# Patient Record
Sex: MALE | Race: WHITE | NOT HISPANIC OR LATINO | Employment: OTHER | URBAN - METROPOLITAN AREA
[De-identification: names, ages, dates, MRNs, and addresses within clinical notes are randomized per-mention and may not be internally consistent; named-entity substitution may affect disease eponyms.]

---

## 2017-10-16 ENCOUNTER — GENERIC CONVERSION - ENCOUNTER (OUTPATIENT)
Dept: OTHER | Facility: OTHER | Age: 62
End: 2017-10-16

## 2017-10-16 DIAGNOSIS — I10 ESSENTIAL (PRIMARY) HYPERTENSION: ICD-10-CM

## 2018-01-03 ENCOUNTER — GENERIC CONVERSION - ENCOUNTER (OUTPATIENT)
Dept: INTERNAL MEDICINE CLINIC | Age: 63
End: 2018-01-03

## 2018-01-22 VITALS
DIASTOLIC BLOOD PRESSURE: 86 MMHG | OXYGEN SATURATION: 98 % | WEIGHT: 176.8 LBS | TEMPERATURE: 99.2 F | SYSTOLIC BLOOD PRESSURE: 130 MMHG | BODY MASS INDEX: 27.75 KG/M2 | HEART RATE: 98 BPM | HEIGHT: 67 IN

## 2018-05-30 DIAGNOSIS — I10 HYPERTENSION, UNSPECIFIED TYPE: Primary | ICD-10-CM

## 2018-05-30 RX ORDER — DILTIAZEM HYDROCHLORIDE 180 MG/1
1 CAPSULE, EXTENDED RELEASE ORAL DAILY
COMMUNITY
End: 2018-05-30 | Stop reason: SDUPTHER

## 2018-05-31 RX ORDER — DILTIAZEM HYDROCHLORIDE 180 MG/1
180 CAPSULE, EXTENDED RELEASE ORAL DAILY
Qty: 180 CAPSULE | Refills: 0 | Status: SHIPPED | OUTPATIENT
Start: 2018-05-31 | End: 2018-10-30 | Stop reason: SDUPTHER

## 2018-06-04 LAB
ALBUMIN SERPL-MCNC: 4.6 G/DL (ref 3.6–4.8)
ALBUMIN/GLOB SERPL: 1.9 {RATIO} (ref 1.2–2.2)
ALP SERPL-CCNC: 86 IU/L (ref 39–117)
ALT SERPL-CCNC: 21 IU/L (ref 0–44)
AMBIG ABBREV DEFAULT: NORMAL
AMBIG ABBREV DEFAULT: NORMAL
AST SERPL-CCNC: 25 IU/L (ref 0–40)
BASOPHILS # BLD AUTO: 0.1 X10E3/UL (ref 0–0.2)
BASOPHILS NFR BLD AUTO: 1 %
BILIRUB SERPL-MCNC: 0.3 MG/DL (ref 0–1.2)
BUN SERPL-MCNC: 18 MG/DL (ref 8–27)
BUN/CREAT SERPL: 14 (ref 10–24)
CALCIUM SERPL-MCNC: 9.9 MG/DL (ref 8.6–10.2)
CHLORIDE SERPL-SCNC: 100 MMOL/L (ref 96–106)
CHOLEST SERPL-MCNC: 230 MG/DL (ref 100–199)
CO2 SERPL-SCNC: 26 MMOL/L (ref 18–29)
CREAT SERPL-MCNC: 1.28 MG/DL (ref 0.76–1.27)
EOSINOPHIL # BLD AUTO: 0.2 X10E3/UL (ref 0–0.4)
EOSINOPHIL NFR BLD AUTO: 3 %
ERYTHROCYTE [DISTWIDTH] IN BLOOD BY AUTOMATED COUNT: 14.1 % (ref 12.3–15.4)
GLOBULIN SER-MCNC: 2.4 G/DL (ref 1.5–4.5)
GLUCOSE SERPL-MCNC: 89 MG/DL (ref 65–99)
HCT VFR BLD AUTO: 50.3 % (ref 37.5–51)
HDLC SERPL-MCNC: 63 MG/DL
HGB BLD-MCNC: 16.8 G/DL (ref 13–17.7)
IMM GRANULOCYTES # BLD: 0 X10E3/UL (ref 0–0.1)
IMM GRANULOCYTES NFR BLD: 0 %
LDLC SERPL CALC-MCNC: 147 MG/DL (ref 0–99)
LYMPHOCYTES # BLD AUTO: 2.9 X10E3/UL (ref 0.7–3.1)
LYMPHOCYTES NFR BLD AUTO: 42 %
MCH RBC QN AUTO: 31.1 PG (ref 26.6–33)
MCHC RBC AUTO-ENTMCNC: 33.4 G/DL (ref 31.5–35.7)
MCV RBC AUTO: 93 FL (ref 79–97)
MONOCYTES # BLD AUTO: 0.7 X10E3/UL (ref 0.1–0.9)
MONOCYTES NFR BLD AUTO: 10 %
NEUTROPHILS # BLD AUTO: 3 X10E3/UL (ref 1.4–7)
NEUTROPHILS NFR BLD AUTO: 44 %
PLATELET # BLD AUTO: 315 X10E3/UL (ref 150–379)
POTASSIUM SERPL-SCNC: 4.9 MMOL/L (ref 3.5–5.2)
PROT SERPL-MCNC: 7 G/DL (ref 6–8.5)
RBC # BLD AUTO: 5.41 X10E6/UL (ref 4.14–5.8)
SL AMB EGFR AFRICAN AMERICAN: 69 ML/MIN/1.73
SL AMB EGFR NON AFRICAN AMERICAN: 60 ML/MIN/1.73
SL AMB VLDL CHOLESTEROL CALC: 20 MG/DL (ref 5–40)
SODIUM SERPL-SCNC: 142 MMOL/L (ref 134–144)
TRIGL SERPL-MCNC: 99 MG/DL (ref 0–149)
TSH SERPL DL<=0.005 MIU/L-ACNC: 5.7 UIU/ML (ref 0.45–4.5)
WBC # BLD AUTO: 6.9 X10E3/UL (ref 3.4–10.8)

## 2018-06-13 ENCOUNTER — OFFICE VISIT (OUTPATIENT)
Dept: INTERNAL MEDICINE CLINIC | Age: 63
End: 2018-06-13
Payer: COMMERCIAL

## 2018-06-13 VITALS
BODY MASS INDEX: 29 KG/M2 | SYSTOLIC BLOOD PRESSURE: 126 MMHG | TEMPERATURE: 97.7 F | OXYGEN SATURATION: 97 % | HEIGHT: 67 IN | HEART RATE: 77 BPM | DIASTOLIC BLOOD PRESSURE: 84 MMHG | WEIGHT: 184.8 LBS

## 2018-06-13 DIAGNOSIS — Z12.11 SCREENING FOR COLON CANCER: ICD-10-CM

## 2018-06-13 DIAGNOSIS — E03.8 SUBCLINICAL HYPOTHYROIDISM: ICD-10-CM

## 2018-06-13 DIAGNOSIS — E78.5 DYSLIPIDEMIA: Primary | ICD-10-CM

## 2018-06-13 PROBLEM — I10 BENIGN HYPERTENSION: Status: ACTIVE | Noted: 2017-10-16

## 2018-06-13 PROBLEM — E04.1 THYROID NODULE: Status: ACTIVE | Noted: 2017-11-06

## 2018-06-13 PROBLEM — L30.9 ECZEMA OF BOTH HANDS: Status: ACTIVE | Noted: 2017-10-16

## 2018-06-13 PROBLEM — L30.1 VESICULAR PALMOPLANTAR ECZEMA: Status: ACTIVE | Noted: 2017-10-16

## 2018-06-13 PROBLEM — L91.9 HYPERTROPHIC CONDITION OF SKIN: Status: ACTIVE | Noted: 2017-11-06

## 2018-06-13 PROBLEM — L85.1 ACQUIRED KERATODERMA: Status: ACTIVE | Noted: 2017-11-06

## 2018-06-13 PROBLEM — N40.0 BENIGN PROSTATIC HYPERPLASIA: Status: ACTIVE | Noted: 2017-11-06

## 2018-06-13 PROCEDURE — 3008F BODY MASS INDEX DOCD: CPT | Performed by: PHYSICIAN ASSISTANT

## 2018-06-13 PROCEDURE — 99214 OFFICE O/P EST MOD 30 MIN: CPT | Performed by: PHYSICIAN ASSISTANT

## 2018-06-13 RX ORDER — ASPIRIN 81 MG/1
1 TABLET ORAL DAILY
COMMUNITY

## 2018-06-13 RX ORDER — LEVOTHYROXINE SODIUM 0.05 MG/1
50 TABLET ORAL DAILY
Qty: 30 TABLET | Refills: 4 | Status: SHIPPED | OUTPATIENT
Start: 2018-06-13 | End: 2018-10-30 | Stop reason: SDUPTHER

## 2018-06-13 RX ORDER — BUDESONIDE AND FORMOTEROL FUMARATE DIHYDRATE 160; 4.5 UG/1; UG/1
2 AEROSOL RESPIRATORY (INHALATION) 2 TIMES DAILY
COMMUNITY
Start: 2018-04-09

## 2018-06-13 RX ORDER — MOMETASONE FUROATE 50 UG/1
2 SPRAY, METERED NASAL DAILY
COMMUNITY

## 2018-06-13 RX ORDER — TRIAMCINOLONE ACETONIDE 1 MG/G
1 CREAM TOPICAL 2 TIMES DAILY
COMMUNITY
Start: 2017-10-16 | End: 2019-05-07 | Stop reason: ALTCHOICE

## 2018-06-13 RX ORDER — ALBUTEROL SULFATE 90 UG/1
90 AEROSOL, METERED RESPIRATORY (INHALATION) 2 TIMES DAILY PRN
COMMUNITY

## 2018-06-13 RX ORDER — SIMVASTATIN 10 MG
1 TABLET ORAL DAILY
COMMUNITY
End: 2019-05-07 | Stop reason: SINTOL

## 2018-06-13 RX ORDER — CYCLOSPORINE 0.5 MG/ML
0.05 EMULSION OPHTHALMIC 2 TIMES DAILY
COMMUNITY

## 2018-06-13 RX ORDER — DILTIAZEM HYDROCHLORIDE 180 MG/1
1 CAPSULE, EXTENDED RELEASE ORAL DAILY
COMMUNITY
Start: 2017-10-16 | End: 2018-06-13 | Stop reason: ALTCHOICE

## 2018-06-13 NOTE — PROGRESS NOTES
Assessment/Plan:         Diagnoses and all orders for this visit:    Dyslipidemia  Comments:  resume simvastatin 10mg daily     Orders:  -     Lipid panel; Future  -     AST; Future  -     ALT; Future    Screening for colon cancer  -     Ambulatory referral to Gastroenterology; Future    Subclinical hypothyroidism  Comments:  start sythroid 50mcg daily (take on empty stomach)  discussed proper way to take this   Orders:  -     TSH, 3rd generation; Future  -     levothyroxine 50 mcg tablet; Take 1 tablet (50 mcg total) by mouth daily    Other orders  -     Discontinue: diltiazem (DILACOR XR) 180 MG 24 hr capsule; Take 1 capsule by mouth daily  -     esomeprazole (NEXIUM) 20 mg capsule; Take 1 capsule by mouth daily  -     aspirin (ASPIR-81) 81 mg EC tablet; Take 1 tablet by mouth daily  -     SYMBICORT 160-4 5 MCG/ACT inhaler; Take 2 puffs by mouth 2 (two) times a day  -     mometasone (NASONEX) 50 mcg/act nasal spray; 2 sprays into each nostril daily  -     albuterol (PROAIR HFA) 90 mcg/act inhaler; Inhale 90 mcg 2 (two) times a day as needed  -     cycloSPORINE (RESTASIS) 0 05 % ophthalmic emulsion; Apply 2 46 application to eye 2 (two) times a day  -     Multiple Vitamins-Minerals (CENTRUM SILVER 50+MEN) TABS; Take 1 tablet by mouth daily  -     Ginger, Zingiber officinalis, (GINGER ROOT PO); Take 550 mg by mouth daily  -     simvastatin (ZOCOR) 10 mg tablet; Take 1 tablet by mouth daily  -     triamcinolone (KENALOG) 0 1 % cream; Apply 1 application topically 2 (two) times a day          Subjective:      Patient ID: Archana Donahue is a 58 y o  male      Dyslipidemia - total 230  Pt has not been taking simvastatin  He is concerned with taking this and drinking beer   States he drinks 1 beer / day at max when working however when he is off drinks 3-6 beers  Pt denies problems with past taking simvastatin     tsh elevated - has been historically mildly elevated  Pt does have fatigue but is on shift work  Has had u/s thyroid in past with no nodules    Pt followed by dr Leatha Blake (urology) and dr Jose Mojica (gi)           The following portions of the patient's history were reviewed and updated as appropriate: allergies, current medications, past family history, past medical history, past social history, past surgical history and problem list     Review of Systems   Constitutional: Positive for fatigue  Negative for appetite change, chills and fever  HENT: Negative for congestion, hearing loss and postnasal drip  Eyes: Negative for itching and visual disturbance  Respiratory: Negative for cough, shortness of breath and wheezing  Cardiovascular: Negative for chest pain, palpitations and leg swelling  Gastrointestinal: Negative for abdominal pain, constipation, diarrhea and nausea  Endocrine: Negative for polyuria  Genitourinary: Negative for dysuria, hematuria and urgency  Musculoskeletal: Negative for arthralgias, back pain, gait problem and myalgias  Skin: Negative for rash  Allergic/Immunologic: Negative for environmental allergies  Neurological: Negative for dizziness, speech difficulty, light-headedness and headaches  Hematological: Does not bruise/bleed easily  Psychiatric/Behavioral: Negative for sleep disturbance  The patient is not nervous/anxious            Past Medical History:   Diagnosis Date    Asthma     Last Assessed:10/16/17    Fontanez's esophagus     Colonic polyp     Last Assessed:10/16/17    Eczema     GERD (gastroesophageal reflux disease)     Last Assessed:10/16/17    High cholesterol     Last Assessed:10/16/17    Hypertension     Last Assessed:10/16/17    Non-neoplastic nevus     Last Assessed:11/16/17         Current Outpatient Prescriptions:     albuterol (PROAIR HFA) 90 mcg/act inhaler, Inhale 90 mcg 2 (two) times a day as needed, Disp: , Rfl:     aspirin (ASPIR-81) 81 mg EC tablet, Take 1 tablet by mouth daily, Disp: , Rfl:     cycloSPORINE (RESTASIS) 0 05 % ophthalmic emulsion, Apply 2 04 application to eye 2 (two) times a day, Disp: , Rfl:     diltiazem (DILACOR XR) 180 MG 24 hr capsule, Take 1 capsule (180 mg total) by mouth daily, Disp: 180 capsule, Rfl: 0    esomeprazole (NEXIUM) 20 mg capsule, Take 1 capsule by mouth daily, Disp: , Rfl:     Marleny, Zingiber officinalis, (MARLENY ROOT PO), Take 550 mg by mouth daily, Disp: , Rfl:     mometasone (NASONEX) 50 mcg/act nasal spray, 2 sprays into each nostril daily, Disp: , Rfl:     Multiple Vitamins-Minerals (CENTRUM SILVER 50+MEN) TABS, Take 1 tablet by mouth daily, Disp: , Rfl:     SYMBICORT 160-4 5 MCG/ACT inhaler, Take 2 puffs by mouth 2 (two) times a day, Disp: , Rfl:     triamcinolone (KENALOG) 0 1 % cream, Apply 1 application topically 2 (two) times a day, Disp: , Rfl:     levothyroxine 50 mcg tablet, Take 1 tablet (50 mcg total) by mouth daily, Disp: 30 tablet, Rfl: 4    simvastatin (ZOCOR) 10 mg tablet, Take 1 tablet by mouth daily, Disp: , Rfl:     Allergies   Allergen Reactions    Amoxicillin      Other reaction(s): Diarrhea    Metoprolol      Annotation - [id number]: Aggravtes asthma    Penicillins      Annotation - [id number]: Diarrhea       Social History   Past Surgical History:   Procedure Laterality Date    COLONOSCOPY      10/2013- egd/colon    LYMPHADENECTOMY      Axillary Lymph node removed-benign    TONSILLECTOMY      Last Assessed:10/16/17    VALVE REPLACEMENT      Heart Valve Replacement; Last Assessed:10/16/17     Family History   Problem Relation Age of Onset    Cancer Father     Diabetes Paternal Grandfather        Objective:  /84 (BP Location: Left arm, Patient Position: Sitting, Cuff Size: Standard)   Pulse 77   Temp 97 7 °F (36 5 °C) (Tympanic)   Ht 5' 7" (1 702 m)   Wt 83 8 kg (184 lb 12 8 oz)   SpO2 97%   BMI 28 94 kg/m²        Physical Exam   Constitutional: He is oriented to person, place, and time  He appears well-developed and well-nourished     HENT:   Head: Normocephalic and atraumatic  Mouth/Throat: Oropharynx is clear and moist  No oropharyngeal exudate  Eyes: Conjunctivae and EOM are normal  Pupils are equal, round, and reactive to light  No scleral icterus  Neck: Normal range of motion  Neck supple  Cardiovascular: Normal rate, regular rhythm and normal heart sounds  No murmur heard  Pulmonary/Chest: Effort normal and breath sounds normal  No respiratory distress  He has no wheezes  He exhibits no tenderness  Musculoskeletal: Normal range of motion  He exhibits no edema  Neurological: He is alert and oriented to person, place, and time  No cranial nerve deficit  Skin: Skin is warm and dry  No rash noted  He is not diaphoretic  Psychiatric: He has a normal mood and affect  His behavior is normal  Thought content normal    Vitals reviewed

## 2018-10-08 DIAGNOSIS — E03.8 SUBCLINICAL HYPOTHYROIDISM: ICD-10-CM

## 2018-10-10 RX ORDER — LEVOTHYROXINE SODIUM 0.05 MG/1
TABLET ORAL
Qty: 30 TABLET | Refills: 4 | OUTPATIENT
Start: 2018-10-10

## 2018-10-29 ENCOUNTER — TELEPHONE (OUTPATIENT)
Dept: INTERNAL MEDICINE CLINIC | Age: 63
End: 2018-10-29

## 2018-10-29 LAB
ALT SERPL-CCNC: 21 IU/L (ref 0–44)
AST SERPL-CCNC: 27 IU/L (ref 0–40)
CHOLEST SERPL-MCNC: 273 MG/DL (ref 100–199)
HDLC SERPL-MCNC: 63 MG/DL
LABCORP COMMENT: NORMAL
LDLC SERPL CALC-MCNC: 170 MG/DL (ref 0–99)
SL AMB VLDL CHOLESTEROL CALC: 40 MG/DL (ref 5–40)
TRIGL SERPL-MCNC: 198 MG/DL (ref 0–149)
TSH SERPL DL<=0.005 MIU/L-ACNC: 1.13 UIU/ML (ref 0.45–4.5)

## 2018-10-30 ENCOUNTER — OFFICE VISIT (OUTPATIENT)
Dept: INTERNAL MEDICINE CLINIC | Age: 63
End: 2018-10-30
Payer: COMMERCIAL

## 2018-10-30 VITALS
WEIGHT: 184 LBS | HEART RATE: 77 BPM | BODY MASS INDEX: 28.88 KG/M2 | TEMPERATURE: 97.2 F | DIASTOLIC BLOOD PRESSURE: 70 MMHG | SYSTOLIC BLOOD PRESSURE: 114 MMHG | OXYGEN SATURATION: 96 % | HEIGHT: 67 IN

## 2018-10-30 DIAGNOSIS — R73.03 PREDIABETES: ICD-10-CM

## 2018-10-30 DIAGNOSIS — I10 HYPERTENSION, UNSPECIFIED TYPE: ICD-10-CM

## 2018-10-30 DIAGNOSIS — E03.8 SUBCLINICAL HYPOTHYROIDISM: ICD-10-CM

## 2018-10-30 DIAGNOSIS — Z82.49 FAMILY HISTORY OF CARDIAC DISORDER IN FATHER: ICD-10-CM

## 2018-10-30 DIAGNOSIS — E78.5 DYSLIPIDEMIA: Primary | ICD-10-CM

## 2018-10-30 DIAGNOSIS — Z23 NEED FOR INFLUENZA VACCINATION: ICD-10-CM

## 2018-10-30 PROCEDURE — 3078F DIAST BP <80 MM HG: CPT | Performed by: INTERNAL MEDICINE

## 2018-10-30 PROCEDURE — 1036F TOBACCO NON-USER: CPT | Performed by: INTERNAL MEDICINE

## 2018-10-30 PROCEDURE — 3008F BODY MASS INDEX DOCD: CPT | Performed by: INTERNAL MEDICINE

## 2018-10-30 PROCEDURE — 3074F SYST BP LT 130 MM HG: CPT | Performed by: INTERNAL MEDICINE

## 2018-10-30 PROCEDURE — 90471 IMMUNIZATION ADMIN: CPT

## 2018-10-30 PROCEDURE — 99214 OFFICE O/P EST MOD 30 MIN: CPT | Performed by: INTERNAL MEDICINE

## 2018-10-30 PROCEDURE — 90682 RIV4 VACC RECOMBINANT DNA IM: CPT

## 2018-10-30 RX ORDER — LEVOTHYROXINE SODIUM 0.05 MG/1
50 TABLET ORAL DAILY
Qty: 90 TABLET | Refills: 1 | Status: SHIPPED | OUTPATIENT
Start: 2018-10-30 | End: 2020-05-21

## 2018-10-30 RX ORDER — FOLIC ACID 1 MG/1
1 TABLET ORAL DAILY
Qty: 90 TABLET | Refills: 1 | Status: SHIPPED | OUTPATIENT
Start: 2018-10-30 | End: 2020-06-05

## 2018-10-30 RX ORDER — DILTIAZEM HYDROCHLORIDE 180 MG/1
180 CAPSULE, EXTENDED RELEASE ORAL DAILY
Qty: 90 CAPSULE | Refills: 1 | Status: SHIPPED | OUTPATIENT
Start: 2018-10-30 | End: 2019-05-07 | Stop reason: SDUPTHER

## 2018-10-30 NOTE — PATIENT INSTRUCTIONS
1  Reduce beer intake to one daily  2  Start to take your simvastatin regularly  3  Schedule a stress test to evaluate if you are able to resume long distance running, would recommend 150 minutes of persistent exercise per week  4  Continue to take Dilacor XR for your blood pressure  5  Take folic acid and thiamine daily  6  In regards to your cholesterol, modify your diet with no white starches, no concentrated sweets, no animal fats or at least have these in moderation  7  Complete blood work including hepatic function, renal function, hemoglobin a1c, and fasting glucose    8  Follow-up in 3 months

## 2018-10-30 NOTE — PROGRESS NOTES
Assessment/Plan:    1  Dyslipidemia  Patient was instructed to resume his simvastatin when he was last seen and he has continued to take simvastatin 10 mg although he does not take it regularly  Recent cholesterol was elevated at 273 and LDL was 170  I recommended that he reduce his alcohol intake to 1 beer per day and to take his simvastatin daily  He was given instructions to modify his diet so that he is refraining from white starches, concentrated sweets, and fatty meats  2  Hypothyroidism  TSH on 6/1/18 was elevated at 5 7 and patient was started on levothyroxine 50 mcg daily  He is to continue to take this  3  Hypertension  Blood pressure today in the office was stable at 114/70  He is to continue to take his Dilacor  mg    4  Family history of heart condition  Patient wants to resume vigorous exercise with long distance running  He has previously followed with Dr Elihue Harada of cardiology for chest pain but there were no issues found at that time  I would like him to have a stress test completed given his family history of cardiac conditions and his dyslipidemia  Discussed the patients case today with Dr Celsa Hinson who will contact the patient for a stress test    5  Health maintenance  Encouraged him to get 150 minutes of persistent exercise per week  He received his influenza vaccination today  He is to follow up in 3 months    Greater than 30 minutes were spent today reviewing the patient's lab work and discussing exercise and potential cardiac status with his cardiologist       Diagnoses and all orders for this visit:    Dyslipidemia  -     folic acid (FOLVITE) 1 mg tablet; Take 1 tablet (1 mg total) by mouth daily  -     thiamine 50 MG tablet; Take 1 tablet (50 mg total) by mouth daily  -     Hepatic function panel; Future  -     Renal function panel; Future  -     Glucose, fasting;  Future    Need for influenza vaccination  -     influenza vaccine, [id number], quadrivalent, recombinant, PF, 0 5 mL, for patients 18 yr+ (FLUBLOK)    Subclinical hypothyroidism  Comments:  start sythroid 50mcg daily (take on empty stomach)  discussed proper way to take this   Orders:  -     levothyroxine 50 mcg tablet; Take 1 tablet (50 mcg total) by mouth daily    Hypertension, unspecified type  -     diltiazem (DILACOR XR) 180 MG 24 hr capsule; Take 1 capsule (180 mg total) by mouth daily    Family history of cardiac disorder in father    Prediabetes  -     HEMOGLOBIN A1C W/ EAG ESTIMATION; Future          Subjective:      Patient ID: Joel Powers is a 61 y o  male  Ruben Hinson is a 61year old male who presents today as a 4 month follow-up regarding his dyslipidemia and hypothyroidism  He just recently retired in August this year and has been enjoying himself  He will be looking into starting new activities such as long distance running and possibly volunteering  If he does return to long distance running, he will ease into this  He was to resume his simvastatin but does not take it regularly because he drinks 3 beers per day and has concern for overloading his liver  He has family history of heart issues starting at age 54 and open heart surgery at age 76  He has previously seen Dr Elihue Harada at [facility] for chest pain a few years ago but there were no cardiac issues found  Recent lipid panel showed elevated cholesterol of 273 and elevated LDL of 170  He has annual eye examinations with no new eye problems  He states that he has had many events over the last several years and has been more emotional but denies a want to harm himself  His sleep has improved since he retired  Patient received his influenza vaccination today with no complication             The following portions of the patient's history were reviewed and updated as appropriate: allergies, current medications, past family history, past medical history, past social history, past surgical history and problem list     Review of Systems Constitutional: Negative for activity change, appetite change, chills, diaphoresis, fatigue, fever and unexpected weight change  Still  Drinking 3 drinks a day   HENT: Negative for congestion, dental problem, hearing loss, mouth sores, nosebleeds, postnasal drip, rhinorrhea, sinus pain and sinus pressure  Eyes: Negative  Respiratory: Negative for cough, choking, chest tightness, shortness of breath, wheezing and stridor  Cardiovascular: Negative for chest pain, palpitations and leg swelling  Gastrointestinal: Negative for abdominal distention, abdominal pain, anal bleeding, blood in stool, constipation, diarrhea, nausea, rectal pain and vomiting  Genitourinary: Negative for difficulty urinating, dysuria, frequency, hematuria and urgency  Skin: Negative for color change, pallor, rash and wound  Neurological: Negative for dizziness, speech difficulty, light-headedness, numbness and headaches  Psychiatric/Behavioral: Negative  Negative for agitation, confusion, dysphoric mood, hallucinations, self-injury, sleep disturbance and suicidal ideas  The patient is not nervous/anxious and is not hyperactive  Objective:      /70 (BP Location: Left arm, Patient Position: Sitting, Cuff Size: Standard)   Pulse 77   Temp (!) 97 2 °F (36 2 °C) (Tympanic)   Ht 5' 7" (1 702 m)   Wt 83 5 kg (184 lb)   SpO2 96%   BMI 28 82 kg/m²          Physical Exam   Constitutional: He is oriented to person, place, and time  He appears well-developed and well-nourished  No distress  HENT:   Head: Normocephalic and atraumatic  Eyes: Pupils are equal, round, and reactive to light  Conjunctivae and EOM are normal  Right eye exhibits no discharge  Left eye exhibits no discharge  No scleral icterus  Neck: Normal range of motion  Neck supple  No JVD present  No tracheal deviation present  No thyromegaly present     Cardiovascular: Normal rate, regular rhythm, normal heart sounds and intact distal pulses  No murmur heard  Pulmonary/Chest: Effort normal and breath sounds normal  No stridor  No respiratory distress  He has no wheezes  He has no rales  He exhibits no tenderness  Musculoskeletal: He exhibits no edema, tenderness or deformity  Lymphadenopathy:     He has no cervical adenopathy  Neurological: He is oriented to person, place, and time  He has normal reflexes  He displays normal reflexes  No cranial nerve deficit  He exhibits normal muscle tone  Coordination normal    Skin: Skin is warm and dry  No rash noted  He is not diaphoretic  No erythema  No pallor  Psychiatric: He has a normal mood and affect  His behavior is normal  Judgment and thought content normal          Attestation:   By signing my name below, Bobo BAKER, attest that this documentation has been prepared under the direction and in the presence of Giselle Hernandez MD  Electronically Signed: Kaity Read  10/30/18     Giselle BAKER, personally performed the services described in this documentation  All medical record entries made by the ernstibe were at my direction and in my presence  I have reviewed the chart and discharge instructions and agree that the record reflects my personal performance and is accurate and complete    Giselle Hernandez MD  10/30/18

## 2018-11-28 ENCOUNTER — TELEPHONE (OUTPATIENT)
Dept: INTERNAL MEDICINE CLINIC | Age: 63
End: 2018-11-28

## 2018-11-28 NOTE — TELEPHONE ENCOUNTER
Dr Mercedes Horne from [facility] called about Evonne Hoff JASPER 55  Pt went in for a stress test that Dr Rodgers Cranker told him he should get if he wants to start running again, pt stated that Dr Rodgers Cranker picked up the phone and spoke to Dr Demarco Deleon from [facility] about it and they schedule an appointment for him   Now [facility] needs a script for that stress test      Mario Ojeda Can be reached at [phone]  Fax [phone]

## 2018-11-29 DIAGNOSIS — Z82.49 FAMILY HISTORY OF HEART DISEASE: Primary | ICD-10-CM

## 2019-01-23 LAB
ALBUMIN SERPL-MCNC: 4.1 G/DL (ref 3.6–4.8)
ALP SERPL-CCNC: 90 IU/L (ref 39–117)
ALT SERPL-CCNC: 17 IU/L (ref 0–44)
AST SERPL-CCNC: 25 IU/L (ref 0–40)
BILIRUB DIRECT SERPL-MCNC: 0.12 MG/DL (ref 0–0.4)
BILIRUB SERPL-MCNC: 0.3 MG/DL (ref 0–1.2)
BUN SERPL-MCNC: 18 MG/DL (ref 8–27)
BUN/CREAT SERPL: 15 (ref 10–24)
CALCIUM SERPL-MCNC: 9.3 MG/DL (ref 8.6–10.2)
CHLORIDE SERPL-SCNC: 99 MMOL/L (ref 96–106)
CO2 SERPL-SCNC: 26 MMOL/L (ref 20–29)
CREAT SERPL-MCNC: 1.23 MG/DL (ref 0.76–1.27)
EST. AVERAGE GLUCOSE BLD GHB EST-MCNC: 114 MG/DL
GLUCOSE SERPL-MCNC: 88 MG/DL (ref 65–99)
HBA1C MFR BLD: 5.6 % (ref 4.8–5.6)
LABCORP COMMENT: NORMAL
PHOSPHATE SERPL-MCNC: 3.1 MG/DL (ref 2.5–4.5)
POTASSIUM SERPL-SCNC: 4.9 MMOL/L (ref 3.5–5.2)
PROT SERPL-MCNC: 6.6 G/DL (ref 6–8.5)
SL AMB EGFR AFRICAN AMERICAN: 72 ML/MIN/1.73
SL AMB EGFR NON AFRICAN AMERICAN: 62 ML/MIN/1.73
SODIUM SERPL-SCNC: 139 MMOL/L (ref 134–144)

## 2019-01-30 ENCOUNTER — OFFICE VISIT (OUTPATIENT)
Dept: INTERNAL MEDICINE CLINIC | Age: 64
End: 2019-01-30
Payer: COMMERCIAL

## 2019-01-30 VITALS
OXYGEN SATURATION: 97 % | WEIGHT: 171.4 LBS | BODY MASS INDEX: 26.85 KG/M2 | HEART RATE: 81 BPM | TEMPERATURE: 97.4 F | SYSTOLIC BLOOD PRESSURE: 112 MMHG | DIASTOLIC BLOOD PRESSURE: 70 MMHG

## 2019-01-30 DIAGNOSIS — J45.20 MILD INTERMITTENT ASTHMA WITHOUT COMPLICATION: ICD-10-CM

## 2019-01-30 DIAGNOSIS — E03.9 HYPOTHYROIDISM, UNSPECIFIED TYPE: ICD-10-CM

## 2019-01-30 DIAGNOSIS — I10 ESSENTIAL HYPERTENSION: ICD-10-CM

## 2019-01-30 DIAGNOSIS — Z11.59 ENCOUNTER FOR HEPATITIS C SCREENING TEST FOR LOW RISK PATIENT: ICD-10-CM

## 2019-01-30 DIAGNOSIS — E78.5 DYSLIPIDEMIA: Primary | ICD-10-CM

## 2019-01-30 PROCEDURE — 1036F TOBACCO NON-USER: CPT | Performed by: INTERNAL MEDICINE

## 2019-01-30 PROCEDURE — 3078F DIAST BP <80 MM HG: CPT | Performed by: INTERNAL MEDICINE

## 2019-01-30 PROCEDURE — 99214 OFFICE O/P EST MOD 30 MIN: CPT | Performed by: INTERNAL MEDICINE

## 2019-01-30 PROCEDURE — 3074F SYST BP LT 130 MM HG: CPT | Performed by: INTERNAL MEDICINE

## 2019-01-30 NOTE — PATIENT INSTRUCTIONS
1  He will have a lipid panel drawn prior to his next appointment  2  He will have a TSH and T4 level drawn prior to his next appointment  3  He will have a CMP drawn prior to his next appointment  4  He was advised to use OTC Nexium if he gets any exacerbation of his GERD  5  He will have a Hepatitis C screening completed as he is in the appropriate age range for this  6  He will follow up with us in 3-4 months or as needed

## 2019-01-30 NOTE — PROGRESS NOTES
Assessment/Plan:    1  Dyslipidemia  He states he discontinued taking his simvastatin since he has started exercising  He will have a lipid panel drawn prior to his next appointment  2  Hypothyroidism  He states he discontinued his Levothyroxine since he has started exercising  He will have a TSH and T4 and thyroid antibodies panel drawn prior to his next appointment  3  Hypertension  His BP is stable at 112/70 today at the office  He is doing very well with his lifestyle changes and exercise  He should continue with this lifestyle  4  Prediabetes  His HgbA1C was normal at 5 6 today at the office  He will have a CMP drawn prior to his next appointment  5  GERD  He states his acid reflux has improved very much after decreasing his alcohol intake  He states he uses apple cider vinegar daily to improve this  He was advised to use OTC Nexium if he gets any exacerbation of his GERD  6  Encounter for Hepatitis C screening  He will have a Hepatitis C screening completed as he is in the appropriate age range for this  7  Healthcare Maintenance  He will follow up with us in 3-4 months or as needed  Diagnoses and all orders for this visit:    Dyslipidemia  -     Lipid panel; Future    Encounter for hepatitis C screening test for low risk patient  -     Hepatitis C antibody; Future    Hypothyroidism, unspecified type  -     TSH, 3rd generation with Free T4 reflex; Future  -     T4, free; Future  -     Thyroid Antibodies Panel; Future    Essential hypertension  -     Comprehensive metabolic panel; Future    Mild intermittent asthma without complication          Subjective:      Patient ID: Afshin Starr is a 61 y o  male  Sussy George is a 61year old male who presents today for a 3 month follow up regarding his dyslipidemia, hypothyroidism, and hypertension  He has no acute significant complaints and is doing very well overall  He lost 20 lbs and has made a huge lifestyle change   He is exercising regularly and changed his diet  He decreased his alcohol intake to 1 beer/week  He states when he started exercising, he was retaining a lot of water so he discontinued taking levothyroxine and simvastatin  He reports he had a cold recently with some wheezing but that has improved considerably since that has resolved  He continues to have asthma and gets exacerbated to some chemicals or environmental allergens  He reports he has dry hands  He followed this with his dermatologist who told him that this might be due to some of the soaps and alcohol sanitizers  He has been doing well since using lotion on his hands regularly  He denies any fever, fatigue, congestion, sinus problems, CV problems, GI and aodminal problems, ocular problems, urinary problems, musculoskeletal problems, neurological problems, and psychiatrist problems  The following portions of the patient's history were reviewed and updated as appropriate: allergies, current medications, past family history, past medical history, past social history, past surgical history and problem list     Review of Systems   Constitutional: Negative for appetite change, chills, diaphoresis, fatigue and fever  Unexpected weight change: lost 20 lbs  on diet  HENT: Negative for congestion, ear discharge, ear pain, facial swelling, hearing loss, mouth sores, rhinorrhea, sinus pain, sinus pressure, sneezing and sore throat  Eyes: Negative  Respiratory: Positive for wheezing (better)  Negative for cough, chest tightness and shortness of breath  Cardiovascular: Positive for leg swelling  Negative for chest pain and palpitations  Gastrointestinal: Negative for abdominal pain, blood in stool, constipation, diarrhea, nausea and vomiting  Genitourinary: Negative for hematuria  Musculoskeletal: Negative for arthralgias, back pain, gait problem, joint swelling, myalgias, neck pain and neck stiffness  Skin: Negative           Hands are dry   had mole  removed Allergic/Immunologic: Positive for environmental allergies  Neurological: Negative for dizziness, tremors, seizures, syncope, speech difficulty, weakness, light-headedness, numbness and headaches  Psychiatric/Behavioral: Negative  Objective:      /70 (BP Location: Left arm, Patient Position: Sitting, Cuff Size: Standard)   Pulse 81   Temp (!) 97 4 °F (36 3 °C) (Tympanic)   Wt 77 7 kg (171 lb 6 4 oz) Comment: shoes on  SpO2 97%   BMI 26 85 kg/m²          Physical Exam   Constitutional: He is oriented to person, place, and time  He appears well-developed and well-nourished  No distress  HENT:   Head: Normocephalic and atraumatic  Mouth/Throat: No oropharyngeal exudate  Eyes: Conjunctivae and EOM are normal  Right eye exhibits no discharge  Left eye exhibits no discharge  No scleral icterus  Neck: Normal range of motion  Neck supple  No thyromegaly present  Cardiovascular: Normal rate and regular rhythm  Exam reveals no gallop and no friction rub  No murmur heard  Pulmonary/Chest: Effort normal and breath sounds normal  No respiratory distress  He has no wheezes  He has no rales  Abdominal: Soft  Bowel sounds are normal  He exhibits no distension  There is no tenderness  There is no rebound and no guarding  Musculoskeletal: He exhibits no edema, tenderness or deformity  Neurological: He is alert and oriented to person, place, and time  He has normal reflexes  He displays normal reflexes  No cranial nerve deficit  Coordination normal    Skin: Skin is warm and dry  No rash noted  He is not diaphoretic  No erythema  No pallor  Psychiatric: He has a normal mood and affect  His behavior is normal  Judgment and thought content normal          Scribe Signature and Attestation:  By signing my name below, Blayne Kramer attest that this documentation has been prepared under the direction and in the presence of Dr Ladan Horan MD  Electronically signed: Kaity Roberson  1/30/2019    Provider Attestation:  I, Dr Mayr Webb, personally performed the services described in this documentation  All medical record entries made by the scribe were at my direction and in my presence  I have reviewed the chart and discharge instructions (if applicable) and agree that the record reflects my personal performance and is accurate and complete  Dr Mary Webb MD  1/30/2019

## 2019-05-06 LAB
ALBUMIN SERPL-MCNC: 4.6 G/DL (ref 3.6–4.8)
ALBUMIN/GLOB SERPL: 2.1 {RATIO} (ref 1.2–2.2)
ALP SERPL-CCNC: 77 IU/L (ref 39–117)
ALT SERPL-CCNC: 17 IU/L (ref 0–44)
AST SERPL-CCNC: 24 IU/L (ref 0–40)
BILIRUB SERPL-MCNC: 0.3 MG/DL (ref 0–1.2)
BUN SERPL-MCNC: 17 MG/DL (ref 8–27)
BUN/CREAT SERPL: 15 (ref 10–24)
CALCIUM SERPL-MCNC: 9.2 MG/DL (ref 8.6–10.2)
CHLORIDE SERPL-SCNC: 100 MMOL/L (ref 96–106)
CHOLEST SERPL-MCNC: 208 MG/DL (ref 100–199)
CO2 SERPL-SCNC: 24 MMOL/L (ref 20–29)
CREAT SERPL-MCNC: 1.17 MG/DL (ref 0.76–1.27)
GLOBULIN SER-MCNC: 2.2 G/DL (ref 1.5–4.5)
GLUCOSE SERPL-MCNC: 93 MG/DL (ref 65–99)
HCV AB S/CO SERPL IA: 0.3 S/CO RATIO (ref 0–0.9)
HDLC SERPL-MCNC: 67 MG/DL
LABCORP COMMENT: NORMAL
LDLC SERPL CALC-MCNC: 124 MG/DL (ref 0–99)
POTASSIUM SERPL-SCNC: 4.3 MMOL/L (ref 3.5–5.2)
PROT SERPL-MCNC: 6.8 G/DL (ref 6–8.5)
SL AMB EGFR AFRICAN AMERICAN: 76 ML/MIN/1.73
SL AMB EGFR NON AFRICAN AMERICAN: 66 ML/MIN/1.73
SL AMB VLDL CHOLESTEROL CALC: 17 MG/DL (ref 5–40)
SODIUM SERPL-SCNC: 139 MMOL/L (ref 134–144)
T4 FREE SERPL-MCNC: 0.9 NG/DL (ref 0.82–1.77)
THYROGLOB AB SERPL-ACNC: <1 IU/ML (ref 0–0.9)
THYROPEROXIDASE AB SERPL-ACNC: 10 IU/ML (ref 0–34)
TRIGL SERPL-MCNC: 84 MG/DL (ref 0–149)
TSH SERPL DL<=0.005 MIU/L-ACNC: 2.57 UIU/ML (ref 0.45–4.5)

## 2019-05-07 ENCOUNTER — OFFICE VISIT (OUTPATIENT)
Dept: INTERNAL MEDICINE CLINIC | Age: 64
End: 2019-05-07
Payer: COMMERCIAL

## 2019-05-07 VITALS
BODY MASS INDEX: 26.06 KG/M2 | WEIGHT: 166.4 LBS | TEMPERATURE: 97 F | SYSTOLIC BLOOD PRESSURE: 112 MMHG | DIASTOLIC BLOOD PRESSURE: 76 MMHG | OXYGEN SATURATION: 97 % | HEART RATE: 68 BPM

## 2019-05-07 DIAGNOSIS — E78.5 DYSLIPIDEMIA: Primary | ICD-10-CM

## 2019-05-07 DIAGNOSIS — Z12.11 SCREENING FOR COLON CANCER: ICD-10-CM

## 2019-05-07 DIAGNOSIS — I10 HYPERTENSION, UNSPECIFIED TYPE: ICD-10-CM

## 2019-05-07 DIAGNOSIS — E03.9 HYPOTHYROIDISM, UNSPECIFIED TYPE: ICD-10-CM

## 2019-05-07 PROCEDURE — 99214 OFFICE O/P EST MOD 30 MIN: CPT | Performed by: INTERNAL MEDICINE

## 2019-05-07 PROCEDURE — 3078F DIAST BP <80 MM HG: CPT | Performed by: INTERNAL MEDICINE

## 2019-05-07 PROCEDURE — 1036F TOBACCO NON-USER: CPT | Performed by: INTERNAL MEDICINE

## 2019-05-07 PROCEDURE — 3074F SYST BP LT 130 MM HG: CPT | Performed by: INTERNAL MEDICINE

## 2019-05-07 RX ORDER — DILTIAZEM HYDROCHLORIDE 120 MG/1
120 CAPSULE, EXTENDED RELEASE ORAL DAILY
Qty: 90 CAPSULE | Refills: 1 | Status: SHIPPED | OUTPATIENT
Start: 2019-05-07 | End: 2019-10-08 | Stop reason: SDUPTHER

## 2019-08-29 LAB
ALBUMIN SERPL-MCNC: 4.2 G/DL (ref 3.6–4.8)
ALBUMIN/GLOB SERPL: 1.8 {RATIO} (ref 1.2–2.2)
ALP SERPL-CCNC: 73 IU/L (ref 39–117)
ALT SERPL-CCNC: 15 IU/L (ref 0–44)
AST SERPL-CCNC: 23 IU/L (ref 0–40)
BILIRUB SERPL-MCNC: 0.3 MG/DL (ref 0–1.2)
BUN SERPL-MCNC: 21 MG/DL (ref 8–27)
BUN/CREAT SERPL: 18 (ref 10–24)
CALCIUM SERPL-MCNC: 9.5 MG/DL (ref 8.6–10.2)
CHLORIDE SERPL-SCNC: 101 MMOL/L (ref 96–106)
CHOLEST SERPL-MCNC: 210 MG/DL (ref 100–199)
CO2 SERPL-SCNC: 24 MMOL/L (ref 20–29)
CREAT SERPL-MCNC: 1.19 MG/DL (ref 0.76–1.27)
GLOBULIN SER-MCNC: 2.3 G/DL (ref 1.5–4.5)
GLUCOSE SERPL-MCNC: 86 MG/DL (ref 65–99)
HDLC SERPL-MCNC: 63 MG/DL
LDLC SERPL CALC-MCNC: 131 MG/DL (ref 0–99)
POTASSIUM SERPL-SCNC: 5 MMOL/L (ref 3.5–5.2)
PROT SERPL-MCNC: 6.5 G/DL (ref 6–8.5)
SL AMB EGFR AFRICAN AMERICAN: 74 ML/MIN/1.73
SL AMB EGFR NON AFRICAN AMERICAN: 64 ML/MIN/1.73
SL AMB VLDL CHOLESTEROL CALC: 16 MG/DL (ref 5–40)
SODIUM SERPL-SCNC: 139 MMOL/L (ref 134–144)
TRIGL SERPL-MCNC: 82 MG/DL (ref 0–149)
TSH SERPL DL<=0.005 MIU/L-ACNC: 2.19 UIU/ML (ref 0.45–4.5)

## 2019-09-05 ENCOUNTER — OFFICE VISIT (OUTPATIENT)
Dept: INTERNAL MEDICINE CLINIC | Age: 64
End: 2019-09-05
Payer: COMMERCIAL

## 2019-09-05 VITALS
DIASTOLIC BLOOD PRESSURE: 80 MMHG | TEMPERATURE: 97.1 F | SYSTOLIC BLOOD PRESSURE: 118 MMHG | BODY MASS INDEX: 26.06 KG/M2 | HEART RATE: 72 BPM | WEIGHT: 166.4 LBS | OXYGEN SATURATION: 97 %

## 2019-09-05 DIAGNOSIS — Z12.11 SCREENING FOR COLON CANCER: ICD-10-CM

## 2019-09-05 DIAGNOSIS — E78.5 DYSLIPIDEMIA: ICD-10-CM

## 2019-09-05 DIAGNOSIS — Z86.010 HX OF COLONIC POLYP: Primary | ICD-10-CM

## 2019-09-05 DIAGNOSIS — E78.2 MIXED HYPERLIPIDEMIA: ICD-10-CM

## 2019-09-05 DIAGNOSIS — I10 BENIGN HYPERTENSION: ICD-10-CM

## 2019-09-05 DIAGNOSIS — E03.8 SUBCLINICAL HYPOTHYROIDISM: ICD-10-CM

## 2019-09-05 DIAGNOSIS — L85.1 ACQUIRED KERATODERMA: ICD-10-CM

## 2019-09-05 DIAGNOSIS — E04.1 THYROID NODULE: ICD-10-CM

## 2019-09-05 DIAGNOSIS — N40.0 BENIGN PROSTATIC HYPERPLASIA WITHOUT LOWER URINARY TRACT SYMPTOMS: ICD-10-CM

## 2019-09-05 PROCEDURE — 3079F DIAST BP 80-89 MM HG: CPT | Performed by: INTERNAL MEDICINE

## 2019-09-05 PROCEDURE — 3074F SYST BP LT 130 MM HG: CPT | Performed by: INTERNAL MEDICINE

## 2019-09-05 PROCEDURE — 99213 OFFICE O/P EST LOW 20 MIN: CPT | Performed by: INTERNAL MEDICINE

## 2019-09-05 NOTE — PROGRESS NOTES
Assessment/Plan:    1  Subclinical hypothyroidism  TSH has been stable at approximaetley 2 5  His T4 has remained normal  In addition, he has a thyroid nodule which needs to be evaluated  Thyroid ultrasound ordered  We will recheck his TSH next visit  He will continue to stay off his medication for now  2  Dyslipidemia  His total cholesterol was slightly elevated at 210, triglycerides were normal at 82, HDL was normal at 63, and LDL was slightly elevated at 131 on 8/28/19  He was advised to minimize fatty foods and red meats and increase natural foods in his diet  Instead, he should eat lean meats, fatty fish, and flax seed in his diet  He was encouraged to minimize his white starches, carbohydrates, and concentrated sugars from his diet  We will recheck lipids on his diet  3  Colonoscopy  Patient will schedule colonoscopy and upper GI with his gastroenterologist    4  Hypertension  His BP is well controlled at 118/80 today at the office  Pt should continue on his medications  5  Prostate  He is being followed by his doctor  He does not seem to be having any issues  He was advised to drink electrolyte drinks to make sure he is hydrated  6  BMI  His BMI was 26 06 today in the office  7  Healthcare Maintenance  His Hepatitis C antibody was negative  He will follow up with us in 4 months or as needed  Diagnoses and all orders for this visit:    Hx of colonic polyp  -     Ambulatory referral to Gastroenterology; Future    Screening for colon cancer    Subclinical hypothyroidism    Thyroid nodule  -     US thyroid; Future    Benign hypertension    Acquired keratoderma    Dyslipidemia    Mixed hyperlipidemia          Subjective:      Patient ID: Tiffanie Antonio is a 59 y o  male  Tiffanie Antonio is a 59 y o  Male presented in the office for a 4 m f/u  Patient has subclinical hypothyroidism and hyperlipidemia   He is asymptomatic from these problems, Pt states he limits his alcohol to two beers a day from a couple packs  Patient has Barrets esophagitis therefore will need follow up endoscopy in the near future  Patient had colonic polyps and needs a colonoscopy within three years of his last exam  Patient has known thyroid nodules which he states on his last ultrasound, he was told "it was fine "     Patients lab results were reviewed and discussed with the patient  The following portions of the patient's history were reviewed and updated as appropriate: allergies, current medications, past family history, past medical history, past social history, past surgical history and problem list     Review of Systems   Constitutional: Negative  Negative for activity change, appetite change, chills, diaphoresis, fatigue, fever and unexpected weight change  HENT: Positive for postnasal drip and rhinorrhea  Negative for congestion, drooling, ear discharge, ear pain, mouth sores, nosebleeds, sinus pressure, sinus pain, sneezing and sore throat  Eyes: Negative for pain, discharge, redness and itching  Respiratory: Negative for apnea, cough, choking, chest tightness, shortness of breath and stridor  Cardiovascular: Negative for palpitations and leg swelling  Gastrointestinal: Negative for abdominal distention, abdominal pain, blood in stool, constipation, nausea, rectal pain and vomiting  Genitourinary: Negative for difficulty urinating, dysuria, frequency, testicular pain and urgency  Musculoskeletal: Negative for arthralgias, back pain, gait problem, joint swelling, myalgias, neck pain and neck stiffness  Skin: Negative  Allergic/Immunologic: Positive for environmental allergies  Neurological: Negative for dizziness, seizures, syncope, speech difficulty, weakness, light-headedness, numbness and headaches  Hematological: Negative for adenopathy  Does not bruise/bleed easily     Psychiatric/Behavioral: Negative for agitation, behavioral problems, decreased concentration, hallucinations, self-injury and suicidal ideas  All other systems reviewed and are negative  Objective:      /80 (BP Location: Left arm, Patient Position: Sitting, Cuff Size: Standard)   Pulse 72   Temp (!) 97 1 °F (36 2 °C) (Tympanic)   Wt 75 5 kg (166 lb 6 4 oz)   SpO2 97%   BMI 26 06 kg/m²          Physical Exam   Constitutional: He is oriented to person, place, and time  He appears well-developed and well-nourished  No distress  HENT:   Head: Normocephalic and atraumatic  Nose: Nose normal    Mouth/Throat: Oropharynx is clear and moist  No oropharyngeal exudate  Eyes: EOM are normal  Right eye exhibits no discharge  Left eye exhibits no discharge  No scleral icterus  Neck: Normal range of motion  Neck supple  No JVD present  No thyromegaly present  Cardiovascular: Normal rate, regular rhythm and normal heart sounds  Exam reveals no friction rub  No murmur heard  Pulmonary/Chest: Effort normal and breath sounds normal  He has no wheezes  He exhibits no tenderness  Hx  Of asthma   Abdominal: Soft  Bowel sounds are normal  He exhibits no distension and no mass  There is no tenderness  There is no guarding  Musculoskeletal: Normal range of motion  He exhibits no edema, tenderness or deformity  Neurological: He is alert and oriented to person, place, and time  He displays normal reflexes  He exhibits normal muscle tone  Skin: Skin is warm and dry  No rash noted  He is not diaphoretic  No erythema  Psychiatric: He has a normal mood and affect  His behavior is normal  Judgment and thought content normal          Attestation:   By signing my name below, Jenniferluis fernando Haynes, attest that this documentation has been prepared under the direction and in the presence of Keshia Desai MD  Electronically Signed: Edith Freitas  8/27/19      I, Keshia Desai, personally performed the services described in this documentation  All medical record entries made by the edith were at my direction and in my presence   I have reviewed the chart and discharge instructions and agree that the record reflects my personal performance and is accurate and complete   Price Alvarado MD  8/27/19

## 2019-09-05 NOTE — PATIENT INSTRUCTIONS
1  Schedule colonscopy and upper GI endoscopy  2  Schedule appointment with GI, Dr Paul Mendoza  3  Complete all blood work by next visit  4  Stay on same medications  5  Continue to minimize fatty foods and red meats and increase natural foods in his diet  6  Instead, he should eat lean meats, fatty fish, and flax seed in his diet  7  Minimize his white starches, carbohydrates, and concentrated sugars from his diet  8  Follow up in 4 months or as needed  Cholesterol and Your Health   GENERAL INFORMATION:   What is cholesterol? Cholesterol is a waxy, fat-like substance that is made by your body  Cholesterol also comes from the foods you eat  Your body needs this fat to work properly, but high levels of cholesterol can lead to health problems  What are the risks of unhealthy cholesterol levels? Cholesterol can build up in your arteries and form plaque  As plaque builds up, your arteries become narrow, and less blood flows through  When plaque decreases blood flow to your heart, you may have chest pain  If plaque completely blocks an artery that carries blood to your heart, you may have a heart attack  Plaque buildup can also increase your risk of a stroke  How are unhealthy levels of cholesterol managed? Your healthcare provider will use blood tests to measure your levels of triglycerides, LDL (bad cholesterol), and HDL (good cholesterol) He will ask if you or your family members have other health conditions, such as diabetes, high blood pressure, or thyroid disease  He will also ask if you smoke  He will recommend treatments to help to decrease your risk of heart disease, heart attack, and stroke  Treatment includes changes to the foods you eat and lifestyle changes  You may also need medicine to decrease your cholesterol levels  How does food affect my cholesterol levels? · Unhealthy fats increase LDL cholesterol levels in your blood   These fats are found in foods that are high in cholesterol, saturated fat, and trans fat  ? Cholesterol is found in eggs, dairy, and meat  ? Saturated fat is found in butter, cheese, ice cream, whole milk, and coconut oil  Saturated fat is also found in meat, such as sausage, hot dogs, and bologna  ? Trans fat is found in liquid vegetable oils and is used in fried and baked foods  Foods that contain trans fats include chips, crackers, muffins, sweet rolls, microwave popcorn, and cookies  · Healthy fats, also called unsaturated fats, help lower LDL cholesterol and triglyceride levels  ? Monounsaturated fats are found in foods such as olive oil, canola oil, avocado, nuts, and olives  ? Polyunsaturated fats, such as omega 3 fats, help to decrease triglyceride levels  Omega 3 fats are found in fish, such as salmon, trout, and tuna  They can also be found in plant foods such as flaxseed, walnuts, and soybeans  What changes can I make to the foods I eat? · Decrease the total amount of fat you eat  Choose lean meats, fat-free or 1% fat milk, and low-fat dairy products, such as yogurt and cheese  · Replace unhealthy fats with healthy fats  Cook foods in olive oil or canola oil  Choose soft margarines that are low in saturated fat and have little or no trans fat  · Include fish in your diet  Eat 2 servings of fish each week  One serving is about 4 ounces  Fish is a good source of healthy omega 3 fats  Hyder and canned light tuna are fish with low levels of mercury  Children and pregnant women should not eat fish that have high levels of mercury, such as shark, swordfish, and arthur mackerel  · Eat a variety of fruits and vegetables  Include dark-colored fruits and vegetables such as peaches, berries, spinach, and carrots  They are good sources of vitamins that are important for good health    · Eat more fiber  Choose whole grain, high-fiber foods to decrease LDL cholesterol   Good choices include legumes, oats, apples, and brussel sprouts  Oranges, carrots, and whole-wheat breads or cereals are also good choices  What lifestyle changes can I make to improve my cholesterol levels? · Maintain a healthy weight  Ask your healthcare provider how much you should weigh  Ask him to help you create a weight loss plan if you are overweight  Weight loss can decrease your total cholesterol and triglyceride levels  Decrease the amount of calories you eat by 500 calories a day to help you lose weight  You can decrease calories by eating smaller portions for each meal and eating fewer high-calorie foods  · Exercise regularly to lower your cholesterol levels and maintain a healthy weight  Get 30 minutes or more of moderate exercise 5 days each week  Include muscle strengthening activities 2 days each week, such as push-ups, sit-ups, and lifting weights  To lose weight, get at least 60 minutes of exercise on most days of the week  Work with your healthcare provider to plan the best exercise program for you  CARE AGREEMENT:   You have the right to help plan your care  Discuss treatment options with your caregivers to decide what care you want to receive  You always have the right to refuse treatment  The above information is an  only  It is not intended as medical advice for individual conditions or treatments  Talk to your doctor, nurse or pharmacist before following any medical regimen to see if it is safe and effective for you  © 2014 [address] is for End User's use only and may not be sold, redistributed or otherwise used for commercial purposes  All illustrations and images included in CareNotes® are the copyrighted property of [employer]  or [employer]

## 2019-09-19 ENCOUNTER — OFFICE VISIT (OUTPATIENT)
Dept: INTERNAL MEDICINE CLINIC | Age: 64
End: 2019-09-19
Payer: COMMERCIAL

## 2019-09-19 ENCOUNTER — HOSPITAL ENCOUNTER (OUTPATIENT)
Dept: ULTRASOUND IMAGING | Facility: HOSPITAL | Age: 64
Discharge: HOME | End: 2019-09-19
Payer: COMMERCIAL

## 2019-09-19 VITALS
DIASTOLIC BLOOD PRESSURE: 84 MMHG | SYSTOLIC BLOOD PRESSURE: 128 MMHG | WEIGHT: 168.4 LBS | BODY MASS INDEX: 26.38 KG/M2 | HEART RATE: 68 BPM | OXYGEN SATURATION: 98 % | TEMPERATURE: 96.6 F

## 2019-09-19 DIAGNOSIS — S30.811A ABRASION OF FLANK, INITIAL ENCOUNTER: Primary | ICD-10-CM

## 2019-09-19 DIAGNOSIS — N40.1 BENIGN PROSTATIC HYPERPLASIA (BPH) WITH STRAINING ON URINATION: ICD-10-CM

## 2019-09-19 DIAGNOSIS — R10.9 RIGHT FLANK PAIN: ICD-10-CM

## 2019-09-19 DIAGNOSIS — R39.9 UTI SYMPTOMS: ICD-10-CM

## 2019-09-19 DIAGNOSIS — R39.16 BENIGN PROSTATIC HYPERPLASIA (BPH) WITH STRAINING ON URINATION: ICD-10-CM

## 2019-09-19 DIAGNOSIS — E66.3 OVERWEIGHT (BMI 25.0-29.9): ICD-10-CM

## 2019-09-19 DIAGNOSIS — E04.1 THYROID NODULE: ICD-10-CM

## 2019-09-19 DIAGNOSIS — N39.43 DRIBBLING FOLLOWING URINATION: ICD-10-CM

## 2019-09-19 PROCEDURE — 99214 OFFICE O/P EST MOD 30 MIN: CPT | Performed by: INTERNAL MEDICINE

## 2019-09-19 PROCEDURE — 76536 US EXAM OF HEAD AND NECK: CPT

## 2019-09-19 NOTE — PROGRESS NOTES
Assessment/Plan:    Abrasion of right flank  - patient should keep the abrasion clean and dry  - he may use over-the-counter Tylenol for his pain    BPH  - patient's symptoms are concerning for BPH versus UTI versus kidney stone  -patient follows up with his urologist  - will check a PSA, free and total  - patient has been counseled to follow up with his urologist    UTI symptoms  - will check urinalysis, with microscopy and reflex culture and sensitivity  -will hold off on giving patient antibiotics till we confirm that there is an infection since his symptoms may also be secondary to BPH  - patient has been counseled to keep himself well hydrated  - will call patient with results    Overweight  - diet and exercise counseling given    BMI Counseling: Body mass index is 26 38 kg/m²  The BMI is above normal  Nutrition recommendations include consuming healthier snacks and moderation in carbohydrate intake  Exercise recommendations include moderate aerobic physical activity for 150 minutes/week  Diagnoses and all orders for this visit:    Abrasion of flank, initial encounter    Right flank pain  -     UA w Reflex to Microscopic w Reflex to Culture - Clinic Collect    UTI symptoms  -     UA w Reflex to Microscopic w Reflex to Culture - [place] following urination  -     PSA, total and free; Future    Benign prostatic hyperplasia (BPH) with straining on urination  -     PSA, total and free; Future    Overweight (BMI 25 0-29  9)          Subjective:      Patient ID: Jonh Hairston is a 59 y o  male  HPI  Patient presents with complaints of pain around his right flank for the past couple of days  He states that he might have scratched his back accidentally on 2 occasions in the past few days  He has not been using any medications for pain  He describes the pain as dull and grades it as 7/10 when pressure is applied  Pain is nonradiating    Pain is also worse when he lies down on his right side   He also admits to difficulty urinating, urinary frequency, small volume urine sometimes, and cloudy urine on 1 occasion recently but denies hematuria, dysuria, suprapubic pain, nausea, vomiting, abdominal pain, diarrhea, constipation  Of note, patient follows up with a urologist and states that he saw the urologist recently and his PSA was normal   He denies a family history of prostate hypertrophy and cancer and denies a personal and family history of kidney stones  Of note, patient states that sometimes he does not drink enough water and he spends some time in the sauna and he feels that he gets dehydrated sometimes  The following portions of the patient's history were reviewed and updated as appropriate:   He  has a past medical history of Asthma, Fontanez's esophagus, Colonic polyp, Eczema, GERD (gastroesophageal reflux disease), High cholesterol, Hypertension, and Non-neoplastic nevus  He   Patient Active Problem List    Diagnosis Date Noted    Right flank pain 09/19/2019    Abrasion of flank 09/19/2019    Overweight (BMI 25 0-29 9) 09/19/2019    Screening for colon cancer 06/13/2018    Acquired keratoderma 11/06/2017    Benign prostatic hyperplasia 11/06/2017    Dyslipidemia 11/06/2017    Hyperlipidemia 11/06/2017    Hypertrophic condition of skin 11/06/2017    Subclinical hypothyroidism 11/06/2017    Thyroid nodule 11/06/2017    Benign hypertension 10/16/2017    Vesicular palmoplantar eczema 10/16/2017    Eczema of both hands 10/16/2017     He  has a past surgical history that includes Lymphadenectomy; Colonoscopy; Valve replacement; and TONSILLECTOMY  His family history includes Cancer in his father; Diabetes in his paternal grandfather  He  reports that he quit smoking about 31 years ago  His smoking use included cigarettes  He has a 14 00 pack-year smoking history  He quit smokeless tobacco use about 34 years ago  His smokeless tobacco use included chew   He reports that he drinks alcohol  He reports that he does not use drugs  Current Outpatient Medications   Medication Sig Dispense Refill    albuterol (PROAIR HFA) 90 mcg/act inhaler Inhale 90 mcg 2 (two) times a day as needed      aspirin (ASPIR-81) 81 mg EC tablet Take 1 tablet by mouth daily      cycloSPORINE (RESTASIS) 0 05 % ophthalmic emulsion Apply 1 53 application to eye 2 (two) times a day      diltiazem (DILACOR XR) 120 MG 24 hr capsule Take 1 capsule (120 mg total) by mouth daily 90 capsule 1    folic acid (FOLVITE) 1 mg tablet Take 1 tablet (1 mg total) by mouth daily 90 tablet 1    mometasone (NASONEX) 50 mcg/act nasal spray 2 sprays into each nostril daily      Multiple Vitamins-Minerals (CENTRUM SILVER 50+MEN) TABS Take 1 tablet by mouth daily      SYMBICORT 160-4 5 MCG/ACT inhaler Take 2 puffs by mouth 2 (two) times a day      thiamine 50 MG tablet Take 1 tablet (50 mg total) by mouth daily 90 tablet 1    levothyroxine 50 mcg tablet Take 1 tablet (50 mcg total) by mouth daily (Patient not taking: Reported on 5/7/2019) 90 tablet 1     No current facility-administered medications for this visit        Current Outpatient Medications on File Prior to Visit   Medication Sig    albuterol (PROAIR HFA) 90 mcg/act inhaler Inhale 90 mcg 2 (two) times a day as needed    aspirin (ASPIR-81) 81 mg EC tablet Take 1 tablet by mouth daily    cycloSPORINE (RESTASIS) 0 05 % ophthalmic emulsion Apply 3 10 application to eye 2 (two) times a day    diltiazem (DILACOR XR) 120 MG 24 hr capsule Take 1 capsule (120 mg total) by mouth daily    folic acid (FOLVITE) 1 mg tablet Take 1 tablet (1 mg total) by mouth daily    mometasone (NASONEX) 50 mcg/act nasal spray 2 sprays into each nostril daily    Multiple Vitamins-Minerals (CENTRUM SILVER 50+MEN) TABS Take 1 tablet by mouth daily    SYMBICORT 160-4 5 MCG/ACT inhaler Take 2 puffs by mouth 2 (two) times a day    thiamine 50 MG tablet Take 1 tablet (50 mg total) by mouth daily  levothyroxine 50 mcg tablet Take 1 tablet (50 mcg total) by mouth daily (Patient not taking: Reported on 5/7/2019)     No current facility-administered medications on file prior to visit  He is allergic to amoxicillin; metoprolol; and penicillins       Review of Systems   Constitutional: Negative for activity change, chills, fatigue, fever and unexpected weight change  HENT: Negative for ear pain, postnasal drip, rhinorrhea, sinus pressure and sore throat  Eyes: Negative for pain  Respiratory: Negative for cough, choking, chest tightness, shortness of breath and wheezing  Cardiovascular: Negative for chest pain, palpitations and leg swelling  Gastrointestinal: Positive for constipation  Negative for abdominal pain, diarrhea, nausea and vomiting  Genitourinary: Positive for decreased urine volume, difficulty urinating, flank pain and frequency  Negative for dysuria and hematuria  Musculoskeletal: Positive for back pain  Negative for arthralgias, gait problem, joint swelling, myalgias and neck stiffness  Skin: Negative for pallor and rash  Neurological: Negative for dizziness, tremors, seizures, syncope, light-headedness and headaches  Hematological: Negative for adenopathy  Psychiatric/Behavioral: Negative for behavioral problems           Past Medical History:   Diagnosis Date    Asthma     Last Assessed:10/16/17    Fontanez's esophagus     Colonic polyp     Last Assessed:10/16/17    Eczema     GERD (gastroesophageal reflux disease)     Last Assessed:10/16/17    High cholesterol     Last Assessed:10/16/17    Hypertension     Last Assessed:10/16/17    Non-neoplastic nevus     Last Assessed:11/16/17         Current Outpatient Medications:     albuterol (PROAIR HFA) 90 mcg/act inhaler, Inhale 90 mcg 2 (two) times a day as needed, Disp: , Rfl:     aspirin (ASPIR-81) 81 mg EC tablet, Take 1 tablet by mouth daily, Disp: , Rfl:     cycloSPORINE (RESTASIS) 0 05 % ophthalmic emulsion, Apply 8 53 application to eye 2 (two) times a day, Disp: , Rfl:     diltiazem (DILACOR XR) 120 MG 24 hr capsule, Take 1 capsule (120 mg total) by mouth daily, Disp: 90 capsule, Rfl: 1    folic acid (FOLVITE) 1 mg tablet, Take 1 tablet (1 mg total) by mouth daily, Disp: 90 tablet, Rfl: 1    mometasone (NASONEX) 50 mcg/act nasal spray, 2 sprays into each nostril daily, Disp: , Rfl:     Multiple Vitamins-Minerals (CENTRUM SILVER 50+MEN) TABS, Take 1 tablet by mouth daily, Disp: , Rfl:     SYMBICORT 160-4 5 MCG/ACT inhaler, Take 2 puffs by mouth 2 (two) times a day, Disp: , Rfl:     thiamine 50 MG tablet, Take 1 tablet (50 mg total) by mouth daily, Disp: 90 tablet, Rfl: 1    levothyroxine 50 mcg tablet, Take 1 tablet (50 mcg total) by mouth daily (Patient not taking: Reported on 5/7/2019), Disp: 90 tablet, Rfl: 1    Allergies   Allergen Reactions    Amoxicillin      Other reaction(s): Diarrhea    Metoprolol      Annotation - [id number]: Aggravtes asthma    Penicillins      Annotation - [id number]: Diarrhea       Social History   Past Surgical History:   Procedure Laterality Date    COLONOSCOPY      10/2013- egd/colon    LYMPHADENECTOMY      Axillary Lymph node removed-benign    TONSILLECTOMY      Last Assessed:10/16/17    VALVE REPLACEMENT      Heart Valve Replacement; Last Assessed:10/16/17     Family History   Problem Relation Age of Onset    Cancer Father         groin, bone    Diabetes Paternal Grandfather        Objective:  /84 (BP Location: Left arm, Patient Position: Sitting, Cuff Size: Standard)   Pulse 68   Temp (!) 96 6 °F (35 9 °C) (Tympanic)   Wt 76 4 kg (168 lb 6 4 oz)   SpO2 98%   BMI 26 38 kg/m²        Physical Exam   Constitutional: He is oriented to person, place, and time  He appears well-developed and well-nourished  No distress  HENT:   Head: Normocephalic and atraumatic     Right Ear: External ear normal    Left Ear: External ear normal    Nose: Nose normal    Mouth/Throat: No oropharyngeal exudate  Very dry mucous membranes   Eyes: Pupils are equal, round, and reactive to light  Conjunctivae and EOM are normal  Right eye exhibits no discharge  Left eye exhibits no discharge  No scleral icterus  Neck: Normal range of motion  Neck supple  No JVD present  No tracheal deviation present  No thyromegaly present  Cardiovascular: Normal rate, regular rhythm, normal heart sounds and intact distal pulses  Exam reveals no gallop and no friction rub  No murmur heard  Pulmonary/Chest: Effort normal and breath sounds normal  No respiratory distress  He has no wheezes  He has no rales  He exhibits no tenderness  Abdominal: Soft  Bowel sounds are normal  He exhibits no distension and no mass  There is no tenderness  There is no rebound and no guarding  Musculoskeletal: Normal range of motion  He exhibits no edema, tenderness or deformity  Lymphadenopathy:     He has no cervical adenopathy  Neurological: He is alert and oriented to person, place, and time  He has normal reflexes  No cranial nerve deficit  He exhibits normal muscle tone  Coordination normal    Skin: Skin is warm and dry  No rash noted  He is not diaphoretic  No erythema  No pallor  There is an Abrasion on patient's right flank that measures about 3 5 cm, erythematous and tender to touch  Psychiatric: He has a normal mood and affect   His behavior is normal

## 2019-09-19 NOTE — PATIENT INSTRUCTIONS
Benign Prostatic Hypertrophy   WHAT YOU NEED TO KNOW:   What is benign prostatic hypertrophy? Benign prostatic hypertrophy (BPH) is a condition that causes your prostate gland to grow larger than normal  The prostate gland is the male sex gland that produces a fluid that is part of semen  It is about the size of a walnut and it is located under the bladder  As the prostate grows, it can squeeze the urethra  This can block urine flow and cause urinary problems  What causes BPH? It is not known what causes BPH  It may be just part of getting older  BPH is very common in men over 39years of age, but rarely causes problems before age 61  Some healthcare providers believe it is caused by a change in hormone levels as men get older  What are the signs and symptoms of BPH? · Feeling like you have not emptied your bladder    · Feeling the need to urinate right away     · Urine does not flow right away when you start to urinate or stops and starts again    · Frequent urination, especially at night    · Weak urine stream    · Blood in your urine  How is BPH diagnosed? · Blood tests:  Blood tests such as prostate specific antigen (PSA) measurement may be done  The amount of PSA in your blood may go up if you have BPH  · Cystoscopy: A cystoscopy allows caregivers to look for problems inside your bladder  A cystoscope is put into your bladder through your urethra  The urethra is the tube that urine flows through when you urinate  The cystoscope is a long tube with a lens and a light on the end  The scope may be hooked to a camera or monitor, and pictures may be taken  A tissue sample may also be taken during your cystoscopy  During this test, small tumors may be removed or bleeding may be stopped       · Digital rectal examination:  Your healthcare provider will use his finger to feel if your prostate is larger than normal      · Prostate biopsy:  A small piece of the prostate is removed and sent to a lab for tests      · Transrectal ultrasound:  Sound waves are used to show pictures of your prostate on a monitor  · Urine tests:     ¨ Urine sample: For this test you need to urinate into a small container  You will be given instructions on how to clean your genital area before you urinate  Do not touch the inside of the cup  Follow instructions on where to place the cup of urine when you are done  ¨ Residual urine measurement:  Healthcare providers may use a catheter to measure how much urine is left in your bladder after you urinate  ¨ Flow rate recording: This is a test that measures the speed of your urine stream   How is BPH treated? · Medicines:      ¨ Alpha blockers: This medicine relaxes the muscles in your prostate and bladder  It may help you urinate more easily  ¨ 5 alpha reductase inhibitors: These medicines block the production of a hormone that causes the prostate to get larger  It may help to slow the growth of the prostate or shrink the prostate  · Stent:  A stent is a short, tiny mesh tube that is put into the urethra to hold it open  · Surgery: You may need surgery to remove your prostate  · Other procedures: The prostate may be made smaller by a procedure such as a transurethral needle ablation (TUNA) or microwave heat treatment  You may also have a laser procedure called interstitial laser coagulation (ILC) to remove the prostate  What are the risks of BPH?   · Surgery to remove your prostate may cause you to bleed more than expected or get an infection  You may also have trouble controlling your bladder  This may cause you to leak urine  You may also have sudden strong urges to urinate that make it hard for you to hold your urine  · Without treatment, you may have other health problems  If urine stays in your bladder too long, you may develop a urinary infection or bladder stones   Small blood vessels in the bladder and prostate may start to bleed because you strain more when you urinate  The urethra may suddenly become blocked off, making it even harder to urinate  Over time, your kidneys can be damaged as urine backs up from the bladder and into the kidneys  How can I manage BPH?   · Do not let your bladder get too full before you empty it  Urinate when you feel the urge  · Limit alcohol  Do not drink large amounts of any liquid at one time  · Decrease the amount of salt you eat  Examples of salty foods are chips, cured meats, and canned soups  Do not use table salt  · Healthcare providers may tell you not to eat spicy foods such as chilli peppers  This may help you find out if spicy food makes your BPH symptoms worse  · You may have sex if you feel well  When should I contact my healthcare provider? · There is a large amount of blood in your urine  · Your signs and symptoms get worse  · You have a fever  · You have questions or concerns about your condition or care  When should I seek immediate care? · You are unable to urinate  · Your bladder feels very full and painful  CARE AGREEMENT:   You have the right to help plan your care  Learn about your health condition and how it may be treated  Discuss treatment options with your caregivers to decide what care you want to receive  You always have the right to refuse treatment  The above information is an  only  It is not intended as medical advice for individual conditions or treatments  Talk to your doctor, nurse or pharmacist before following any medical regimen to see if it is safe and effective for you  © 2017 [employer] Information is for End User's use only and may not be sold, redistributed or otherwise used for commercial purposes  All illustrations and images included in CareNotes® are the copyrighted property of [employer]  or [employer]

## 2019-09-20 ENCOUNTER — TELEPHONE (OUTPATIENT)
Dept: INTERNAL MEDICINE CLINIC | Age: 64
End: 2019-09-20

## 2019-09-20 LAB
APPEARANCE UR: CLEAR
BACTERIA URNS QL MICRO: NORMAL
BILIRUB UR QL STRIP: NEGATIVE
COLOR UR: YELLOW
EPI CELLS #/AREA URNS HPF: NORMAL /HPF (ref 0–10)
GLUCOSE UR QL: NEGATIVE
HGB UR QL STRIP: NEGATIVE
KETONES UR QL STRIP: NEGATIVE
LEUKOCYTE ESTERASE UR QL STRIP: NEGATIVE
MICRO URNS: NORMAL
MICRO URNS: NORMAL
NITRITE UR QL STRIP: NEGATIVE
PH UR STRIP: 6 [PH] (ref 5–7.5)
PROT UR QL STRIP: NEGATIVE
PSA FREE MFR SERPL: 32 %
PSA FREE SERPL-MCNC: 0.16 NG/ML
PSA SERPL-MCNC: 0.5 NG/ML (ref 0–4)
RBC #/AREA URNS HPF: NORMAL /HPF (ref 0–2)
SL AMB URINALYSIS REFLEX: NORMAL
SP GR UR: 1.01 (ref 1–1.03)
UROBILINOGEN UR STRIP-ACNC: 0.2 MG/DL (ref 0.2–1)
WBC #/AREA URNS HPF: NORMAL /HPF (ref 0–5)

## 2019-09-20 NOTE — TELEPHONE ENCOUNTER
I discussed pt's lab results with him  He states that his flank pain is much improved now but was bad in the morning  He will follow up  next week for a possible CT scan if his symptoms continue

## 2019-10-08 DIAGNOSIS — I10 HYPERTENSION, UNSPECIFIED TYPE: ICD-10-CM

## 2019-10-09 RX ORDER — DILTIAZEM HYDROCHLORIDE 120 MG/1
CAPSULE, EXTENDED RELEASE ORAL
Qty: 90 CAPSULE | Refills: 1 | Status: SHIPPED | OUTPATIENT
Start: 2019-10-09 | End: 2020-01-07 | Stop reason: ALTCHOICE

## 2019-12-31 ENCOUNTER — HOSPITAL ENCOUNTER (EMERGENCY)
Facility: HOSPITAL | Age: 64
Discharge: HOME | End: 2019-12-31
Attending: EMERGENCY MEDICINE | Admitting: EMERGENCY MEDICINE
Payer: COMMERCIAL

## 2019-12-31 VITALS
DIASTOLIC BLOOD PRESSURE: 92 MMHG | SYSTOLIC BLOOD PRESSURE: 144 MMHG | TEMPERATURE: 97.1 F | HEART RATE: 65 BPM | BODY MASS INDEX: 26.78 KG/M2 | WEIGHT: 171 LBS | OXYGEN SATURATION: 95 % | RESPIRATION RATE: 18 BRPM

## 2019-12-31 DIAGNOSIS — I10 HYPERTENSION, UNSPECIFIED TYPE: Primary | ICD-10-CM

## 2019-12-31 LAB
ALBUMIN SERPL BCP-MCNC: 3.6 G/DL (ref 3.5–5)
ALP SERPL-CCNC: 79 U/L (ref 46–116)
ALT SERPL W P-5'-P-CCNC: 26 U/L (ref 12–78)
ANION GAP SERPL CALCULATED.3IONS-SCNC: 8 MMOL/L (ref 4–13)
AST SERPL W P-5'-P-CCNC: 21 U/L (ref 5–45)
ATRIAL RATE: 66 BPM
BASOPHILS # BLD AUTO: 0.09 THOUSANDS/ΜL (ref 0–0.1)
BASOPHILS NFR BLD AUTO: 1 % (ref 0–1)
BILIRUB SERPL-MCNC: 0.3 MG/DL (ref 0.2–1)
BUN SERPL-MCNC: 20 MG/DL (ref 5–25)
CALCIUM SERPL-MCNC: 8.5 MG/DL (ref 8.3–10.1)
CHLORIDE SERPL-SCNC: 98 MMOL/L (ref 100–108)
CO2 SERPL-SCNC: 27 MMOL/L (ref 21–32)
CREAT SERPL-MCNC: 1 MG/DL (ref 0.6–1.3)
EOSINOPHIL # BLD AUTO: 0.13 THOUSAND/ΜL (ref 0–0.61)
EOSINOPHIL NFR BLD AUTO: 2 % (ref 0–6)
ERYTHROCYTE [DISTWIDTH] IN BLOOD BY AUTOMATED COUNT: 13.1 % (ref 11.6–15.1)
GFR SERPL CREATININE-BSD FRML MDRD: 79 ML/MIN/1.73SQ M
GLUCOSE SERPL-MCNC: 96 MG/DL (ref 65–140)
HCT VFR BLD AUTO: 44.9 % (ref 36.5–49.3)
HGB BLD-MCNC: 15 G/DL (ref 12–17)
IMM GRANULOCYTES # BLD AUTO: 0.02 THOUSAND/UL (ref 0–0.2)
IMM GRANULOCYTES NFR BLD AUTO: 0 % (ref 0–2)
LYMPHOCYTES # BLD AUTO: 2.16 THOUSANDS/ΜL (ref 0.6–4.47)
LYMPHOCYTES NFR BLD AUTO: 34 % (ref 14–44)
MCH RBC QN AUTO: 30.9 PG (ref 26.8–34.3)
MCHC RBC AUTO-ENTMCNC: 33.4 G/DL (ref 31.4–37.4)
MCV RBC AUTO: 92 FL (ref 82–98)
MONOCYTES # BLD AUTO: 0.69 THOUSAND/ΜL (ref 0.17–1.22)
MONOCYTES NFR BLD AUTO: 11 % (ref 4–12)
NEUTROPHILS # BLD AUTO: 3.28 THOUSANDS/ΜL (ref 1.85–7.62)
NEUTS SEG NFR BLD AUTO: 52 % (ref 43–75)
NRBC BLD AUTO-RTO: 0 /100 WBCS
P AXIS: 30 DEGREES
PLATELET # BLD AUTO: 286 THOUSANDS/UL (ref 149–390)
PMV BLD AUTO: 10.1 FL (ref 8.9–12.7)
POTASSIUM SERPL-SCNC: 3.8 MMOL/L (ref 3.5–5.3)
PR INTERVAL: 202 MS
PROT SERPL-MCNC: 6.9 G/DL (ref 6.4–8.2)
QRS AXIS: 13 DEGREES
QRSD INTERVAL: 88 MS
QT INTERVAL: 370 MS
QTC INTERVAL: 387 MS
RBC # BLD AUTO: 4.86 MILLION/UL (ref 3.88–5.62)
SODIUM SERPL-SCNC: 133 MMOL/L (ref 136–145)
T WAVE AXIS: 18 DEGREES
VENTRICULAR RATE: 66 BPM
WBC # BLD AUTO: 6.37 THOUSAND/UL (ref 4.31–10.16)

## 2019-12-31 PROCEDURE — 93005 ELECTROCARDIOGRAM TRACING: CPT

## 2019-12-31 PROCEDURE — 80053 COMPREHEN METABOLIC PANEL: CPT | Performed by: EMERGENCY MEDICINE

## 2019-12-31 PROCEDURE — 93010 ELECTROCARDIOGRAM REPORT: CPT | Performed by: INTERNAL MEDICINE

## 2019-12-31 PROCEDURE — 85025 COMPLETE CBC W/AUTO DIFF WBC: CPT | Performed by: EMERGENCY MEDICINE

## 2019-12-31 PROCEDURE — 99283 EMERGENCY DEPT VISIT LOW MDM: CPT

## 2019-12-31 PROCEDURE — 36415 COLL VENOUS BLD VENIPUNCTURE: CPT | Performed by: EMERGENCY MEDICINE

## 2019-12-31 NOTE — ED PROVIDER NOTES
History  Chief Complaint   Patient presents with    Hypertension     states blood pressure up today, under alot of stress, feeling off     71-year-old male presents to the ED states that his blood pressure was up today been under lot of stress recently in not feeling quite well  Patient states his wife was recently diagnosed with brain cancer had a tumor removal it [place] earlier this last month and has been in the process of getting chemotherapy started over the last week  Patient states since that time he has stopped going to the gym exercising to be with her and also has started drinking more alcohol at home  Status is blood pressure systolic was 529 today and scared him made him come to the ED  Now that issues 164/96 states he does take medication such as diltiazem for his blood pressure states he recently lowered it where he had been exercising heavily  Patient does not want to see crisis for any assistance with dealing with all of his issues currently but would like to have his blood pressure checked states he will follow up with Dr Robert Hough who is his PCP  Prior to Admission Medications   Prescriptions Last Dose Informant Patient Reported? Taking?    DILT- MG 24 hr capsule   No No   Sig: TAKE 1 CAPSULE DAILY   Multiple Vitamins-Minerals (CENTRUM SILVER 50+MEN) TABS  Self Yes No   Sig: Take 1 tablet by mouth daily   SYMBICORT 160-4 5 MCG/ACT inhaler  Self Yes No   Sig: Take 2 puffs by mouth 2 (two) times a day   albuterol (PROAIR HFA) 90 mcg/act inhaler  Self Yes No   Sig: Inhale 90 mcg 2 (two) times a day as needed   aspirin (ASPIR-81) 81 mg EC tablet  Self Yes No   Sig: Take 1 tablet by mouth daily   cycloSPORINE (RESTASIS) 0 05 % ophthalmic emulsion  Self Yes No   Sig: Apply 9 15 application to eye 2 (two) times a day   folic acid (FOLVITE) 1 mg tablet  Self No No   Sig: Take 1 tablet (1 mg total) by mouth daily   levothyroxine 50 mcg tablet  Self No No   Sig: Take 1 tablet (50 mcg total) by mouth daily   Patient not taking: Reported on 2019   mometasone (NASONEX) 50 mcg/act nasal spray  Self Yes No   Si sprays into each nostril daily   thiamine 50 MG tablet  Self No No   Sig: Take 1 tablet (50 mg total) by mouth daily      Facility-Administered Medications: None       Past Medical History:   Diagnosis Date    Asthma     Last Assessed:10/16/17    Fontanez's esophagus     Colonic polyp     Last Assessed:10/16/17    Eczema     GERD (gastroesophageal reflux disease)     Last Assessed:10/16/17    High cholesterol     Last Assessed:10/16/17    Hypertension     Last Assessed:10/16/17    Non-neoplastic nevus     Last Assessed:17       Past Surgical History:   Procedure Laterality Date    COLONOSCOPY      10/2013- egd/colon    LYMPHADENECTOMY      Axillary Lymph node removed-benign    TONSILLECTOMY      Last Assessed:10/16/17    VALVE REPLACEMENT      Heart Valve Replacement; Last Assessed:10/16/17       Family History   Problem Relation Age of Onset    Cancer Father         groin, bone    Diabetes Paternal Grandfather      I have reviewed and agree with the history as documented  Social History     Tobacco Use    Smoking status: Former Smoker     Packs/day: 1 00     Years: 14 00     Pack years: 14 00     Types: Cigarettes     Last attempt to quit:      Years since quittin 0    Smokeless tobacco: Former User     Types: [address] date:    Substance Use Topics    Alcohol use: Yes     Comment: occasionally     Drug use: No        Review of Systems   Constitutional: Negative for activity change, chills, diaphoresis and fever  HENT: Negative for congestion, ear pain, nosebleeds, sore throat, trouble swallowing and voice change  Eyes: Negative for pain, discharge and redness  Respiratory: Negative for apnea, cough, choking, shortness of breath, wheezing and stridor  Cardiovascular: Negative for chest pain and palpitations     Gastrointestinal: Negative for abdominal distention, abdominal pain, constipation, diarrhea, nausea and vomiting  Endocrine: Negative for polydipsia  Genitourinary: Negative for difficulty urinating, dysuria, flank pain, frequency, hematuria and urgency  Musculoskeletal: Negative for back pain, gait problem, joint swelling, myalgias, neck pain and neck stiffness  Skin: Negative for pallor and rash  Neurological: Negative for dizziness, tremors, syncope, speech difficulty, weakness, numbness and headaches  Hematological: Negative for adenopathy  Psychiatric/Behavioral: Negative for confusion, hallucinations, self-injury and suicidal ideas  The patient is not nervous/anxious  Physical Exam  Physical Exam   Constitutional: He is oriented to person, place, and time  Vital signs are normal  He appears well-developed and well-nourished  No distress  HENT:   Head: Normocephalic and atraumatic  Right Ear: External ear normal    Left Ear: External ear normal    Nose: Nose normal    Mouth/Throat: Oropharynx is clear and moist    Eyes: Pupils are equal, round, and reactive to light  Conjunctivae are normal    Neck: Normal range of motion  Neck supple  Cardiovascular: Normal rate, regular rhythm, normal heart sounds and intact distal pulses  Pulmonary/Chest: Effort normal and breath sounds normal    Abdominal: Soft  Bowel sounds are normal    Musculoskeletal: Normal range of motion  Neurological: He is alert and oriented to person, place, and time  He has normal reflexes  Skin: Skin is warm and dry  He is not diaphoretic  Psychiatric: He has a normal mood and affect  Nursing note and vitals reviewed        Vital Signs  ED Triage Vitals [12/31/19 1520]   Temperature Pulse Respirations Blood Pressure SpO2   (!) 97 1 °F (36 2 °C) 75 20 164/96 97 %      Temp Source Heart Rate Source Patient Position - Orthostatic VS BP Location FiO2 (%)   Tympanic Monitor Sitting Right arm --      Pain Score       No Pain Vitals:    12/31/19 1520 12/31/19 1631   BP: 164/96 144/92   Pulse: 75 65   Patient Position - Orthostatic VS: Sitting Lying         Visual Acuity      ED Medications  Medications - No data to display    Diagnostic Studies  Results Reviewed     Procedure Component Value Units Date/Time    Comprehensive metabolic panel [[id number]]  (Abnormal) Collected:  12/31/19 1609    Lab Status:  Final result Specimen:  Blood from Arm, Left Updated:  12/31/19 1630     Sodium 133 mmol/L      Potassium 3 8 mmol/L      Chloride 98 mmol/L      CO2 27 mmol/L      ANION GAP 8 mmol/L      BUN 20 mg/dL      Creatinine 1 00 mg/dL      Glucose 96 mg/dL      Calcium 8 5 mg/dL      AST 21 U/L      ALT 26 U/L      Alkaline Phosphatase 79 U/L      Total Protein 6 9 g/dL      Albumin 3 6 g/dL      Total Bilirubin 0 30 mg/dL      eGFR 79 ml/min/1 73sq m     Narrative:       [place] guidelines for Chronic Kidney Disease (CKD):     Stage 1 with normal or high GFR (GFR > 90 mL/min/1 73 square meters)    Stage 2 Mild CKD (GFR = 60-89 mL/min/1 73 square meters)    Stage 3A Moderate CKD (GFR = 45-59 mL/min/1 73 square meters)    Stage 3B Moderate CKD (GFR = 30-44 mL/min/1 73 square meters)    Stage 4 Severe CKD (GFR = 15-29 mL/min/1 73 square meters)    Stage 5 End Stage CKD (GFR <15 mL/min/1 73 square meters)  Note: GFR calculation is accurate only with a steady state creatinine    CBC and differential [[id number]] Collected:  12/31/19 1609    Lab Status:  Final result Specimen:  Blood from Arm, Left Updated:  12/31/19 1614     WBC 6 37 Thousand/uL      RBC 4 86 Million/uL      Hemoglobin 15 0 g/dL      Hematocrit 44 9 %      MCV 92 fL      MCH 30 9 pg      MCHC 33 4 g/dL      RDW 13 1 %      MPV 10 1 fL      Platelets 760 Thousands/uL      nRBC 0 /100 WBCs      Neutrophils Relative 52 %      Immat GRANS % 0 %      Lymphocytes Relative 34 %      Monocytes Relative 11 %      Eosinophils Relative 2 %      Basophils Relative 1 %      Neutrophils Absolute 3 28 Thousands/µL      Immature Grans Absolute 0 02 Thousand/uL      Lymphocytes Absolute 2 16 Thousands/µL      Monocytes Absolute 0 69 Thousand/µL      Eosinophils Absolute 0 13 Thousand/µL      Basophils Absolute 0 09 Thousands/µL                  No orders to display              Procedures  Procedures         ED Course                               MDM      Disposition  Final diagnoses:   Hypertension, unspecified type     Time reflects when diagnosis was documented in both MDM as applicable and the Disposition within this note     Time User Action Codes Description Comment    12/31/2019  4:33 PM Krzysztof Carrillo Hypertension, unspecified type       ED Disposition     ED Disposition Condition Date/Time Comment    Discharge Stable Tue Dec 31, 2019  4:33 PM Darlene Fail Cogan discharge to home/self care  Follow-up Information     Follow up With Specialties Details Why Contact Info    Juana Narayan MD Internal Medicine Schedule an appointment as soon as possible for a visit  As needed [id number] [facility]  [phone]            Patient's Medications   Discharge Prescriptions    No medications on file     No discharge procedures on file      ED Provider  Electronically Signed by           Bridger Pitt DO  12/31/19 9758

## 2019-12-31 NOTE — ED PROCEDURE NOTE
PROCEDURE  ECG 12 Lead Documentation Only  Date/Time: 12/31/2019 4:13 PM  Performed by: Nathalie Greenwood DO  Authorized by: Nathalie Greenwood DO     Indications / Diagnosis:  Htn  ECG reviewed by me, the ED Provider: yes    Patient location:  ED  Previous ECG:     Previous ECG:  Unavailable    Comparison to cardiac monitor: No    Interpretation:     Interpretation: normal    Rate:     ECG rate:  66    ECG rate assessment: normal    Rhythm:     Rhythm: sinus rhythm    Ectopy:     Ectopy: none    QRS:     QRS axis:  Normal    QRS intervals:  Normal  Conduction:     Conduction: normal    ST segments:     ST segments:  Normal  T waves:     T waves: normal           Nathalie Greenwood DO  12/31/19 1617

## 2019-12-31 NOTE — ED NOTES
Patient admits to increased beer consumption over the last few weeks  Drinking about 4-5 beers per night  States dealing with stressors at home including wife's recent diagnosis of cancer        [address], RN  12/31/19 7495

## 2020-01-07 ENCOUNTER — OFFICE VISIT (OUTPATIENT)
Dept: INTERNAL MEDICINE CLINIC | Age: 65
End: 2020-01-07
Payer: COMMERCIAL

## 2020-01-07 VITALS
TEMPERATURE: 97.4 F | DIASTOLIC BLOOD PRESSURE: 72 MMHG | SYSTOLIC BLOOD PRESSURE: 130 MMHG | BODY MASS INDEX: 26.31 KG/M2 | HEART RATE: 75 BPM | OXYGEN SATURATION: 97 % | WEIGHT: 168 LBS

## 2020-01-07 DIAGNOSIS — Z12.11 SCREENING FOR COLON CANCER: ICD-10-CM

## 2020-01-07 DIAGNOSIS — L30.9 ECZEMA OF BOTH HANDS: ICD-10-CM

## 2020-01-07 DIAGNOSIS — E04.1 THYROID NODULE: ICD-10-CM

## 2020-01-07 DIAGNOSIS — E03.8 SUBCLINICAL HYPOTHYROIDISM: Primary | ICD-10-CM

## 2020-01-07 DIAGNOSIS — I10 ESSENTIAL HYPERTENSION: ICD-10-CM

## 2020-01-07 DIAGNOSIS — N40.1 BENIGN PROSTATIC HYPERPLASIA (BPH) WITH STRAINING ON URINATION: ICD-10-CM

## 2020-01-07 DIAGNOSIS — R10.9 RIGHT FLANK PAIN: ICD-10-CM

## 2020-01-07 DIAGNOSIS — I10 BENIGN HYPERTENSION: ICD-10-CM

## 2020-01-07 DIAGNOSIS — I10 ACCELERATED ESSENTIAL HYPERTENSION: ICD-10-CM

## 2020-01-07 DIAGNOSIS — K21.00 REFLUX ESOPHAGITIS: ICD-10-CM

## 2020-01-07 DIAGNOSIS — R39.16 BENIGN PROSTATIC HYPERPLASIA (BPH) WITH STRAINING ON URINATION: ICD-10-CM

## 2020-01-07 PROCEDURE — 3075F SYST BP GE 130 - 139MM HG: CPT | Performed by: INTERNAL MEDICINE

## 2020-01-07 PROCEDURE — 99214 OFFICE O/P EST MOD 30 MIN: CPT | Performed by: INTERNAL MEDICINE

## 2020-01-07 PROCEDURE — 1036F TOBACCO NON-USER: CPT | Performed by: INTERNAL MEDICINE

## 2020-01-07 PROCEDURE — 3078F DIAST BP <80 MM HG: CPT | Performed by: INTERNAL MEDICINE

## 2020-01-07 RX ORDER — DILTIAZEM HYDROCHLORIDE 180 MG/1
180 CAPSULE, EXTENDED RELEASE ORAL DAILY
Qty: 90 CAPSULE | Refills: 3 | Status: SHIPPED | OUTPATIENT
Start: 2020-01-07 | End: 2020-03-11 | Stop reason: ALTCHOICE

## 2020-01-07 NOTE — PATIENT INSTRUCTIONS
1  Start Tiazac 180 mg for high BP    2  Try using NO salt salt instead  Potassium chloride instead of sodium chloride  3  Switch to only decaf coffee  4  Limit overall salt and caffeine intake  5  Follow up with GI for reflux and colonoscopy  6  Continue minimizing processed foods and increase natural foods in diet  7  Minimize white starches, carbohydrates, and concentration sugars from diet  8  Eat smaller portions and decrease caloric intake  9  Try to exercise at least 150 min/week  10  Follow up in 2 months or as needed  11  If you feel any new symptoms, call Dr Trisha Oconnell immediately  12  Look for something positive in each day, even if some days you have to look a little harder   =)

## 2020-01-07 NOTE — PROGRESS NOTES
Chief Complaint   Patient presents with    Follow-up     htn- labs 12/31/19       Assessment/Plan:    Accelerated HTN  Pt reports ER visit last week due to abdominal pain and accelerated HTN  His BP at home was 290 systolic  It was 164/96 at the hospital  EKG and labs were checked and were all within normal limits  He thinks this maybe due to his alcohol intake and stress level  Pt states he is under a lot of stress due to his wife recently being diagnosed with brain cancer and tumor removal  Pt denies head congestion or fatigue  He states he feeling energized after swimming at the [employer]  He denies taking any OTC medication before ER visit  However, he states drinking coffee  His BP was 619 systolic today  I have increased his diltiazem from 120 mg to 180 mg  Instructions and warnings were given for side effects  He was also advised to try using potassium salt instead if needed and to switch to only decaf coffee intake  GERD  Pt has a Hx of GERD which is worsens with stress  I put in a referral for GI, Dr Milton Wiley soon  Colonoscopy  Pt states he will be getting this done soon as he postponed it due to his wifes condition  I put in a referral      Health Maintenance  Pt was advised to continue on his exercise regimen to help decrease his stress level and BP  He is due for PPSV23 which we will discuss next visit  Diagnoses and all orders for this visit:    Subclinical hypothyroidism  -     Cancel: Comprehensive metabolic panel; Future  -     Cancel: CBC and differential; Future    Thyroid nodule  -     Cancel: Comprehensive metabolic panel; Future    Benign hypertension  -     diltiazem (TIAZAC) 180 MG 24 hr capsule; Take 1 capsule (180 mg total) by mouth daily  -     Cancel: Comprehensive metabolic panel; Future  -     Cancel: CBC and differential; Future    Eczema of both hands    Essential hypertension  -     Cancel: Comprehensive metabolic panel;  Future  -     Cancel: CBC and differential; Future    Reflux esophagitis  -     Ambulatory referral for colonoscopy; Future    Screening for colon cancer  -     Ambulatory referral to Gastroenterology; Future    Benign prostatic hyperplasia (BPH) with straining on urination    Right flank pain  -     Renal function panel; Future    Accelerated essential hypertension        Subjective:      Patient ID: Claudia Nieves is a 59 y o  male  This is the case of a 59year old male with a PMHx of  HTN, HLD, and subclinical hypothyroidism, presenting in the office today with a chief complaint of elevated BP  Pt reports ER visit last week due to abdominal pain and elevated BP  His BP at home was 845 systolic  It was 164/96 at the hospital  Pt states he is under a lot of stress due to his wife recently being diagnosed with brain cancer and tumor removal  Pt denies head congestion or fatigue  He [facility]  He denies taking any OTC medication before ER visit  However, he states drinking coffee  Lab work was reviewed and discussed with patient  The following portions of the patient's history were reviewed and updated as appropriate: allergies, current medications, past family history, past medical history, past social history, past surgical history and problem list     Review of Systems   Constitutional: Negative for chills, diaphoresis, fatigue and fever  HENT: Negative for congestion, ear pain, facial swelling, postnasal drip, rhinorrhea, sinus pressure and sinus pain  Eyes: Negative for photophobia, pain, discharge, redness, itching and visual disturbance  Respiratory: Negative for apnea, cough, choking, chest tightness, shortness of breath, wheezing and stridor  Cardiovascular: Negative for chest pain, palpitations and leg swelling  Gastrointestinal: Negative for abdominal distention, abdominal pain, anal bleeding, blood in stool, constipation, diarrhea and nausea     Genitourinary: Negative for difficulty urinating, frequency and hematuria  Reflux   Musculoskeletal: Negative for arthralgias, back pain, gait problem, joint swelling, myalgias, neck pain and neck stiffness  Skin: Negative for color change, pallor, rash and wound  Neurological: Negative for dizziness, tremors, seizures, syncope, weakness, light-headedness, numbness and headaches  Psychiatric/Behavioral: Negative            Allergies   Allergen Reactions    Amoxicillin      Other reaction(s): Diarrhea    Metoprolol      Annotation - [id number]: Aggravtes asthma    Penicillins      Annotation - [id number]: Diarrhea     Past Medical History:   Diagnosis Date    Asthma     Last Assessed:10/16/17    Fontanez's esophagus     Colonic polyp     Last Assessed:10/16/17    Eczema     GERD (gastroesophageal reflux disease)     Last Assessed:10/16/17    High cholesterol     Last Assessed:10/16/17    Hypertension     Last Assessed:10/16/17    Non-neoplastic nevus     Last Assessed:11/16/17     Current Outpatient Medications on File Prior to Visit   Medication Sig Dispense Refill    albuterol (PROAIR HFA) 90 mcg/act inhaler Inhale 90 mcg 2 (two) times a day as needed      aspirin (ASPIR-81) 81 mg EC tablet Take 1 tablet by mouth daily      cycloSPORINE (RESTASIS) 0 05 % ophthalmic emulsion Apply 2 54 application to eye 2 (two) times a day      folic acid (FOLVITE) 1 mg tablet Take 1 tablet (1 mg total) by mouth daily 90 tablet 1    mometasone (NASONEX) 50 mcg/act nasal spray 2 sprays into each nostril daily      Multiple Vitamins-Minerals (CENTRUM SILVER 50+MEN) TABS Take 1 tablet by mouth daily      SYMBICORT 160-4 5 MCG/ACT inhaler Take 2 puffs by mouth 2 (two) times a day      thiamine 50 MG tablet Take 1 tablet (50 mg total) by mouth daily 90 tablet 1    [DISCONTINUED] DILT- MG 24 hr capsule TAKE 1 CAPSULE DAILY 90 capsule 1    levothyroxine 50 mcg tablet Take 1 tablet (50 mcg total) by mouth daily (Patient not taking: Reported on 2019) 90 tablet 1     No current facility-administered medications on file prior to visit  Past Surgical History:   Procedure Laterality Date    COLONOSCOPY      10/2013- egd/colon    LYMPHADENECTOMY      Axillary Lymph node removed-benign    TONSILLECTOMY      Last Assessed:10/16/17    VALVE REPLACEMENT      Heart Valve Replacement;  Last Assessed:10/16/17     Social History     Socioeconomic History    Marital status: /Civil Union     Spouse name: Not on file    Number of children: Not on file    Years of education: Not on file    Highest education level: Not on file   Occupational History    Not on file   Social Needs    Financial resource strain: Not on file    Food insecurity:     Worry: Not on file     Inability: Not on file    Transportation needs:     Medical: Not on file     Non-medical: Not on file   Tobacco Use    Smoking status: Former Smoker     Packs/day: 1 00     Years: 14      Pack years:      Types: Cigarettes     Last attempt to quit:      Years since quittin 0    Smokeless tobacco: Former User     Types: [address] date:    Substance and Sexual Activity    Alcohol use: Yes     Comment: occasionally     Drug use: No    Sexual activity: Not Currently   Lifestyle    Physical activity:     Days per week: Not on file     Minutes per session: Not on file    Stress: Not on file   Relationships    Social connections:     Talks on phone: Not on file     Gets together: Not on file     Attends Church service: Not on file     Active member of club or organization: Not on file     Attends meetings of clubs or organizations: Not on file     Relationship status: Not on file    Intimate partner violence:     Fear of current or ex partner: Not on file     Emotionally abused: Not on file     Physically abused: Not on file     Forced sexual activity: Not on file   Other Topics Concern    Not on file   Social History Narrative    Caffeine use Objective:      /72 (BP Location: Left arm, Patient Position: Sitting, Cuff Size: Standard)   Pulse 75   Temp (!) 97 4 °F (36 3 °C)   Wt 76 2 kg (168 lb)   SpO2 97%   BMI 26 31 kg/m²          Physical Exam   Constitutional: He is oriented to person, place, and time  He appears well-developed and well-nourished  No distress  HENT:   Head: Atraumatic  Mouth/Throat: No oropharyngeal exudate  Eyes: EOM are normal  Right eye exhibits no discharge  Left eye exhibits no discharge  No scleral icterus  Neck: Neck supple  No JVD present  No tracheal deviation present  No thyromegaly present  Cardiovascular: Regular rhythm, normal heart sounds and intact distal pulses  No murmur heard  Pulmonary/Chest: Effort normal and breath sounds normal  No respiratory distress  He has no wheezes  He has no rales  Abdominal: Soft  Bowel sounds are normal  He exhibits no distension  There is no tenderness  There is no guarding  Musculoskeletal: Normal range of motion  He exhibits no edema, tenderness or deformity  Neurological: He is alert and oriented to person, place, and time  He displays normal reflexes  No cranial nerve deficit  Coordination normal    Skin: Skin is warm and dry  No rash noted  He is not diaphoretic  No erythema  No pallor  Psychiatric: He has a normal mood and affect  His behavior is normal  Judgment and thought content normal    Nursing note and vitals reviewed  Attestation:   By signing my name below, Saranya Wadsworth, attbekah that this documentation has been prepared under the direction and in the presence of Leroy High MD  Electronically Signed: Miller Hensley, [address] [place] [address]  1/7/20      I, Leroy High, personally performed the services described in this documentation  All medical record entries made by the scribe were at my direction and in my presence   I have reviewed the chart and discharge instructions and agree that the record reflects my personal performance and is accurate and complete   Elsie Mitchell MD  1/7/20

## 2020-01-09 DIAGNOSIS — K51.40 PSEUDOPOLYPOSIS OF COLON, UNSPECIFIED COMPLICATION STATUS, UNSPECIFIED PART OF COLON (HCC): Primary | ICD-10-CM

## 2020-03-10 LAB
ALBUMIN SERPL-MCNC: 4.5 G/DL (ref 3.8–4.8)
ALBUMIN/GLOB SERPL: 2 {RATIO} (ref 1.2–2.2)
ALP SERPL-CCNC: 82 IU/L (ref 39–117)
ALT SERPL-CCNC: 18 IU/L (ref 0–44)
AST SERPL-CCNC: 25 IU/L (ref 0–40)
BASOPHILS # BLD AUTO: 0.1 X10E3/UL (ref 0–0.2)
BASOPHILS NFR BLD AUTO: 2 %
BILIRUB SERPL-MCNC: 0.3 MG/DL (ref 0–1.2)
BUN SERPL-MCNC: 18 MG/DL (ref 8–27)
BUN/CREAT SERPL: 16 (ref 10–24)
CALCIUM SERPL-MCNC: 9.5 MG/DL (ref 8.6–10.2)
CHLORIDE SERPL-SCNC: 100 MMOL/L (ref 96–106)
CO2 SERPL-SCNC: 21 MMOL/L (ref 20–29)
CREAT SERPL-MCNC: 1.14 MG/DL (ref 0.76–1.27)
EOSINOPHIL # BLD AUTO: 0.2 X10E3/UL (ref 0–0.4)
EOSINOPHIL NFR BLD AUTO: 4 %
ERYTHROCYTE [DISTWIDTH] IN BLOOD BY AUTOMATED COUNT: 12.7 % (ref 11.6–15.4)
GLOBULIN SER-MCNC: 2.2 G/DL (ref 1.5–4.5)
GLUCOSE SERPL-MCNC: 86 MG/DL (ref 65–99)
HCT VFR BLD AUTO: 47.4 % (ref 37.5–51)
HGB BLD-MCNC: 15.7 G/DL (ref 13–17.7)
IMM GRANULOCYTES # BLD: 0 X10E3/UL (ref 0–0.1)
IMM GRANULOCYTES NFR BLD: 0 %
LYMPHOCYTES # BLD AUTO: 2.3 X10E3/UL (ref 0.7–3.1)
LYMPHOCYTES NFR BLD AUTO: 42 %
MCH RBC QN AUTO: 30.1 PG (ref 26.6–33)
MCHC RBC AUTO-ENTMCNC: 33.1 G/DL (ref 31.5–35.7)
MCV RBC AUTO: 91 FL (ref 79–97)
MONOCYTES # BLD AUTO: 0.6 X10E3/UL (ref 0.1–0.9)
MONOCYTES NFR BLD AUTO: 11 %
NEUTROPHILS # BLD AUTO: 2.2 X10E3/UL (ref 1.4–7)
NEUTROPHILS NFR BLD AUTO: 41 %
PHOSPHATE SERPL-MCNC: 3.9 MG/DL (ref 2.8–4.1)
PLATELET # BLD AUTO: 351 X10E3/UL (ref 150–450)
POTASSIUM SERPL-SCNC: 4.6 MMOL/L (ref 3.5–5.2)
PROT SERPL-MCNC: 6.7 G/DL (ref 6–8.5)
RBC # BLD AUTO: 5.22 X10E6/UL (ref 4.14–5.8)
SL AMB EGFR AFRICAN AMERICAN: 78 ML/MIN/1.73
SL AMB EGFR NON AFRICAN AMERICAN: 68 ML/MIN/1.73
SODIUM SERPL-SCNC: 137 MMOL/L (ref 134–144)
WBC # BLD AUTO: 5.4 X10E3/UL (ref 3.4–10.8)

## 2020-03-11 ENCOUNTER — OFFICE VISIT (OUTPATIENT)
Dept: INTERNAL MEDICINE CLINIC | Age: 65
End: 2020-03-11
Payer: COMMERCIAL

## 2020-03-11 VITALS
BODY MASS INDEX: 24.86 KG/M2 | OXYGEN SATURATION: 98 % | WEIGHT: 164 LBS | HEART RATE: 74 BPM | SYSTOLIC BLOOD PRESSURE: 132 MMHG | HEIGHT: 68 IN | DIASTOLIC BLOOD PRESSURE: 88 MMHG | TEMPERATURE: 98.9 F

## 2020-03-11 DIAGNOSIS — I10 BENIGN HYPERTENSION: Primary | ICD-10-CM

## 2020-03-11 DIAGNOSIS — N28.9 MILD RENAL INSUFFICIENCY: ICD-10-CM

## 2020-03-11 DIAGNOSIS — E78.2 MIXED HYPERLIPIDEMIA: ICD-10-CM

## 2020-03-11 PROCEDURE — 3008F BODY MASS INDEX DOCD: CPT | Performed by: INTERNAL MEDICINE

## 2020-03-11 PROCEDURE — 1036F TOBACCO NON-USER: CPT | Performed by: INTERNAL MEDICINE

## 2020-03-11 PROCEDURE — 3079F DIAST BP 80-89 MM HG: CPT | Performed by: INTERNAL MEDICINE

## 2020-03-11 PROCEDURE — 99213 OFFICE O/P EST LOW 20 MIN: CPT | Performed by: INTERNAL MEDICINE

## 2020-03-11 PROCEDURE — 3075F SYST BP GE 130 - 139MM HG: CPT | Performed by: INTERNAL MEDICINE

## 2020-03-11 RX ORDER — DILTIAZEM HYDROCHLORIDE 120 MG/1
120 CAPSULE, EXTENDED RELEASE ORAL DAILY
Qty: 90 CAPSULE | Refills: 3 | Status: SHIPPED | OUTPATIENT
Start: 2020-03-11 | End: 2021-02-22

## 2020-03-11 NOTE — PROGRESS NOTES
Chief Complaint   Patient presents with    Follow-up     Pt is here today for 2 month follow-up  Assessment/Plan:    HTN  BP in the office today was 132/88 while sitting  I had increased his intake of diltiazem from 120 to 180 mg last visit on 1/7/20  His BP levels are stable  He states he started experiencing edema in his lower extremities however with the higher dosage  Therefore, he went back to previous dosage of 120 mg  He was advised to keep a BP log for the next 3 months and bring it into the office next visit  He will need to also continue to limit his salt and caffeine intake  Asthma  Stable at this time  Mild Class II renal insufficiency - Renal function panel revealed mild insufficiency  I will continue to monitor  Health Maintenance  Pt is due for annual physical      Diagnoses and all orders for this visit:    Benign hypertension  -     diltiazem (DILACOR XR) 120 MG 24 hr capsule; Take 1 capsule (120 mg total) by mouth daily    Mild renal insufficiency  -     Renal function panel; Future    Mixed hyperlipidemia  -     Lipid Panel with Direct LDL reflex; Future      Subjective:      Patient ID: Melanie Camilo is a 59 y o  male  This is the case of a 59year old male with a PMHx of asthma and HTN, presenting in the office for 2 month follow up and discuss lab results from 3/6/20  Renal function panel, CBC, and CMP results were all within normal range     Positive for sore throat and urinary frequency  Negative for cardio, respiratory, GI, or neuro related sx at this time  He has colonoscopy scheduled for next month  Lab work was reviewed and discussed with the patient           The following portions of the patient's history were reviewed and updated as appropriate: allergies, current medications, past family history, past medical history, past social history, past surgical history and problem list     Review of Systems        Allergies   Allergen Reactions    Amoxicillin Other reaction(s): Diarrhea    Amoxicillin-Pot Clavulanate      Other reaction(s): Allergy Date : 07/27/2015   GI    Metoprolol      Annotation - [id number]: Aggravtes asthma    Penicillins      Annotation - [id number]: Diarrhea     Past Medical History:   Diagnosis Date    Asthma     Last Assessed:10/16/17    Fontanez's esophagus     Colonic polyp     Last Assessed:10/16/17    Eczema     GERD (gastroesophageal reflux disease)     Last Assessed:10/16/17    High cholesterol     Last Assessed:10/16/17    Hypertension     Last Assessed:10/16/17    Non-neoplastic nevus     Last Assessed:11/16/17     Current Outpatient Medications on File Prior to Visit   Medication Sig Dispense Refill    albuterol (PROAIR HFA) 90 mcg/act inhaler Inhale 90 mcg 2 (two) times a day as needed      aspirin (ASPIR-81) 81 mg EC tablet Take 1 tablet by mouth daily      cycloSPORINE (RESTASIS) 0 05 % ophthalmic emulsion Apply 3 68 application to eye 2 (two) times a day      diltiazem (TIAZAC) 180 MG 24 hr capsule Take 1 capsule (180 mg total) by mouth daily 90 capsule 3    folic acid (FOLVITE) 1 mg tablet Take 1 tablet (1 mg total) by mouth daily 90 tablet 1    mometasone (NASONEX) 50 mcg/act nasal spray 2 sprays into each nostril daily      Multiple Vitamins-Minerals (CENTRUM SILVER 50+MEN) TABS Take 1 tablet by mouth daily      SYMBICORT 160-4 5 MCG/ACT inhaler Take 2 puffs by mouth 2 (two) times a day      thiamine 50 MG tablet Take 1 tablet (50 mg total) by mouth daily 90 tablet 1    levothyroxine 50 mcg tablet Take 1 tablet (50 mcg total) by mouth daily (Patient not taking: Reported on 5/7/2019) 90 tablet 1     No current facility-administered medications on file prior to visit        Past Surgical History:   Procedure Laterality Date    COLONOSCOPY      10/2013- egd/colon    LYMPHADENECTOMY      Axillary Lymph node removed-benign    TONSILLECTOMY      Last Assessed:10/16/17    VALVE REPLACEMENT      Heart Valve Replacement;  Last Assessed:10/16/17     Social History     Socioeconomic History    Marital status: /Civil Union     Spouse name: Not on file    Number of children: Not on file    Years of education: Not on file    Highest education level: Not on file   Occupational History    Not on file   Social Needs    Financial resource strain: Not on file    Food insecurity:     Worry: Not on file     Inability: Not on file    Transportation needs:     Medical: Not on file     Non-medical: Not on file   Tobacco Use    Smoking status: Former Smoker     Packs/day: 1 00     Years: 14 00     Pack years:      Types: Cigarettes     Last attempt to quit:      Years since quittin 2    Smokeless tobacco: Former User     Types: Chew     Quit date:    Substance and Sexual Activity    Alcohol use: Yes     Comment: occasionally     Drug use: No    Sexual activity: Not Currently   Lifestyle    Physical activity:     Days per week: Not on file     Minutes per session: Not on file    Stress: Not on file   Relationships    Social connections:     Talks on phone: Not on file     Gets together: Not on file     Attends Yarsanism service: Not on file     Active member of club or organization: Not on file     Attends meetings of clubs or organizations: Not on file     Relationship status: Not on file    Intimate partner violence:     Fear of current or ex partner: Not on file     Emotionally abused: Not on file     Physically abused: Not on file     Forced sexual activity: Not on file   Other Topics Concern    Not on file   Social History Narrative    Caffeine use       Objective:      /88 (BP Location: Left arm, Patient Position: Sitting, Cuff Size: Adult)   Pulse 74   Temp 98 9 °F (37 2 °C) (Tympanic)   Ht 5' 7 95" (1 726 m)   Wt 74 4 kg (164 lb)   SpO2 98%   BMI 24 97 kg/m²          Physical Exam        Attestation:   By signing my name below, Clarita March, attest that this documentation has been prepared under the direction and in the presence of Cesar Ventura MD  Electronically Signed: Kaity Abdul  3/11/20      I, Cesar Ventura, personally performed the services described in this documentation  All medical record entries made by the scribe were at my direction and in my presence  I have reviewed the chart and discharge instructions and agree that the record reflects my personal performance and is accurate and complete   Cesar Ventura MD  3/11/20

## 2020-03-11 NOTE — PATIENT INSTRUCTIONS
1  Keep a log of BP for the next 3 months for most days of the week  2  Continue on medications  3  Complete all blood work prior to next visit  4  Continue to limit salt caffeine intake  5  Follow up in 3 months or as needed

## 2020-03-11 NOTE — PROGRESS NOTES
Chief Complaint   Patient presents with    Follow-up     Pt is here today for 2 month follow-up  Assessment/Plan:    HTN - BP in the office today was 132/88 while sitting  I had increased his intake of diltiazem from 120 to 180 mg last visit on 1/7/20  His BP levels are stable  He states he started experiencing edema in his lower extremities however with the higher dosage  Therefore, he went back to previous dosage of 120 mg  He was advised to keep a BP log for the next 3 months and bring it into the office next visit  He will need to also continue to limit his salt and caffeine intake  Asthma - Stable at this time  Mild Class II renal insufficiency - Renal function panel revealed mild insufficiency  I will continue to monitor  Health Maintenance - Pt is due for annual physical      Diagnoses and all orders for this visit:    Benign hypertension  -     diltiazem (DILACOR XR) 120 MG 24 hr capsule; Take 1 capsule (120 mg total) by mouth daily    Mild renal insufficiency  -     Renal function panel; Future    Mixed hyperlipidemia  -     Lipid Panel with Direct LDL reflex; Future        Subjective:      Patient ID: Liz Chiu is a 59 y o  male  This is the case of a 59year old male with a PMHx of asthma and HTN, presenting in the office for 2 month follow up and discuss lab results from 3/6/20  Renal function panel, CBC, and CMP results were all within normal range     Positive for sore throat and urinary frequency  Negative for cardio, respiratory, GI, or neuro related sx at this time  He has colonoscopy scheduled for next month  Lab work was reviewed and discussed with the patient           The following portions of the patient's history were reviewed and updated as appropriate: allergies, current medications, past family history, past medical history, past social history, past surgical history and problem list     Review of Systems   Constitutional: Negative for appetite change, chills, diaphoresis, fatigue and fever  HENT: Positive for sneezing  Negative for congestion, ear discharge, hearing loss, mouth sores, nosebleeds, postnasal drip, rhinorrhea, sinus pressure, sinus pain, sore throat and tinnitus  Asthma  Very stable   Eyes: Negative  Respiratory: Negative for cough, choking, chest tightness, shortness of breath, wheezing and stridor  Cardiovascular: Negative  Gastrointestinal: Negative  Genitourinary: Positive for frequency  Negative for difficulty urinating and urgency  Musculoskeletal: Negative for arthralgias, back pain, gait problem, joint swelling, myalgias and neck pain  Skin: Negative  Allergic/Immunologic: Positive for environmental allergies  Neurological: Negative for dizziness, seizures, syncope, speech difficulty, light-headedness, numbness and headaches  Hematological: Does not bruise/bleed easily  Psychiatric/Behavioral: Negative  Allergies   Allergen Reactions    Amoxicillin      Other reaction(s): Diarrhea    Amoxicillin-Pot Clavulanate      Other reaction(s):  Allergy Date : 07/27/2015   GI    Metoprolol      Annotation - [id number]: Aggravtes asthma    Penicillins      Annotation - [id number]: Diarrhea     Past Medical History:   Diagnosis Date    Asthma     Last Assessed:10/16/17    Fontanez's esophagus     Colonic polyp     Last Assessed:10/16/17    Eczema     GERD (gastroesophageal reflux disease)     Last Assessed:10/16/17    High cholesterol     Last Assessed:10/16/17    Hypertension     Last Assessed:10/16/17    Non-neoplastic nevus     Last Assessed:11/16/17     Current Outpatient Medications on File Prior to Visit   Medication Sig Dispense Refill    albuterol (PROAIR HFA) 90 mcg/act inhaler Inhale 90 mcg 2 (two) times a day as needed      aspirin (ASPIR-81) 81 mg EC tablet Take 1 tablet by mouth daily      cycloSPORINE (RESTASIS) 0 05 % ophthalmic emulsion Apply 3 54 application to eye 2 (two) times a day      diltiazem (TIAZAC) 180 MG 24 hr capsule Take 1 capsule (180 mg total) by mouth daily 90 capsule 3    folic acid (FOLVITE) 1 mg tablet Take 1 tablet (1 mg total) by mouth daily 90 tablet 1    mometasone (NASONEX) 50 mcg/act nasal spray 2 sprays into each nostril daily      Multiple Vitamins-Minerals (CENTRUM SILVER 50+MEN) TABS Take 1 tablet by mouth daily      SYMBICORT 160-4 5 MCG/ACT inhaler Take 2 puffs by mouth 2 (two) times a day      thiamine 50 MG tablet Take 1 tablet (50 mg total) by mouth daily 90 tablet 1    levothyroxine 50 mcg tablet Take 1 tablet (50 mcg total) by mouth daily (Patient not taking: Reported on 2019) 90 tablet 1     No current facility-administered medications on file prior to visit  Past Surgical History:   Procedure Laterality Date    COLONOSCOPY      10/2013- egd/colon    LYMPHADENECTOMY      Axillary Lymph node removed-benign    TONSILLECTOMY      Last Assessed:10/16/17    VALVE REPLACEMENT      Heart Valve Replacement;  Last Assessed:10/16/17     Social History     Socioeconomic History    Marital status: /Civil Union     Spouse name: Not on file    Number of children: Not on file    Years of education: Not on file    Highest education level: Not on file   Occupational History    Not on file   Social Needs    Financial resource strain: Not on file    Food insecurity:     Worry: Not on file     Inability: Not on file    Transportation needs:     Medical: Not on file     Non-medical: Not on file   Tobacco Use    Smoking status: Former Smoker     Packs/day: 1 00     Years: 14 00     Pack years: 14 00     Types: Cigarettes     Last attempt to quit:      Years since quittin 2    Smokeless tobacco: Former User     Types: Chew     Quit date:    Substance and Sexual Activity    Alcohol use: Yes     Comment: occasionally     Drug use: No    Sexual activity: Not Currently   Lifestyle    Physical activity:     Days per week: Not on file     Minutes per session: Not on file    Stress: Not on file   Relationships    Social connections:     Talks on phone: Not on file     Gets together: Not on file     Attends Synagogue service: Not on file     Active member of club or organization: Not on file     Attends meetings of clubs or organizations: Not on file     Relationship status: Not on file    Intimate partner violence:     Fear of current or ex partner: Not on file     Emotionally abused: Not on file     Physically abused: Not on file     Forced sexual activity: Not on file   Other Topics Concern    Not on file   Social History Narrative    Caffeine use       Objective:      /88 (BP Location: Left arm, Patient Position: Sitting, Cuff Size: Adult)   Pulse 74   Temp 98 9 °F (37 2 °C) (Tympanic)   Ht 5' 7 95" (1 726 m)   Wt 74 4 kg (164 lb)   SpO2 98%   BMI 24 97 kg/m²          Physical Exam   Constitutional: He is oriented to person, place, and time  He appears well-developed and well-nourished  No distress  HENT:   Head: Normocephalic and atraumatic  Right Ear: External ear normal    Left Ear: External ear normal    Nose: Nose normal    Mouth/Throat: Oropharynx is clear and moist  No oropharyngeal exudate  Eyes: Pupils are equal, round, and reactive to light  Conjunctivae and EOM are normal  Right eye exhibits no discharge  Left eye exhibits no discharge  No scleral icterus  Neck: Normal range of motion  Neck supple  No JVD present  No tracheal deviation present  No thyromegaly present  Cardiovascular: Normal rate, regular rhythm, normal heart sounds and intact distal pulses  Exam reveals no gallop and no friction rub  No murmur heard  Pulmonary/Chest: Effort normal and breath sounds normal  No respiratory distress  He has no wheezes  He has no rales  He exhibits no tenderness  Abdominal: Soft  Bowel sounds are normal  He exhibits no distension and no mass  There is no tenderness  There is no rebound and no guarding  Musculoskeletal: Normal range of motion  He exhibits no edema, tenderness or deformity  Lymphadenopathy:     He has no cervical adenopathy  Neurological: He is alert and oriented to person, place, and time  He has normal reflexes  He displays normal reflexes  No cranial nerve deficit  Coordination normal    Skin: Skin is warm and dry  No rash noted  He is not diaphoretic  No erythema  No pallor  Psychiatric: He has a normal mood and affect  His behavior is normal  Judgment and thought content normal          Attestation:   By signing my name below, Saranya Wadsworth, attbekah that this documentation has been prepared under the direction and in the presence of Leroy High MD  Electronically Signed: Kaity Agosto  3/11/20      I, Leroy High, personally performed the services described in this documentation  All medical record entries made by the scribe were at my direction and in my presence  I have reviewed the chart and discharge instructions and agree that the record reflects my personal performance and is accurate and complete   Leroy High MD  3/11/20

## 2020-03-12 ENCOUNTER — TELEPHONE (OUTPATIENT)
Dept: INTERNAL MEDICINE CLINIC | Facility: CLINIC | Age: 65
End: 2020-03-12

## 2020-03-12 NOTE — TELEPHONE ENCOUNTER
----- Message from Juana Narayan MD sent at 3/11/2020  1:32 PM EDT -----  Please call pt   To let him know that his blood work was normal

## 2020-03-12 NOTE — TELEPHONE ENCOUNTER
Patient called and informed of the results yesterday at his office visit with Dr Warren Richardson  He is reqeusting the orders for his future be mailed to his home address  Request granted

## 2020-05-17 ENCOUNTER — HOSPITAL ENCOUNTER (EMERGENCY)
Facility: HOSPITAL | Age: 65
Discharge: HOME | End: 2020-05-17
Attending: EMERGENCY MEDICINE | Admitting: EMERGENCY MEDICINE
Payer: COMMERCIAL

## 2020-05-17 VITALS
HEART RATE: 70 BPM | BODY MASS INDEX: 26.34 KG/M2 | WEIGHT: 173 LBS | OXYGEN SATURATION: 96 % | RESPIRATION RATE: 12 BRPM | TEMPERATURE: 97.7 F | SYSTOLIC BLOOD PRESSURE: 155 MMHG | DIASTOLIC BLOOD PRESSURE: 77 MMHG

## 2020-05-17 DIAGNOSIS — R10.9 ABDOMINAL PAIN: Primary | ICD-10-CM

## 2020-05-17 DIAGNOSIS — E87.6 HYPOKALEMIA: ICD-10-CM

## 2020-05-17 DIAGNOSIS — K29.20 ALCOHOLIC GASTRITIS: ICD-10-CM

## 2020-05-17 LAB
ALBUMIN SERPL BCP-MCNC: 3.6 G/DL (ref 3.5–5)
ALP SERPL-CCNC: 67 U/L (ref 46–116)
ALT SERPL W P-5'-P-CCNC: 31 U/L (ref 12–78)
ANION GAP SERPL CALCULATED.3IONS-SCNC: 9 MMOL/L (ref 4–13)
AST SERPL W P-5'-P-CCNC: 23 U/L (ref 5–45)
BASOPHILS # BLD AUTO: 0.05 THOUSANDS/ΜL (ref 0–0.1)
BASOPHILS NFR BLD AUTO: 1 % (ref 0–1)
BILIRUB SERPL-MCNC: 0.4 MG/DL (ref 0.2–1)
BILIRUB UR QL STRIP: NEGATIVE
BUN SERPL-MCNC: 12 MG/DL (ref 5–25)
CALCIUM SERPL-MCNC: 8.7 MG/DL (ref 8.3–10.1)
CHLORIDE SERPL-SCNC: 95 MMOL/L (ref 100–108)
CLARITY UR: CLEAR
CO2 SERPL-SCNC: 27 MMOL/L (ref 21–32)
COLOR UR: YELLOW
CREAT SERPL-MCNC: 1.19 MG/DL (ref 0.6–1.3)
EOSINOPHIL # BLD AUTO: 0.14 THOUSAND/ΜL (ref 0–0.61)
EOSINOPHIL NFR BLD AUTO: 2 % (ref 0–6)
ERYTHROCYTE [DISTWIDTH] IN BLOOD BY AUTOMATED COUNT: 12.6 % (ref 11.6–15.1)
GFR SERPL CREATININE-BSD FRML MDRD: 64 ML/MIN/1.73SQ M
GLUCOSE SERPL-MCNC: 122 MG/DL (ref 65–140)
GLUCOSE UR STRIP-MCNC: NEGATIVE MG/DL
HCT VFR BLD AUTO: 41.9 % (ref 36.5–49.3)
HGB BLD-MCNC: 14.3 G/DL (ref 12–17)
HGB UR QL STRIP.AUTO: NEGATIVE
IMM GRANULOCYTES # BLD AUTO: 0.03 THOUSAND/UL (ref 0–0.2)
IMM GRANULOCYTES NFR BLD AUTO: 0 % (ref 0–2)
KETONES UR STRIP-MCNC: NEGATIVE MG/DL
LEUKOCYTE ESTERASE UR QL STRIP: NEGATIVE
LIPASE SERPL-CCNC: 92 U/L (ref 73–393)
LYMPHOCYTES # BLD AUTO: 2.15 THOUSANDS/ΜL (ref 0.6–4.47)
LYMPHOCYTES NFR BLD AUTO: 27 % (ref 14–44)
MCH RBC QN AUTO: 31.4 PG (ref 26.8–34.3)
MCHC RBC AUTO-ENTMCNC: 34.1 G/DL (ref 31.4–37.4)
MCV RBC AUTO: 92 FL (ref 82–98)
MONOCYTES # BLD AUTO: 0.63 THOUSAND/ΜL (ref 0.17–1.22)
MONOCYTES NFR BLD AUTO: 8 % (ref 4–12)
NEUTROPHILS # BLD AUTO: 5.06 THOUSANDS/ΜL (ref 1.85–7.62)
NEUTS SEG NFR BLD AUTO: 62 % (ref 43–75)
NITRITE UR QL STRIP: NEGATIVE
NRBC BLD AUTO-RTO: 0 /100 WBCS
PH UR STRIP.AUTO: 6 [PH]
PLATELET # BLD AUTO: 286 THOUSANDS/UL (ref 149–390)
PMV BLD AUTO: 9.9 FL (ref 8.9–12.7)
POTASSIUM SERPL-SCNC: 3.4 MMOL/L (ref 3.5–5.3)
PROT SERPL-MCNC: 6.9 G/DL (ref 6.4–8.2)
PROT UR STRIP-MCNC: NEGATIVE MG/DL
RBC # BLD AUTO: 4.56 MILLION/UL (ref 3.88–5.62)
SODIUM SERPL-SCNC: 131 MMOL/L (ref 136–145)
SP GR UR STRIP.AUTO: <=1.005 (ref 1–1.03)
TROPONIN I SERPL-MCNC: <0.02 NG/ML
UROBILINOGEN UR QL STRIP.AUTO: 0.2 E.U./DL
WBC # BLD AUTO: 8.06 THOUSAND/UL (ref 4.31–10.16)

## 2020-05-17 PROCEDURE — 81003 URINALYSIS AUTO W/O SCOPE: CPT | Performed by: EMERGENCY MEDICINE

## 2020-05-17 PROCEDURE — 85025 COMPLETE CBC W/AUTO DIFF WBC: CPT | Performed by: EMERGENCY MEDICINE

## 2020-05-17 PROCEDURE — 99284 EMERGENCY DEPT VISIT MOD MDM: CPT

## 2020-05-17 PROCEDURE — 99284 EMERGENCY DEPT VISIT MOD MDM: CPT | Performed by: EMERGENCY MEDICINE

## 2020-05-17 PROCEDURE — 96374 THER/PROPH/DIAG INJ IV PUSH: CPT

## 2020-05-17 PROCEDURE — 83690 ASSAY OF LIPASE: CPT | Performed by: EMERGENCY MEDICINE

## 2020-05-17 PROCEDURE — 96375 TX/PRO/DX INJ NEW DRUG ADDON: CPT

## 2020-05-17 PROCEDURE — 84484 ASSAY OF TROPONIN QUANT: CPT | Performed by: EMERGENCY MEDICINE

## 2020-05-17 PROCEDURE — 80053 COMPREHEN METABOLIC PANEL: CPT | Performed by: EMERGENCY MEDICINE

## 2020-05-17 PROCEDURE — 36415 COLL VENOUS BLD VENIPUNCTURE: CPT | Performed by: EMERGENCY MEDICINE

## 2020-05-17 RX ORDER — POTASSIUM CHLORIDE 20 MEQ/1
40 TABLET, EXTENDED RELEASE ORAL ONCE
Status: COMPLETED | OUTPATIENT
Start: 2020-05-17 | End: 2020-05-17

## 2020-05-17 RX ORDER — OMEPRAZOLE 20 MG/1
40 CAPSULE, DELAYED RELEASE ORAL DAILY
Qty: 20 CAPSULE | Refills: 0 | Status: SHIPPED | OUTPATIENT
Start: 2020-05-17 | End: 2021-09-30

## 2020-05-17 RX ORDER — ONDANSETRON 2 MG/ML
4 INJECTION INTRAMUSCULAR; INTRAVENOUS ONCE
Status: COMPLETED | OUTPATIENT
Start: 2020-05-17 | End: 2020-05-17

## 2020-05-17 RX ADMIN — ONDANSETRON 4 MG: 2 INJECTION INTRAMUSCULAR; INTRAVENOUS at 21:38

## 2020-05-17 RX ADMIN — POTASSIUM CHLORIDE 40 MEQ: 1500 TABLET, EXTENDED RELEASE ORAL at 22:54

## 2020-05-17 RX ADMIN — FAMOTIDINE 20 MG: 10 INJECTION INTRAVENOUS at 21:38

## 2020-05-21 ENCOUNTER — OFFICE VISIT (OUTPATIENT)
Dept: INTERNAL MEDICINE CLINIC | Age: 65
End: 2020-05-21
Payer: COMMERCIAL

## 2020-05-21 VITALS
DIASTOLIC BLOOD PRESSURE: 64 MMHG | HEART RATE: 79 BPM | TEMPERATURE: 98.2 F | BODY MASS INDEX: 25.16 KG/M2 | SYSTOLIC BLOOD PRESSURE: 116 MMHG | HEIGHT: 68 IN | OXYGEN SATURATION: 96 % | WEIGHT: 166 LBS

## 2020-05-21 DIAGNOSIS — F10.10 ETOH ABUSE: ICD-10-CM

## 2020-05-21 DIAGNOSIS — E78.2 MIXED HYPERLIPIDEMIA: ICD-10-CM

## 2020-05-21 DIAGNOSIS — R79.89 ELEVATED TSH: ICD-10-CM

## 2020-05-21 DIAGNOSIS — E87.6 HYPOKALEMIA: ICD-10-CM

## 2020-05-21 DIAGNOSIS — I10 ESSENTIAL HYPERTENSION: ICD-10-CM

## 2020-05-21 DIAGNOSIS — R19.5 CHANGE IN STOOL: ICD-10-CM

## 2020-05-21 DIAGNOSIS — E87.1 HYPONATREMIA: ICD-10-CM

## 2020-05-21 DIAGNOSIS — K29.00 OTHER ACUTE GASTRITIS WITHOUT HEMORRHAGE: Primary | ICD-10-CM

## 2020-05-21 PROCEDURE — 99214 OFFICE O/P EST MOD 30 MIN: CPT | Performed by: PHYSICIAN ASSISTANT

## 2020-05-21 PROCEDURE — 3008F BODY MASS INDEX DOCD: CPT | Performed by: PHYSICIAN ASSISTANT

## 2020-05-21 PROCEDURE — 3078F DIAST BP <80 MM HG: CPT | Performed by: PHYSICIAN ASSISTANT

## 2020-05-21 PROCEDURE — 3074F SYST BP LT 130 MM HG: CPT | Performed by: PHYSICIAN ASSISTANT

## 2020-05-21 PROCEDURE — 1036F TOBACCO NON-USER: CPT | Performed by: PHYSICIAN ASSISTANT

## 2020-06-05 ENCOUNTER — OFFICE VISIT (OUTPATIENT)
Dept: URGENT CARE | Facility: CLINIC | Age: 65
End: 2020-06-05
Payer: COMMERCIAL

## 2020-06-05 VITALS
HEIGHT: 68 IN | WEIGHT: 163 LBS | RESPIRATION RATE: 16 BRPM | DIASTOLIC BLOOD PRESSURE: 87 MMHG | OXYGEN SATURATION: 98 % | HEART RATE: 67 BPM | TEMPERATURE: 97.6 F | BODY MASS INDEX: 24.71 KG/M2 | SYSTOLIC BLOOD PRESSURE: 130 MMHG

## 2020-06-05 DIAGNOSIS — Z20.822 ENCOUNTER FOR LABORATORY TESTING FOR COVID-19 VIRUS: Primary | ICD-10-CM

## 2020-06-05 PROCEDURE — 99212 OFFICE O/P EST SF 10 MIN: CPT | Performed by: NURSE PRACTITIONER

## 2020-06-05 PROCEDURE — U0003 INFECTIOUS AGENT DETECTION BY NUCLEIC ACID (DNA OR RNA); SEVERE ACUTE RESPIRATORY SYNDROME CORONAVIRUS 2 (SARS-COV-2) (CORONAVIRUS DISEASE [COVID-19]), AMPLIFIED PROBE TECHNIQUE, MAKING USE OF HIGH THROUGHPUT TECHNOLOGIES AS DESCRIBED BY CMS-2020-01-R: HCPCS | Performed by: NURSE PRACTITIONER

## 2020-06-06 LAB — SARS-COV-2 RNA SPEC QL NAA+PROBE: NOT DETECTED

## 2020-06-11 LAB
ALBUMIN SERPL-MCNC: 4.5 G/DL (ref 3.8–4.8)
ALBUMIN/GLOB SERPL: 2.1 {RATIO} (ref 1.2–2.2)
ALP SERPL-CCNC: 66 IU/L (ref 39–117)
ALT SERPL-CCNC: 15 IU/L (ref 0–44)
AST SERPL-CCNC: 22 IU/L (ref 0–40)
BASOPHILS # BLD AUTO: 0.1 X10E3/UL (ref 0–0.2)
BASOPHILS NFR BLD AUTO: 1 %
BILIRUB SERPL-MCNC: 0.3 MG/DL (ref 0–1.2)
BUN SERPL-MCNC: 12 MG/DL (ref 8–27)
BUN/CREAT SERPL: 11 (ref 10–24)
CALCIUM SERPL-MCNC: 9.7 MG/DL (ref 8.6–10.2)
CHLORIDE SERPL-SCNC: 99 MMOL/L (ref 96–106)
CHOLEST SERPL-MCNC: 200 MG/DL (ref 100–199)
CO2 SERPL-SCNC: 25 MMOL/L (ref 20–29)
CREAT SERPL-MCNC: 1.13 MG/DL (ref 0.76–1.27)
EOSINOPHIL # BLD AUTO: 0.2 X10E3/UL (ref 0–0.4)
EOSINOPHIL NFR BLD AUTO: 3 %
ERYTHROCYTE [DISTWIDTH] IN BLOOD BY AUTOMATED COUNT: 13 % (ref 11.6–15.4)
GLOBULIN SER-MCNC: 2.1 G/DL (ref 1.5–4.5)
GLUCOSE SERPL-MCNC: 92 MG/DL (ref 65–99)
HCT VFR BLD AUTO: 46.1 % (ref 37.5–51)
HDLC SERPL-MCNC: 68 MG/DL
HGB BLD-MCNC: 15.2 G/DL (ref 13–17.7)
IMM GRANULOCYTES # BLD: 0 X10E3/UL (ref 0–0.1)
IMM GRANULOCYTES NFR BLD: 0 %
LDLC SERPL CALC-MCNC: 116 MG/DL (ref 0–99)
LYMPHOCYTES # BLD AUTO: 2 X10E3/UL (ref 0.7–3.1)
LYMPHOCYTES NFR BLD AUTO: 38 %
MCH RBC QN AUTO: 30.5 PG (ref 26.6–33)
MCHC RBC AUTO-ENTMCNC: 33 G/DL (ref 31.5–35.7)
MCV RBC AUTO: 92 FL (ref 79–97)
MONOCYTES # BLD AUTO: 0.5 X10E3/UL (ref 0.1–0.9)
MONOCYTES NFR BLD AUTO: 9 %
NEUTROPHILS # BLD AUTO: 2.5 X10E3/UL (ref 1.4–7)
NEUTROPHILS NFR BLD AUTO: 49 %
PLATELET # BLD AUTO: 322 X10E3/UL (ref 150–450)
POTASSIUM SERPL-SCNC: 4.6 MMOL/L (ref 3.5–5.2)
PROT SERPL-MCNC: 6.6 G/DL (ref 6–8.5)
RBC # BLD AUTO: 4.99 X10E6/UL (ref 4.14–5.8)
SL AMB EGFR AFRICAN AMERICAN: 79 ML/MIN/1.73
SL AMB EGFR NON AFRICAN AMERICAN: 68 ML/MIN/1.73
SL AMB VLDL CHOLESTEROL CALC: 16 MG/DL (ref 5–40)
SODIUM SERPL-SCNC: 138 MMOL/L (ref 134–144)
TRIGL SERPL-MCNC: 78 MG/DL (ref 0–149)
TSH SERPL DL<=0.005 MIU/L-ACNC: 2.42 UIU/ML (ref 0.45–4.5)
WBC # BLD AUTO: 5.2 X10E3/UL (ref 3.4–10.8)

## 2020-06-17 ENCOUNTER — OFFICE VISIT (OUTPATIENT)
Dept: INTERNAL MEDICINE CLINIC | Age: 65
End: 2020-06-17
Payer: COMMERCIAL

## 2020-06-17 VITALS
BODY MASS INDEX: 25.28 KG/M2 | WEIGHT: 166.8 LBS | SYSTOLIC BLOOD PRESSURE: 140 MMHG | HEIGHT: 68 IN | TEMPERATURE: 97.8 F | HEART RATE: 72 BPM | OXYGEN SATURATION: 98 % | DIASTOLIC BLOOD PRESSURE: 82 MMHG

## 2020-06-17 DIAGNOSIS — K29.20 ACUTE ALCOHOLIC GASTRITIS WITHOUT HEMORRHAGE: ICD-10-CM

## 2020-06-17 DIAGNOSIS — E78.5 DYSLIPIDEMIA: Primary | ICD-10-CM

## 2020-06-17 DIAGNOSIS — I10 BENIGN HYPERTENSION: ICD-10-CM

## 2020-06-17 DIAGNOSIS — E04.1 THYROID NODULE: ICD-10-CM

## 2020-06-17 PROCEDURE — 3077F SYST BP >= 140 MM HG: CPT | Performed by: PHYSICIAN ASSISTANT

## 2020-06-17 PROCEDURE — 3079F DIAST BP 80-89 MM HG: CPT | Performed by: PHYSICIAN ASSISTANT

## 2020-06-17 PROCEDURE — 3008F BODY MASS INDEX DOCD: CPT | Performed by: PHYSICIAN ASSISTANT

## 2020-06-17 PROCEDURE — 99214 OFFICE O/P EST MOD 30 MIN: CPT | Performed by: PHYSICIAN ASSISTANT

## 2020-06-17 PROCEDURE — 1036F TOBACCO NON-USER: CPT | Performed by: PHYSICIAN ASSISTANT

## 2020-06-17 RX ORDER — CYCLOSPORINE 0.5 MG/ML
EMULSION OPHTHALMIC
COMMUNITY
Start: 2020-05-11

## 2020-10-29 ENCOUNTER — OFFICE VISIT (OUTPATIENT)
Dept: INTERNAL MEDICINE CLINIC | Age: 65
End: 2020-10-29
Payer: MEDICARE

## 2020-10-29 VITALS
OXYGEN SATURATION: 97 % | WEIGHT: 169 LBS | DIASTOLIC BLOOD PRESSURE: 70 MMHG | HEART RATE: 83 BPM | TEMPERATURE: 98.6 F | HEIGHT: 68 IN | SYSTOLIC BLOOD PRESSURE: 122 MMHG | BODY MASS INDEX: 25.61 KG/M2

## 2020-10-29 DIAGNOSIS — I10 BENIGN HYPERTENSION: ICD-10-CM

## 2020-10-29 DIAGNOSIS — K29.00 OTHER ACUTE GASTRITIS WITHOUT HEMORRHAGE: ICD-10-CM

## 2020-10-29 DIAGNOSIS — E03.8 SUBCLINICAL HYPOTHYROIDISM: ICD-10-CM

## 2020-10-29 DIAGNOSIS — Z23 NEED FOR INFLUENZA VACCINATION: ICD-10-CM

## 2020-10-29 DIAGNOSIS — E78.5 DYSLIPIDEMIA: ICD-10-CM

## 2020-10-29 DIAGNOSIS — N28.9 MILD RENAL INSUFFICIENCY: ICD-10-CM

## 2020-10-29 DIAGNOSIS — Z00.00 WELCOME TO MEDICARE PREVENTIVE VISIT: Primary | ICD-10-CM

## 2020-10-29 PROCEDURE — 90662 IIV NO PRSV INCREASED AG IM: CPT

## 2020-10-29 PROCEDURE — G0008 ADMIN INFLUENZA VIRUS VAC: HCPCS

## 2020-10-29 PROCEDURE — G0402 INITIAL PREVENTIVE EXAM: HCPCS | Performed by: PHYSICIAN ASSISTANT

## 2020-10-29 PROCEDURE — G0403 EKG FOR INITIAL PREVENT EXAM: HCPCS | Performed by: PHYSICIAN ASSISTANT

## 2021-02-21 DIAGNOSIS — I10 BENIGN HYPERTENSION: ICD-10-CM

## 2021-02-22 RX ORDER — DILTIAZEM HYDROCHLORIDE 120 MG/1
CAPSULE, EXTENDED RELEASE ORAL
Qty: 90 CAPSULE | Refills: 3 | Status: SHIPPED | OUTPATIENT
Start: 2021-02-22 | End: 2021-09-30 | Stop reason: SDUPTHER

## 2021-03-30 DIAGNOSIS — Z23 ENCOUNTER FOR IMMUNIZATION: ICD-10-CM

## 2021-04-03 ENCOUNTER — IMMUNIZATIONS (OUTPATIENT)
Dept: FAMILY MEDICINE CLINIC | Facility: HOSPITAL | Age: 66
End: 2021-04-03

## 2021-04-03 DIAGNOSIS — Z23 ENCOUNTER FOR IMMUNIZATION: Primary | ICD-10-CM

## 2021-04-03 PROCEDURE — 91300 SARS-COV-2 / COVID-19 MRNA VACCINE (PFIZER-BIONTECH) 30 MCG: CPT

## 2021-04-03 PROCEDURE — 0001A SARS-COV-2 / COVID-19 MRNA VACCINE (PFIZER-BIONTECH) 30 MCG: CPT

## 2021-04-24 ENCOUNTER — IMMUNIZATIONS (OUTPATIENT)
Dept: FAMILY MEDICINE CLINIC | Facility: HOSPITAL | Age: 66
End: 2021-04-24

## 2021-04-24 DIAGNOSIS — Z23 ENCOUNTER FOR IMMUNIZATION: Primary | ICD-10-CM

## 2021-04-24 PROCEDURE — 0002A SARS-COV-2 / COVID-19 MRNA VACCINE (PFIZER-BIONTECH) 30 MCG: CPT

## 2021-04-24 PROCEDURE — 91300 SARS-COV-2 / COVID-19 MRNA VACCINE (PFIZER-BIONTECH) 30 MCG: CPT

## 2021-07-27 ENCOUNTER — TELEPHONE (OUTPATIENT)
Dept: INTERNAL MEDICINE CLINIC | Age: 66
End: 2021-07-27

## 2021-09-30 ENCOUNTER — OFFICE VISIT (OUTPATIENT)
Dept: INTERNAL MEDICINE CLINIC | Age: 66
End: 2021-09-30
Payer: MEDICARE

## 2021-09-30 VITALS
TEMPERATURE: 98.5 F | DIASTOLIC BLOOD PRESSURE: 88 MMHG | OXYGEN SATURATION: 98 % | BODY MASS INDEX: 25.07 KG/M2 | SYSTOLIC BLOOD PRESSURE: 122 MMHG | HEART RATE: 79 BPM | WEIGHT: 165.4 LBS | HEIGHT: 68 IN

## 2021-09-30 DIAGNOSIS — I10 BENIGN HYPERTENSION: ICD-10-CM

## 2021-09-30 DIAGNOSIS — E03.8 SUBCLINICAL HYPOTHYROIDISM: ICD-10-CM

## 2021-09-30 DIAGNOSIS — Z12.5 SCREENING PSA (PROSTATE SPECIFIC ANTIGEN): ICD-10-CM

## 2021-09-30 DIAGNOSIS — F10.988 ALCOHOL USE, UNSPECIFIED WITH OTHER ALCOHOL-INDUCED DISORDER (HCC): ICD-10-CM

## 2021-09-30 DIAGNOSIS — E78.5 DYSLIPIDEMIA: Primary | ICD-10-CM

## 2021-09-30 PROCEDURE — 99213 OFFICE O/P EST LOW 20 MIN: CPT | Performed by: PHYSICIAN ASSISTANT

## 2021-09-30 RX ORDER — DILTIAZEM HYDROCHLORIDE 120 MG/1
120 CAPSULE, EXTENDED RELEASE ORAL DAILY
Qty: 90 CAPSULE | Refills: 3 | Status: SHIPPED | OUTPATIENT
Start: 2021-09-30

## 2021-09-30 NOTE — PROGRESS NOTES
BMI Counseling: There is no height or weight on file to calculate BMI  The BMI is above normal  Nutrition recommendations include reducing portion sizes, decreasing overall calorie intake, 3-5 servings of fruits/vegetables daily and reducing fast food intake

## 2021-09-30 NOTE — PROGRESS NOTES
Assessment/Plan:       Diagnoses and all orders for this visit:    Dyslipidemia  Comments:  low chol diet   await labs   Orders:  -     CBC and differential; Future  -     Comprehensive metabolic panel; Future  -     Lipid panel; Future    Benign hypertension  Comments:  well controlled  Orders:  -     diltiazem (Dilt-XR) 120 MG 24 hr capsule; Take 1 capsule (120 mg total) by mouth daily  -     CBC and differential; Future  -     Comprehensive metabolic panel; Future  -     Lipid panel; Future    Alcohol use, unspecified with other alcohol-induced disorder ([facility] [address] )  -     CBC and differential; Future  -     Comprehensive metabolic panel; Future  -     Lipid panel; Future  -     TSH, 3rd generation; Future    Screening PSA (prostate specific antigen)  -     PSA, Total Screen; Future    Subclinical hypothyroidism  -     CBC and differential; Future  -     Comprehensive metabolic panel; Future  -     Lipid panel; Future  -     TSH, 3rd generation; Future          Subjective:      Patient ID: Lico Hope is a 77 y o  male  HTN: taking diltiazem daily  /88 in the office  Patient does not take it at home, but states he knows when it's high because he feels "tense " He denies any episodes of hypertension  Swims about a mile three times per week  Diet is balanced, states he tries to avoid eating anything from a box  Avoiding white breads and sugar  Low salt  Smoking history 14 years, quit 33 years ago  Has 2-3 beers daily  No illicit drug use  Asthma: Patient taking albuterol and Symbicort  States his asthma is well controlled at this time  No hospitalizations in the last year  Last steroid taper was three years ago  Patient states he is woken up in the night short of breath "rarely ever " No issues when he is swimming  GERD: Patient with hx of Fontanez's  States he follows up with GI yearly -- due for his next follow up with Dr Brad Willis  No issues with GERD recently   Has not used his omeprazole in about two months and states he only gets reflux symptoms once every two weeks  HLD: Patient just had his blood work yesterday -- no results yet  Patient not interested in starting a statin at this time  Pt will be due for psa at upcoming bloodwork - will order today       The following portions of the patient's history were reviewed and updated as appropriate: allergies, current medications, past family history, past medical history, past social history, past surgical history and problem list     Review of Systems   Constitutional: Negative for chills and fever  HENT: Negative for congestion and sore throat  Respiratory: Negative for cough and shortness of breath  Cardiovascular: Negative for chest pain, palpitations and leg swelling  Gastrointestinal: Positive for constipation  Negative for abdominal pain, diarrhea, nausea and vomiting  Genitourinary: Negative for dysuria, frequency, hematuria and urgency  Musculoskeletal: Negative for arthralgias, back pain and joint swelling  Neurological: Negative for dizziness, light-headedness and headaches  Objective:      /88 (BP Location: Left arm, Patient Position: Sitting, Cuff Size: Adult)   Pulse 79   Temp 98 5 °F (36 9 °C) (Temporal)   Ht 5' 7 5" (1 715 m)   Wt 75 kg (165 lb 6 4 oz)   SpO2 98% Comment: Room Air  BMI 25 52 kg/m²          Physical Exam  Vitals reviewed  Constitutional:       General: He is not in acute distress  HENT:      Head: Normocephalic and atraumatic  Right Ear: Tympanic membrane, ear canal and external ear normal       Left Ear: Tympanic membrane, ear canal and external ear normal    Eyes:      Extraocular Movements: Extraocular movements intact  Conjunctiva/sclera: Conjunctivae normal       Pupils: Pupils are equal, round, and reactive to light  Cardiovascular:      Rate and Rhythm: Normal rate and regular rhythm  Pulmonary:      Effort: Pulmonary effort is normal  No respiratory distress  Breath sounds: Normal breath sounds  No wheezing or rales  Musculoskeletal:         General: Normal range of motion  Cervical back: Normal range of motion  Right lower leg: No edema  Left lower leg: No edema  Lymphadenopathy:      Cervical: No cervical adenopathy  Skin:     General: Skin is warm  Findings: No erythema or rash  Neurological:      General: No focal deficit present  Mental Status: He is alert and oriented to person, place, and time

## 2021-10-02 LAB
ALBUMIN SERPL-MCNC: 4.1 G/DL (ref 3.8–4.8)
ALBUMIN/GLOB SERPL: 1.8 {RATIO} (ref 1.2–2.2)
ALP SERPL-CCNC: 65 IU/L (ref 44–121)
ALT SERPL-CCNC: 16 IU/L (ref 0–44)
AST SERPL-CCNC: 23 IU/L (ref 0–40)
BASOPHILS # BLD AUTO: 0.1 X10E3/UL (ref 0–0.2)
BASOPHILS NFR BLD AUTO: 2 %
BILIRUB SERPL-MCNC: 0.3 MG/DL (ref 0–1.2)
BUN SERPL-MCNC: 18 MG/DL (ref 8–27)
BUN/CREAT SERPL: 14 (ref 10–24)
CALCIUM SERPL-MCNC: 9.4 MG/DL (ref 8.6–10.2)
CHLORIDE SERPL-SCNC: 102 MMOL/L (ref 96–106)
CHOLEST SERPL-MCNC: 219 MG/DL (ref 100–199)
CO2 SERPL-SCNC: 26 MMOL/L (ref 20–29)
CREAT SERPL-MCNC: 1.26 MG/DL (ref 0.76–1.27)
EOSINOPHIL # BLD AUTO: 0.2 X10E3/UL (ref 0–0.4)
EOSINOPHIL NFR BLD AUTO: 4 %
ERYTHROCYTE [DISTWIDTH] IN BLOOD BY AUTOMATED COUNT: 13.4 % (ref 11.6–15.4)
GLOBULIN SER-MCNC: 2.3 G/DL (ref 1.5–4.5)
GLUCOSE SERPL-MCNC: 94 MG/DL (ref 65–99)
HCT VFR BLD AUTO: 48.7 % (ref 37.5–51)
HDLC SERPL-MCNC: 67 MG/DL
HGB BLD-MCNC: 16.4 G/DL (ref 13–17.7)
IMM GRANULOCYTES # BLD: 0 X10E3/UL (ref 0–0.1)
IMM GRANULOCYTES NFR BLD: 1 %
LDLC SERPL CALC-MCNC: 132 MG/DL (ref 0–99)
LYMPHOCYTES # BLD AUTO: 2.8 X10E3/UL (ref 0.7–3.1)
LYMPHOCYTES NFR BLD AUTO: 42 %
MCH RBC QN AUTO: 31.1 PG (ref 26.6–33)
MCHC RBC AUTO-ENTMCNC: 33.7 G/DL (ref 31.5–35.7)
MCV RBC AUTO: 92 FL (ref 79–97)
MONOCYTES # BLD AUTO: 0.6 X10E3/UL (ref 0.1–0.9)
MONOCYTES NFR BLD AUTO: 10 %
NEUTROPHILS # BLD AUTO: 2.8 X10E3/UL (ref 1.4–7)
NEUTROPHILS NFR BLD AUTO: 41 %
PLATELET # BLD AUTO: 336 X10E3/UL (ref 150–450)
POTASSIUM SERPL-SCNC: 4.6 MMOL/L (ref 3.5–5.2)
PROT SERPL-MCNC: 6.4 G/DL (ref 6–8.5)
RBC # BLD AUTO: 5.27 X10E6/UL (ref 4.14–5.8)
SL AMB EGFR AFRICAN AMERICAN: 68 ML/MIN/1.73
SL AMB EGFR NON AFRICAN AMERICAN: 59 ML/MIN/1.73
SL AMB VLDL CHOLESTEROL CALC: 20 MG/DL (ref 5–40)
SODIUM SERPL-SCNC: 140 MMOL/L (ref 134–144)
TRIGL SERPL-MCNC: 113 MG/DL (ref 0–149)
TSH SERPL DL<=0.005 MIU/L-ACNC: 3.31 UIU/ML (ref 0.45–4.5)
WBC # BLD AUTO: 6.6 X10E3/UL (ref 3.4–10.8)

## 2022-04-02 LAB
ALBUMIN SERPL-MCNC: 4.1 G/DL (ref 3.8–4.8)
ALBUMIN/GLOB SERPL: 1.8 {RATIO} (ref 1.2–2.2)
ALP SERPL-CCNC: 67 IU/L (ref 44–121)
ALT SERPL-CCNC: 14 IU/L (ref 0–44)
AST SERPL-CCNC: 24 IU/L (ref 0–40)
BASOPHILS # BLD AUTO: 0.1 X10E3/UL (ref 0–0.2)
BASOPHILS NFR BLD AUTO: 1 %
BILIRUB SERPL-MCNC: 0.3 MG/DL (ref 0–1.2)
BUN SERPL-MCNC: 15 MG/DL (ref 8–27)
BUN/CREAT SERPL: 11 (ref 10–24)
CALCIUM SERPL-MCNC: 9.1 MG/DL (ref 8.6–10.2)
CHLORIDE SERPL-SCNC: 100 MMOL/L (ref 96–106)
CHOLEST SERPL-MCNC: 250 MG/DL (ref 100–199)
CO2 SERPL-SCNC: 22 MMOL/L (ref 20–29)
CREAT SERPL-MCNC: 1.31 MG/DL (ref 0.76–1.27)
EGFR: 60 ML/MIN/1.73
EOSINOPHIL # BLD AUTO: 0.2 X10E3/UL (ref 0–0.4)
EOSINOPHIL NFR BLD AUTO: 4 %
ERYTHROCYTE [DISTWIDTH] IN BLOOD BY AUTOMATED COUNT: 13.2 % (ref 11.6–15.4)
GLOBULIN SER-MCNC: 2.3 G/DL (ref 1.5–4.5)
GLUCOSE SERPL-MCNC: 93 MG/DL (ref 65–99)
HCT VFR BLD AUTO: 48 % (ref 37.5–51)
HDLC SERPL-MCNC: 59 MG/DL
HGB BLD-MCNC: 15.9 G/DL (ref 13–17.7)
IMM GRANULOCYTES # BLD: 0 X10E3/UL (ref 0–0.1)
IMM GRANULOCYTES NFR BLD: 0 %
LDLC SERPL CALC-MCNC: 169 MG/DL (ref 0–99)
LYMPHOCYTES # BLD AUTO: 2.3 X10E3/UL (ref 0.7–3.1)
LYMPHOCYTES NFR BLD AUTO: 41 %
MCH RBC QN AUTO: 30.8 PG (ref 26.6–33)
MCHC RBC AUTO-ENTMCNC: 33.1 G/DL (ref 31.5–35.7)
MCV RBC AUTO: 93 FL (ref 79–97)
MONOCYTES # BLD AUTO: 0.6 X10E3/UL (ref 0.1–0.9)
MONOCYTES NFR BLD AUTO: 11 %
NEUTROPHILS # BLD AUTO: 2.5 X10E3/UL (ref 1.4–7)
NEUTROPHILS NFR BLD AUTO: 43 %
PLATELET # BLD AUTO: 302 X10E3/UL (ref 150–450)
POTASSIUM SERPL-SCNC: 4.7 MMOL/L (ref 3.5–5.2)
PROT SERPL-MCNC: 6.4 G/DL (ref 6–8.5)
PSA SERPL-MCNC: 0.6 NG/ML (ref 0–4)
RBC # BLD AUTO: 5.16 X10E6/UL (ref 4.14–5.8)
SL AMB VLDL CHOLESTEROL CALC: 22 MG/DL (ref 5–40)
SODIUM SERPL-SCNC: 138 MMOL/L (ref 134–144)
TRIGL SERPL-MCNC: 122 MG/DL (ref 0–149)
TSH SERPL DL<=0.005 MIU/L-ACNC: 2.56 UIU/ML (ref 0.45–4.5)
WBC # BLD AUTO: 5.8 X10E3/UL (ref 3.4–10.8)

## 2022-04-04 ENCOUNTER — TELEPHONE (OUTPATIENT)
Dept: GASTROENTEROLOGY | Facility: CLINIC | Age: 67
End: 2022-04-04

## 2022-04-04 NOTE — TELEPHONE ENCOUNTER
Recall call went out via [employer] in 6/2021 with no return call from pt to schedule  Pt is due for a f/u appt with Dr Raquel Norton  I lmom for pt to please call back to schedule an appt with Dr Raquel Norton  Will call pt again in one week if do not hear back from him

## 2022-04-06 ENCOUNTER — OFFICE VISIT (OUTPATIENT)
Dept: INTERNAL MEDICINE CLINIC | Age: 67
End: 2022-04-06
Payer: MEDICARE

## 2022-04-06 VITALS
SYSTOLIC BLOOD PRESSURE: 122 MMHG | WEIGHT: 174 LBS | OXYGEN SATURATION: 97 % | DIASTOLIC BLOOD PRESSURE: 66 MMHG | TEMPERATURE: 98.4 F | HEART RATE: 92 BPM | HEIGHT: 68 IN | BODY MASS INDEX: 26.37 KG/M2

## 2022-04-06 DIAGNOSIS — N28.9 MILD RENAL INSUFFICIENCY: ICD-10-CM

## 2022-04-06 DIAGNOSIS — Z87.891 HISTORY OF TOBACCO USE DISORDER: ICD-10-CM

## 2022-04-06 DIAGNOSIS — I10 PRIMARY HYPERTENSION: Primary | ICD-10-CM

## 2022-04-06 DIAGNOSIS — F10.988 ALCOHOL USE, UNSPECIFIED WITH OTHER ALCOHOL-INDUCED DISORDER (HCC): ICD-10-CM

## 2022-04-06 DIAGNOSIS — R79.89 ELEVATED SERUM CREATININE: ICD-10-CM

## 2022-04-06 DIAGNOSIS — E78.5 DYSLIPIDEMIA: ICD-10-CM

## 2022-04-06 DIAGNOSIS — Z00.00 ENCOUNTER FOR ANNUAL WELLNESS VISIT (AWV) IN MEDICARE PATIENT: ICD-10-CM

## 2022-04-06 PROCEDURE — 1123F ACP DISCUSS/DSCN MKR DOCD: CPT | Performed by: PHYSICIAN ASSISTANT

## 2022-04-06 PROCEDURE — G0438 PPPS, INITIAL VISIT: HCPCS | Performed by: PHYSICIAN ASSISTANT

## 2022-04-06 PROCEDURE — 99214 OFFICE O/P EST MOD 30 MIN: CPT | Performed by: PHYSICIAN ASSISTANT

## 2022-04-06 RX ORDER — NIACIN 500 MG/1
500 TABLET, EXTENDED RELEASE ORAL
Qty: 90 TABLET | Refills: 3 | Status: SHIPPED | OUTPATIENT
Start: 2022-04-06

## 2022-04-06 NOTE — PATIENT INSTRUCTIONS

## 2022-04-06 NOTE — PROGRESS NOTES
Assessment and Plan:     Problem List Items Addressed This Visit        Nervous and Auditory    Alcohol use, unspecified with other alcohol-induced disorder ([facility] [address] )      Other Visit Diagnoses     Primary hypertension    -  Primary    Relevant Orders    US abdominal aorta screening aaa    History of tobacco use disorder        Relevant Orders    US abdominal aorta screening aaa    Encounter for annual wellness visit (AWV) in Medicare patient               Preventive health issues were discussed with patient, and age appropriate screening tests were ordered as noted in patient's After Visit Summary  Personalized health advice and appropriate referrals for health education or preventive services given if needed, as noted in patient's After Visit Summary  History of Present Illness:     Patient presents for Medicare Annual Wellness visit    Patient Care Team:  Danika Ojeda MD as PCP - General     Problem List:     Patient Active Problem List   Diagnosis    Acquired keratoderma    Benign hypertension    Benign prostatic hyperplasia    Vesicular palmoplantar eczema    Dyslipidemia    Eczema of both hands    Hyperlipidemia    Hypertrophic condition of skin    Subclinical hypothyroidism    Thyroid nodule    Screening for colon cancer    Right flank pain    Abrasion of flank    Overweight (BMI 25 0-29  9)    Essential hypertension    Alcohol use, unspecified with other alcohol-induced disorder [facility])      Past Medical and Surgical History:     Past Medical History:   Diagnosis Date    Asthma     Last Assessed:10/16/17    Fontanez's esophagus     Colonic polyp     Last Assessed:10/16/17    Eczema     GERD (gastroesophageal reflux disease)     Last Assessed:10/16/17    High cholesterol     Last Assessed:10/16/17    Hypertension     Last Assessed:10/16/17    Non-neoplastic nevus     Last Assessed:11/16/17     Past Surgical History:   Procedure Laterality Date    COLONOSCOPY      10/2013- egd/colon  LYMPHADENECTOMY      Axillary Lymph node removed-benign    TONSILLECTOMY      Last Assessed:10/16/17    VALVE REPLACEMENT      Heart Valve Replacement; Last Assessed:10/16/17      Family History:     Family History   Problem Relation Age of Onset    Cancer Father         groin, bone    Diabetes Paternal Grandfather       Social History:     Social History     Socioeconomic History    Marital status: /Civil Union     Spouse name: None    Number of children: None    Years of education: None    Highest education level: None   Occupational History    None   Tobacco Use    Smoking status: Former Smoker     Packs/day: 1 00     Years: 14 00     Pack years: 14 00     Types: Cigarettes     Quit date:      Years since quittin 2    Smokeless tobacco: Former User     Types: [address] date:    Vaping Use    Vaping Use: Never used   Substance and Sexual Activity    Alcohol use:  Yes     Alcohol/week: 14 0 standard drinks     Types: 14 Cans of beer per week    Drug use: No    Sexual activity: Not Currently   Other Topics Concern    None   Social History Narrative    Caffeine use     Social Determinants of Health     Financial Resource Strain: Not on file   Food Insecurity: Not on file   Transportation Needs: Not on file   Physical Activity: Not on file   Stress: Not on file   Social Connections: Not on file   Intimate Partner Violence: Not on file   Housing Stability: Not on file      Medications and Allergies:     Current Outpatient Medications   Medication Sig Dispense Refill    albuterol (PROAIR HFA) 90 mcg/act inhaler Inhale 90 mcg 2 (two) times a day as needed      aspirin (ASPIR-81) 81 mg EC tablet Take 1 tablet by mouth daily      cycloSPORINE (RESTASIS) 0 05 % ophthalmic emulsion Apply 8 85 application to eye 2 (two) times a day      diltiazem (Dilt-XR) 120 MG 24 hr capsule Take 1 capsule (120 mg total) by mouth daily 90 capsule 3    mometasone (NASONEX) 50 mcg/act nasal spray 2 sprays into each nostril daily      Multiple Vitamins-Minerals (CENTRUM SILVER 50+MEN) TABS Take 1 tablet by mouth daily      SYMBICORT 160-4 5 MCG/ACT inhaler Take 2 puffs by mouth 2 (two) times a day      thiamine 50 MG tablet Take 1 tablet (50 mg total) by mouth daily 90 tablet 1    TURMERIC PO Take by mouth daily       RESTASIS 0 05 % ophthalmic emulsion  (Patient not taking: Reported on 4/6/2022 )       No current facility-administered medications for this visit  Allergies   Allergen Reactions    Amoxicillin      Other reaction(s): Diarrhea    Amoxicillin-Pot Clavulanate      Other reaction(s):  Allergy Date : 07/27/2015   GI    Metoprolol      Annotation - [id number]: Aggravtes asthma    Penicillins      Annotation - [id number]: Diarrhea      Immunizations:     Immunization History   Administered Date(s) Administered    COVID-19 PFIZER VACCINE 0 3 ML IM 04/03/2021, 04/24/2021    Fluzone Split Quad 0 25 mL 12/04/2019    INFLUENZA 09/18/2012, 11/18/2014, 09/29/2016, 10/16/2017, 10/30/2018, 12/04/2019    Influenza Quadrivalent Preservative Free 3 years and older IM 10/16/2017    Influenza, high dose seasonal 0 7 mL 10/29/2020    Influenza, injectable, quadrivalent, preservative free 0 5 mL 11/04/2015    Influenza, recombinant, quadrivalent,injectable, preservative free 10/30/2018    Influenza, seasonal, injectable 09/18/2012, 11/18/2014, 09/29/2016    Td (adult), adsorbed 05/01/2009    Tdap 10/24/2016      Health Maintenance:         Topic Date Due    Colorectal Cancer Screening  06/11/2025    Hepatitis C Screening  Completed         Topic Date Due    Pneumococcal Vaccine: 65+ Years (1 of 1 - PPSV23) Never done    Influenza Vaccine (1) 09/01/2021    COVID-19 Vaccine (3 - Booster for Anders Peter series) 09/24/2021      Medicare Health Risk Assessment:     /66   Pulse (!) 106   Temp 98 4 °F (36 9 °C) (Temporal)   Ht 5' 7 5" (1 715 m)   Wt 78 9 kg (174 lb)   SpO2 97%   BMI 26 85 kg/m²      Oracio Evans is here for his Subsequent Wellness visit  Health Risk Assessment:   Patient rates overall health as very good  Patient feels that their physical health rating is same  Patient is satisfied with their life  Eyesight was rated as same  Hearing was rated as same  Patient feels that their emotional and mental health rating is same  Patients states they are never, rarely angry  Patient states they are never, rarely unusually tired/fatigued  Pain experienced in the last 7 days has been none  Patient states that he has experienced no weight loss or gain in last 6 months  Depression Screening:   PHQ-2 Score: 0      Fall Risk Screening: In the past year, patient has experienced: no history of falling in past year      Home Safety:  Patient does not have trouble with stairs inside or outside of their home  Patient has working smoke alarms and has no working carbon monoxide detector  Home safety hazards include: none  Nutrition:   Current diet is Regular and Limited junk food  Medications:   Patient is currently taking over-the-counter supplements  OTC medications include: see medication list  Patient is able to manage medications  Activities of Daily Living (ADLs)/Instrumental Activities of Daily Living (IADLs):   Walk and transfer into and out of bed and chair?: Yes  Dress and groom yourself?: Yes    Bathe or shower yourself?: Yes    Feed yourself?  Yes  Do your laundry/housekeeping?: Yes  Manage your money, pay your bills and track your expenses?: Yes  Make your own meals?: Yes    Do your own shopping?: Yes    Previous Hospitalizations:   Any hospitalizations or ED visits within the last 12 months?: Yes      Advance Care Planning:   Living will: Yes    Advanced directive: Yes      Cognitive Screening:   Provider or family/friend/caregiver concerned regarding cognition?: No    PREVENTIVE SCREENINGS      Cardiovascular Screening:    General: Screening Not Indicated and History Lipid Disorder      Diabetes Screening:     General: Screening Current      Colorectal Cancer Screening:     General: Screening Current      Prostate Cancer Screening:    General: Screening Current      Osteoporosis Screening:    General: Screening Not Indicated      Abdominal Aortic Aneurysm (AAA) Screening:    Risk factors include: age between 73-67 yo and tobacco use        General: Risks and Benefits Discussed    Due for: Screening AAA Ultrasound      Lung Cancer Screening:     General: Screening Not Indicated      Hepatitis C Screening:    General: Screening Current    Screening, Brief Intervention, and Referral to Treatment (SBIRT)    Screening      Single Item Drug Screening:  How often have you used an illegal drug (including marijuana) or a prescription medication for non-medical reasons in the past year? never    Single Item Drug Screen Score: 0  Interpretation: Negative screen for possible drug use disorder      Nydia Richards PA-C

## 2022-04-06 NOTE — PROGRESS NOTES
Assessment/Plan:         Diagnoses and all orders for this visit:    Primary hypertension  Comments:  well controlled  continue diltizem  Orders:  -     US abdominal aorta screening aaa; Future  -     Comprehensive metabolic panel; Future  -     Lipid panel; Future    History of tobacco use disorder  -     US abdominal aorta screening aaa; Future    Alcohol use, unspecified with other alcohol-induced disorder ([facility] [address] )    Encounter for annual wellness visit (AWV) in Medicare patient    Dyslipidemia  Comments:  declines statin  agreeable to trial niaspan  Orders:  -     niacin (NIASPAN) 500 mg CR tablet; Take 1 tablet (500 mg total) by mouth daily at bedtime  -     Comprehensive metabolic panel; Future  -     Lipid panel; Future    Mild renal insufficiency  -     Comprehensive metabolic panel; Future  -     Lipid panel; Future    Elevated serum creatinine  Comments:  increase water intake  limit etoh use  hydrate after swimming and sauna  avoid nsaid           Subjective:      Patient ID: Bran Xiong is a 77 y o  male  76 y/o male with hx of hld, htn, asthma presents for f/u, under more stress lately due to wife dx  Labs reviewed FBS stable  Creatinine slightly increased  Pt swims 1 mile every other day and uses sauna after this, not drinking enough per pt  Drinks alcohol to help cope with stress  lfts normal     Chol elevated, pt declines statin        The following portions of the patient's history were reviewed and updated as appropriate: allergies, current medications, past family history, past medical history, past social history, past surgical history and problem list     Review of Systems   Constitutional: Negative for activity change, appetite change, diaphoresis, fatigue and fever  HENT: Negative for congestion and sore throat  Respiratory: Negative for cough, shortness of breath and wheezing  Cardiovascular: Negative for chest pain and leg swelling     Gastrointestinal: Negative for abdominal pain, constipation and diarrhea  Genitourinary: Negative for dysuria  Neurological: Negative for dizziness, light-headedness and headaches  Psychiatric/Behavioral: Negative for sleep disturbance  Past Medical History:   Diagnosis Date    Asthma     Last Assessed:10/16/17    Fontanez's esophagus     Colonic polyp     Last Assessed:10/16/17    Eczema     GERD (gastroesophageal reflux disease)     Last Assessed:10/16/17    High cholesterol     Last Assessed:10/16/17    Hypertension     Last Assessed:10/16/17    Non-neoplastic nevus     Last Assessed:11/16/17         Current Outpatient Medications:     albuterol (PROAIR HFA) 90 mcg/act inhaler, Inhale 90 mcg 2 (two) times a day as needed, Disp: , Rfl:     aspirin (ASPIR-81) 81 mg EC tablet, Take 1 tablet by mouth daily, Disp: , Rfl:     cycloSPORINE (RESTASIS) 0 05 % ophthalmic emulsion, Apply 2 36 application to eye 2 (two) times a day, Disp: , Rfl:     diltiazem (Dilt-XR) 120 MG 24 hr capsule, Take 1 capsule (120 mg total) by mouth daily, Disp: 90 capsule, Rfl: 3    mometasone (NASONEX) 50 mcg/act nasal spray, 2 sprays into each nostril daily, Disp: , Rfl:     Multiple Vitamins-Minerals (CENTRUM SILVER 50+MEN) TABS, Take 1 tablet by mouth daily, Disp: , Rfl:     SYMBICORT 160-4 5 MCG/ACT inhaler, Take 2 puffs by mouth 2 (two) times a day, Disp: , Rfl:     thiamine 50 MG tablet, Take 1 tablet (50 mg total) by mouth daily, Disp: 90 tablet, Rfl: 1    TURMERIC PO, Take by mouth daily , Disp: , Rfl:     niacin (NIASPAN) 500 mg CR tablet, Take 1 tablet (500 mg total) by mouth daily at bedtime, Disp: 90 tablet, Rfl: 3    RESTASIS 0 05 % ophthalmic emulsion, , Disp: , Rfl:     Allergies   Allergen Reactions    Amoxicillin      Other reaction(s): Diarrhea    Amoxicillin-Pot Clavulanate      Other reaction(s):  Allergy Date : 07/27/2015   GI    Metoprolol      Annotation - [id number]: Aggravtes asthma    Penicillins      Annotation - [id number]: Diarrhea       Social History   Past Surgical History:   Procedure Laterality Date    COLONOSCOPY      10/2013- egd/colon    LYMPHADENECTOMY      Axillary Lymph node removed-benign    TONSILLECTOMY      Last Assessed:10/16/17    VALVE REPLACEMENT      Heart Valve Replacement; Last Assessed:10/16/17     Family History   Problem Relation Age of Onset    Cancer Father         groin, bone    Diabetes Paternal Grandfather        Objective:  /66   Pulse 92   Temp 98 4 °F (36 9 °C) (Temporal)   Ht 5' 7 5" (1 715 m)   Wt 78 9 kg (174 lb)   SpO2 97%   BMI 26 85 kg/m²        Physical Exam  Vitals reviewed  Constitutional:       General: He is not in acute distress  HENT:      Head: Normocephalic  Eyes:      General:         Right eye: No discharge  Left eye: No discharge  Extraocular Movements: Extraocular movements intact  Conjunctiva/sclera: Conjunctivae normal       Pupils: Pupils are equal, round, and reactive to light  Cardiovascular:      Rate and Rhythm: Normal rate and regular rhythm  Pulmonary:      Effort: Pulmonary effort is normal  No respiratory distress  Breath sounds: No wheezing or rales  Musculoskeletal:         General: Normal range of motion  Cervical back: Normal range of motion  Right lower leg: No edema  Left lower leg: No edema  Skin:     General: Skin is warm  Findings: No erythema or rash  Neurological:      General: No focal deficit present  Mental Status: He is alert and oriented to person, place, and time     Psychiatric:         Mood and Affect: Mood normal

## 2022-04-11 NOTE — TELEPHONE ENCOUNTER
I lmom for pt to please call back to schedule an appt with Dr Chacha Willis  Will wait for pt's call back at this time

## 2022-06-08 ENCOUNTER — TELEPHONE (OUTPATIENT)
Dept: PALLIATIVE MEDICINE | Facility: CLINIC | Age: 67
End: 2022-06-08

## 2022-06-08 NOTE — TELEPHONE ENCOUNTER
Yulissa Melton called office stating he was returning a call for Andreia  Please reach out to Yulissa Melton when you have a moment  Thank you!

## 2022-10-06 ENCOUNTER — RA CDI HCC (OUTPATIENT)
Dept: OTHER | Facility: HOSPITAL | Age: 67
End: 2022-10-06

## 2022-10-06 NOTE — PROGRESS NOTES
[facility] [address]  coding opportunities       Chart reviewed, no opportunity found: CHART REVIEWED, NO OPPORTUNITY FOUND        Patients Insurance     Medicare Insurance: Medicare

## 2022-10-12 ENCOUNTER — OFFICE VISIT (OUTPATIENT)
Dept: INTERNAL MEDICINE CLINIC | Age: 67
End: 2022-10-12
Payer: MEDICARE

## 2022-10-12 VITALS
WEIGHT: 179 LBS | TEMPERATURE: 98.2 F | HEIGHT: 68 IN | OXYGEN SATURATION: 96 % | HEART RATE: 84 BPM | DIASTOLIC BLOOD PRESSURE: 80 MMHG | BODY MASS INDEX: 27.13 KG/M2 | SYSTOLIC BLOOD PRESSURE: 126 MMHG

## 2022-10-12 DIAGNOSIS — Z12.5 SCREENING PSA (PROSTATE SPECIFIC ANTIGEN): ICD-10-CM

## 2022-10-12 DIAGNOSIS — N28.9 MILD RENAL INSUFFICIENCY: ICD-10-CM

## 2022-10-12 DIAGNOSIS — I10 BENIGN HYPERTENSION: Primary | ICD-10-CM

## 2022-10-12 DIAGNOSIS — E78.5 DYSLIPIDEMIA: ICD-10-CM

## 2022-10-12 DIAGNOSIS — R79.89 ELEVATED SERUM CREATININE: ICD-10-CM

## 2022-10-12 DIAGNOSIS — E03.8 SUBCLINICAL HYPOTHYROIDISM: ICD-10-CM

## 2022-10-12 PROCEDURE — 99214 OFFICE O/P EST MOD 30 MIN: CPT | Performed by: PHYSICIAN ASSISTANT

## 2022-10-12 RX ORDER — DILTIAZEM HYDROCHLORIDE 120 MG/1
120 CAPSULE, EXTENDED RELEASE ORAL DAILY
Qty: 90 CAPSULE | Refills: 3 | Status: SHIPPED | OUTPATIENT
Start: 2022-10-12

## 2022-10-12 NOTE — PROGRESS NOTES
Assessment/Plan:         Diagnoses and all orders for this visit:    Benign hypertension  Comments:  well controlled  Orders:  -     diltiazem (Dilt-XR) 120 MG 24 hr capsule; Take 1 capsule (120 mg total) by mouth daily    Dyslipidemia  Comments:  continue niacin  intol statins  Orders:  -     Lipid panel; Future  -     CBC and differential; Future  -     Comprehensive metabolic panel; Future  -     TSH, 3rd generation; Future    Elevated serum creatinine  -     Lipid panel; Future  -     CBC and differential; Future  -     Comprehensive metabolic panel; Future  -     TSH, 3rd generation; Future    Mild renal insufficiency  -     Lipid panel; Future  -     CBC and differential; Future  -     Comprehensive metabolic panel; Future  -     TSH, 3rd generation; Future    Screening PSA (prostate specific antigen)  -     PSA, Total Screen; Future    Subclinical hypothyroidism  -     TSH, 3rd generation; Future        BMI Counseling: Body mass index is 27 62 kg/m²  The BMI is above normal  Nutrition recommendations include decreasing portion sizes, encouraging healthy choices of fruits and vegetables and increasing intake of lean protein  Exercise recommendations include moderate physical activity 150 minutes/week  Rationale for BMI follow-up plan is due to patient being overweight or obese  Subjective:      Patient ID: Jackie Hines is a 79 y o  male  80 Y/O male with hx of hypothyroidism, htn, hld, asthma    Pt lost his wife in July, grieving  Pt states he had grief counseling offered through hospice  Pt states he is sleeping ok     Pt states he has been going to the [employer] 3 times weekly, swims 1 mile   Pt states his appetite has been good   He has been active and spending time with friends       The following portions of the patient's history were reviewed and updated as appropriate: allergies, current medications, past family history, past medical history, past social history, past surgical history and problem list     Review of Systems   Constitutional: Negative for activity change, appetite change, chills, diaphoresis, fatigue and fever  HENT: Negative for congestion, postnasal drip and sore throat  Respiratory: Negative for cough, shortness of breath and wheezing  Cardiovascular: Negative for chest pain and leg swelling  Gastrointestinal: Negative for abdominal pain, constipation, diarrhea, nausea and vomiting  Musculoskeletal: Negative for arthralgias, back pain and gait problem  Skin: Negative for rash  Neurological: Negative for dizziness, light-headedness and headaches  Psychiatric/Behavioral: Negative for sleep disturbance  The patient is not nervous/anxious            Past Medical History:   Diagnosis Date   • Asthma     Last Assessed:10/16/17   • Fontanez's esophagus    • Colonic polyp     Last Assessed:10/16/17   • Eczema    • GERD (gastroesophageal reflux disease)     Last Assessed:10/16/17   • High cholesterol     Last Assessed:10/16/17   • Hypertension     Last Assessed:10/16/17   • Non-neoplastic nevus     Last Assessed:11/16/17         Current Outpatient Medications:   •  albuterol (PROVENTIL HFA,VENTOLIN HFA) 90 mcg/act inhaler, Inhale 90 mcg 2 (two) times a day as needed, Disp: , Rfl:   •  aspirin (ECOTRIN LOW STRENGTH) 81 mg EC tablet, Take 1 tablet by mouth daily, Disp: , Rfl:   •  cycloSPORINE (RESTASIS) 0 05 % ophthalmic emulsion, Apply 4 86 application to eye 2 (two) times a day, Disp: , Rfl:   •  diltiazem (Dilt-XR) 120 MG 24 hr capsule, Take 1 capsule (120 mg total) by mouth daily, Disp: 90 capsule, Rfl: 3  •  mometasone (NASONEX) 50 mcg/act nasal spray, 2 sprays into each nostril daily, Disp: , Rfl:   •  Multiple Vitamins-Minerals (CENTRUM SILVER 50+MEN) TABS, Take 1 tablet by mouth daily, Disp: , Rfl:   •  niacin (NIASPAN) 500 mg CR tablet, Take 1 tablet (500 mg total) by mouth daily at bedtime, Disp: 90 tablet, Rfl: 3  •  SYMBICORT 160-4 5 MCG/ACT inhaler, Take 2 puffs by mouth 2 (two) times a day, Disp: , Rfl:   •  thiamine 50 MG tablet, Take 1 tablet (50 mg total) by mouth daily, Disp: 90 tablet, Rfl: 1  •  TURMERIC PO, Take by mouth daily , Disp: , Rfl:   •  RESTASIS 0 05 % ophthalmic emulsion, , Disp: , Rfl:     Allergies   Allergen Reactions   • Amoxicillin      Other reaction(s): Diarrhea   • Amoxicillin-Pot Clavulanate      Other reaction(s): Allergy Date : 07/27/2015   GI   • Metoprolol      Annotation - [id number]: Aggravtes asthma   • Penicillins      Annotation - [id number]: Diarrhea       Social History   Past Surgical History:   Procedure Laterality Date   • COLONOSCOPY      10/2013- egd/colon   • LYMPHADENECTOMY      Axillary Lymph node removed-benign   • TONSILLECTOMY      Last Assessed:10/16/17   • VALVE REPLACEMENT      Heart Valve Replacement; Last Assessed:10/16/17     Family History   Problem Relation Age of Onset   • Cancer Father         groin, bone   • Diabetes Paternal Grandfather        Objective:  /80 (BP Location: Left arm, Patient Position: Sitting, Cuff Size: Standard)   Pulse 84   Temp 98 2 °F (36 8 °C) (Temporal)   Ht 5' 7 5" (1 715 m)   Wt 81 2 kg (179 lb)   SpO2 96%   BMI 27 62 kg/m²        Physical Exam  Vitals reviewed  Constitutional:       General: He is not in acute distress  HENT:      Head: Normocephalic and atraumatic  Right Ear: Tympanic membrane, ear canal and external ear normal  There is no impacted cerumen  Left Ear: Tympanic membrane, ear canal and external ear normal  There is no impacted cerumen  Eyes:      General:         Right eye: No discharge  Left eye: No discharge  Extraocular Movements: Extraocular movements intact  Conjunctiva/sclera: Conjunctivae normal       Pupils: Pupils are equal, round, and reactive to light  Cardiovascular:      Rate and Rhythm: Normal rate and regular rhythm  Pulmonary:      Effort: Pulmonary effort is normal  No respiratory distress        Breath sounds: Normal breath sounds  No wheezing, rhonchi or rales  Abdominal:      General: Bowel sounds are normal  There is no distension  Tenderness: There is no abdominal tenderness  Musculoskeletal:      Cervical back: Normal range of motion  Right lower leg: No edema  Left lower leg: No edema  Lymphadenopathy:      Cervical: No cervical adenopathy  Skin:     Findings: No lesion or rash  Neurological:      General: No focal deficit present  Mental Status: He is alert and oriented to person, place, and time     Psychiatric:         Mood and Affect: Mood normal

## 2023-04-27 ENCOUNTER — TELEPHONE (OUTPATIENT)
Dept: GASTROENTEROLOGY | Facility: CLINIC | Age: 68
End: 2023-04-27

## 2023-10-27 ENCOUNTER — VBI (OUTPATIENT)
Dept: ADMINISTRATIVE | Facility: OTHER | Age: 68
End: 2023-10-27

## 2023-10-27 NOTE — TELEPHONE ENCOUNTER
10/27/23 3:34 PM    Patient contacted (left message) to bring Advance Directive, POLST, or Living Will document to next scheduled pcp visit. Thank you.   Melani Rendon  [facility]

## 2023-10-31 ENCOUNTER — OFFICE VISIT (OUTPATIENT)
Dept: INTERNAL MEDICINE CLINIC | Age: 68
End: 2023-10-31
Payer: MEDICARE

## 2023-10-31 VITALS
HEIGHT: 68 IN | WEIGHT: 188 LBS | TEMPERATURE: 98.2 F | DIASTOLIC BLOOD PRESSURE: 66 MMHG | SYSTOLIC BLOOD PRESSURE: 124 MMHG | BODY MASS INDEX: 28.49 KG/M2 | OXYGEN SATURATION: 94 % | HEART RATE: 88 BPM

## 2023-10-31 DIAGNOSIS — Z23 NEED FOR INFLUENZA VACCINATION: ICD-10-CM

## 2023-10-31 DIAGNOSIS — I10 BENIGN HYPERTENSION: ICD-10-CM

## 2023-10-31 DIAGNOSIS — M79.89 PALPABLE MASS OF SOFT TISSUE OF SHOULDER: Primary | ICD-10-CM

## 2023-10-31 DIAGNOSIS — F10.988 ALCOHOL USE, UNSPECIFIED WITH OTHER ALCOHOL-INDUCED DISORDER (HCC): ICD-10-CM

## 2023-10-31 DIAGNOSIS — E78.5 DYSLIPIDEMIA: ICD-10-CM

## 2023-10-31 LAB
ALBUMIN SERPL-MCNC: 4.4 G/DL (ref 3.9–4.9)
ALBUMIN/GLOB SERPL: 1.7 {RATIO} (ref 1.2–2.2)
ALP SERPL-CCNC: 79 IU/L (ref 44–121)
ALT SERPL-CCNC: 15 IU/L (ref 0–44)
AST SERPL-CCNC: 22 IU/L (ref 0–40)
BASOPHILS # BLD AUTO: 0.1 X10E3/UL (ref 0–0.2)
BASOPHILS NFR BLD AUTO: 1 %
BILIRUB SERPL-MCNC: 0.5 MG/DL (ref 0–1.2)
BUN SERPL-MCNC: 18 MG/DL (ref 8–27)
BUN/CREAT SERPL: 14 (ref 10–24)
CALCIUM SERPL-MCNC: 9.8 MG/DL (ref 8.6–10.2)
CHLORIDE SERPL-SCNC: 101 MMOL/L (ref 96–106)
CHOLEST SERPL-MCNC: 298 MG/DL (ref 100–199)
CO2 SERPL-SCNC: 22 MMOL/L (ref 20–29)
CREAT SERPL-MCNC: 1.25 MG/DL (ref 0.76–1.27)
CYTOLOGY CMNT CVX/VAG CYTO-IMP: ABNORMAL
EGFR: 63 ML/MIN/1.73
EOSINOPHIL # BLD AUTO: 0.2 X10E3/UL (ref 0–0.4)
EOSINOPHIL NFR BLD AUTO: 3 %
ERYTHROCYTE [DISTWIDTH] IN BLOOD BY AUTOMATED COUNT: 13.1 % (ref 11.6–15.4)
GLOBULIN SER-MCNC: 2.6 G/DL (ref 1.5–4.5)
GLUCOSE SERPL-MCNC: 98 MG/DL (ref 70–99)
HCT VFR BLD AUTO: 51.1 % (ref 37.5–51)
HDLC SERPL-MCNC: 69 MG/DL
HGB BLD-MCNC: 16.9 G/DL (ref 13–17.7)
IMM GRANULOCYTES # BLD: 0 X10E3/UL (ref 0–0.1)
IMM GRANULOCYTES NFR BLD: 0 %
LDLC SERPL CALC-MCNC: 193 MG/DL (ref 0–99)
LYMPHOCYTES # BLD AUTO: 2.6 X10E3/UL (ref 0.7–3.1)
LYMPHOCYTES NFR BLD AUTO: 37 %
MCH RBC QN AUTO: 30.8 PG (ref 26.6–33)
MCHC RBC AUTO-ENTMCNC: 33.1 G/DL (ref 31.5–35.7)
MCV RBC AUTO: 93 FL (ref 79–97)
MONOCYTES # BLD AUTO: 0.7 X10E3/UL (ref 0.1–0.9)
MONOCYTES NFR BLD AUTO: 10 %
NEUTROPHILS # BLD AUTO: 3.4 X10E3/UL (ref 1.4–7)
NEUTROPHILS NFR BLD AUTO: 49 %
PLATELET # BLD AUTO: 307 X10E3/UL (ref 150–450)
POTASSIUM SERPL-SCNC: 4.9 MMOL/L (ref 3.5–5.2)
PROT SERPL-MCNC: 7 G/DL (ref 6–8.5)
RBC # BLD AUTO: 5.49 X10E6/UL (ref 4.14–5.8)
SL AMB VLDL CHOLESTEROL CALC: 36 MG/DL (ref 5–40)
SODIUM SERPL-SCNC: 138 MMOL/L (ref 134–144)
TRIGL SERPL-MCNC: 195 MG/DL (ref 0–149)
WBC # BLD AUTO: 7 X10E3/UL (ref 3.4–10.8)

## 2023-10-31 PROCEDURE — 99214 OFFICE O/P EST MOD 30 MIN: CPT | Performed by: PHYSICIAN ASSISTANT

## 2023-10-31 PROCEDURE — 90662 IIV NO PRSV INCREASED AG IM: CPT

## 2023-10-31 PROCEDURE — G0008 ADMIN INFLUENZA VIRUS VAC: HCPCS

## 2023-10-31 RX ORDER — DILTIAZEM HYDROCHLORIDE 120 MG/1
120 CAPSULE, EXTENDED RELEASE ORAL DAILY
Qty: 90 CAPSULE | Refills: 3 | Status: SHIPPED | OUTPATIENT
Start: 2023-10-31

## 2023-10-31 RX ORDER — ROSUVASTATIN CALCIUM 5 MG/1
5 TABLET, COATED ORAL DAILY
Qty: 90 TABLET | Refills: 3 | Status: SHIPPED | OUTPATIENT
Start: 2023-10-31

## 2023-10-31 NOTE — PROGRESS NOTES
Assessment/Plan:         Diagnoses and all orders for this visit:    Palpable mass of soft tissue of shoulder  Comments:  check u/s, may need MRI  Orders:  -     US extremity soft tissue; Future    Benign hypertension  Comments:  well controlled  Orders:  -     diltiazem (Dilt-XR) 120 MG 24 hr capsule; Take 1 capsule (120 mg total) by mouth daily  -     Comprehensive metabolic panel; Future  -     LDL cholesterol, direct; Future    Dyslipidemia  Comments:  continue rosuvustatin  Orders:  -     rosuvastatin (CRESTOR) 5 mg tablet; Take 1 tablet (5 mg total) by mouth daily  -     Comprehensive metabolic panel; Future  -     LDL cholesterol, direct; Future    Alcohol use, unspecified with other alcohol-induced disorder ([address])  Comments:  aware to limit with statin  f/u lfts in 2-3 months  Orders:  -     Comprehensive metabolic panel; Future  -     LDL cholesterol, direct; Future    Need for influenza vaccination  -     influenza vaccine, high-dose, PF 0.7 mL (FLUZONE HIGH-DOSE)        BMI Counseling: Body mass index is 29.01 kg/m². The BMI is above normal. Nutrition recommendations include encouraging healthy choices of fruits and vegetables, moderation in carbohydrate intake and reducing intake of cholesterol. Exercise recommendations include moderate physical activity 150 minutes/week. Rationale for BMI follow-up plan is due to patient being overweight or obese. Depression Screening and Follow-up Plan: Patient was screened for depression during today's encounter. They screened negative with a PHQ-2 score of 0. Subjective:      Patient ID: Taylor Henderson is a 76 y.o. male.     77 y/o male presents for f/u, hld, asthma  C/o lump on posterior R shoulder - present for months but getting larger and pt reports it is tender when he leans back on this area or in certain positions  Pt had labs done today, reviewed  Niaspan too expensive  Discussed statin, pt agreeable    Pt is a swimmer, swims laps 3 days / week  Denies pain in R shoulder with this         The following portions of the patient's history were reviewed and updated as appropriate: allergies, current medications, past family history, past medical history, past social history, past surgical history, and problem list.    Review of Systems   Constitutional:  Negative for activity change, appetite change, chills, diaphoresis and fever. HENT:  Negative for congestion and sore throat. Eyes:  Negative for pain and redness. Respiratory:  Negative for cough and shortness of breath. Cardiovascular:  Negative for chest pain, palpitations and leg swelling. Gastrointestinal:  Negative for abdominal pain, blood in stool, constipation and diarrhea. Genitourinary:  Negative for dysuria. Musculoskeletal:  Negative for arthralgias, back pain and gait problem. Skin:  Negative for rash and wound. Mass on R shoulder    Neurological:  Negative for dizziness, light-headedness and headaches. Psychiatric/Behavioral:  Negative for sleep disturbance. The patient is not nervous/anxious.           Past Medical History:   Diagnosis Date    Asthma     Last Assessed:10/16/17    Fontanez's esophagus     Colonic polyp     Last Assessed:10/16/17    Eczema     GERD (gastroesophageal reflux disease)     Last Assessed:10/16/17    High cholesterol     Last Assessed:10/16/17    Hypertension     Last Assessed:10/16/17    Non-neoplastic nevus     Last Assessed:11/16/17         Current Outpatient Medications:     albuterol (PROVENTIL HFA,VENTOLIN HFA) 90 mcg/act inhaler, Inhale 90 mcg 2 (two) times a day as needed, Disp: , Rfl:     aspirin (ECOTRIN LOW STRENGTH) 81 mg EC tablet, Take 1 tablet by mouth daily, Disp: , Rfl:     cycloSPORINE (RESTASIS) 0.05 % ophthalmic emulsion, Apply 2.76 application to eye 2 (two) times a day, Disp: , Rfl:     diltiazem (Dilt-XR) 120 MG 24 hr capsule, Take 1 capsule (120 mg total) by mouth daily, Disp: 90 capsule, Rfl: 3    mometasone (NASONEX) 50 mcg/act nasal spray, 2 sprays into each nostril daily, Disp: , Rfl:     Multiple Vitamins-Minerals (CENTRUM SILVER 50+MEN) TABS, Take 1 tablet by mouth daily, Disp: , Rfl:     rosuvastatin (CRESTOR) 5 mg tablet, Take 1 tablet (5 mg total) by mouth daily, Disp: 90 tablet, Rfl: 3    SYMBICORT 160-4.5 MCG/ACT inhaler, Take 2 puffs by mouth 2 (two) times a day, Disp: , Rfl:     thiamine 50 MG tablet, Take 1 tablet (50 mg total) by mouth daily, Disp: 90 tablet, Rfl: 1    TURMERIC PO, Take by mouth daily , Disp: , Rfl:     Allergies   Allergen Reactions    Amoxicillin      Other reaction(s): Diarrhea    Amoxicillin-Pot Clavulanate      Other reaction(s): Allergy Date : 07/27/2015   GI    Metoprolol      Annotation - [id number]: Aggravtes asthma    Penicillins      Annotation - [id number]: Diarrhea       Social History   Past Surgical History:   Procedure Laterality Date    COLONOSCOPY      10/2013- egd/colon    LYMPHADENECTOMY      Axillary Lymph node removed-benign    TONSILLECTOMY      Last Assessed:10/16/17    VALVE REPLACEMENT      Heart Valve Replacement; Last Assessed:10/16/17     Family History   Problem Relation Age of Onset    Cancer Father         groin, bone    Diabetes Paternal Grandfather        Objective:  /66 (BP Location: Left arm, Patient Position: Sitting, Cuff Size: Standard)   Pulse 88   Temp 98.2 °F (36.8 °C) (Temporal)   Ht 5' 7.5" (1.715 m)   Wt 85.3 kg (188 lb)   SpO2 94%   BMI 29.01 kg/m²        Physical Exam  Vitals reviewed. Constitutional:       General: He is not in acute distress. HENT:      Head: Normocephalic and atraumatic. Right Ear: Tympanic membrane, ear canal and external ear normal.      Left Ear: Tympanic membrane, ear canal and external ear normal.      Nose: Nose normal.      Mouth/Throat:      Mouth: Mucous membranes are moist.   Eyes:      Extraocular Movements: Extraocular movements intact.       Conjunctiva/sclera: Conjunctivae normal. Pupils: Pupils are equal, round, and reactive to light. Neck:      Vascular: No carotid bruit. Cardiovascular:      Rate and Rhythm: Normal rate and regular rhythm. Pulmonary:      Effort: Pulmonary effort is normal. No respiratory distress. Breath sounds: Normal breath sounds. No wheezing or rales. Abdominal:      General: Bowel sounds are normal. There is no distension. Musculoskeletal:         General: Deformity (R posterior scap: large mobile mass deep to subq tissues - approx 3 in diameter) present. Cervical back: Normal range of motion. No rigidity or tenderness. Lymphadenopathy:      Cervical: No cervical adenopathy. Skin:     Findings: No erythema or rash. Neurological:      General: No focal deficit present. Mental Status: He is alert and oriented to person, place, and time.

## 2023-12-29 ENCOUNTER — OFFICE VISIT (OUTPATIENT)
Dept: URGENT CARE | Facility: CLINIC | Age: 68
End: 2023-12-29
Payer: MEDICARE

## 2023-12-29 VITALS
RESPIRATION RATE: 18 BRPM | TEMPERATURE: 101 F | OXYGEN SATURATION: 97 % | HEIGHT: 67 IN | BODY MASS INDEX: 29.51 KG/M2 | HEART RATE: 102 BPM | WEIGHT: 188 LBS

## 2023-12-29 DIAGNOSIS — R50.9 FEVER, UNSPECIFIED FEVER CAUSE: Primary | ICD-10-CM

## 2023-12-29 PROCEDURE — 99213 OFFICE O/P EST LOW 20 MIN: CPT | Performed by: PREVENTIVE MEDICINE

## 2023-12-29 PROCEDURE — 87636 SARSCOV2 & INF A&B AMP PRB: CPT | Performed by: PREVENTIVE MEDICINE

## 2023-12-29 RX ORDER — OSELTAMIVIR PHOSPHATE 75 MG/1
75 CAPSULE ORAL 2 TIMES DAILY
Qty: 10 CAPSULE | Refills: 0 | Status: SHIPPED | OUTPATIENT
Start: 2023-12-29 | End: 2024-01-03

## 2023-12-29 NOTE — PROGRESS NOTES
[facility]        NAME: David S Cogan is a 68 y.o. male  : 1955    MRN: [id number]  DATE: 2023  TIME: 2:20 PM    Assessment and Plan   Fever, unspecified fever cause [R50.9]  1. Fever, unspecified fever cause              Patient Instructions       Follow up with PCP in 3-5 days.  Proceed to  ER if symptoms worsen.    Chief Complaint     Chief Complaint   Patient presents with    viral illness     Cough, chest congestion, body aches Wednesday          History of Present Illness       Coughing and fatigue x 2 to 3 days        Review of Systems   Review of Systems   Constitutional:  Positive for fever.   Respiratory:  Positive for cough.          Current Medications       Current Outpatient Medications:     albuterol (PROVENTIL HFA,VENTOLIN HFA) 90 mcg/act inhaler, Inhale 90 mcg 2 (two) times a day as needed, Disp: , Rfl:     cycloSPORINE (RESTASIS) 0.05 % ophthalmic emulsion, Apply 0.05 application to eye 2 (two) times a day, Disp: , Rfl:     diltiazem (Dilt-XR) 120 MG 24 hr capsule, Take 1 capsule (120 mg total) by mouth daily, Disp: 90 capsule, Rfl: 3    mometasone (NASONEX) 50 mcg/act nasal spray, 2 sprays into each nostril daily, Disp: , Rfl:     Multiple Vitamins-Minerals (CENTRUM SILVER 50+MEN) TABS, Take 1 tablet by mouth daily, Disp: , Rfl:     rosuvastatin (CRESTOR) 5 mg tablet, Take 1 tablet (5 mg total) by mouth daily, Disp: 90 tablet, Rfl: 3    SYMBICORT 160-4.5 MCG/ACT inhaler, Take 2 puffs by mouth 2 (two) times a day, Disp: , Rfl:     thiamine 50 MG tablet, Take 1 tablet (50 mg total) by mouth daily, Disp: 90 tablet, Rfl: 1    TURMERIC PO, Take by mouth daily , Disp: , Rfl:     aspirin (ECOTRIN LOW STRENGTH) 81 mg EC tablet, Take 1 tablet by mouth daily (Patient not taking: Reported on 2023), Disp: , Rfl:     Current Allergies     Allergies as of 2023 - Reviewed 2023   Allergen Reaction Noted    Amoxicillin  10/16/2017    Amoxicillin-pot clavulanate   "02/11/2020    Metoprolol  11/06/2017    Penicillins  11/06/2017            The following portions of the patient's history were reviewed and updated as appropriate: allergies, current medications, past family history, past medical history, past social history, past surgical history and problem list.     Past Medical History:   Diagnosis Date    Asthma     Last Assessed:10/16/17    Fontanez's esophagus     Colonic polyp     Last Assessed:10/16/17    Eczema     GERD (gastroesophageal reflux disease)     Last Assessed:10/16/17    High cholesterol     Last Assessed:10/16/17    Hypertension     Last Assessed:10/16/17    Non-neoplastic nevus     Last Assessed:11/16/17       Past Surgical History:   Procedure Laterality Date    COLONOSCOPY      10/2013- egd/colon    LYMPHADENECTOMY      Axillary Lymph node removed-benign    TONSILLECTOMY      Last Assessed:10/16/17    VALVE REPLACEMENT      Heart Valve Replacement; Last Assessed:10/16/17       Family History   Problem Relation Age of Onset    Cancer Father         groin, bone    Diabetes Paternal Grandfather          Medications have been verified.        Objective   Pulse 102   Temp (!) 101 °F (38.3 °C)   Resp 18   Ht 5' 7\" (1.702 m)   Wt 85.3 kg (188 lb)   SpO2 97%   BMI 29.44 kg/m²   No LMP for male patient.       Physical Exam     Physical Exam  Pulmonary:      Breath sounds: Normal breath sounds. No wheezing, rhonchi or rales.                   "

## 2023-12-29 NOTE — PATIENT INSTRUCTIONS
Tomorrow check on the results of the COVID and flu.  If the flu is positive continue to take the Tamiflu.  If your COVID is positive you need to call your family doctor for possible Paxlovid

## 2023-12-30 LAB
FLUAV RNA RESP QL NAA+PROBE: POSITIVE
FLUBV RNA RESP QL NAA+PROBE: NEGATIVE
SARS-COV-2 RNA RESP QL NAA+PROBE: NEGATIVE

## 2024-02-21 ENCOUNTER — HOSPITAL ENCOUNTER (OUTPATIENT)
Dept: RADIOLOGY | Facility: HOSPITAL | Age: 69
Discharge: HOME | End: 2024-02-21
Payer: MEDICARE

## 2024-02-21 DIAGNOSIS — M79.89 PALPABLE MASS OF SOFT TISSUE OF SHOULDER: ICD-10-CM

## 2024-02-21 PROBLEM — Z12.11 SCREENING FOR COLON CANCER: Status: RESOLVED | Noted: 2018-06-13 | Resolved: 2024-02-21

## 2024-02-21 PROCEDURE — 76882 US LMTD JT/FCL EVL NVASC XTR: CPT

## 2024-04-15 ENCOUNTER — TELEPHONE (OUTPATIENT)
Dept: ADMINISTRATIVE | Facility: OTHER | Age: 69
End: 2024-04-15

## 2024-04-15 NOTE — TELEPHONE ENCOUNTER
04/15/24 2:25 PM    Patient contacted (left message) to bring Advance Directive, POLST, or Living Will document to next scheduled pcp visit.    Thank you.  Ophelia Lemus MA  [facility]

## 2024-04-16 ENCOUNTER — OFFICE VISIT (OUTPATIENT)
Dept: INTERNAL MEDICINE CLINIC | Age: 69
End: 2024-04-16
Payer: MEDICARE

## 2024-04-16 VITALS
WEIGHT: 188 LBS | BODY MASS INDEX: 29.51 KG/M2 | OXYGEN SATURATION: 96 % | SYSTOLIC BLOOD PRESSURE: 130 MMHG | DIASTOLIC BLOOD PRESSURE: 80 MMHG | HEART RATE: 81 BPM | TEMPERATURE: 97.3 F | HEIGHT: 67 IN

## 2024-04-16 DIAGNOSIS — Z12.5 PROSTATE CANCER SCREENING: ICD-10-CM

## 2024-04-16 DIAGNOSIS — K29.00 ACUTE SUPERFICIAL GASTRITIS WITHOUT HEMORRHAGE: ICD-10-CM

## 2024-04-16 DIAGNOSIS — E78.2 MIXED HYPERLIPIDEMIA: ICD-10-CM

## 2024-04-16 DIAGNOSIS — F10.988 ALCOHOL USE, UNSPECIFIED WITH OTHER ALCOHOL-INDUCED DISORDER (HCC): ICD-10-CM

## 2024-04-16 DIAGNOSIS — I10 BENIGN HYPERTENSION: ICD-10-CM

## 2024-04-16 DIAGNOSIS — K21.00 GASTROESOPHAGEAL REFLUX DISEASE WITH ESOPHAGITIS WITHOUT HEMORRHAGE: ICD-10-CM

## 2024-04-16 DIAGNOSIS — Z00.00 ENCOUNTER FOR ANNUAL WELLNESS VISIT (AWV) IN MEDICARE PATIENT: ICD-10-CM

## 2024-04-16 DIAGNOSIS — E03.8 SUBCLINICAL HYPOTHYROIDISM: ICD-10-CM

## 2024-04-16 DIAGNOSIS — R07.9 EXERTIONAL CHEST PAIN: Primary | ICD-10-CM

## 2024-04-16 DIAGNOSIS — R07.89 OTHER CHEST PAIN: ICD-10-CM

## 2024-04-16 DIAGNOSIS — E78.5 DYSLIPIDEMIA: ICD-10-CM

## 2024-04-16 DIAGNOSIS — K63.5 POLYP OF COLON, UNSPECIFIED PART OF COLON, UNSPECIFIED TYPE: ICD-10-CM

## 2024-04-16 LAB
ALBUMIN SERPL-MCNC: 4.3 G/DL (ref 3.9–4.9)
ALBUMIN/GLOB SERPL: 1.5 {RATIO} (ref 1.2–2.2)
ALP SERPL-CCNC: 76 IU/L (ref 44–121)
ALT SERPL-CCNC: 23 IU/L (ref 0–44)
AST SERPL-CCNC: 39 IU/L (ref 0–40)
BILIRUB SERPL-MCNC: 0.7 MG/DL (ref 0–1.2)
BUN SERPL-MCNC: 17 MG/DL (ref 8–27)
BUN/CREAT SERPL: 11 (ref 10–24)
CALCIUM SERPL-MCNC: 10 MG/DL (ref 8.6–10.2)
CHLORIDE SERPL-SCNC: 98 MMOL/L (ref 96–106)
CO2 SERPL-SCNC: 23 MMOL/L (ref 20–29)
CREAT SERPL-MCNC: 1.5 MG/DL (ref 0.76–1.27)
EGFR: 50 ML/MIN/1.73
GLOBULIN SER-MCNC: 2.9 G/DL (ref 1.5–4.5)
GLUCOSE SERPL-MCNC: 93 MG/DL (ref 70–99)
LDLC SERPL DIRECT ASSAY-MCNC: 134 MG/DL (ref 0–99)
POTASSIUM SERPL-SCNC: 5.1 MMOL/L (ref 3.5–5.2)
PROT SERPL-MCNC: 7.2 G/DL (ref 6–8.5)
SODIUM SERPL-SCNC: 138 MMOL/L (ref 134–144)

## 2024-04-16 PROCEDURE — 99214 OFFICE O/P EST MOD 30 MIN: CPT | Performed by: PHYSICIAN ASSISTANT

## 2024-04-16 PROCEDURE — 93000 ELECTROCARDIOGRAM COMPLETE: CPT

## 2024-04-16 PROCEDURE — G0439 PPPS, SUBSEQ VISIT: HCPCS | Performed by: PHYSICIAN ASSISTANT

## 2024-04-16 RX ORDER — ESOMEPRAZOLE MAGNESIUM 40 MG/1
40 CAPSULE, DELAYED RELEASE ORAL
Qty: 30 CAPSULE | Refills: 1 | Status: SHIPPED | OUTPATIENT
Start: 2024-04-16 | End: 2024-10-13

## 2024-04-16 NOTE — PROGRESS NOTES
Assessment and Plan:     Problem List Items Addressed This Visit       Benign hypertension    Relevant Orders    CBC and differential    Comprehensive metabolic panel    Lipid panel    Dyslipidemia    Relevant Orders    CBC and differential    Comprehensive metabolic panel    Lipid panel    Stress test only, exercise    Echo complete w/ contrast if indicated    Hyperlipidemia    Subclinical hypothyroidism    Relevant Orders    CBC and differential    Comprehensive metabolic panel    Lipid panel    Alcohol use, unspecified with other alcohol-induced disorder (HCC)     Other Visit Diagnoses       Exertional chest pain    -  Primary    pt advised to go to ED if chest pain recurs  avoid strenuous exertion / exercise   needs cardiac w/u    Relevant Orders    Stress test only, exercise    Echo complete w/ contrast if indicated    POCT ECG (Completed)    Prostate cancer screening        Relevant Orders    PSA, Total Screen    Gastroesophageal reflux disease with esophagitis without hemorrhage        Relevant Medications    esomeprazole (NexIUM) 40 MG capsule    Other Relevant Orders    Ambulatory Referral to Gastroenterology    Acute superficial gastritis without hemorrhage        Relevant Medications    esomeprazole (NexIUM) 40 MG capsule    Other Relevant Orders    Ambulatory Referral to Gastroenterology    Polyp of colon, unspecified part of colon, unspecified type        Relevant Orders    Ambulatory Referral to Gastroenterology    Other chest pain        Relevant Orders    Ambulatory Referral to Cardiology    Stress test only, exercise    Echo complete w/ contrast if indicated    POCT ECG (Completed)    Encounter for annual wellness visit (AWV) in Medicare patient            Pt advised to go to ED if any chest pain recurs, avoid strenuous exercise at this time until further evaluation done  Agreeable   Advised to avoid alcohol at this time  Start PPI and needs EGD, referral placed       Preventive health issues were  discussed with patient, and age appropriate screening tests were ordered as noted in patient's After Visit Summary.  Personalized health advice and appropriate referrals for health education or preventive services given if needed, as noted in patient's After Visit Summary.     History of Present Illness:     Patient presents for a Medicare Wellness Visit    69 y/o male presents for AWV and f/u for htn, hld, hypothyroidism    Pt states he had episode of chest pain while he was swimming last week   Pt swims 3 x / week normally and was not under more stress than usual   Pt states he stopped and checked pulse (150) when this occurred and pt rested for a few minutes then started swimming again   Pt states he felt good afterwards but then episode happened again a few days later  Pt states later the same afternoon he felt hungry and ate pizza and fell asleep on couch  Pt states later that night he got chest pain again and reports it was intense and felt like gas pain  Pt states then the following day he went to  for the weekend and reports he woke up with similar pain in his chest   Pt states he believes it was flared up by eating eggs and onions and reports gas ex helped it to resolve   Pt believes this is gas pain and seems to be related to foods that he is eating   Resolved with pepcid and otc ppi   Pt denies current cp, sob, palpitations  States he did his usual swim yesterday and felt fine   Bp well controlled in office  We dicussed impt of urgent evaluation should this recur and pt agrees to go to ED if recurs  Will add PPI and schedule cardiac testing     Pt is due for colonoscopy and would recommend EGD in light of notable gastritis  He also has hx of alcohol use and agrees to abstain from this at this time due to sx     Had labs done yesterday at [facility], awaiting result  (Lipids)    Heartburn  He complains of chest pain. He reports no abdominal pain, no coughing, no nausea or no sore throat.      Patient Care  Team:  Maday Chu PA-C as PCP - General (Physician Assistant)     Review of Systems:     Review of Systems   Constitutional:  Negative for activity change, appetite change, chills, diaphoresis and fever.   HENT:  Negative for congestion and sore throat.    Eyes:  Negative for pain and redness.   Respiratory:  Negative for cough and shortness of breath.    Cardiovascular:  Positive for chest pain. Negative for leg swelling.   Gastrointestinal:  Negative for abdominal pain, constipation, diarrhea and nausea.   Musculoskeletal:  Negative for arthralgias, back pain and gait problem.   Skin:  Negative for pallor and rash.   Neurological:  Negative for dizziness, light-headedness and headaches.   Psychiatric/Behavioral:  Negative for sleep disturbance. The patient is not nervous/anxious.         Problem List:     Patient Active Problem List   Diagnosis    Acquired keratoderma    Benign hypertension    Benign prostatic hyperplasia    Vesicular palmoplantar eczema    Dyslipidemia    Eczema of both hands    Hyperlipidemia    Hypertrophic condition of skin    Subclinical hypothyroidism    Thyroid nodule    Right flank pain    Abrasion of flank    Overweight (BMI 25.0-29.9)    Essential hypertension    Alcohol use, unspecified with other alcohol-induced disorder (HCC)      Past Medical and Surgical History:     Past Medical History:   Diagnosis Date    Asthma     Last Assessed:10/16/17    Fontanez's esophagus     Colonic polyp     Last Assessed:10/16/17    Eczema     GERD (gastroesophageal reflux disease)     Last Assessed:10/16/17    High cholesterol     Last Assessed:10/16/17    Hypertension     Last Assessed:10/16/17    Non-neoplastic nevus     Last Assessed:11/16/17     Past Surgical History:   Procedure Laterality Date    COLONOSCOPY      10/2013- egd/colon    LYMPHADENECTOMY      Axillary Lymph node removed-benign    TONSILLECTOMY      Last Assessed:10/16/17    VALVE REPLACEMENT      Heart Valve Replacement; Last  Assessed:10/16/17      Family History:     Family History   Problem Relation Age of Onset    Cancer Father         groin, bone    Diabetes Paternal Grandfather       Social History:     Social History     Socioeconomic History    Marital status:      Spouse name: None    Number of children: None    Years of education: None    Highest education level: None   Occupational History    None   Tobacco Use    Smoking status: Former     Current packs/day: 0.00     Average packs/day: 1 pack/day for 14.0 years (14.0 ttl pk-yrs)     Types: Cigarettes     Start date: 1974     Quit date:      Years since quittin.3    Smokeless tobacco: Former     Types: Chew     Quit date:    Vaping Use    Vaping status: Never Used   Substance and Sexual Activity    Alcohol use: Yes     Alcohol/week: 14.0 standard drinks of alcohol     Types: 14 Cans of beer per week    Drug use: No    Sexual activity: Not Currently   Other Topics Concern    None   Social History Narrative    Caffeine use     Social Determinants of Health     Financial Resource Strain: Low Risk  (2023)    Overall Financial Resource Strain (CARDIA)     Difficulty of Paying Living Expenses: Not very hard   Food Insecurity: No Food Insecurity (2024)    Hunger Vital Sign     Worried About Running Out of Food in the Last Year: Never true     Ran Out of Food in the Last Year: Never true   Transportation Needs: No Transportation Needs (2024)    PRAPARE - Transportation     Lack of Transportation (Medical): No     Lack of Transportation (Non-Medical): No   Physical Activity: Not on file   Stress: Not on file   Social Connections: Not on file   Intimate Partner Violence: Not on file   Housing Stability: Low Risk  (2024)    Housing Stability Vital Sign     Unable to Pay for Housing in the Last Year: No     Number of Places Lived in the Last Year: 1     Unstable Housing in the Last Year: No      Medications and Allergies:     Current Outpatient  Medications   Medication Sig Dispense Refill    albuterol (PROVENTIL HFA,VENTOLIN HFA) 90 mcg/act inhaler Inhale 90 mcg 2 (two) times a day as needed      aspirin (ECOTRIN LOW STRENGTH) 81 mg EC tablet Take 1 tablet by mouth daily As needed      cycloSPORINE (RESTASIS) 0.05 % ophthalmic emulsion Apply 0.05 application to eye 2 (two) times a day      diltiazem (Dilt-XR) 120 MG 24 hr capsule Take 1 capsule (120 mg total) by mouth daily 90 capsule 3    esomeprazole (NexIUM) 40 MG capsule Take 1 capsule (40 mg total) by mouth daily in the early morning 30 capsule 1    mometasone (NASONEX) 50 mcg/act nasal spray 2 sprays into each nostril daily      Multiple Vitamins-Minerals (CENTRUM SILVER 50+MEN) TABS Take 1 tablet by mouth daily      rosuvastatin (CRESTOR) 5 mg tablet Take 1 tablet (5 mg total) by mouth daily 90 tablet 3    SYMBICORT 160-4.5 MCG/ACT inhaler Take 2 puffs by mouth 2 (two) times a day      thiamine 50 MG tablet Take 1 tablet (50 mg total) by mouth daily 90 tablet 1    TURMERIC PO Take by mouth daily        No current facility-administered medications for this visit.     Allergies   Allergen Reactions    Amoxicillin      Other reaction(s): Diarrhea    Amoxicillin-Pot Clavulanate      Other reaction(s): Allergy Date : 07/27/2015   GI    Metoprolol      Annotation - 06Nov2017: Aggravtes asthma    Penicillins      Annotation - 06Nov2017: Diarrhea      Immunizations:     Immunization History   Administered Date(s) Administered    COVID-19 PFIZER VACCINE 0.3 ML IM 04/03/2021, 04/24/2021    Fluzone Split Quad 0.25 mL 12/04/2019    INFLUENZA 09/18/2012, 11/18/2014, 09/29/2016, 10/16/2017, 10/30/2018, 12/04/2019    Influenza Quadrivalent Preservative Free 3 years and older IM 10/16/2017    Influenza, high dose seasonal 0.7 mL 10/29/2020, 10/31/2023    Influenza, injectable, quadrivalent, preservative free 0.5 mL 11/04/2015    Influenza, recombinant, quadrivalent,injectable, preservative free 10/30/2018     Influenza, seasonal, injectable 09/18/2012, 11/18/2014, 09/29/2016    Td (adult), adsorbed 05/01/2009    Tdap 10/24/2016      Health Maintenance:         Topic Date Due    Colorectal Cancer Screening  06/11/2025    Hepatitis C Screening  Completed         Topic Date Due    Pneumococcal Vaccine: 65+ Years (1 of 1 - PCV) Never done    COVID-19 Vaccine (3 - 2023-24 season) 09/01/2023      Medicare Screening Tests and Risk Assessments:     Christopher is here for his Subsequent Wellness visit. Last Medicare Wellness visit information reviewed, patient interviewed and updates made to the record today.      Health Risk Assessment:   Patient rates overall health as good. Patient feels that their physical health rating is same. Patient is satisfied with their life. Eyesight was rated as same. Hearing was rated as same. Patient feels that their emotional and mental health rating is same. Patients states they are sometimes angry. Patient states they are sometimes unusually tired/fatigued. Pain experienced in the last 7 days has been a lot. Patient's pain rating has been 8/10. Patient states that he has experienced no weight loss or gain in last 6 months.     Depression Screening:   PHQ-2 Score: 0      Fall Risk Screening:   In the past year, patient has experienced: no history of falling in past year      Home Safety:  Patient does not have trouble with stairs inside or outside of their home. Patient has working smoke alarms and has no working carbon monoxide detector. Home safety hazards include: none.     Nutrition:   Current diet is Regular and Limited junk food.     Medications:   Patient is currently taking over-the-counter supplements. OTC medications include: see medication list. Patient is able to manage medications.     Activities of Daily Living (ADLs)/Instrumental Activities of Daily Living (IADLs):   Walk and transfer into and out of bed and chair?: Yes  Dress and groom yourself?: Yes    Bathe or shower yourself?: Yes     Feed yourself? Yes  Do your laundry/housekeeping?: Yes  Manage your money, pay your bills and track your expenses?: Yes  Make your own meals?: Yes    Do your own shopping?: Yes    Previous Hospitalizations:   Any hospitalizations or ED visits within the last 12 months?: No      Advance Care Planning:   Living will: Yes    Advanced directive: Yes      Cognitive Screening:   Provider or family/friend/caregiver concerned regarding cognition?: No    PREVENTIVE SCREENINGS      Cardiovascular Screening:    General: Screening Not Indicated and History Lipid Disorder      Diabetes Screening:     General: Screening Current      Colorectal Cancer Screening:     General: Screening Current      Prostate Cancer Screening:    General: Risks and Benefits Discussed      Osteoporosis Screening:    General: Screening Not Indicated      Abdominal Aortic Aneurysm (AAA) Screening:    Risk factors include: age between 65-76 yo and tobacco use        General: Risks and Benefits Discussed    Due for: Screening AAA Ultrasound      Lung Cancer Screening:     General: Screening Not Indicated      Hepatitis C Screening:    General: Screening Current    Screening, Brief Intervention, and Referral to Treatment (SBIRT)    Screening  Typical number of drinks in a day: 4  Typical number of drinks in a week: 16  Interpretation: Risky drinking behavior.    AUDIT-C Screenin) How often did you have a drink containing alcohol in the past year? 4 or more times a week  2) How many drinks did you have on a typical day when you were drinking in the past year? 5 to 6  3) How often did you have 6 or more drinks on one occasion in the past year? weekly    AUDIT-C Score: 7  Interpretation: Score 4-12 (male): POSITIVE screen for alcohol misuse    AUDIT Screenin) How often during the last year have you found that you were not able to stop drinking once you had started? 0 - never  5) How often during the last year have you failed to do what was  "normally expected from you because of drinking? 0 - never  6) How often during the last year have you needed a first drink in the morning to get yourself going after a heavy drinking session? 0 - never  7) How often during the last year have you had a feeling of guilt or remorse after drinking? 0 - never  8) How often during the last year have you been unable to remember what happened the night before because you had been drinking? 0 - never  9) Have you or someone else been injured as a result of your drinking? 0 - no  10) Has a relative or friend or a doctor or another health worker been concerned about your drinking or suggested you cut down? 0 - no    AUDIT Score: 7  Interpretation: Low risk alcohol consumption    Single Item Drug Screening:  How often have you used an illegal drug (including marijuana) or a prescription medication for non-medical reasons in the past year? never    Single Item Drug Screen Score: 0  Interpretation: Negative screen for possible drug use disorder    No results found.     Physical Exam:     /80 (BP Location: Left arm, Patient Position: Sitting, Cuff Size: Large)   Pulse 81   Temp (!) 97.3 °F (36.3 °C) (Temporal)   Ht 5' 7\" (1.702 m)   Wt 85.3 kg (188 lb)   SpO2 96% Comment: room air  BMI 29.44 kg/m²     Physical Exam  Vitals reviewed.   Constitutional:       General: He is not in acute distress.  HENT:      Head: Normocephalic and atraumatic.      Right Ear: Tympanic membrane, ear canal and external ear normal. There is no impacted cerumen.      Left Ear: Tympanic membrane, ear canal and external ear normal. There is no impacted cerumen.      Nose: Nose normal.   Eyes:      General:         Right eye: No discharge.         Left eye: No discharge.      Extraocular Movements: Extraocular movements intact.      Conjunctiva/sclera: Conjunctivae normal.      Pupils: Pupils are equal, round, and reactive to light.   Cardiovascular:      Rate and Rhythm: Normal rate and regular " rhythm.   Pulmonary:      Effort: Pulmonary effort is normal. No respiratory distress.      Breath sounds: Normal breath sounds. No wheezing or rales.   Abdominal:      General: Bowel sounds are normal. There is no distension.   Musculoskeletal:         General: Normal range of motion.      Cervical back: Normal range of motion.      Right lower leg: No edema.      Left lower leg: No edema.   Lymphadenopathy:      Cervical: No cervical adenopathy.   Skin:     General: Skin is warm.      Findings: No erythema or rash.   Neurological:      General: No focal deficit present.      Mental Status: He is alert and oriented to person, place, and time.   Psychiatric:         Mood and Affect: Mood normal.          Maday Chu PA-C

## 2024-04-16 NOTE — PROGRESS NOTES
Consultation - Cardiology Office  [facility].    David S Cogan 68 y.o. male MRN: [id number]  : 1955  Unit/Bed#:  Encounter: [id number]      ASSESSMENT:  Chest pain intermittently for a few days.  He had chest pain yesterday and saw his PCP who advised him to go to the ED.  Patient declined and came to our office today.  Had some chest pain today also  Noncompliant with diet and medications  Had 3 slices of pizza followed by a sub.  Has been taking his Crestor only intermittently    Benign essential hypertension  BP today is 110/80 with heart rate of 72/min    Dyslipidemia  10/30/2023: , , HDL 69, normal AST and ALT  On Crestor 5 mg intermittently and oral turmeric.  LDL from yesterday is 134    High ASCVD 10-year risk score of 20%    GERD  History of acute superficial gastritis without hemorrhage      RECOMMENDATIONS:  Advised patient to go to the ED to have evaluation for his recurrent chest pain with abnormal EKG.  Patient prefers to go to [facility] instead of [facility]    Echocardiogram and Stress test were ordered by PCP yesterday, 2024        Thank you for your consultation.  If you have any question please call me at [phone]      Primary Care Physician Requesting Consult: Maday Chu PA-C      Reason for Consult / Principal Problem: Chest pain        HPI :     David S Cogan is a 68 y.o. year old male who was referred by primary care doctor for evaluation of chest pain that the patient has been having intermittently for a few weeks.  He describes them as retrosternal often exertional.  He had chest pain yesterday when he was swimming.  He stopped had 3 slices of pizza and swam some more.  Little later he went somewhere and had a SUB.  He states that he had some.  He was seen by his PCP yesterday where an EKG showed sinus rhythm with septal infarct of undetermined age.  He was advised yesterday also to go to the ED but declined and  preferred seeing cardiology in the office EKG today is similar.  I again advised him to go to the ED to get some cardiac enzymes and if abnormal he will need cardiac workup otherwise it can be managed as an outpatient.  Patient prefers to go to hospitals ED rather than [place]      Review of Systems   Cardiovascular:  Positive for chest pain.   All other systems reviewed and are negative.      Historical Information   Past Medical History:   Diagnosis Date    Asthma     Last Assessed:10/16/17    Fontanez's esophagus     Colonic polyp     Last Assessed:10/16/17    Eczema     GERD (gastroesophageal reflux disease)     Last Assessed:10/16/17    High cholesterol     Last Assessed:10/16/17    Hypertension     Last Assessed:10/16/17    Non-neoplastic nevus     Last Assessed:17     Past Surgical History:   Procedure Laterality Date    COLONOSCOPY      10/2013- egd/colon    LYMPHADENECTOMY      Axillary Lymph node removed-benign    TONSILLECTOMY      Last Assessed:10/16/17    VALVE REPLACEMENT      Heart Valve Replacement; Last Assessed:10/16/17     Social History     Substance and Sexual Activity   Alcohol Use Yes    Alcohol/week: 14.0 standard drinks of alcohol    Types: 14 Cans of beer per week     Social History     Substance and Sexual Activity   Drug Use No     Social History     Tobacco Use   Smoking Status Former    Current packs/day: 0.00    Average packs/day: 1 pack/day for 14.0 years (14.0 ttl pk-yrs)    Types: Cigarettes    Start date: 1974    Quit date:     Years since quittin.3   Smokeless Tobacco Former    Types: Chew    Quit date:      Family History:   Family History   Problem Relation Age of Onset    Cancer Father         groin, bone    Diabetes Paternal Grandfather        Meds/Allergies     Allergies   Allergen Reactions    Amoxicillin      Other reaction(s): Diarrhea    Amoxicillin-Pot Clavulanate      Other reaction(s): Allergy Date : 2015   GI    Metoprolol      Annotation -  "06Nov2017: Aggravtes asthma    Penicillins      Annotation - 06Nov2017: Diarrhea       Current Outpatient Medications:     albuterol (PROVENTIL HFA,VENTOLIN HFA) 90 mcg/act inhaler, Inhale 90 mcg 2 (two) times a day as needed, Disp: , Rfl:     aspirin (ECOTRIN LOW STRENGTH) 81 mg EC tablet, Take 1 tablet by mouth daily As needed, Disp: , Rfl:     cycloSPORINE (RESTASIS) 0.05 % ophthalmic emulsion, Apply 0.05 application to eye 2 (two) times a day, Disp: , Rfl:     diltiazem (Dilt-XR) 120 MG 24 hr capsule, Take 1 capsule (120 mg total) by mouth daily, Disp: 90 capsule, Rfl: 3    esomeprazole (NexIUM) 40 MG capsule, Take 1 capsule (40 mg total) by mouth daily in the early morning, Disp: 30 capsule, Rfl: 1    mometasone (NASONEX) 50 mcg/act nasal spray, 2 sprays into each nostril daily, Disp: , Rfl:     Multiple Vitamins-Minerals (CENTRUM SILVER 50+MEN) TABS, Take 1 tablet by mouth daily, Disp: , Rfl:     rosuvastatin (CRESTOR) 5 mg tablet, Take 1 tablet (5 mg total) by mouth daily, Disp: 90 tablet, Rfl: 3    SYMBICORT 160-4.5 MCG/ACT inhaler, Take 2 puffs by mouth 2 (two) times a day, Disp: , Rfl:     thiamine 50 MG tablet, Take 1 tablet (50 mg total) by mouth daily, Disp: 90 tablet, Rfl: 1    TURMERIC PO, Take by mouth daily , Disp: , Rfl:     Vitals: Blood pressure 110/80, pulse 72, height 5' 7\" (1.702 m), weight 85.3 kg (188 lb), SpO2 98%.    Body mass index is 29.44 kg/m².  Vitals:    04/17/24 1335   Weight: 85.3 kg (188 lb)     BP Readings from Last 3 Encounters:   04/17/24 110/80   04/16/24 130/80   10/31/23 124/66       Physical Exam  PHYSICAL EXAMINATION:  Neurologic:  Alert & oriented x 3, no new focal deficits, Not in any acute distress,  Constitutional:  Well deve rhythm loped, well nourished, non-toxic appearance   Eyes:  Pupil equal and reacting to light, conjunctiva normal, No JVP, No LNP   HENT:  Atraumatic, oropharynx moist, Neck- normal range of motion, no tenderness,  Neck supple   Respiratory:  " "Bilateral air entry, mostly clear to auscultation  Cardiovascular: S1-S2 regular rhythm  GI:  Soft, nondistended, normal bowel sounds, nontender, no hepatosplenomegaly appreciated.  Musculoskeletal: no tenderness, no deformities.   Skin:  Well hydrated, no rash   Lymphatic:  No lymphadenopathy noted   Extremities:  No edema and distal pulses are present    Diagnostic Studies Review Cardio:      EKG: Normal sinus rhythm, sinus arrhythmia, heart rate 72/min, septal infarct of undetermined age, nonspecific ST and T wave abnormality    Cardiac testing:   No results found for this or any previous visit.      Chest X-Ray:   No Chest XR results available for this patient.    CT-scan of the chest:     No CTA results available for this patient.  Lab Review   Lab Results   Component Value Date    WBC 7.0 10/30/2023    HGB 16.9 10/30/2023    HCT 51.1 (H) 10/30/2023    MCV 93 10/30/2023    RDW 13.1 10/30/2023     10/30/2023     BMP:  Lab Results   Component Value Date    SODIUM 138 04/15/2024    K 5.1 04/15/2024    CL 98 04/15/2024    CO2 23 04/15/2024    BUN 17 04/15/2024    CREATININE 1.50 (H) 04/15/2024    GLUC 93 04/15/2024    CALCIUM 8.7 05/17/2020    EGFR 50 (L) 04/15/2024     LFT:  Lab Results   Component Value Date    AST 39 04/15/2024    ALT 23 04/15/2024    ALKPHOS 67 05/17/2020    TP 7.2 04/15/2024    ALB 4.3 04/15/2024      No results found for: \"[id number]\"  No components found for: \"TSH3\"  Lab Results   Component Value Date    HGBA1C 5.6 01/22/2019     Lipid Profile:   Lab Results   Component Value Date    CHOLESTEROL 298 (H) 10/30/2023    HDL 69 10/30/2023    LDLCALC 193 (H) 10/30/2023    TRIG 195 (H) 10/30/2023     Lab Results   Component Value Date    CHOLESTEROL 298 (H) 10/30/2023    CHOLESTEROL 281 (H) 04/10/2023     Lab Results   Component Value Date    TROPONINI <0.02 05/17/2020     No results found for: \"NTBNP\"   Recent Results (from the past 672 hour(s))   Comprehensive metabolic panel    " "Collection Time: 04/15/24 12:55 PM   Result Value Ref Range    Glucose, Random 93 70 - 99 mg/dL    BUN 17 8 - 27 mg/dL    Creatinine 1.50 (H) 0.76 - 1.27 mg/dL    eGFR 50 (L) >59 mL/min/1.73    SL AMB BUN/CREATININE RATIO 11 10 - 24    Sodium 138 134 - 144 mmol/L    Potassium 5.1 3.5 - 5.2 mmol/L    Chloride 98 96 - 106 mmol/L    CO2 23 20 - 29 mmol/L    CALCIUM 10.0 8.6 - 10.2 mg/dL    Protein, Total 7.2 6.0 - 8.5 g/dL    Albumin 4.3 3.9 - 4.9 g/dL    Globulin, Total 2.9 1.5 - 4.5 g/dL    Albumin/Globulin Ratio 1.5 1.2 - 2.2    TOTAL BILIRUBIN 0.7 0.0 - 1.2 mg/dL    Alk Phos Isoenzymes 76 44 - 121 IU/L    AST 39 0 - 40 IU/L    ALT 23 0 - 44 IU/L   LDL cholesterol, direct    Collection Time: 04/15/24 12:55 PM   Result Value Ref Range    LDL Direct 134 (H) 0 - 99 mg/dL           Dr. Javier Willis MD, FACC      \"This note has been constructed using a voice recognition system.Therefore there may be syntax, spelling, and/or grammatical errors. Please call if you have any questions. \"  "

## 2024-04-16 NOTE — PATIENT INSTRUCTIONS
Medicare Preventive Visit Patient Instructions  Thank you for completing your Welcome to Medicare Visit or Medicare Annual Wellness Visit today. Your next wellness visit will be due in one year (4/17/2025).  The screening/preventive services that you may require over the next 5-10 years are detailed below. Some tests may not apply to you based off risk factors and/or age. Screening tests ordered at today's visit but not completed yet may show as past due. Also, please note that scanned in results may not display below.  Preventive Screenings:  Service Recommendations Previous Testing/Comments   Colorectal Cancer Screening  Colonoscopy    Fecal Occult Blood Test (FOBT)/Fecal Immunochemical Test (FIT)  Fecal DNA/Cologuard Test  Flexible Sigmoidoscopy Age: 45-75 years old   Colonoscopy: every 10 years (May be performed more frequently if at higher risk)  OR  FOBT/FIT: every 1 year  OR  Cologuard: every 3 years  OR  Sigmoidoscopy: every 5 years  Screening may be recommended earlier than age 45 if at higher risk for colorectal cancer. Also, an individualized decision between you and your healthcare provider will decide whether screening between the ages of 76-85 would be appropriate. Colonoscopy: 06/11/2020  FOBT/FIT: Not on file  Cologuard: Not on file  Sigmoidoscopy: Not on file    Screening Current     Prostate Cancer Screening Individualized decision between patient and health care provider in men between ages of 55-69   Medicare will cover every 12 months beginning on the day after your 50th birthday PSA: 0.6 ng/mL           Hepatitis C Screening Once for adults born between 1945 and 1965  More frequently in patients at high risk for Hepatitis C Hep C Antibody: 05/03/2019    Screening Current   Diabetes Screening 1-2 times per year if you're at risk for diabetes or have pre-diabetes Fasting glucose: No results in last 5 years (No results in last 5 years)  A1C: No results in last 5 years (No results in last 5  years)  Screening Current   Cholesterol Screening Once every 5 years if you don't have a lipid disorder. May order more often based on risk factors. Lipid panel: 10/30/2023  Screening Not Indicated  History Lipid Disorder      Other Preventive Screenings Covered by Medicare:  Abdominal Aortic Aneurysm (AAA) Screening: covered once if your at risk. You're considered to be at risk if you have a family history of AAA or a male between the age of 65-75 who smoking at least 100 cigarettes in your lifetime.  Lung Cancer Screening: covers low dose CT scan once per year if you meet all of the following conditions: (1) Age 55-77; (2) No signs or symptoms of lung cancer; (3) Current smoker or have quit smoking within the last 15 years; (4) You have a tobacco smoking history of at least 20 pack years (packs per day x number of years you smoked); (5) You get a written order from a healthcare provider.  Glaucoma Screening: covered annually if you're considered high risk: (1) You have diabetes OR (2) Family history of glaucoma OR (3)  aged 50 and older OR (4)  American aged 65 and older  Osteoporosis Screening: covered every 2 years if you meet one of the following conditions: (1) Have a vertebral abnormality; (2) On glucocorticoid therapy for more than 3 months; (3) Have primary hyperparathyroidism; (4) On osteoporosis medications and need to assess response to drug therapy.  HIV Screening: covered annually if you're between the age of 15-65. Also covered annually if you are younger than 15 and older than 65 with risk factors for HIV infection. For pregnant patients, it is covered up to 3 times per pregnancy.    Immunizations:  Immunization Recommendations   Influenza Vaccine Annual influenza vaccination during flu season is recommended for all persons aged >= 6 months who do not have contraindications   Pneumococcal Vaccine   * Pneumococcal conjugate vaccine = PCV13 (Prevnar 13), PCV15 (Vaxneuvance),  PCV20 (Prevnar 20)  * Pneumococcal polysaccharide vaccine = PPSV23 (Pneumovax) Adults 19-63 yo with certain risk factors or if 65+ yo  If never received any pneumonia vaccine: recommend Prevnar 20 (PCV20)  Give PCV20 if previously received 1 dose of PCV13 or PPSV23   Hepatitis B Vaccine 3 dose series if at intermediate or high risk (ex: diabetes, end stage renal disease, liver disease)   Respiratory syncytial virus (RSV) Vaccine - COVERED BY MEDICARE PART D  * RSVPreF3 (Arexvy) CDC recommends that adults 60 years of age and older may receive a single dose of RSV vaccine using shared clinical decision-making (SCDM)   Tetanus (Td) Vaccine - COST NOT COVERED BY MEDICARE PART B Following completion of primary series, a booster dose should be given every 10 years to maintain immunity against tetanus. Td may also be given as tetanus wound prophylaxis.   Tdap Vaccine - COST NOT COVERED BY MEDICARE PART B Recommended at least once for all adults. For pregnant patients, recommended with each pregnancy.   Shingles Vaccine (Shingrix) - COST NOT COVERED BY MEDICARE PART B  2 shot series recommended in those 19 years and older who have or will have weakened immune systems or those 50 years and older     Health Maintenance Due:      Topic Date Due   • Colorectal Cancer Screening  06/11/2025   • Hepatitis C Screening  Completed     Immunizations Due:      Topic Date Due   • Pneumococcal Vaccine: 65+ Years (1 of 1 - PCV) Never done   • COVID-19 Vaccine (3 - 2023-24 season) 09/01/2023     Advance Directives   What are advance directives?  Advance directives are legal documents that state your wishes and plans for medical care. These plans are made ahead of time in case you lose your ability to make decisions for yourself. Advance directives can apply to any medical decision, such as the treatments you want, and if you want to donate organs.   What are the types of advance directives?  There are many types of advance directives, and  each state has rules about how to use them. You may choose a combination of any of the following:  Living will:  This is a written record of the treatment you want. You can also choose which treatments you do not want, which to limit, and which to stop at a certain time. This includes surgery, medicine, IV fluid, and tube feedings.   Durable power of  for healthcare (DPAHC):  This is a written record that states who you want to make healthcare choices for you when you are unable to make them for yourself. This person, called a proxy, is usually a family member or a friend. You may choose more than 1 proxy.  Do not resuscitate (DNR) order:  A DNR order is used in case your heart stops beating or you stop breathing. It is a request not to have certain forms of treatment, such as CPR. A DNR order may be included in other types of advance directives.  Medical directive:  This covers the care that you want if you are in a coma, near death, or unable to make decisions for yourself. You can list the treatments you want for each condition. Treatment may include pain medicine, surgery, blood transfusions, dialysis, IV or tube feedings, and a ventilator (breathing machine).  Values history:  This document has questions about your views, beliefs, and how you feel and think about life. This information can help others choose the care that you would choose.  Why are advance directives important?  An advance directive helps you control your care. Although spoken wishes may be used, it is better to have your wishes written down. Spoken wishes can be misunderstood, or not followed. Treatments may be given even if you do not want them. An advance directive may make it easier for your family to make difficult choices about your care.   Weight Management   Why it is important to manage your weight:  Being overweight increases your risk of health conditions such as heart disease, high blood pressure, type 2 diabetes, and  certain types of cancer. It can also increase your risk for osteoarthritis, sleep apnea, and other respiratory problems. Aim for a slow, steady weight loss. Even a small amount of weight loss can lower your risk of health problems.  How to lose weight safely:  A safe and healthy way to lose weight is to eat fewer calories and get regular exercise. You can lose up about 1 pound a week by decreasing the number of calories you eat by 500 calories each day.   Healthy meal plan for weight management:  A healthy meal plan includes a variety of foods, contains fewer calories, and helps you stay healthy. A healthy meal plan includes the following:  Eat whole-grain foods more often.  A healthy meal plan should contain fiber. Fiber is the part of grains, fruits, and vegetables that is not broken down by your body. Whole-grain foods are healthy and provide extra fiber in your diet. Some examples of whole-grain foods are whole-wheat breads and pastas, oatmeal, brown rice, and bulgur.  Eat a variety of vegetables every day.  Include dark, leafy greens such as spinach, kale, paola greens, and mustard greens. Eat yellow and orange vegetables such as carrots, sweet potatoes, and winter squash.   Eat a variety of fruits every day.  Choose fresh or canned fruit (canned in its own juice or light syrup) instead of juice. Fruit juice has very little or no fiber.  Eat low-fat dairy foods.  Drink fat-free (skim) milk or 1% milk. Eat fat-free yogurt and low-fat cottage cheese. Try low-fat cheeses such as mozzarella and other reduced-fat cheeses.  Choose meat and other protein foods that are low in fat.  Choose beans or other legumes such as split peas or lentils. Choose fish, skinless poultry (chicken or turkey), or lean cuts of red meat (beef or pork). Before you cook meat or poultry, cut off any visible fat.   Use less fat and oil.  Try baking foods instead of frying them. Add less fat, such as margarine, sour cream, regular salad  "dressing and mayonnaise to foods. Eat fewer high-fat foods. Some examples of high-fat foods include french fries, doughnuts, ice cream, and cakes.  Eat fewer sweets.  Limit foods and drinks that are high in sugar. This includes candy, cookies, regular soda, and sweetened drinks.  Exercise:  Exercise at least 30 minutes per day on most days of the week. Some examples of exercise include walking, biking, dancing, and swimming. You can also fit in more physical activity by taking the stairs instead of the elevator or parking farther away from stores. Ask your healthcare provider about the best exercise plan for you.   Alcohol Use and Your Health    Drinking too much can harm your health.  Excessive alcohol use leads to about 88,000 death in the [place] each year, and shortens the life of those who diet by almost 30 years.  Further, excessive drinking cost the economy $249 billion in 2010.  Most excessive drinkers are not alcohol dependent.    Excessive alcohol use has immediate effects that increase the risk of many harmful health conditions.  These are most often the result of binge drinking.  Over time, excessive alcohol use can lead to the development of chronic diseases and other series health problems.    What is considered a \"drink\"?        Excessive alcohol use includes:  Binge Drinking: For women, 4 or more drinks consumed on one occasion. For men, 5 or more drinks consumed on one occasion.  Heavy Drinking: For women, 8 or more drinks per week. For men, 15 or more drinks per week  Any alcohol used by pregnant women  Any alcohol used by those under the age of 21 years    If you choose to drink, do so in moderation:  Do not drink at all if you are under the age of 21, or if you are or may be pregnant, or have health problems that could be made worse by drinking.  For women, up to 1 drink per day  For men, up to 2 drinks a day    No one should begin drinking or drink more frequently based on potential health " benefits    Short-Term Health Risks:  Injuries: motor vehicle crashes, falls, drownings, burns  Violence: homicide, suicide, sexual assault, intimate partner violence  Alcohol poisoning  Reproductive health: risky sexual behaviors, unintended prengnacy, sexually transmitted diseases, miscarriage, stillbirth, fetal alcohol syndrome    Long-Term Health Risks:  Chronic diseases: high blood pressure, heart disease, stroke, liver disease, digestive problems  Cancers: breast, mouth and throat, liver, colon  Learning and memory problems: dementia, poor school performance  Mental health: depression, anxiety, insomnia  Social problems: lost productivity, family problems, unemployment  Alcohol dependence    For support and more information:  Substance Abuse and Mental Health Services Administration  [address]  [place] [address]  Web Address: http://www.samhsa.gov    Alcoholics Anonymous        Web Address: http://www.aa.org    https://www.cdc.gov/alcohol/fact-sheets/alcohol-use.htm     © Copyright [employer] 2018 Information is for End User's use only and may not be sold, redistributed or otherwise used for commercial purposes. All illustrations and images included in CareNotes® are the copyrighted property of [employer]., [employer] or [employer]

## 2024-04-17 ENCOUNTER — HOSPITAL ENCOUNTER (INPATIENT)
Facility: HOSPITAL | Age: 69
LOS: 10 days | Discharge: HOME HEALTH | DRG: 233 | End: 2024-04-27
Attending: EMERGENCY MEDICINE | Admitting: INTERNAL MEDICINE
Payer: MEDICARE

## 2024-04-17 ENCOUNTER — OFFICE VISIT (OUTPATIENT)
Dept: CARDIOLOGY CLINIC | Facility: CLINIC | Age: 69
End: 2024-04-17
Payer: MEDICARE

## 2024-04-17 ENCOUNTER — APPOINTMENT (EMERGENCY)
Dept: RADIOLOGY | Facility: HOSPITAL | Age: 69
DRG: 233 | End: 2024-04-17
Payer: MEDICARE

## 2024-04-17 VITALS
OXYGEN SATURATION: 98 % | BODY MASS INDEX: 29.51 KG/M2 | DIASTOLIC BLOOD PRESSURE: 80 MMHG | HEIGHT: 67 IN | HEART RATE: 72 BPM | SYSTOLIC BLOOD PRESSURE: 110 MMHG | WEIGHT: 188 LBS

## 2024-04-17 DIAGNOSIS — I25.10 CAD (CORONARY ARTERY DISEASE): ICD-10-CM

## 2024-04-17 DIAGNOSIS — Z95.1 S/P CABG (CORONARY ARTERY BYPASS GRAFT): Primary | ICD-10-CM

## 2024-04-17 DIAGNOSIS — E78.5 HYPERLIPIDEMIA, UNSPECIFIED HYPERLIPIDEMIA TYPE: ICD-10-CM

## 2024-04-17 DIAGNOSIS — E78.5 DYSLIPIDEMIA: ICD-10-CM

## 2024-04-17 DIAGNOSIS — I10 BENIGN HYPERTENSION: ICD-10-CM

## 2024-04-17 DIAGNOSIS — R07.9 CHEST PAIN: ICD-10-CM

## 2024-04-17 DIAGNOSIS — R07.89 OTHER CHEST PAIN: ICD-10-CM

## 2024-04-17 DIAGNOSIS — I21.4 NSTEMI (NON-ST ELEVATED MYOCARDIAL INFARCTION) (HCC): ICD-10-CM

## 2024-04-17 DIAGNOSIS — I10 ESSENTIAL HYPERTENSION: Primary | ICD-10-CM

## 2024-04-17 PROBLEM — J45.909 ASTHMA: Status: ACTIVE | Noted: 2024-04-17

## 2024-04-17 LAB
2HR DELTA HS TROPONIN: -98 NG/L
4HR DELTA HS TROPONIN: -105 NG/L
ALBUMIN SERPL BCP-MCNC: 4.1 G/DL (ref 3.5–5)
ALP SERPL-CCNC: 62 U/L (ref 34–104)
ALT SERPL W P-5'-P-CCNC: 18 U/L (ref 7–52)
ANION GAP SERPL CALCULATED.3IONS-SCNC: 9 MMOL/L (ref 4–13)
APTT PPP: 30 SECONDS (ref 23–37)
APTT PPP: 33 SECONDS (ref 23–37)
AST SERPL W P-5'-P-CCNC: 26 U/L (ref 13–39)
ATRIAL RATE: 78 BPM
BASOPHILS # BLD AUTO: 0.03 THOUSANDS/ÂΜL (ref 0–0.1)
BASOPHILS NFR BLD AUTO: 1 % (ref 0–1)
BILIRUB SERPL-MCNC: 0.53 MG/DL (ref 0.2–1)
BUN SERPL-MCNC: 17 MG/DL (ref 5–25)
CALCIUM SERPL-MCNC: 9 MG/DL (ref 8.4–10.2)
CARDIAC TROPONIN I PNL SERPL HS: 1112 NG/L
CARDIAC TROPONIN I PNL SERPL HS: 1119 NG/L
CARDIAC TROPONIN I PNL SERPL HS: 1217 NG/L
CHLORIDE SERPL-SCNC: 103 MMOL/L (ref 96–108)
CO2 SERPL-SCNC: 25 MMOL/L (ref 21–32)
CREAT SERPL-MCNC: 1.17 MG/DL (ref 0.6–1.3)
EOSINOPHIL # BLD AUTO: 0.21 THOUSAND/ÂΜL (ref 0–0.61)
EOSINOPHIL NFR BLD AUTO: 3 % (ref 0–6)
ERYTHROCYTE [DISTWIDTH] IN BLOOD BY AUTOMATED COUNT: 13.7 % (ref 11.6–15.1)
GFR SERPL CREATININE-BSD FRML MDRD: 63 ML/MIN/1.73SQ M
GLUCOSE SERPL-MCNC: 94 MG/DL (ref 65–140)
HCT VFR BLD AUTO: 46.8 % (ref 36.5–49.3)
HGB BLD-MCNC: 15.7 G/DL (ref 12–17)
IMM GRANULOCYTES # BLD AUTO: 0.02 THOUSAND/UL (ref 0–0.2)
IMM GRANULOCYTES NFR BLD AUTO: 0 % (ref 0–2)
INR PPP: 1.06 (ref 0.84–1.19)
LYMPHOCYTES # BLD AUTO: 2.28 THOUSANDS/ÂΜL (ref 0.6–4.47)
LYMPHOCYTES NFR BLD AUTO: 36 % (ref 14–44)
MCH RBC QN AUTO: 30.7 PG (ref 26.8–34.3)
MCHC RBC AUTO-ENTMCNC: 33.5 G/DL (ref 31.4–37.4)
MCV RBC AUTO: 92 FL (ref 82–98)
MONOCYTES # BLD AUTO: 0.76 THOUSAND/ÂΜL (ref 0.17–1.22)
MONOCYTES NFR BLD AUTO: 12 % (ref 4–12)
NEUTROPHILS # BLD AUTO: 3.06 THOUSANDS/ÂΜL (ref 1.85–7.62)
NEUTS SEG NFR BLD AUTO: 48 % (ref 43–75)
NRBC BLD AUTO-RTO: 0 /100 WBCS
P AXIS: 73 DEGREES
PLATELET # BLD AUTO: 313 THOUSANDS/UL (ref 149–390)
PMV BLD AUTO: 10.7 FL (ref 8.9–12.7)
POTASSIUM SERPL-SCNC: 4.2 MMOL/L (ref 3.5–5.3)
PR INTERVAL: 180 MS
PROT SERPL-MCNC: 7.3 G/DL (ref 6.4–8.4)
PROTHROMBIN TIME: 13.7 SECONDS (ref 11.6–14.5)
QRS AXIS: 73 DEGREES
QRSD INTERVAL: 90 MS
QT INTERVAL: 390 MS
QTC INTERVAL: 444 MS
RBC # BLD AUTO: 5.11 MILLION/UL (ref 3.88–5.62)
SODIUM SERPL-SCNC: 137 MMOL/L (ref 135–147)
T WAVE AXIS: 92 DEGREES
VENTRICULAR RATE: 78 BPM
WBC # BLD AUTO: 6.36 THOUSAND/UL (ref 4.31–10.16)

## 2024-04-17 PROCEDURE — 93010 ELECTROCARDIOGRAM REPORT: CPT | Performed by: INTERNAL MEDICINE

## 2024-04-17 PROCEDURE — NC001 PR NO CHARGE: Performed by: INTERNAL MEDICINE

## 2024-04-17 PROCEDURE — 99223 1ST HOSP IP/OBS HIGH 75: CPT | Performed by: INTERNAL MEDICINE

## 2024-04-17 PROCEDURE — 99291 CRITICAL CARE FIRST HOUR: CPT | Performed by: EMERGENCY MEDICINE

## 2024-04-17 PROCEDURE — 80053 COMPREHEN METABOLIC PANEL: CPT

## 2024-04-17 PROCEDURE — 85730 THROMBOPLASTIN TIME PARTIAL: CPT | Performed by: INTERNAL MEDICINE

## 2024-04-17 PROCEDURE — 99285 EMERGENCY DEPT VISIT HI MDM: CPT

## 2024-04-17 PROCEDURE — 96374 THER/PROPH/DIAG INJ IV PUSH: CPT

## 2024-04-17 PROCEDURE — 84484 ASSAY OF TROPONIN QUANT: CPT

## 2024-04-17 PROCEDURE — 93000 ELECTROCARDIOGRAM COMPLETE: CPT | Performed by: INTERNAL MEDICINE

## 2024-04-17 PROCEDURE — 85025 COMPLETE CBC W/AUTO DIFF WBC: CPT

## 2024-04-17 PROCEDURE — C9113 INJ PANTOPRAZOLE SODIUM, VIA: HCPCS

## 2024-04-17 PROCEDURE — 99203 OFFICE O/P NEW LOW 30 MIN: CPT | Performed by: INTERNAL MEDICINE

## 2024-04-17 PROCEDURE — 36415 COLL VENOUS BLD VENIPUNCTURE: CPT

## 2024-04-17 PROCEDURE — 93005 ELECTROCARDIOGRAM TRACING: CPT

## 2024-04-17 PROCEDURE — 71046 X-RAY EXAM CHEST 2 VIEWS: CPT

## 2024-04-17 PROCEDURE — 85610 PROTHROMBIN TIME: CPT

## 2024-04-17 PROCEDURE — 85730 THROMBOPLASTIN TIME PARTIAL: CPT

## 2024-04-17 RX ORDER — ALBUTEROL SULFATE 90 UG/1
1 AEROSOL, METERED RESPIRATORY (INHALATION) EVERY 6 HOURS PRN
Status: DISCONTINUED | OUTPATIENT
Start: 2024-04-17 | End: 2024-04-27 | Stop reason: HOSPADM

## 2024-04-17 RX ORDER — HEPARIN SODIUM 1000 [USP'U]/ML
4000 INJECTION, SOLUTION INTRAVENOUS; SUBCUTANEOUS EVERY 6 HOURS PRN
Status: DISCONTINUED | OUTPATIENT
Start: 2024-04-17 | End: 2024-04-23

## 2024-04-17 RX ORDER — LANOLIN ALCOHOL/MO/W.PET/CERES
100 CREAM (GRAM) TOPICAL DAILY
Status: DISCONTINUED | OUTPATIENT
Start: 2024-04-18 | End: 2024-04-27 | Stop reason: HOSPADM

## 2024-04-17 RX ORDER — PANTOPRAZOLE SODIUM 40 MG/10ML
40 INJECTION, POWDER, LYOPHILIZED, FOR SOLUTION INTRAVENOUS ONCE
Status: COMPLETED | OUTPATIENT
Start: 2024-04-17 | End: 2024-04-17

## 2024-04-17 RX ORDER — METOPROLOL SUCCINATE 25 MG/1
25 TABLET, EXTENDED RELEASE ORAL DAILY
Status: DISCONTINUED | OUTPATIENT
Start: 2024-04-18 | End: 2024-04-19

## 2024-04-17 RX ORDER — ONDANSETRON 2 MG/ML
4 INJECTION INTRAMUSCULAR; INTRAVENOUS EVERY 6 HOURS PRN
Status: DISCONTINUED | OUTPATIENT
Start: 2024-04-17 | End: 2024-04-23

## 2024-04-17 RX ORDER — MAGNESIUM HYDROXIDE/ALUMINUM HYDROXICE/SIMETHICONE 120; 1200; 1200 MG/30ML; MG/30ML; MG/30ML
30 SUSPENSION ORAL ONCE
Status: COMPLETED | OUTPATIENT
Start: 2024-04-17 | End: 2024-04-17

## 2024-04-17 RX ORDER — CYCLOSPORINE 0.5 MG/ML
1 EMULSION OPHTHALMIC 2 TIMES DAILY
Status: DISCONTINUED | OUTPATIENT
Start: 2024-04-17 | End: 2024-04-27 | Stop reason: HOSPADM

## 2024-04-17 RX ORDER — FOLIC ACID 1 MG/1
1 TABLET ORAL DAILY
Status: DISCONTINUED | OUTPATIENT
Start: 2024-04-18 | End: 2024-04-27 | Stop reason: HOSPADM

## 2024-04-17 RX ORDER — HEPARIN SODIUM 1000 [USP'U]/ML
4000 INJECTION, SOLUTION INTRAVENOUS; SUBCUTANEOUS ONCE
Status: COMPLETED | OUTPATIENT
Start: 2024-04-17 | End: 2024-04-17

## 2024-04-17 RX ORDER — HEPARIN SODIUM 10000 [USP'U]/100ML
3-20 INJECTION, SOLUTION INTRAVENOUS
Status: DISCONTINUED | OUTPATIENT
Start: 2024-04-17 | End: 2024-04-23

## 2024-04-17 RX ORDER — BUDESONIDE AND FORMOTEROL FUMARATE DIHYDRATE 160; 4.5 UG/1; UG/1
2 AEROSOL RESPIRATORY (INHALATION) 2 TIMES DAILY
Status: DISCONTINUED | OUTPATIENT
Start: 2024-04-17 | End: 2024-04-27 | Stop reason: HOSPADM

## 2024-04-17 RX ORDER — DILTIAZEM HYDROCHLORIDE 120 MG/1
120 CAPSULE, EXTENDED RELEASE ORAL DAILY
Status: DISCONTINUED | OUTPATIENT
Start: 2024-04-18 | End: 2024-04-18

## 2024-04-17 RX ORDER — SUCRALFATE 1 G/1
1 TABLET ORAL ONCE
Status: COMPLETED | OUTPATIENT
Start: 2024-04-17 | End: 2024-04-17

## 2024-04-17 RX ORDER — ASPIRIN 325 MG
325 TABLET ORAL ONCE
Status: COMPLETED | OUTPATIENT
Start: 2024-04-17 | End: 2024-04-17

## 2024-04-17 RX ORDER — ACETAMINOPHEN 325 MG/1
650 TABLET ORAL EVERY 4 HOURS PRN
Status: DISCONTINUED | OUTPATIENT
Start: 2024-04-17 | End: 2024-04-23

## 2024-04-17 RX ORDER — ACETAMINOPHEN 325 MG/1
650 TABLET ORAL EVERY 6 HOURS PRN
Status: DISCONTINUED | OUTPATIENT
Start: 2024-04-17 | End: 2024-04-17

## 2024-04-17 RX ORDER — PANTOPRAZOLE SODIUM 40 MG/1
40 TABLET, DELAYED RELEASE ORAL
Status: DISCONTINUED | OUTPATIENT
Start: 2024-04-18 | End: 2024-04-23

## 2024-04-17 RX ORDER — POLYETHYLENE GLYCOL 3350 17 G/17G
17 POWDER, FOR SOLUTION ORAL DAILY
Status: DISCONTINUED | OUTPATIENT
Start: 2024-04-18 | End: 2024-04-23

## 2024-04-17 RX ORDER — HEPARIN SODIUM 1000 [USP'U]/ML
2000 INJECTION, SOLUTION INTRAVENOUS; SUBCUTANEOUS EVERY 6 HOURS PRN
Status: DISCONTINUED | OUTPATIENT
Start: 2024-04-17 | End: 2024-04-18

## 2024-04-17 RX ORDER — ATORVASTATIN CALCIUM 40 MG/1
40 TABLET, FILM COATED ORAL
Status: DISCONTINUED | OUTPATIENT
Start: 2024-04-17 | End: 2024-04-18

## 2024-04-17 RX ORDER — LOSARTAN POTASSIUM 25 MG/1
25 TABLET ORAL DAILY
Status: DISCONTINUED | OUTPATIENT
Start: 2024-04-18 | End: 2024-04-19

## 2024-04-17 RX ORDER — FLUTICASONE PROPIONATE 50 MCG
2 SPRAY, SUSPENSION (ML) NASAL DAILY
Status: DISCONTINUED | OUTPATIENT
Start: 2024-04-18 | End: 2024-04-27 | Stop reason: HOSPADM

## 2024-04-17 RX ADMIN — ATORVASTATIN CALCIUM 40 MG: 40 TABLET, FILM COATED ORAL at 18:36

## 2024-04-17 RX ADMIN — BUDESONIDE AND FORMOTEROL FUMARATE DIHYDRATE 2 PUFF: 160; 4.5 AEROSOL RESPIRATORY (INHALATION) at 18:38

## 2024-04-17 RX ADMIN — TICAGRELOR 90 MG: 90 TABLET ORAL at 23:23

## 2024-04-17 RX ADMIN — HEPARIN SODIUM 11.8 UNITS/KG/HR: 10000 INJECTION, SOLUTION INTRAVENOUS at 16:37

## 2024-04-17 RX ADMIN — ASPIRIN 325 MG ORAL TABLET 325 MG: 325 PILL ORAL at 16:33

## 2024-04-17 RX ADMIN — TICAGRELOR 180 MG: 90 TABLET ORAL at 18:37

## 2024-04-17 RX ADMIN — CYCLOSPORINE 1 DROP: 0.5 EMULSION OPHTHALMIC at 23:23

## 2024-04-17 RX ADMIN — SUCRALFATE 1 G: 1 TABLET ORAL at 16:01

## 2024-04-17 RX ADMIN — PANTOPRAZOLE SODIUM 40 MG: 40 INJECTION, POWDER, FOR SOLUTION INTRAVENOUS at 16:04

## 2024-04-17 RX ADMIN — HEPARIN SODIUM 4000 UNITS: 1000 INJECTION INTRAVENOUS; SUBCUTANEOUS at 22:18

## 2024-04-17 RX ADMIN — HEPARIN SODIUM 11.8 UNITS/KG/HR: 10000 INJECTION, SOLUTION INTRAVENOUS at 20:06

## 2024-04-17 RX ADMIN — HEPARIN SODIUM 4000 UNITS: 1000 INJECTION INTRAVENOUS; SUBCUTANEOUS at 16:33

## 2024-04-17 RX ADMIN — ALUMINUM HYDROXIDE, MAGNESIUM HYDROXIDE, AND DIMETHICONE 30 ML: 200; 20; 200 SUSPENSION ORAL at 16:02

## 2024-04-17 RX ADMIN — PANTOPRAZOLE SODIUM 40 MG: 40 INJECTION, POWDER, FOR SOLUTION INTRAVENOUS at 16:03

## 2024-04-17 NOTE — PROGRESS NOTES
INTERNAL MEDICINE RESIDENCY SENIOR ADMISSION NOTE     Name: David S Cogan   Age & Sex: 68 y.o. male   MRN: [id number]  Unit/Bed#: ED 06   Encounter: [id number]  Primary Care Provider: Maday Chu PA-C    Admit to team: SOD Team C     Patient seen and examined. Reviewed H&P per Dr. Shah . Agree with the assessment and plan with any exception/addition as noted below:    Active Problems:  There are no active Hospital Problems.      Code Status: No Order  Admission Status: INPATIENT   Disposition: Patient requires Med/Surg with Telemetry  Expected Length of Stay: > 2 Mn    67 y/o male with a history of asthma, GERD, hyperlipidemia, hypertension, alcohol use disorder, former tobacco use (quit in 1988) who presents with chest pain with exertion.  Started 1 week ago while swimming.  Described as pressure and rated 8-9/10 in severity.  Resolved after he stopped swimming.  Chest pressure recurred the next day with radiation to the arms while driving to [employer].  Again had sternal chest pressure and bloating while lying down the day after that.  Endorses chest pressure woke him up from sleep Thursday and Friday and had vomiting Thursday Friday night.  Had more chest pressure while driving home on Friday.  Became short of breath mowing the grass yesterday and felt that it was much harder to do than normal.  Denies diaphoresis, headache, change in vision, lower extremity edema.  Stopped drinking beer over the weekend.  His wife states that he drinks too much.  Family history significant for father with angina.    Patient was seen by his PCP yesterday who ordered echo, stress test, and POC EKG.  Unable to view POC EKG.  Patient went to the cardiologist today who also ordered EKG.  He was advised to go to the ED for abnormal EKG.     Vitals in ED: 98 F, /93, pulse 77, RR 16, 96% RA.  Labs significant for troponin 1217.  EKG showed normal sinus rhythm with age-indeterminate septal infarct.  Wet read CXR showed no  acute pathology.  Received aspirin 3 and 25 mg, IV Protonix 40 mg, Carafate.  Started on heparin drip.    Physical Exam  Vitals reviewed.   Constitutional:       General: He is not in acute distress.  HENT:      Head: Normocephalic and atraumatic.      Nose: No rhinorrhea.   Eyes:      General: No scleral icterus.  Cardiovascular:      Rate and Rhythm: Normal rate and regular rhythm.      Heart sounds: Normal heart sounds. No murmur heard.  Pulmonary:      Effort: Pulmonary effort is normal. No respiratory distress.      Breath sounds: Normal breath sounds. No wheezing, rhonchi or rales.   Abdominal:      General: Bowel sounds are normal. There is no distension.      Palpations: Abdomen is soft.      Tenderness: There is no abdominal tenderness. There is no guarding or rebound.   Musculoskeletal:      Right lower leg: No edema.      Left lower leg: No edema.   Skin:     General: Skin is warm and dry.      Coloration: Skin is not jaundiced.   Neurological:      Mental Status: He is alert.      Cranial Nerves: No dysarthria.      Comments: CN 2-12 grossly intact, moves all 4 extremities   Psychiatric:         Mood and Affect: Mood normal.         Behavior: Behavior normal.           Plan:  NSTEMI  Presents with 1 week chest pain with exertion, troponin over 1200, and EKG showing age undetermined septal infarct  S/P aspirin load  Cardiology consulted,  appreciate recommendations  Load Brillinta  Continue heparin drip  Start atorvastatin 40 mg, Toprol 25 mg, losartan 25 mg  Start Brilinta 90 mg BID and ASA 81 mg tomorrow  TTE  NPO in anticipation for possible cardiac cath  Monitor on telemetry    HTN  Continue home diltiazem 120 mg    Asthma  Does not appear in acute exacerbation  Continue Symbicort  Substitute home mometasone for fluticasone nasal spray    Alcohol use disorder  Wife states he drinks too much  Last drink was 3-4 days ago  JULIET  Thiamine, folate    Steff Rowland,   [facility]  "[facility]  Internal Medicine, PGY-3    Portions of the record may have been created with voice recognition software. Occasional wrong word or \"sound a like\" substitutions may have occurred due to the inherent limitations of voice recognition software.  Read the chart carefully and recognize, using context, where substitutions have occurred.       "

## 2024-04-17 NOTE — H&P
INTERNAL MEDICINE RESIDENCY ADMISSION H&P     Name: David S Cogan   Age & Sex: 68 y.o. male   MRN: [id number]  Unit/Bed#: ED 06   Encounter: [id number]  Primary Care Provider: Maday Chu PA-C    Code Status: Level 1 - Full Code  Admission Status: INPATIENT   Disposition: Patient requires Med/Surg with Telemetry    Admit to team: SOD Team C     ASSESSMENT/PLAN     Principal Problem:    NSTEMI (non-ST elevated myocardial infarction) ([facility])  Active Problems:    Benign hypertension    Hyperlipidemia    Alcohol use, unspecified with other alcohol-induced disorder (HCC)    Asthma      * NSTEMI (non-ST elevated myocardial infarction) ([facility])  Assessment & Plan  Patient presenting with one week history of intermittent chest pain/pressure. Described as up to an 8-9/10 in intensity, substernal, with radiation to the bilateral shoulders and upper arms. Associated symptoms include nausea/vomiting (end of last week), abdominal bloating (last week), and shortness of breath/fatigue (earlier this week); he denies any associated diaphoresis. Above symptoms improved with rest/Gasex; of note, patient has had somewhat similar symptoms in the past secondary to GERD.     Seen by PCP/cardiology earlier this week with EKG(s) at that time suggestive of age-indeterminate septal infarct with nonspecific t-wave changes. Subsequently advised to present to the ED, where troponin found to be elevated at 1,217 (2 hour brionna 1,119). Treated with Protonix, carafate, loaded with ASA, and started on heparin drip.    Given presenting symptoms, elevated troponin, and EKG changes, highest suspicion at present is for NSTEMI.    Plan  -Admit to Medr with Telemetry  -Cardiology consulted, recommendations appreciated  -Patient loaded with ASA as above; will load with Brilinta following discussion with Cardiology  -Will start patient on metoprolol, losartan, and increase home Crestor to high intensity  -NPO midnight pending Cardiology evaluation  for potential cath tomorrow  -STAT EKG with recurrence of chest pain    Asthma  Assessment & Plan  Patient with history of asthma (no PFTs on file); treated as an outpatient with Symbicort and albuterol as needed. Endorsing some shortness of breath prior to presentation, although this appears more likely correlated to patient's cardiac complaints.    Plan  -Continue PTA inhaler regimen    Alcohol use, unspecified with other alcohol-induced disorder (HCC)  Assessment & Plan  Patient (and his wife) endorse significant alcohol use; up to 6 drinks [place], 1-2 when at home. Reports he did stop drinking over the weekend but has since resumed.     Plan  -[facility] protocol  -Will order thiamine supplementation    Hyperlipidemia  Assessment & Plan  Patient with history of HLD, with last lipid panel (October 2023) notable for cholesterol 298, triglycerides 195, HDL 69, . Was previously on treatment with Crestor 5 mg daily, though endorses overall poor compliance to this.    Plan  -Given presentation, will increase statin to high intensity atorvastatin 40 mg daily    Benign hypertension  Assessment & Plan  Patient with history of HTN, treated as an outpatient with diltiazem 120 mg daily. BP on arrival 161/93.    Plan  -Continue PTA diltiazem; will add on metoprolol and losartan given patient's presentation        VTE Pharmacologic Prophylaxis: Heparin  VTE Mechanical Prophylaxis: sequential compression device    CHIEF COMPLAINT     Chief Complaint   Patient presents with    Chest Pain     Since last Wednesday, seen at cardiology office today and told to come to ER. Center of chest. 1 episode of vomiting last week. C/o SOB. Denies extremity swelling      HISTORY OF PRESENT ILLNESS     Patient is a 68-year-old male with past medical history notable for hyperlipidemia, hypertension, GERD, asthma, former tobacco use with prolonged cessation (quit in approximately 1988) who presented today on the advice of his  cardiologist for further evaluation of chest pain as well as concern for ACS.  Patient was seen by his PCP yesterday for similar symptoms who had ordered an echocardiogram, stress test, and point-of-care EKG; at that time, he was advised to present for further evaluation though instead waited until he could meet with cardiology earlier today.  At this visit, repeat EKG showed sinus rhythm with age-indeterminate septal infarct per reading cardiologist and patient was again advised to present to the ED for further evaluation.    On arrival in the ED, patient's vital signs include the following: Temp 98, pulse 76, respiratory rate 18, /93, 98% on room air.  Initial lab studies showed a CBC within normal limits, CMP with normal electrolytes (creatinine 1.17, with interval improvement from 1.5 two days prior), initial troponin 1217 with 2-hour repeat of 1119 (delta 98).  Repeat EKG again with age-indeterminate septal infarct per reading cardiologist.  Given the above findings, patient was given Carafate and Protonix, loaded with aspirin, and started on heparin drip before medicine was contacted for admission.    Patient seen and examined with his wife at bedside.  He reports that his symptoms began approximately 1 week (4/8) prior while swimming; at that time, he endorsed chest tightness that subsequently improved with rest.  He attributed this to his asthma, though continued to have symptom recurrence over the following days. Associated symptoms at that time included intermittent nausea/vomiting and abdominal bloating, but no associated diaphoresis. Over the course of the week, he noted increasing intensity of his pain as well as developing radiation into his shoulders and upper arms. However, as his symptoms improved with either no intervention or with Gasex, he attributed his pain to GERD.    As of earlier this week, the patient also noticed increased shortness of breath and fatigue. He continued to have  "episodes of his pain with activity though also noted several instances of symptom occurrence overnight/while sleeping. He was ultimately evaluated by his PCP who ordered stress testing and an echocardiogram - this ultimately led to the events above up until present. The patient also reports that he has previously undergone treadmill stress testing and cardiac catheterization - although we do not have these records available for review, he states that these were both \"normal.\"    Of note, patient does have a family history of CAD (father with anginal chest pain, diagnosed late 50s, with subsequent CABG around age 68; maternal aunt with CABG). He states that he has only been partially compliant with Crestor and admits to taking this only 2-3 times per week. As above, he does endorse a remote history of tobacco abuse (with cessation maintained since 1988; was previously a PPD smoker for multiple years). He does also endorse a significant history of alcohol use (up to 6 drinks when out at the casino, usually 1-2 at home) but denies any other substance use.    Patient will be [facility] under the service of Dr. Hickman for ongoing monitoring and evaluation. He and his wife confirm that he is a level 1 full-code.    REVIEW OF SYSTEMS     Review of Systems   Constitutional:  Negative for chills, diaphoresis and fever.   HENT:  Negative for sore throat.    Eyes:  Negative for visual disturbance.   Respiratory:  Positive for chest tightness and shortness of breath.    Cardiovascular:  Positive for chest pain. Negative for palpitations and leg swelling.   Gastrointestinal:  Positive for abdominal distention, nausea and vomiting. Negative for abdominal pain, constipation and diarrhea.   Endocrine: Negative for polydipsia, polyphagia and polyuria.   Genitourinary:  Negative for dysuria.   Musculoskeletal:  Negative for arthralgias and myalgias.   Skin:  Negative for rash.   Neurological:  Negative for dizziness, " weakness and light-headedness.   Psychiatric/Behavioral:  Negative for dysphoric mood.      OBJECTIVE     Vitals:    24 1615 24 1715 24 1841 24 2042   BP: 145/93  153/75 139/73   BP Location:       Pulse: 77 73 89 78   Resp: 16 15 18    Temp:   98.6 °F (37 °C)    TempSrc:   Oral    SpO2: 96% 94% 97%       Temperature:   Temp (24hrs), Av.3 °F (36.8 °C), Min:98 °F (36.7 °C), Max:98.6 °F (37 °C)    Temperature: 98.6 °F (37 °C)  Intake & Output:  I/O       None          Weights:        There is no height or weight on file to calculate BMI.  Weight (last 2 days)       None          Physical Exam  Vitals and nursing note reviewed.   Constitutional:       General: He is not in acute distress.     Appearance: Normal appearance. He is not ill-appearing.   HENT:      Head: Normocephalic and atraumatic.      Right Ear: External ear normal.      Left Ear: External ear normal.      Nose: Nose normal.      Mouth/Throat:      Mouth: Mucous membranes are moist.      Pharynx: Oropharynx is clear.   Eyes:      Extraocular Movements: Extraocular movements intact.      Conjunctiva/sclera: Conjunctivae normal.      Pupils: Pupils are equal, round, and reactive to light.      Comments: Wears glasses   Cardiovascular:      Rate and Rhythm: Normal rate and regular rhythm.      Heart sounds: Normal heart sounds. No murmur heard.  Pulmonary:      Effort: Pulmonary effort is normal. No respiratory distress.      Breath sounds: Normal breath sounds.   Abdominal:      General: Abdomen is flat. Bowel sounds are normal. There is no distension.      Palpations: Abdomen is soft.      Tenderness: There is no abdominal tenderness.   Musculoskeletal:      Right lower leg: No edema.      Left lower leg: No edema.   Skin:     General: Skin is warm and dry.   Neurological:      Mental Status: He is alert. Mental status is at baseline.   Psychiatric:         Mood and Affect: Mood normal.         Behavior: Behavior normal.        PAST MEDICAL HISTORY     Past Medical History:   Diagnosis Date    Asthma     Last Assessed:10/16/17    Fontanez's esophagus     Colonic polyp     Last Assessed:10/16/17    Eczema     GERD (gastroesophageal reflux disease)     Last Assessed:10/16/17    High cholesterol     Last Assessed:10/16/17    Hypertension     Last Assessed:10/16/17    Non-neoplastic nevus     Last Assessed:17     PAST SURGICAL HISTORY     Past Surgical History:   Procedure Laterality Date    COLONOSCOPY      10/2013- egd/colon    LYMPHADENECTOMY      Axillary Lymph node removed-benign    TONSILLECTOMY      Last Assessed:10/16/17    VALVE REPLACEMENT      Heart Valve Replacement; Last Assessed:10/16/17     SOCIAL & FAMILY HISTORY     Social History     Substance and Sexual Activity   Alcohol Use Yes    Alcohol/week: 14.0 standard drinks of alcohol    Types: 14 Cans of beer per week       Social History     Substance and Sexual Activity   Drug Use No     Social History     Tobacco Use   Smoking Status Former    Current packs/day: 0.00    Average packs/day: 1 pack/day for 14.0 years (14.0 ttl pk-yrs)    Types: Cigarettes    Start date: 1974    Quit date:     Years since quittin.3   Smokeless Tobacco Former    Types: Chew    Quit date:      Family History   Problem Relation Age of Onset    Cancer Father         groin, bone    Diabetes Paternal Grandfather      LABORATORY DATA     Labs: I have personally reviewed pertinent reports.    Results from last 7 days   Lab Units 24  1520   WBC Thousand/uL 6.36   HEMOGLOBIN g/dL 15.7   HEMATOCRIT % 46.8   PLATELETS Thousands/uL 313   SEGS PCT % 48   MONO PCT % 12   EOS PCT % 3      Results from last 7 days   Lab Units 24  1520 04/15/24  1255   POTASSIUM mmol/L 4.2 5.1   CHLORIDE mmol/L 103 98   CO2 mmol/L 25 23   BUN mg/dL 17 17   CREATININE mg/dL 1.17 1.50*   CALCIUM mg/dL 9.0  --    ALK PHOS U/L 62  --    ALT U/L 18 23   AST U/L 26 39              Results from last  "7 days   Lab Units 04/17/24 2004 04/17/24  1614   INR   --  1.06   PTT seconds 33 30             Micro:  No results found for: \"BLOODCX\", \"URINECX\", \"WOUNDCULT\", \"SPUTUMCULTUR\"  IMAGING & DIAGNOSTIC TESTS     Imaging: I have personally reviewed pertinent reports.    No results found.  EKG, Pathology, and Other Studies: I have personally reviewed pertinent reports.     ALLERGIES     Allergies   Allergen Reactions    Amoxicillin      Other reaction(s): Diarrhea    Amoxicillin-Pot Clavulanate      Other reaction(s): Allergy Date : 07/27/2015   GI    Metoprolol      Annotation - 06Nov2017: Aggravtes asthma    Penicillins      Annotation - 06Nov2017: Diarrhea     MEDICATIONS PRIOR TO ARRIVAL     Prior to Admission medications    Medication Sig Start Date End Date Taking? Authorizing Provider   albuterol (PROVENTIL HFA,VENTOLIN HFA) 90 mcg/act inhaler Inhale 90 mcg 2 (two) times a day as needed    Historical Provider, MD   aspirin (ECOTRIN LOW STRENGTH) 81 mg EC tablet Take 1 tablet by mouth daily As needed    Historical Provider, MD   cycloSPORINE (RESTASIS) 0.05 % ophthalmic emulsion Apply 0.05 application to eye 2 (two) times a day    Historical Provider, MD   diltiazem (Dilt-XR) 120 MG 24 hr capsule Take 1 capsule (120 mg total) by mouth daily 10/31/23   Maday Chu PA-C   esomeprazole (NexIUM) 40 MG capsule Take 1 capsule (40 mg total) by mouth daily in the early morning 4/16/24 10/13/24  Maday Chu PA-C   mometasone (NASONEX) 50 mcg/act nasal spray 2 sprays into each nostril daily    Historical Provider, MD   Multiple Vitamins-Minerals (CENTRUM SILVER 50+MEN) TABS Take 1 tablet by mouth daily    Historical Provider, MD   rosuvastatin (CRESTOR) 5 mg tablet Take 1 tablet (5 mg total) by mouth daily 10/31/23   Maday Chu PA-C   SYMBICORT 160-4.5 MCG/ACT inhaler Take 2 puffs by mouth 2 (two) times a day 4/9/18   Historical Provider, MD   thiamine 50 MG tablet Take 1 tablet (50 mg total) by mouth " daily 10/30/18   Cm Glass MD   TURMERIC PO Take by mouth daily     Historical Provider, MD     MEDICATIONS ADMINISTERED IN LAST 24 HOURS     Medication Administration - last 24 hours from 04/16/2024 2203 to 04/17/2024 2203         Date/Time Order Dose Route Action Action by     04/17/2024 1604 EDT pantoprazole (PROTONIX) injection 40 mg 40 mg Intravenous Given Dana Tellez RN     04/17/2024 1603 EDT pantoprazole (PROTONIX) injection 40 mg 40 mg Intravenous Given Dana Tellez RN     04/17/2024 1602 EDT aluminum-magnesium hydroxide-simethicone (MAALOX) oral suspension 30 mL 30 mL Oral Given Dana Tellez RN     04/17/2024 1601 EDT sucralfate (CARAFATE) tablet 1 g 1 g Oral Given Dana Tellez RN     04/17/2024 1633 EDT aspirin tablet 325 mg 325 mg Oral Given Dana Tellez RN     04/17/2024 1633 EDT heparin (porcine) injection 4,000 Units 4,000 Units Intravenous Given Dana Tellez RN     04/17/2024 2006 EDT heparin (porcine) 25,000 units in 0.45% NaCl 250 mL infusion (premix) 11.8 Units/kg/hr Intravenous New Bag Briseida Tavera RN     04/17/2024 1637 EDT heparin (porcine) 25,000 units in 0.45% NaCl 250 mL infusion (premix) 11.8 Units/kg/hr Intravenous New Bag Dana Tellez RN     04/17/2024 1838 EDT budesonide-formoterol (SYMBICORT) 160-4.5 mcg/act inhaler 2 puff 2 puff Inhalation Given Dana Tellez RN     04/17/2024 1836 EDT atorvastatin (LIPITOR) tablet 40 mg 40 mg Oral Given Dana Tellez RN     04/17/2024 1837 EDT ticagrelor (BRILINTA) tablet 180 mg 180 mg Oral Given Dana Tellez RN          CURRENT MEDICATIONS     Current Facility-Administered Medications   Medication Dose Route Frequency Provider Last Rate    acetaminophen  650 mg Oral Q4H PRN Daniel Shah MD      albuterol  1 puff Inhalation Q6H PRN Daniel Shah MD      Artificial Tears  2 drop Both Eyes Q12H SARITA Daniel Shah MD      [START ON 4/18/2024] aspirin  81 mg Oral Daily Daniel Shah MD      atorvastatin  40 mg  "Oral Daily With Dinner Steff Sultanax, DO      budesonide-formoterol  2 puff Inhalation BID Daniel Shah MD      [START ON 4/18/2024] diltiazem  120 mg Oral Daily Daniel Shah MD      [START ON 4/18/2024] fluticasone  2 spray Each Nare Daily Daniel Shah MD      [START ON 4/18/2024] folic acid  1 mg Oral Daily Steff Villaseñornix, DO      heparin (porcine)  3-20 Units/kg/hr (Order-Specific) Intravenous Titrated Daniel Shah MD 11.8 Units/kg/hr (04/17/24 2006)    heparin (porcine)  2,000 Units Intravenous Q6H PRN Daniel Shah MD      heparin (porcine)  4,000 Units Intravenous Q6H PRN Daniel Shah MD      [START ON 4/18/2024] losartan  25 mg Oral Daily Daniel Shah MD      [START ON 4/18/2024] metoprolol succinate  25 mg Oral Daily Steff Villaseñornix, DO      NON FORMULARY  2 drop Ophthalmic BID Ayers Ragab, DO      ondansetron  4 mg Intravenous Q6H PRN Daniel Shah MD      [START ON 4/18/2024] pantoprazole  40 mg Oral Early Morning Daniel Shah MD      [START ON 4/18/2024] polyethylene glycol  17 g Oral Daily Daniel Shah MD      [START ON 4/18/2024] thiamine  100 mg Oral Daily Steff Villaseñornix, DO      ticagrelor  90 mg Oral Q12H SARITA Steff Sultanax, DO       heparin (porcine), 3-20 Units/kg/hr (Order-Specific), Last Rate: 11.8 Units/kg/hr (04/17/24 2006)      acetaminophen, 650 mg, Q4H PRN  albuterol, 1 puff, Q6H PRN  heparin (porcine), 2,000 Units, Q6H PRN  heparin (porcine), 4,000 Units, Q6H PRN  ondansetron, 4 mg, Q6H PRN        Admission Time  I spent 45 minutes admitting the patient.  This involved direct patient contact where I performed a full history and physical, reviewing previous records, and reviewing laboratory and other diagnostic studies.    Portions of the record may have been created with voice recognition software.  Occasional wrong word or \"sound a like\" substitutions may have occurred due to the inherent limitations of voice recognition software.  Read the chart carefully and " recognize, using context, where substitutions have occurred.    ==  Daniel Shah MD  [facility]  Internal Medicine Residency PGY-1

## 2024-04-17 NOTE — ED PROVIDER NOTES
History  Chief Complaint   Patient presents with    Chest Pain     Since last Wednesday, seen at cardiology office today and told to come to ER. Center of chest. 1 episode of vomiting last week. C/o SOB. Denies extremity swelling     HPI  MDM  Pt is a 68 Y o M, hx of htn , hld, presents for 1 week of excertional chest pain radiating to his bilateral arms. Went to his Pcp yesterday who recommended to come to the ED. Denies tearing like cp radiating to the back. Currently is asymptomatic. No neurologic symptoms    Initial presentation pt is no acute distress    On exam   General: VSS, NAD, awake, alert. Talking normally.   Head: Normocephalic, atraumatic, nontender.  Eyes: EOM-No subconjunctival hemorrhages.  ENT: Nose atraumatic. MMM  No malocclusion. No stridor. Normal phonation. No drooling. Normal swallowing.   Neck: Trachea midline. No JVD.  CV: RRR.   Lungs: CTAB No tachypnea. No paradoxical motion.  Abd: +BS, soft, NT/ND. No guarding/rigidity.   MSK: Full ROM throughout. No lower extremity edema.   Skin: Dry, intact.   Neuro: AAOx3, GCS 15, CN II-XII grossly intact. Motor/sensory grossly intact.  Psychiatric/Behavioral: mood/affect normal; behavior normal; thought content normal; judgement normal   Exam: deferred      Ddx: ACS, angina, GERD, pneumonia, pneomothorax, cancer    Plan: Pt was evaluated with cardiac work up, new t wave flattening in EKG per my interpretation. Trop at 1200, pt was diagnosed with NSTEMI, given full dose aspirin + heparin drip and admitted to medicine.       Final Dispo:       Prior to Admission Medications   Prescriptions Last Dose Informant Patient Reported? Taking?   Multiple Vitamins-Minerals (CENTRUM SILVER 50+MEN) TABS 4/17/2024 Self Yes Yes   Sig: Take 1 tablet by mouth daily   SYMBICORT 160-4.5 MCG/ACT inhaler 4/17/2024 Self Yes Yes   Sig: Take 2 puffs by mouth 2 (two) times a day   TURMERIC PO Unknown Self Yes No   Sig: Take by mouth daily    albuterol (PROVENTIL  HFA,VENTOLIN HFA) 90 mcg/act inhaler Past Week Self Yes Yes   Sig: Inhale 90 mcg 2 (two) times a day as needed   aspirin (ECOTRIN LOW STRENGTH) 81 mg EC tablet 2024 Self Yes Yes   Sig: Take 1 tablet by mouth daily As needed   cycloSPORINE (RESTASIS) 0.05 % ophthalmic emulsion 2024 Self Yes Yes   Sig: Apply 0.05 application to eye 2 (two) times a day   diltiazem (Dilt-XR) 120 MG 24 hr capsule 2024 Self No Yes   Sig: Take 1 capsule (120 mg total) by mouth daily   esomeprazole (NexIUM) 40 MG capsule 2024  No Yes   Sig: Take 1 capsule (40 mg total) by mouth daily in the early morning   mometasone (NASONEX) 50 mcg/act nasal spray 2024 Self Yes Yes   Si sprays into each nostril daily   rosuvastatin (CRESTOR) 5 mg tablet 2024 Self No Yes   Sig: Take 1 tablet (5 mg total) by mouth daily   thiamine 50 MG tablet Unknown Self No No   Sig: Take 1 tablet (50 mg total) by mouth daily      Facility-Administered Medications: None       Past Medical History:   Diagnosis Date    Asthma     Last Assessed:10/16/17    Fontanez's esophagus     Colonic polyp     Last Assessed:10/16/17    Eczema     GERD (gastroesophageal reflux disease)     Last Assessed:10/16/17    High cholesterol     Last Assessed:10/16/17    Hypertension     Last Assessed:10/16/17    Non-neoplastic nevus     Last Assessed:17       Past Surgical History:   Procedure Laterality Date    COLONOSCOPY      10/2013- egd/colon    LYMPHADENECTOMY      Axillary Lymph node removed-benign    TONSILLECTOMY      Last Assessed:10/16/17    VALVE REPLACEMENT      Heart Valve Replacement; Last Assessed:10/16/17       Family History   Problem Relation Age of Onset    Cancer Father         groin, bone    Diabetes Paternal Grandfather      I have reviewed and agree with the history as documented.    E-Cigarette/Vaping    E-Cigarette Use Never User      E-Cigarette/Vaping Substances    Nicotine No     THC No     CBD No     Flavoring No     Other No      Unknown No      Social History     Tobacco Use    Smoking status: Former     Current packs/day: 0.00     Average packs/day: 1 pack/day for 14.0 years (14.0 ttl pk-yrs)     Types: Cigarettes     Start date: 1974     Quit date:      Years since quittin.3    Smokeless tobacco: Former     Types: Chew     Quit date:    Vaping Use    Vaping status: Never Used   Substance Use Topics    Alcohol use: Yes     Alcohol/week: 14.0 standard drinks of alcohol     Types: 14 Cans of beer per week    Drug use: No        Review of Systems    Physical Exam  ED Triage Vitals [24 1457]   Temperature Pulse Respirations Blood Pressure SpO2   98 °F (36.7 °C) 76 18 161/93 98 %      Temp Source Heart Rate Source Patient Position - Orthostatic VS BP Location FiO2 (%)   Temporal Monitor Sitting Left arm --      Pain Score       3             Orthostatic Vital Signs  Vitals:    24 0643 24 0657 24 0743 24 0852   BP: 150/68 150/68 140/81 140/94   Pulse: 65 79 78 72   Patient Position - Orthostatic VS: Lying  Lying Lying       Physical Exam    ED Medications  Medications   heparin (porcine) 25,000 units in 0.45% NaCl 250 mL infusion (premix) (9.8 Units/kg/hr × 85 kg (Order-Specific) Intravenous Rate/Dose Verify 24 0801)   heparin (porcine) injection 4,000 Units (4,000 Units Intravenous Given 24 2218)   heparin (porcine) injection 2,000 Units (has no administration in time range)   albuterol (PROVENTIL HFA,VENTOLIN HFA) inhaler 1 puff (has no administration in time range)   Artificial Tears ophthalmic solution 2 drop (2 drops Both Eyes Not Given 24 2239)   pantoprazole (PROTONIX) EC tablet 40 mg (40 mg Oral Given 24 0642)   fluticasone (FLONASE) 50 mcg/act nasal spray 2 spray (has no administration in time range)   budesonide-formoterol (SYMBICORT) 160-4.5 mcg/act inhaler 2 puff (2 puffs Inhalation Given 24 1838)   aspirin (ECOTRIN LOW STRENGTH) EC tablet 81 mg (81 mg Oral  Given 4/18/24 0903)   polyethylene glycol (MIRALAX) packet 17 g (17 g Oral Not Given 4/18/24 0846)   ondansetron (ZOFRAN) injection 4 mg (has no administration in time range)   acetaminophen (TYLENOL) tablet 650 mg (has no administration in time range)   atorvastatin (LIPITOR) tablet 40 mg (40 mg Oral Given 4/17/24 1836)   metoprolol succinate (TOPROL-XL) 24 hr tablet 25 mg (25 mg Oral Given 4/18/24 0903)   losartan (COZAAR) tablet 25 mg (25 mg Oral Given 4/18/24 0903)   ticagrelor (BRILINTA) tablet 90 mg (90 mg Oral Given 4/18/24 0903)   thiamine tablet 100 mg (100 mg Oral Given 4/18/24 0904)   folic acid (FOLVITE) tablet 1 mg (1 mg Oral Given 4/18/24 0903)   cycloSPORINE (RESTASIS) 0.05 % ophthalmic emulsion 1 drop (1 drop Both Eyes Given 4/17/24 2323)   nitroglycerin (NITROSTAT) SL tablet 0.4 mg (has no administration in time range)   pantoprazole (PROTONIX) injection 40 mg (40 mg Intravenous Given 4/17/24 1604)   aluminum-magnesium hydroxide-simethicone (MAALOX) oral suspension 30 mL (30 mL Oral Given 4/17/24 1602)   sucralfate (CARAFATE) tablet 1 g (1 g Oral Given 4/17/24 1601)   aspirin tablet 325 mg (325 mg Oral Given 4/17/24 1633)   heparin (porcine) injection 4,000 Units (4,000 Units Intravenous Given 4/17/24 1633)   ticagrelor (BRILINTA) tablet 180 mg (180 mg Oral Given 4/17/24 1837)       Diagnostic Studies  Results Reviewed       Procedure Component Value Units Date/Time    APTT [[id number]]  (Abnormal) Collected: 04/18/24 0858    Lab Status: Final result Specimen: Blood from Arm, Right Updated: 04/18/24 0938     PTT 44 seconds     Basic metabolic panel [[id number]]  (Abnormal) Collected: 04/18/24 0439    Lab Status: Final result Specimen: Blood from Arm, Left Updated: 04/18/24 0510     Sodium 139 mmol/L      Potassium 4.1 mmol/L      Chloride 105 mmol/L      CO2 22 mmol/L      ANION GAP 12 mmol/L      BUN 13 mg/dL      Creatinine 1.16 mg/dL      Glucose 94 mg/dL      Calcium 8.2 mg/dL      eGFR 64  ml/min/1.73sq m     Narrative:      National Kid[employer] Disease Foundation guidelines for Chronic Kidney Disease (CKD):     Stage 1 with normal or high GFR (GFR > 90 mL/min/1.73 square meters)    Stage 2 Mild CKD (GFR = 60-89 mL/min/1.73 square meters)    Stage 3A Moderate CKD (GFR = 45-59 mL/min/1.73 square meters)    Stage 3B Moderate CKD (GFR = 30-44 mL/min/1.73 square meters)    Stage 4 Severe CKD (GFR = 15-29 mL/min/1.73 square meters)    Stage 5 End Stage CKD (GFR <15 mL/min/1.73 square meters)  Note: GFR calculation is accurate only with a steady state creatinine    CBC and differential [[id number]] Collected: 04/18/24 0439    Lab Status: Final result Specimen: Blood from Arm, Left Updated: 04/18/24 0452     WBC 6.62 Thousand/uL      RBC 4.74 Million/uL      Hemoglobin 14.6 g/dL      Hematocrit 43.9 %      MCV 93 fL      MCH 30.8 pg      MCHC 33.3 g/dL      RDW 13.7 %      MPV 10.3 fL      Platelets 283 Thousands/uL      nRBC 0 /100 WBCs      Segmented % 46 %      Immature Grans % 1 %      Lymphocytes % 38 %      Monocytes % 10 %      Eosinophils Relative 4 %      Basophils Relative 1 %      Absolute Neutrophils 3.13 Thousands/µL      Absolute Immature Grans 0.04 Thousand/uL      Absolute Lymphocytes 2.52 Thousands/µL      Absolute Monocytes 0.65 Thousand/µL      Eosinophils Absolute 0.23 Thousand/µL      Basophils Absolute 0.05 Thousands/µL     APTT [[id number]]  (Abnormal) Collected: 04/18/24 0212    Lab Status: Final result Specimen: Blood from Arm, Left Updated: 04/18/24 0241     PTT 95 seconds     APTT [[id number]]  (Normal) Collected: 04/17/24 2004    Lab Status: Final result Specimen: Blood from Arm, Right Updated: 04/17/24 2041     PTT 33 seconds     HS Troponin I 4hr [[id number]]  (Abnormal) Collected: 04/17/24 1954    Lab Status: Final result Specimen: Blood from Arm, Right Updated: 04/17/24 2034     hs TnI 4hr 1,112 ng/L      Delta 4hr hsTnI -105 ng/L     HS Troponin I 2hr [[id number]]  (Abnormal)  Collected: 04/17/24 1731    Lab Status: Final result Specimen: Blood from Arm, Left Updated: 04/17/24 1805     hs TnI 2hr 1,119 ng/L      Delta 2hr hsTnI -98 ng/L     APTT six (6) hours after Heparin bolus/drip initiation or dosing change [[id number]]  (Normal) Collected: 04/17/24 1614    Lab Status: Final result Specimen: Blood from Arm, Left Updated: 04/17/24 1645     PTT 30 seconds     Protime-INR [[id number]]  (Normal) Collected: 04/17/24 1614    Lab Status: Final result Specimen: Blood from Arm, Left Updated: 04/17/24 1645     Protime 13.7 seconds      INR 1.06    HS Troponin 0hr (reflex protocol) [[id number]]  (Abnormal) Collected: 04/17/24 1520    Lab Status: Final result Specimen: Blood from Arm, Left Updated: 04/17/24 1559     hs TnI 0hr 1,217 ng/L     CBC and differential [[id number]] Collected: 04/17/24 1520    Lab Status: Final result Specimen: Blood from Arm, Left Updated: 04/17/24 1552     WBC 6.36 Thousand/uL      RBC 5.11 Million/uL      Hemoglobin 15.7 g/dL      Hematocrit 46.8 %      MCV 92 fL      MCH 30.7 pg      MCHC 33.5 g/dL      RDW 13.7 %      MPV 10.7 fL      Platelets 313 Thousands/uL      nRBC 0 /100 WBCs      Segmented % 48 %      Immature Grans % 0 %      Lymphocytes % 36 %      Monocytes % 12 %      Eosinophils Relative 3 %      Basophils Relative 1 %      Absolute Neutrophils 3.06 Thousands/µL      Absolute Immature Grans 0.02 Thousand/uL      Absolute Lymphocytes 2.28 Thousands/µL      Absolute Monocytes 0.76 Thousand/µL      Eosinophils Absolute 0.21 Thousand/µL      Basophils Absolute 0.03 Thousands/µL     Comprehensive metabolic panel [[id number]] Collected: 04/17/24 1520    Lab Status: Final result Specimen: Blood from Arm, Left Updated: 04/17/24 1548     Sodium 137 mmol/L      Potassium 4.2 mmol/L      Chloride 103 mmol/L      CO2 25 mmol/L      ANION GAP 9 mmol/L      BUN 17 mg/dL      Creatinine 1.17 mg/dL      Glucose 94 mg/dL      Calcium 9.0 mg/dL      AST 26 U/L      ALT 18 U/L       Alkaline Phosphatase 62 U/L      Total Protein 7.3 g/dL      Albumin 4.1 g/dL      Total Bilirubin 0.53 mg/dL      eGFR 63 ml/min/1.73sq m     Narrative:      National Kidney Disease Foundation guidelines for Chronic Kidney Disease (CKD):     Stage 1 with normal or high GFR (GFR > 90 mL/min/1.73 square meters)    Stage 2 Mild CKD (GFR = 60-89 mL/min/1.73 square meters)    Stage 3A Moderate CKD (GFR = 45-59 mL/min/1.73 square meters)    Stage 3B Moderate CKD (GFR = 30-44 mL/min/1.73 square meters)    Stage 4 Severe CKD (GFR = 15-29 mL/min/1.73 square meters)    Stage 5 End Stage CKD (GFR <15 mL/min/1.73 square meters)  Note: GFR calculation is accurate only with a steady state creatinine                   XR chest 2 views   Final Result by Lupis Dahl MD (04/17 2122)      No acute cardiopulmonary disease.               Workstation performed: [id number]               Procedures  Procedures      ED Course  ED Course as of 04/18/24 0943   Wed Apr 17, 2024   1637 Admitted to [facility] for NSTEMI, patient received full dose aspirin and heparin                             SBIRT 20yo+      Flowsheet Row Most Recent Value   Initial Alcohol Screen: US AUDIT-C     1. How often do you have a drink containing alcohol? 6 Filed at: 04/17/2024 1542   2. How many drinks containing alcohol do you have on a typical day you are drinking?  5 Filed at: 04/17/2024 1542   3a. Male UNDER 65: How often do you have five or more drinks on one occasion? 6 Filed at: 04/17/2024 1542   3b. FEMALE Any Age, or MALE 65+: How often do you have 4 or more drinks on one occassion? 6 Filed at: 04/17/2024 1545   Audit-C Score 6 Filed at: 04/17/2024 1545   Full Alcohol Screen: US AUDIT    4. How often during the last year have you found that you were not able to stop drinking once you had started? 0 Filed at: 04/17/2024 1542   5. How often during past year have you failed to do what was normally expected of you because of drinking?  0 Filed at:  04/17/2024 1542   6. How often in past year have you needed a first drink in the morning to get yourself going after a heavy drinking session?  0 Filed at: 04/17/2024 1542   7. How often in past year have you had feeling of guilt or remorse after drinking?  0 Filed at: 04/17/2024 1542   8. How often in past year have you been unable to remember what happened night before because you had been drinking?  1 Filed at: 04/17/2024 1542   9. Have you or someone else been injured as a result of your drinking?  0 Filed at: 04/17/2024 1542   10. Has a relative, friend, doctor or other health worker been concerned about your drinking and suggested you cut down?  4 Filed at: 04/17/2024 1542   AUDIT Total Score 22 Filed at: 04/17/2024 1542   MIKA: How many times in the past year have you...    Used an illegal drug or used a prescription medication for non-medical reasons? Never Filed at: 04/17/2024 1542                  Medical Decision Making  Amount and/or Complexity of Data Reviewed  Labs: ordered.    Risk  OTC drugs.  Prescription drug management.  Decision regarding hospitalization.          Disposition  Final diagnoses:   Chest pain   NSTEMI (non-ST elevated myocardial infarction) (HCC)     Time reflects when diagnosis was documented in both MDM as applicable and the Disposition within this note       Time User Action Codes Description Comment    4/17/2024  4:23 PM Vaishnavi Gu Add [R07.9] Chest pain     4/17/2024  4:23 PM Vaishnavi Gu Add [I21.4] NSTEMI (non-ST elevated myocardial infarction) (HCC)           ED Disposition       ED Disposition   Admit    Condition   Stable    Date/Time   Wed Apr 17, 2024 1623    Comment                  Follow-up Information    None         Current Discharge Medication List        CONTINUE these medications which have NOT CHANGED    Details   albuterol (PROVENTIL HFA,VENTOLIN HFA) 90 mcg/act inhaler Inhale 90 mcg 2 (two) times a day as needed      aspirin (ECOTRIN LOW  STRENGTH) 81 mg EC tablet Take 1 tablet by mouth daily As needed      cycloSPORINE (RESTASIS) 0.05 % ophthalmic emulsion Apply 0.05 application to eye 2 (two) times a day      diltiazem (Dilt-XR) 120 MG 24 hr capsule Take 1 capsule (120 mg total) by mouth daily  Qty: 90 capsule, Refills: 3    Comments: Requesting 1 year supply  Associated Diagnoses: Benign hypertension      esomeprazole (NexIUM) 40 MG capsule Take 1 capsule (40 mg total) by mouth daily in the early morning  Qty: 30 capsule, Refills: 1    Associated Diagnoses: Gastroesophageal reflux disease with esophagitis without hemorrhage; Acute superficial gastritis without hemorrhage      mometasone (NASONEX) 50 mcg/act nasal spray 2 sprays into each nostril daily      Multiple Vitamins-Minerals (CENTRUM SILVER 50+MEN) TABS Take 1 tablet by mouth daily      rosuvastatin (CRESTOR) 5 mg tablet Take 1 tablet (5 mg total) by mouth daily  Qty: 90 tablet, Refills: 3    Associated Diagnoses: Dyslipidemia      SYMBICORT 160-4.5 MCG/ACT inhaler Take 2 puffs by mouth 2 (two) times a day      thiamine 50 MG tablet Take 1 tablet (50 mg total) by mouth daily  Qty: 90 tablet, Refills: 1    Associated Diagnoses: Dyslipidemia      TURMERIC PO Take by mouth daily            No discharge procedures on file.    PDMP Review       None             ED Provider  Attending physically available and evaluated David S Cogan. I managed the patient along with the ED Attending.    Electronically Signed by           Vaishnavi Gu DO  04/18/24 0943

## 2024-04-17 NOTE — ED ATTENDING ATTESTATION
4/17/2024  I, Ernesto Saenz DO, saw and evaluated the patient. I have discussed the patient with the resident/non-physician practitioner and agree with the resident's/non-physician practitioner's findings, Plan of Care, and MDM as documented in the resident's/non-physician practitioner's note, except where noted. All available labs and Radiology studies were reviewed.  I was present for key portions of any procedure(s) performed by the resident/non-physician practitioner and I was immediately available to provide assistance.       At this point I agree with the current assessment done in the Emergency Department.  I have conducted an independent evaluation of this patient a history and physical is as follows:    Patient is a 68-year-old male with a history of hypertension, BPH, hyperlipidemia, GERD, accompanied by his female girlfriend.  Patient says he regularly lap swims and 10 days ago he noticed that about 20 laps into his swimming he began having some mild retrosternal chest discomfort, eased off a little bit when he rested, he finished the swim and felt better, then over the next 2 to 3 days he had multiple episodes of recurrent chest discomfort, lasting anywhere between 5 to 20 minutes, sometimes exertional related, sometimes not, sometimes associated with food and sometimes not.  He has been doing a little bit better but still having maybe 1 or 2 episodes a day.  When they are there, they are not pleuritic in nature, sometimes some nausea but not always, not knifelike, ripping, or tearing, no radiation or diaphoresis.Patient denies any prolonged travel history, no recent long travel, no immobilizations, or hospitalizations.    Yesterday he was seen in his primary care physician's office by physician assistant, who recommended him to go to the ED for acute workup, patient declined, outpatient stress test was ordered.  Patient was seen today by his cardiologist who again recommended ED  evaluation.    General:  Patient is well-appearing  Head:  Atraumatic  Eyes:  Conjunctiva pink  ENT:  Mucous membranes are moist  Neck:  Supple  Cardiac:  S1-S2, without murmurs  Lungs:  Clear to auscultation bilaterally  Abdomen:  Soft, nontender, normal bowel sounds, no CVA tenderness, no tympany, no rigidity, no guarding  Extremities:  Normal range of motion, no pedal edema or calf asymmetry, radial pulses are equal and symmetric bilaterally  Neurologic:  Awake, fluent speech, normal comprehension, AAOx3  Skin:  Pink warm and dry  Psychiatric:  Alert, pleasant, cooperative      ED Course  ED Course as of 04/17/24 1532   Wed Apr 17, 2024   1521 ECG interpreted by me, sinus rhythm, rate of 78, there is some nonspecific T wave flattening V1 and V2, possible previous old septal infarction, no acute ST segment elevation or depression, the T wave flattening and possible septal infarction are new when compared with December 2019     Patient presents with symptoms concerning for acute coronary syndrome.  Could represent symptomatic anemia as well.I do not believe this patient's complaints are from pulmonary embolism and I believe they would most likely be harmed through false positive test results and other complications of testing by further pursuing the diagnosis of pulmonary embolism.    X-ray shows no acute pathology, laboratory studies show significant elevated troponin over 1200, patient given aspirin, IV heparin, reassessed several times, continues to be asymptomatic and pain-free.    The patient was evaluated for sepsis in the emergency department. After that assessment, at the time of admission, no sepsis, severe sepsis, or septic shock was found.    DIAGNOSIS:  Acute non-ST segment elevation MI, increased troponin    MEDICAL DECISION MAKING CODING    Patient presents with acute new problem with:  Threat to life or bodily function    Chronic conditions affecting care: As per HPI    COLLECTION AND INTERPRETATION  OF DATA  Additional history obtained from: Girlfriend  I reviewed prior external notes, including previous ECG, outpatient visits yesterday and today as noted above    I ordered each unique test  Tests reviewed personally by me:  ECG: See my ED course  Labs: Ordered and reviewed by me, see above for interpretation  Imaging: I independently reviewed the chest x-ray and found no acute pathology.    Discussion with other providers: Admitting medicine physician    Tests considered but not ordered: See above regarding PE    RISK    Treatment:  Patient admitted        I consider this patient be critically ill given his non-ST segment elevation MI requiring treatment with IV heparin    Critical Care Time  CriticalCare Time    Date/Time: 4/17/2024 4:50 PM    Performed by: Ernesto Saenz DO  Authorized by: Ernesto Saenz DO    Critical care provider statement:     Critical care time (minutes):  35    Critical care time was exclusive of:  Teaching time and separately billable procedures and treating other patients    Critical care was necessary to treat or prevent imminent or life-threatening deterioration of the following conditions:  Cardiac failure    Critical care was time spent personally by me on the following activities:  Blood draw for specimens, obtaining history from patient or surrogate, development of treatment plan with patient or surrogate, discussions with primary provider, evaluation of patient's response to treatment, examination of patient, review of old charts, re-evaluation of patient's condition, ordering and review of radiographic studies, ordering and review of laboratory studies and ordering and performing treatments and interventions    I assumed direction of critical care for this patient from another provider in my specialty: no

## 2024-04-18 ENCOUNTER — APPOINTMENT (INPATIENT)
Dept: NON INVASIVE DIAGNOSTICS | Facility: HOSPITAL | Age: 69
DRG: 233 | End: 2024-04-18
Payer: MEDICARE

## 2024-04-18 ENCOUNTER — PREP FOR PROCEDURE (OUTPATIENT)
Dept: CARDIAC SURGERY | Facility: CLINIC | Age: 69
End: 2024-04-18

## 2024-04-18 DIAGNOSIS — I21.4 NSTEMI (NON-ST ELEVATED MYOCARDIAL INFARCTION) (HCC): Primary | ICD-10-CM

## 2024-04-18 LAB
ABO GROUP BLD: NORMAL
ABO GROUP BLD: NORMAL
ANION GAP SERPL CALCULATED.3IONS-SCNC: 12 MMOL/L (ref 4–13)
AORTIC ROOT: 3.1 CM
APICAL FOUR CHAMBER EJECTION FRACTION: 55 %
APTT PPP: 44 SECONDS (ref 23–37)
APTT PPP: 91 SECONDS (ref 23–37)
APTT PPP: 95 SECONDS (ref 23–37)
ASCENDING AORTA: 3.1 CM
ATRIAL RATE: 74 BPM
BASOPHILS # BLD AUTO: 0.05 THOUSANDS/ÂΜL (ref 0–0.1)
BASOPHILS NFR BLD AUTO: 1 % (ref 0–1)
BLD GP AB SCN SERPL QL: NEGATIVE
BSA FOR ECHO PROCEDURE: 1.97 M2
BUN SERPL-MCNC: 13 MG/DL (ref 5–25)
CALCIUM SERPL-MCNC: 8.2 MG/DL (ref 8.4–10.2)
CHLORIDE SERPL-SCNC: 105 MMOL/L (ref 96–108)
CHOLEST SERPL-MCNC: 182 MG/DL
CO2 SERPL-SCNC: 22 MMOL/L (ref 21–32)
CREAT SERPL-MCNC: 1.16 MG/DL (ref 0.6–1.3)
E WAVE DECELERATION TIME: 179 MS
E/A RATIO: 0.68
EOSINOPHIL # BLD AUTO: 0.23 THOUSAND/ÂΜL (ref 0–0.61)
EOSINOPHIL NFR BLD AUTO: 4 % (ref 0–6)
ERYTHROCYTE [DISTWIDTH] IN BLOOD BY AUTOMATED COUNT: 13.7 % (ref 11.6–15.1)
EST. AVERAGE GLUCOSE BLD GHB EST-MCNC: 123 MG/DL
FRACTIONAL SHORTENING: 30 (ref 28–44)
GFR SERPL CREATININE-BSD FRML MDRD: 64 ML/MIN/1.73SQ M
GLUCOSE SERPL-MCNC: 94 MG/DL (ref 65–140)
HBA1C MFR BLD: 5.9 %
HCT VFR BLD AUTO: 43.9 % (ref 36.5–49.3)
HDLC SERPL-MCNC: 48 MG/DL
HGB BLD-MCNC: 14.6 G/DL (ref 12–17)
IMM GRANULOCYTES # BLD AUTO: 0.04 THOUSAND/UL (ref 0–0.2)
IMM GRANULOCYTES NFR BLD AUTO: 1 % (ref 0–2)
INTERVENTRICULAR SEPTUM IN DIASTOLE (PARASTERNAL SHORT AXIS VIEW): 1.2 CM
INTERVENTRICULAR SEPTUM: 1.2 CM (ref 0.6–1.1)
LAAS-AP2: 20 CM2
LAAS-AP4: 17.5 CM2
LDLC SERPL CALC-MCNC: 118 MG/DL (ref 0–100)
LEFT ATRIUM SIZE: 3.1 CM
LEFT ATRIUM VOLUME (MOD BIPLANE): 53 ML
LEFT ATRIUM VOLUME INDEX (MOD BIPLANE): 26.9 ML/M2
LEFT INTERNAL DIMENSION IN SYSTOLE: 2.8 CM (ref 2.1–4)
LEFT VENTRICLE DIASTOLIC VOLUME (MOD BIPLANE): 98 ML
LEFT VENTRICLE DIASTOLIC VOLUME INDEX (MOD BIPLANE): 49.7 ML/M2
LEFT VENTRICLE SYSTOLIC VOLUME (MOD BIPLANE): 42 ML
LEFT VENTRICLE SYSTOLIC VOLUME INDEX (MOD BIPLANE): 21.3 ML/M2
LEFT VENTRICULAR INTERNAL DIMENSION IN DIASTOLE: 4 CM (ref 3.5–6)
LEFT VENTRICULAR POSTERIOR WALL IN END DIASTOLE: 0.8 CM
LEFT VENTRICULAR STROKE VOLUME: 40 ML
LV EF: 58 %
LVSV (TEICH): 40 ML
LYMPHOCYTES # BLD AUTO: 2.52 THOUSANDS/ÂΜL (ref 0.6–4.47)
LYMPHOCYTES NFR BLD AUTO: 38 % (ref 14–44)
MCH RBC QN AUTO: 30.8 PG (ref 26.8–34.3)
MCHC RBC AUTO-ENTMCNC: 33.3 G/DL (ref 31.4–37.4)
MCV RBC AUTO: 93 FL (ref 82–98)
MONOCYTES # BLD AUTO: 0.65 THOUSAND/ÂΜL (ref 0.17–1.22)
MONOCYTES NFR BLD AUTO: 10 % (ref 4–12)
MV E'TISSUE VEL-SEP: 6 CM/S
MV PEAK A VEL: 1.21 M/S
MV PEAK E VEL: 82 CM/S
MV STENOSIS PRESSURE HALF TIME: 52 MS
MV VALVE AREA P 1/2 METHOD: 4.23
NEUTROPHILS # BLD AUTO: 3.13 THOUSANDS/ÂΜL (ref 1.85–7.62)
NEUTS SEG NFR BLD AUTO: 46 % (ref 43–75)
NONHDLC SERPL-MCNC: 134 MG/DL
NRBC BLD AUTO-RTO: 0 /100 WBCS
P AXIS: 69 DEGREES
PLATELET # BLD AUTO: 283 THOUSANDS/UL (ref 149–390)
PMV BLD AUTO: 10.3 FL (ref 8.9–12.7)
POTASSIUM SERPL-SCNC: 4.1 MMOL/L (ref 3.5–5.3)
PR INTERVAL: 183 MS
QRS AXIS: 53 DEGREES
QRSD INTERVAL: 88 MS
QT INTERVAL: 417 MS
QTC INTERVAL: 463 MS
RBC # BLD AUTO: 4.74 MILLION/UL (ref 3.88–5.62)
RH BLD: NEGATIVE
RH BLD: NEGATIVE
RIGHT ATRIUM AREA SYSTOLE A4C: 13.8 CM2
RIGHT VENTRICLE ID DIMENSION: 2.6 CM
SL CV LEFT ATRIUM LENGTH A2C: 5 CM
SL CV LV EF: 55
SL CV PED ECHO LEFT VENTRICLE DIASTOLIC VOLUME (MOD BIPLANE) 2D: 69 ML
SL CV PED ECHO LEFT VENTRICLE SYSTOLIC VOLUME (MOD BIPLANE) 2D: 29 ML
SODIUM SERPL-SCNC: 139 MMOL/L (ref 135–147)
SPECIMEN EXPIRATION DATE: NORMAL
T WAVE AXIS: 85 DEGREES
TRICUSPID ANNULAR PLANE SYSTOLIC EXCURSION: 2 CM
TRIGL SERPL-MCNC: 80 MG/DL
VENTRICULAR RATE: 74 BPM
WBC # BLD AUTO: 6.62 THOUSAND/UL (ref 4.31–10.16)

## 2024-04-18 PROCEDURE — 99153 MOD SED SAME PHYS/QHP EA: CPT | Performed by: INTERNAL MEDICINE

## 2024-04-18 PROCEDURE — 93971 EXTREMITY STUDY: CPT

## 2024-04-18 PROCEDURE — 87081 CULTURE SCREEN ONLY: CPT | Performed by: PHYSICIAN ASSISTANT

## 2024-04-18 PROCEDURE — 97166 OT EVAL MOD COMPLEX 45 MIN: CPT

## 2024-04-18 PROCEDURE — B2111ZZ FLUOROSCOPY OF MULTIPLE CORONARY ARTERIES USING LOW OSMOLAR CONTRAST: ICD-10-PCS | Performed by: INTERNAL MEDICINE

## 2024-04-18 PROCEDURE — 85730 THROMBOPLASTIN TIME PARTIAL: CPT | Performed by: INTERNAL MEDICINE

## 2024-04-18 PROCEDURE — C1887 CATHETER, GUIDING: HCPCS | Performed by: INTERNAL MEDICINE

## 2024-04-18 PROCEDURE — 99223 1ST HOSP IP/OBS HIGH 75: CPT | Performed by: INTERNAL MEDICINE

## 2024-04-18 PROCEDURE — C1894 INTRO/SHEATH, NON-LASER: HCPCS | Performed by: INTERNAL MEDICINE

## 2024-04-18 PROCEDURE — 86850 RBC ANTIBODY SCREEN: CPT | Performed by: PHYSICIAN ASSISTANT

## 2024-04-18 PROCEDURE — 93306 TTE W/DOPPLER COMPLETE: CPT

## 2024-04-18 PROCEDURE — 93458 L HRT ARTERY/VENTRICLE ANGIO: CPT | Performed by: INTERNAL MEDICINE

## 2024-04-18 PROCEDURE — 4A023N7 MEASUREMENT OF CARDIAC SAMPLING AND PRESSURE, LEFT HEART, PERCUTANEOUS APPROACH: ICD-10-PCS | Performed by: INTERNAL MEDICINE

## 2024-04-18 PROCEDURE — 93306 TTE W/DOPPLER COMPLETE: CPT | Performed by: INTERNAL MEDICINE

## 2024-04-18 PROCEDURE — 93880 EXTRACRANIAL BILAT STUDY: CPT

## 2024-04-18 PROCEDURE — 93005 ELECTROCARDIOGRAM TRACING: CPT

## 2024-04-18 PROCEDURE — 80061 LIPID PANEL: CPT | Performed by: PHYSICIAN ASSISTANT

## 2024-04-18 PROCEDURE — 83036 HEMOGLOBIN GLYCOSYLATED A1C: CPT | Performed by: PHYSICIAN ASSISTANT

## 2024-04-18 PROCEDURE — 99152 MOD SED SAME PHYS/QHP 5/>YRS: CPT | Performed by: INTERNAL MEDICINE

## 2024-04-18 PROCEDURE — B2151ZZ FLUOROSCOPY OF LEFT HEART USING LOW OSMOLAR CONTRAST: ICD-10-PCS | Performed by: INTERNAL MEDICINE

## 2024-04-18 PROCEDURE — 36415 COLL VENOUS BLD VENIPUNCTURE: CPT | Performed by: INTERNAL MEDICINE

## 2024-04-18 PROCEDURE — 86900 BLOOD TYPING SEROLOGIC ABO: CPT | Performed by: PHYSICIAN ASSISTANT

## 2024-04-18 PROCEDURE — 86901 BLOOD TYPING SEROLOGIC RH(D): CPT | Performed by: PHYSICIAN ASSISTANT

## 2024-04-18 PROCEDURE — C1769 GUIDE WIRE: HCPCS | Performed by: INTERNAL MEDICINE

## 2024-04-18 PROCEDURE — 93010 ELECTROCARDIOGRAM REPORT: CPT | Performed by: INTERNAL MEDICINE

## 2024-04-18 PROCEDURE — 99223 1ST HOSP IP/OBS HIGH 75: CPT | Performed by: THORACIC SURGERY (CARDIOTHORACIC VASCULAR SURGERY)

## 2024-04-18 PROCEDURE — 97161 PT EVAL LOW COMPLEX 20 MIN: CPT

## 2024-04-18 PROCEDURE — 85025 COMPLETE CBC W/AUTO DIFF WBC: CPT

## 2024-04-18 PROCEDURE — 80048 BASIC METABOLIC PNL TOTAL CA: CPT

## 2024-04-18 PROCEDURE — 99232 SBSQ HOSP IP/OBS MODERATE 35: CPT | Performed by: INTERNAL MEDICINE

## 2024-04-18 RX ORDER — ATORVASTATIN CALCIUM 80 MG/1
80 TABLET, FILM COATED ORAL
Status: DISCONTINUED | OUTPATIENT
Start: 2024-04-18 | End: 2024-04-23

## 2024-04-18 RX ORDER — LIDOCAINE HYDROCHLORIDE 10 MG/ML
INJECTION, SOLUTION EPIDURAL; INFILTRATION; INTRACAUDAL; PERINEURAL CODE/TRAUMA/SEDATION MEDICATION
Status: DISCONTINUED | OUTPATIENT
Start: 2024-04-18 | End: 2024-04-18 | Stop reason: HOSPADM

## 2024-04-18 RX ORDER — HEPARIN SODIUM 1000 [USP'U]/ML
INJECTION, SOLUTION INTRAVENOUS; SUBCUTANEOUS CODE/TRAUMA/SEDATION MEDICATION
Status: DISCONTINUED | OUTPATIENT
Start: 2024-04-18 | End: 2024-04-18 | Stop reason: HOSPADM

## 2024-04-18 RX ORDER — NITROGLYCERIN 0.4 MG/1
0.4 TABLET SUBLINGUAL
Status: DISCONTINUED | OUTPATIENT
Start: 2024-04-18 | End: 2024-04-23

## 2024-04-18 RX ORDER — VERAPAMIL HYDROCHLORIDE 2.5 MG/ML
INJECTION, SOLUTION INTRAVENOUS CODE/TRAUMA/SEDATION MEDICATION
Status: DISCONTINUED | OUTPATIENT
Start: 2024-04-18 | End: 2024-04-18 | Stop reason: HOSPADM

## 2024-04-18 RX ORDER — SODIUM CHLORIDE 9 MG/ML
75 INJECTION, SOLUTION INTRAVENOUS CONTINUOUS
Status: DISCONTINUED | OUTPATIENT
Start: 2024-04-18 | End: 2024-04-19

## 2024-04-18 RX ORDER — NITROGLYCERIN 20 MG/100ML
INJECTION INTRAVENOUS CODE/TRAUMA/SEDATION MEDICATION
Status: DISCONTINUED | OUTPATIENT
Start: 2024-04-18 | End: 2024-04-18 | Stop reason: HOSPADM

## 2024-04-18 RX ORDER — SODIUM CHLORIDE 9 MG/ML
125 INJECTION, SOLUTION INTRAVENOUS CONTINUOUS
Status: DISPENSED | OUTPATIENT
Start: 2024-04-18 | End: 2024-04-18

## 2024-04-18 RX ORDER — FENTANYL CITRATE 50 UG/ML
INJECTION, SOLUTION INTRAMUSCULAR; INTRAVENOUS CODE/TRAUMA/SEDATION MEDICATION
Status: DISCONTINUED | OUTPATIENT
Start: 2024-04-18 | End: 2024-04-18 | Stop reason: HOSPADM

## 2024-04-18 RX ORDER — CHLORHEXIDINE GLUCONATE ORAL RINSE 1.2 MG/ML
15 SOLUTION DENTAL EVERY 12 HOURS SCHEDULED
Status: DISCONTINUED | OUTPATIENT
Start: 2024-04-18 | End: 2024-04-19

## 2024-04-18 RX ORDER — MIDAZOLAM HYDROCHLORIDE 2 MG/2ML
INJECTION, SOLUTION INTRAMUSCULAR; INTRAVENOUS CODE/TRAUMA/SEDATION MEDICATION
Status: DISCONTINUED | OUTPATIENT
Start: 2024-04-18 | End: 2024-04-18 | Stop reason: HOSPADM

## 2024-04-18 RX ADMIN — SODIUM CHLORIDE 75 ML/HR: 0.9 INJECTION, SOLUTION INTRAVENOUS at 09:57

## 2024-04-18 RX ADMIN — METOPROLOL SUCCINATE 25 MG: 25 TABLET, FILM COATED, EXTENDED RELEASE ORAL at 09:03

## 2024-04-18 RX ADMIN — FOLIC ACID 1 MG: 1 TABLET ORAL at 09:03

## 2024-04-18 RX ADMIN — CYCLOSPORINE 1 DROP: 0.5 EMULSION OPHTHALMIC at 11:28

## 2024-04-18 RX ADMIN — THIAMINE HCL TAB 100 MG 100 MG: 100 TAB at 09:04

## 2024-04-18 RX ADMIN — CHLORHEXIDINE GLUCONATE 0.12% ORAL RINSE 15 ML: 1.2 LIQUID ORAL at 16:49

## 2024-04-18 RX ADMIN — BUDESONIDE AND FORMOTEROL FUMARATE DIHYDRATE 2 PUFF: 160; 4.5 AEROSOL RESPIRATORY (INHALATION) at 16:49

## 2024-04-18 RX ADMIN — TICAGRELOR 90 MG: 90 TABLET ORAL at 09:03

## 2024-04-18 RX ADMIN — ASPIRIN 81 MG: 81 TABLET, COATED ORAL at 09:03

## 2024-04-18 RX ADMIN — MUPIROCIN 1 APPLICATION: 20 OINTMENT TOPICAL at 16:49

## 2024-04-18 RX ADMIN — HEPARIN SODIUM 11.8 UNITS/KG/HR: 10000 INJECTION, SOLUTION INTRAVENOUS at 21:02

## 2024-04-18 RX ADMIN — PANTOPRAZOLE SODIUM 40 MG: 40 TABLET, DELAYED RELEASE ORAL at 06:42

## 2024-04-18 RX ADMIN — ATORVASTATIN CALCIUM 80 MG: 80 TABLET, FILM COATED ORAL at 16:50

## 2024-04-18 RX ADMIN — NITROGLYCERIN 1 INCH: 20 OINTMENT TOPICAL at 11:28

## 2024-04-18 RX ADMIN — CYCLOSPORINE 1 DROP: 0.5 EMULSION OPHTHALMIC at 22:19

## 2024-04-18 RX ADMIN — HEPARIN SODIUM 2000 UNITS: 1000 INJECTION INTRAVENOUS; SUBCUTANEOUS at 09:50

## 2024-04-18 RX ADMIN — SODIUM CHLORIDE 125 ML/HR: 0.9 INJECTION, SOLUTION INTRAVENOUS at 14:00

## 2024-04-18 RX ADMIN — LOSARTAN POTASSIUM 25 MG: 25 TABLET, FILM COATED ORAL at 09:03

## 2024-04-18 NOTE — PHYSICAL THERAPY NOTE
PHYSICAL THERAPY EVALUATION  NAME:  David S Cogan  DATE: 04/18/24    AGE:   68 y.o.  Mrn:   [id number]  ADMIT DX:  Chest pain [R07.9]  NSTEMI (non-ST elevated myocardial infarction) (HCC) [I21.4]  Problem List:   Patient Active Problem List   Diagnosis    Acquired keratoderma    Benign hypertension    Benign prostatic hyperplasia    Vesicular palmoplantar eczema    Dyslipidemia    Eczema of both hands    Hyperlipidemia    Hypertrophic condition of skin    Subclinical hypothyroidism    Thyroid nodule    Right flank pain    Abrasion of flank    Overweight (BMI 25.0-29.9)    Essential hypertension    Alcohol use, unspecified with other alcohol-induced disorder (HCC)    NSTEMI (non-ST elevated myocardial infarction) (HCC)    Asthma       Past Medical History  Past Medical History:   Diagnosis Date    Asthma     Last Assessed:10/16/17    Fontanez's esophagus     Colonic polyp     Last Assessed:10/16/17    Eczema     GERD (gastroesophageal reflux disease)     Last Assessed:10/16/17    High cholesterol     Last Assessed:10/16/17    Hypertension     Last Assessed:10/16/17    Non-neoplastic nevus     Last Assessed:11/16/17       Past Surgical History  Past Surgical History:   Procedure Laterality Date    COLONOSCOPY      10/2013- egd/colon    LYMPHADENECTOMY      Axillary Lymph node removed-benign    TONSILLECTOMY      Last Assessed:10/16/17    VALVE REPLACEMENT      Heart Valve Replacement; Last Assessed:10/16/17       Length Of Stay: 1  Performed at least 2 patient identifiers during session: Name and ID bracelet       04/18/24 0944   PT Last Visit   PT Visit Date 04/18/24   Note Type   Note type Evaluation   Pain Assessment   Pain Assessment Tool 0-10   Pain Score 1   Pain Location/Orientation Location: Chest   Restrictions/Precautions   Weight Bearing Precautions Per Order No   Other Precautions Multiple lines;Fall Risk;Telemetry   Home Living   Type of Home House   Home Layout Two level;Bed/bath upstairs  (4 [place])    Bathroom Shower/Tub Tub/shower unit   Bathroom Toilet Standard   Bathroom Equipment   (noen reported)   Home Equipment Walker  (no AD used at baseline)   Prior Function   Level of [place] Independent with ADLs;Independent with IADLS   Lives With Significant other   Receives Help From Family   IADLs Independent with driving;Independent with meal prep;Independent with medication management   Falls in the last 6 months 0   Vocational Retired   General   Family/Caregiver Present Yes   Cognition   Overall Cognitive Status WFL   Arousal/Participation Alert   Attention Attends with cues to redirect   Orientation Level Oriented X4   Memory Within functional limits   Following Commands Follows one step commands without difficulty   RUE Assessment   RUE Assessment WFL   LUE Assessment   LUE Assessment WFL   RLE Assessment   RLE Assessment WFL   LLE Assessment   LLE Assessment WFL   Vision-Basic Assessment   Current Vision Wears glasses all the time   Bed Mobility   Supine to Sit 6  Modified independent   Additional items Bedrails   Sit to Supine 6  Modified independent   Additional items Bedrails   Additional Comments pt denied dizziness with transitional movement   Transfers   Sit to Stand 5  Supervision   Stand to Sit 5  Supervision   Ambulation/Elevation   Gait pattern WNL   Gait Assistance 5  Supervision   Assistive Device None   Distance 150 ft   Ambulation/Elevation Additional Comments no LOB or deficts noted   Balance   Static Sitting Normal   Dynamic Sitting Normal   Static Standing Normal   Dynamic Standing Good   Ambulatory Good   Endurance Deficit   Endurance Deficit No   Activity Tolerance   Activity Tolerance Patient tolerated treatment well   Assessment   Prognosis Excellent   Assessment Pt is 68 y.o. male seen for PT evaluation s/p admit to [facility] w/ NSTEMI (non-ST elevated myocardial infarction) ([facility]). PT consulted to assess pt's functional mobility and d/c needs. Order placed  for PT eval and tx, w/ up and OOB as tolerated order. Pt agreeable to PT  session upon arrival, pt found supine in bed. The following objective measures performed on IE also reveal limitations: AM-PAC 6 Clicks 24/24.  Patient was independent/supervision w/ all functional mobility w/ no AD.  Pt presents at [facility] with transfers, ambulation, and bed mobility.  From PT/mobility standpoint, recommendation at time of d/c would be anticipate no needs/resources. Upon conclusion pt supine in bed. D/c PT services at this time. Complexity: Comorbidities affecting pt's physical performance at time of assessment include: htn. Personal factors affecting pt at time of IE include: steps to enter home. Please find objective findings from PT assessment regarding body systems outlined above with impairments and limitations including pain.  Pt's clinical presentation is currently evolving seen in pt's presentation of pain and upcoming procedure. The patient's AM-PAC Basic Mobility Inpatient Short Form Raw Score is 24. Please also refer to the recommendation of the Physical Therapist for safe discharge planning. RN verbalized pt appropriate for PT session. Pt seen as a co-eval with OT due to the patient's co-morbidities and clinically evolving presentation.   Barriers to Discharge None   Goals   Patient Goals to get his procedure done today   Discharge Recommendation   Rehab Resource Intensity Level, PT No post-acute rehabilitation needs   AM-PAC Basic Mobility Inpatient   Turning in Flat Bed Without Bedrails 4   Lying on Back to Sitting on Edge of Flat Bed Without Bedrails 4   Moving Bed to Chair 4   Standing Up From Chair Using Arms 4   Walk in Room 4   Climb 3-5 Stairs With Railing 4   Basic Mobility Inpatient Raw Score 24   Basic Mobility Standardized Score 57.68   [facility] Highest Level Of Mobility   -HLM Goal 8: Walk 250 feet or more   -HLM Achieved 7: Walk 25 feet or more       Time In: 0934  Time Out: 0944  Total  Evaluation Minutes: 10    Roberta Christie, PT

## 2024-04-18 NOTE — CASE MANAGEMENT
Case Management Assessment & Discharge Planning Note    Patient name David S Cogan  Location [facility]- MRN [id number]  : 1955 Date 2024       Current Admission Date: 2024  Current Admission Diagnosis:NSTEMI (non-ST elevated myocardial infarction) (HCC)   Patient Active Problem List    Diagnosis Date Noted    NSTEMI (non-ST elevated myocardial infarction) (HCC) 2024    Asthma 2024    Alcohol use, unspecified with other alcohol-induced disorder (HCC) 2021    Essential hypertension 2020    Right flank pain 2019    Abrasion of flank 2019    Overweight (BMI 25.0-29.9) 2019    Acquired keratoderma 2017    Benign prostatic hyperplasia 2017    Dyslipidemia 2017    Hyperlipidemia 2017    Hypertrophic condition of skin 2017    Subclinical hypothyroidism 2017    Thyroid nodule 2017    Benign hypertension 10/16/2017    Vesicular palmoplantar eczema 10/16/2017    Eczema of both hands 10/16/2017      LOS (days): 1  Geometric Mean LOS (GMLOS) (days): 1.8  Days to GMLOS:0.8     OBJECTIVE:    Risk of Unplanned Readmission Score: 12.31         Current admission status: Inpatient       Preferred Pharmacy:   [facility] ([facility]) - [address]  [address] [address]  Phone: [phone] Fax: [phone]    [facility] - [place] [id number]  [address]  [address] [address]  Phone: [phone] Fax: [phone]    Primary Care Provider: Maday Chu PA-C    Primary Insurance: MEDICARE  Secondary Insurance: BLUE CROSS    ASSESSMENT:  Active Health Care Proxies    There are no active Health Care Proxies on file.       Readmission Root Cause  30 Day Readmission: No    Patient Information  Admitted from:: Home  Mental Status: Alert  During Assessment patient was accompanied by:  Other-Comment (Significant other, Paul)  Assessment information provided by:: Patient  Primary Caregiver: Self  Support Systems: Self, Spouse/significant other, Family members  Type of Current Residence: 3 story home  Upon entering residence, is there a bedroom on the main floor (no further steps)?: No  A bedroom is located on the following floor levels of residence (select all that apply):: 2nd Floor  Upon entering residence, is there a bathroom on the main floor (no further steps)?: No  Indicate which floors of current residence have a bathroom (select all the apply):: 2nd Floor  Number of steps to 2nd floor from main floor: One Flight  Living Arrangements: Lives w/ Spouse/significant other  Is patient a ?: No    Activities of Daily Living Prior to Admission  Functional Status: Independent  Completes ADLs independently?: Yes  Ambulates independently?: Yes  Does patient use assisted devices?: No  Does patient have a history of Outpatient Therapy (PT/OT)?: No  Does the patient have a history of Short-Term Rehab?: No  Does patient have a history of HHC?: No  Does patient currently have HHC?: No         Patient Information Continued  Income Source: Pension/FPC  Does patient receive dialysis treatments?: No  Does patient have a history of Mental Health Diagnosis?: No         Means of Transportation  Means of Transport to Appts:: Drives Self      Social Determinants of Health (SDOH)      Flowsheet Row Most Recent Value   Housing Stability    In the last 12 months, was there a time when you were not able to pay the mortgage or rent on time? N   In the last 12 months, how many places have you lived? 1   In the last 12 months, was there a time when you did not have a steady place to sleep or slept in a shelter (including now)? N   Transportation Needs    In the past 12 months, has lack of transportation kept you from medical appointments or from getting medications? no   In the past 12 months, has lack of  transportation kept you from meetings, work, or from getting things needed for daily living? No   Food Insecurity    Within the past 12 months, you worried that your food would run out before you got the money to buy more. Never true   Within the past 12 months, the food you bought just didn't last and you didn't have money to get more. Never true   Utilities    In the past 12 months has the electric, gas, oil, or water company threatened to shut off services in your home? No            DISCHARGE DETAILS:    Discharge planning discussed with:: Patient and patient's girlfriend Paul  Freedom of Choice: Yes     CM contacted family/caregiver?: Yes             Contacts  Patient Contacts: Patient's girlfriend, Paul  Relationship to Patient:: Family  Contact Method: In Person, Phone  Phone Number: [phone]  Reason/Outcome: Emergency Contact

## 2024-04-18 NOTE — OCCUPATIONAL THERAPY NOTE
Occupational Therapy Evaluation     Patient Name: David S Cogan  Today's Date: 4/18/2024  Problem List  Principal Problem:    NSTEMI (non-ST elevated myocardial infarction) (HCC)  Active Problems:    Benign hypertension    Hyperlipidemia    Alcohol use, unspecified with other alcohol-induced disorder (HCC)    Asthma    Past Medical History  Past Medical History:   Diagnosis Date    Asthma     Last Assessed:10/16/17    Fontanez's esophagus     Colonic polyp     Last Assessed:10/16/17    Eczema     GERD (gastroesophageal reflux disease)     Last Assessed:10/16/17    High cholesterol     Last Assessed:10/16/17    Hypertension     Last Assessed:10/16/17    Non-neoplastic nevus     Last Assessed:11/16/17     Past Surgical History  Past Surgical History:   Procedure Laterality Date    COLONOSCOPY      10/2013- egd/colon    LYMPHADENECTOMY      Axillary Lymph node removed-benign    TONSILLECTOMY      Last Assessed:10/16/17    VALVE REPLACEMENT      Heart Valve Replacement; Last Assessed:10/16/17           04/18/24 0947   OT Last Visit   OT Visit Date 04/18/24   Note Type   Note type Evaluation   Additional Comments confirmed clearance for session with MD   Pain Assessment   Pain Assessment Tool 0-10   Pain Score 1   Hospital Pain Intervention(s) Repositioned;Ambulation/increased activity   Restrictions/Precautions   Weight Bearing Precautions Per Order No   Other Precautions Multiple lines;Fall Risk;Telemetry   Home Living   Type of Home House   Home Layout Two level;Bed/bath upstairs  (4STE)   Bathroom Shower/Tub Tub/shower unit   Bathroom Toilet Standard   Home Equipment Walker  (did not use PTA)   Prior Function   Level of [place] Independent with ADLs;Independent with IADLS   Lives With Significant other   Receives Help From Family   IADLs Independent with driving;Independent with meal prep;Independent with medication management   Falls in the last 6 months 0   Vocational Retired   Lifestyle   Autonomy PTA, pt  "reports being independent with ADLs, IADLs, fnxl mobility, (+)    Reciprocal Relationships S/O   Service to Others Retired   Intrinsic Gratification Traveling, going to the [place]   Subjective   Subjective \"it's minimal\"   ADL   Where Assessed Edge of bed   Eating Assistance 7  Independent   Grooming Assistance 7  Independent   UB Bathing Assistance 7  Independent   LB Bathing Assistance 5  Supervision/Setup   UB Dressing Assistance 7  Independent   LB Dressing Assistance 5  Supervision/Setup   Toileting Assistance  5  Supervision/Setup   Bed Mobility   Supine to Sit 6  Modified independent   Additional items Bedrails   Sit to Supine 6  Modified independent   Additional items Bedrails   Transfers   Sit to Stand 5  Supervision   Stand to Sit 5  Supervision   Additional Comments transfers w/o AD   Functional Mobility   Functional Mobility 5  Supervision   Additional Comments no AD   Balance   Static Sitting Good   Dynamic Sitting Fair +   Static Standing Fair +   Dynamic Standing Fair   Ambulatory Fair   Activity Tolerance   Activity Tolerance Patient tolerated treatment well   Medical Staff Made Aware PT MD Roberta   Nurse Made Aware RN clearance for session   RUE Assessment   RUE Assessment WFL   LUE Assessment   LUE Assessment WFL   Vision-Basic Assessment   Current Vision Wears glasses all the time   Cognition   Overall Cognitive Status WFL   Arousal/Participation Responsive;Cooperative   Attention Attends with cues to redirect   Orientation Level Oriented X4   Memory Within functional limits   Following Commands Follows one step commands without difficulty   Comments Pt pleasant and cooperative t/o session   Assessment   Assessment Pt is a 68 y.o. male admitted to [facility] on 4/17/2024 w/ NSTEMI.  has a past medical history of Asthma, Fontanez's esophagus, BPH, Dyslipidemia, Colonic polyp, Eczema, GERD, High cholesterol, Hypertension, and Non-neoplastic nevus. Pt with active OT orders and up and OOB as tolerated " orders. Pt seen as a co-evaluation with PT due to the patient's co-morbidities, clinically unstable presentation/clinical complexity, and present impairments. As per pt report, pta, resides with his S/O in a 2STH, 4STE. Pt was I w/  ADLS and IADLS, (+) drove. Upon evaluation, pt currently S for transfers and mobility. Pt currently requires I eating, I grooming, I UB ADLs, S LB ADLs, and S toileting. Pt with no questions or concerns at this time. Reports S/O is able to assist as/if needed upon d/c. From OT standpoint, recommendation would be home with social support. No further acute OT needs. D/C OT. Please re-consult if needed. Thank you. Pt was left after session with all current needs met. The patient's raw score on the AM-PAC Daily Activity Inpatient Short Form is 21. A raw score of greater than or equal to 19 suggests the patient may benefit from discharge to home. Please refer to the recommendation of the Occupational Therapist for safe discharge planning.   Plan   OT Frequency Eval only   Discharge Recommendation   Rehab Resource Intensity Level, OT No post-acute rehabilitation needs   AM-PAC Daily Activity Inpatient   Lower Body Dressing 3   Bathing 3   Toileting 3   Upper Body Dressing 4   Grooming 4   Eating 4   Daily Activity Raw Score 21   Daily Activity Standardized Score (Calc for Raw Score >=11) 44.27   AM-PAC Applied Cognition Inpatient   Following a Speech/Presentation 4   Understanding Ordinary Conversation 4   Taking Medications 4   Remembering Where Things Are Placed or Put Away 4   Remembering List of 4-5 Errands 4   Taking Care of Complicated Tasks 3   Applied Cognition Raw Score 23   Applied Cognition Standardized Score 53.08     Teresa Zhang MS, OTR/L

## 2024-04-18 NOTE — PROGRESS NOTES
INTERNAL MEDICINE RESIDENCY PROGRESS NOTE     Name: David S Cogan   Age & Sex: 68 y.o. male   MRN: [id number]  Unit/Bed#: ED 06   Encounter: [id number]  Team: SOD Team C     PATIENT INFORMATION     Name: David S Cogan   Age & Sex: 68 y.o. male   MRN: [id number]  Hospital Stay Days: 1    ASSESSMENT/PLAN     Principal Problem:    NSTEMI (non-ST elevated myocardial infarction) (HCC)  Active Problems:    Benign hypertension    Hyperlipidemia    Alcohol use, unspecified with other alcohol-induced disorder (HCC)    Asthma      * NSTEMI (non-ST elevated myocardial infarction) (HCC)  Assessment & Plan  Patient presenting with one week history of intermittent chest pain/pressure. Described as up to an 8-9/10 in intensity, substernal, with radiation to the bilateral shoulders and upper arms. Associated symptoms include nausea/vomiting (end of last week), abdominal bloating (last week), and shortness of breath/fatigue (earlier this week); he denies any associated diaphoresis. Above symptoms improved with rest/Gasex; of note, patient has had somewhat similar symptoms in the past secondary to GERD.     Seen by PCP/cardiology earlier this week with EKG(s) at that time suggestive of age-indeterminate septal infarct with nonspecific t-wave changes. Subsequently advised to present to the ED, where troponin found to be elevated at 1,217 (2 hour brionna 1,119). Treated with Protonix, carafate, loaded with ASA, and started on heparin drip.    Given presenting symptoms, elevated troponin, and EKG changes, highest suspicion at present is for NSTEMI.    Plan  -Admit to [facility] with Telemetry  -Cardiology consulted, recommendations appreciated - pending evaluation this morning  -Patient now status post loading with aspirin and Brilinta; will continue maintenance dosing  -Continue metoprolol, losartan, and Lipitor  -NPO midnight pending Cardiology evaluation for potential cath tomorrow  -STAT EKG with recurrence of chest pain  -Nitro as  needed for symptomatic chest pain    Asthma  Assessment & Plan  Patient with history of asthma (no PFTs on file); treated as an outpatient with Symbicort and albuterol as needed. Endorsing some shortness of breath prior to presentation, although this appears more likely correlated to patient's cardiac complaints.    Plan  -Continue PTA inhaler regimen    Alcohol use, unspecified with other alcohol-induced disorder (HCC)  Assessment & Plan  Patient (and his wife) endorse significant alcohol use; up to 6 drinks [place], 1-2 when at home. Reports he did stop drinking over the weekend but has since resumed.     Plan  -[facility] protocol  -Continue thiamine supplementation    Hyperlipidemia  Assessment & Plan  Patient with history of HLD, with last lipid panel (October 2023) notable for cholesterol 298, triglycerides 195, HDL 69, . Was previously on treatment with Crestor 5 mg daily, though endorses overall poor compliance to this.    Plan  -Given presentation, will increase statin to high intensity atorvastatin 40 mg daily    Benign hypertension  Assessment & Plan  Patient with history of HTN, treated as an outpatient with diltiazem 120 mg daily. BP on arrival 161/93, with most subsequent readings in the range of 130-150s/60-70s.    Plan  -Continue PTA diltiazem  -Continue Toprol as well as losartan given concern for underlying cardiac disease as well as ongoing hypertensive        Disposition: Remain admitted pending further evaluation by inpatient cardiology team, potential cath    SUBJECTIVE     Patient seen and examined. No acute events overnight.  This morning, patient reports feeling unchanged since yesterday.  He did have some symptom recurrence overnight when he was up ambulating to the bathroom, though he states that this was mild and resolved very quickly with rest.  He otherwise denies any fevers/chills, new chest pain, shortness of breath, nausea/vomiting, diaphoresis, or other symptoms at  present.    OBJECTIVE     Vitals:    24 0030 24 0212 24 0643 24 0657   BP: 134/79 119/70 150/68 150/68   BP Location:  Right arm Right arm    Pulse: 76 75 65 79   Resp:  14 12    Temp:       TempSrc:       SpO2:  95% 95%       Temperature:   Temp (24hrs), Av.3 °F (36.8 °C), Min:98 °F (36.7 °C), Max:98.6 °F (37 °C)    Temperature: 98.6 °F (37 °C)  Intake & Output:  I/O       None          Weights:        There is no height or weight on file to calculate BMI.  Weight (last 2 days)       None          Physical Exam  Vitals and nursing note reviewed.   Constitutional:       General: He is not in acute distress.     Appearance: Normal appearance. He is not ill-appearing.   HENT:      Head: Normocephalic and atraumatic.      Right Ear: External ear normal.      Left Ear: External ear normal.      Nose: Nose normal.      Mouth/Throat:      Mouth: Mucous membranes are moist.      Pharynx: Oropharynx is clear.   Eyes:      Extraocular Movements: Extraocular movements intact.      Conjunctiva/sclera: Conjunctivae normal.      Pupils: Pupils are equal, round, and reactive to light.   Cardiovascular:      Rate and Rhythm: Normal rate and regular rhythm.      Heart sounds: Normal heart sounds. No murmur heard.  Pulmonary:      Effort: Pulmonary effort is normal. No respiratory distress.      Breath sounds: Normal breath sounds.   Abdominal:      General: Abdomen is flat. Bowel sounds are normal. There is no distension.      Palpations: Abdomen is soft.      Tenderness: There is no abdominal tenderness.   Musculoskeletal:      Right lower leg: No edema.      Left lower leg: No edema.   Skin:     General: Skin is warm and dry.   Neurological:      Mental Status: He is alert. Mental status is at baseline.   Psychiatric:         Mood and Affect: Mood normal.         Behavior: Behavior normal.       LABORATORY DATA     Labs: I have personally reviewed pertinent reports.  Results from last 7 days   Lab  Units 04/18/24  0439 04/17/24  1520   WBC Thousand/uL 6.62 6.36   HEMOGLOBIN g/dL 14.6 15.7   HEMATOCRIT % 43.9 46.8   PLATELETS Thousands/uL 283 313   SEGS PCT % 46 48   MONO PCT % 10 12   EOS PCT % 4 3      Results from last 7 days   Lab Units 04/18/24  0439 04/17/24  1520 04/15/24  1255   POTASSIUM mmol/L 4.1 4.2 5.1   CHLORIDE mmol/L 105 103 98   CO2 mmol/L 22 25 23   BUN mg/dL 13 17 17   CREATININE mg/dL 1.16 1.17 1.50*   CALCIUM mg/dL 8.2* 9.0  --    ALK PHOS U/L  --  62  --    ALT U/L  --  18 23   AST U/L  --  26 39              Results from last 7 days   Lab Units 04/18/24  0212 04/17/24 2004 04/17/24  1614   INR   --   --  1.06   PTT seconds 95* 33 30               IMAGING & DIAGNOSTIC TESTING     Radiology Results: I have personally reviewed pertinent reports.  XR chest 2 views    Result Date: 4/17/2024  Impression: No acute cardiopulmonary disease. Workstation performed: [id number]     Other Diagnostic Testing: I have personally reviewed pertinent reports.    ACTIVE MEDICATIONS     Current Facility-Administered Medications   Medication Dose Route Frequency    acetaminophen (TYLENOL) tablet 650 mg  650 mg Oral Q4H PRN    albuterol (PROVENTIL HFA,VENTOLIN HFA) inhaler 1 puff  1 puff Inhalation Q6H PRN    Artificial Tears ophthalmic solution 2 drop  2 drop Both Eyes Q12H [facility]    aspirin (ECOTRIN LOW STRENGTH) EC tablet 81 mg  81 mg Oral Daily    atorvastatin (LIPITOR) tablet 40 mg  40 mg Oral Daily With Dinner    budesonide-formoterol (SYMBICORT) 160-4.5 mcg/act inhaler 2 puff  2 puff Inhalation BID    cycloSPORINE (RESTASIS) 0.05 % ophthalmic emulsion 1 drop  1 drop Both Eyes BID    diltiazem (DILACOR XR) 24 hr capsule 120 mg  120 mg Oral Daily    fluticasone (FLONASE) 50 mcg/act nasal spray 2 spray  2 spray Each Nare Daily    folic acid (FOLVITE) tablet 1 mg  1 mg Oral Daily    heparin (porcine) 25,000 units in 0.45% NaCl 250 mL infusion (premix)  3-20 Units/kg/hr (Order-Specific) Intravenous Titrated     "heparin (porcine) injection 2,000 Units  2,000 Units Intravenous Q6H PRN    heparin (porcine) injection 4,000 Units  4,000 Units Intravenous Q6H PRN    losartan (COZAAR) tablet 25 mg  25 mg Oral Daily    metoprolol succinate (TOPROL-XL) 24 hr tablet 25 mg  25 mg Oral Daily    nitroglycerin (NITROSTAT) SL tablet 0.4 mg  0.4 mg Sublingual Q5 Min PRN    ondansetron (ZOFRAN) injection 4 mg  4 mg Intravenous Q6H PRN    pantoprazole (PROTONIX) EC tablet 40 mg  40 mg Oral Early Morning    polyethylene glycol (MIRALAX) packet 17 g  17 g Oral Daily    thiamine tablet 100 mg  100 mg Oral Daily    ticagrelor (BRILINTA) tablet 90 mg  90 mg Oral Q12H SARITA       VTE Pharmacologic Prophylaxis: Heparin  VTE Mechanical Prophylaxis: sequential compression device    Portions of the record may have been created with voice recognition software.  Occasional wrong word or \"sound a like\" substitutions may have occurred due to the inherent limitations of voice recognition software.  Read the chart carefully and recognize, using context, where substitutions have occurred.  ==  Daniel Shah MD  [facility]  Internal Medicine Residency PGY-1      "

## 2024-04-18 NOTE — ED NOTES
Primary nurse, Zacarias, found that IV pump had shut off upon entering room. Unknown how long pump was off. New PTT was drawn and sent and Heparin infusion restarted at previous rate. Dr. Ford with SOD made aware.      Briseida Tavera RN  04/17/24 2010

## 2024-04-18 NOTE — CONSULTS
Consultation - Cardiothoracic Surgery   David S Cogan 68 y.o. male MRN: [id number]  [facility] Encounter: [id number]    Physician Requesting Consult: Krystyna Hickman MD    Reason for Consult / Principal Problem: MV CAD    Inpatient consult to Cardiac Surgery  Consult performed by: Christopher Tirado PA-C  Consult ordered by: KATINA Mann        History of Present Illness: David S Cogan is a 68 y.o. male without a longstanding cardiovascular past medical history.  This certainly he did see a cardiologist at [facility] when he previously experienced angina.  By report he underwent cardiac catheterization approximately 10 years ago.  He tells me that the results of the study did not identify any significant obstructive coronary disease.  Since that time he did not complete routine cardiology follow-up.    His current clinical course began several weeks ago.  At that time he began to note exertional angina and dyspnea.  Symptoms began with activity as he swam regularly for exercise.  Symptoms began to increase in intensity and duration.  Ultimately he was referred to the emergency department at [facility].  Upon evaluation NSTEMI was identified.  He was loaded with Brilinta 180 mg and transferred to [place] for cardiac catheterization.  The study was completed today.  Severe two-vessel coronary artery disease was identified.  Cardiovascular surgery consultation was obtained at this time.    During interview today he denies significant recurrence of symptoms since admission to the hospital.  His past medical history is otherwise significant for gastroesophageal reflux disease, hypertension, hyperlipidemia, asthma, and Fontanez's esophagus.    Past Medical History:  Past Medical History:   Diagnosis Date    Asthma     Last Assessed:10/16/17    Fontanez's esophagus     Colonic polyp     Last Assessed:10/16/17    Eczema     GERD (gastroesophageal reflux disease)     Last Assessed:10/16/17     High cholesterol     Last Assessed:10/16/17    Hypertension     Last Assessed:10/16/17    Non-neoplastic nevus     Last Assessed:17         Past Surgical History:   Past Surgical History:   Procedure Laterality Date    COLONOSCOPY      10/2013- egd/colon    LYMPHADENECTOMY      Axillary Lymph node removed-benign    TONSILLECTOMY      Last Assessed:10/16/17    VALVE REPLACEMENT      Heart Valve Replacement; Last Assessed:10/16/17         Family History:  Family History   Problem Relation Age of Onset    Cancer Father         groin, bone    Diabetes Paternal Grandfather          Social History:  Social History     Substance and Sexual Activity   Alcohol Use Yes    Alcohol/week: 14.0 standard drinks of alcohol    Types: 14 Cans of beer per week     Social History     Substance and Sexual Activity   Drug Use No     Social History     Tobacco Use   Smoking Status Former    Current packs/day: 0.00    Average packs/day: 1 pack/day for 14.0 years (14.0 ttl pk-yrs)    Types: Cigarettes    Start date: 1974    Quit date:     Years since quittin.3   Smokeless Tobacco Former    Types: Chew    Quit date:      Marital Status:       Home Medications:   Prior to Admission medications    Medication Sig Start Date End Date Taking? Authorizing Provider   albuterol (PROVENTIL HFA,VENTOLIN HFA) 90 mcg/act inhaler Inhale 90 mcg 2 (two) times a day as needed   Yes Historical Provider, MD   aspirin (ECOTRIN LOW STRENGTH) 81 mg EC tablet Take 1 tablet by mouth daily As needed   Yes Historical Provider, MD   cycloSPORINE (RESTASIS) 0.05 % ophthalmic emulsion Apply 0.05 application to eye 2 (two) times a day   Yes Historical Provider, MD   diltiazem (Dilt-XR) 120 MG 24 hr capsule Take 1 capsule (120 mg total) by mouth daily 10/31/23  Yes Maday Chu PA-C   esomeprazole (NexIUM) 40 MG capsule Take 1 capsule (40 mg total) by mouth daily in the early morning 4/16/24 10/13/24 Yes Maday Chu PA-C    mometasone (NASONEX) 50 mcg/act nasal spray 2 sprays into each nostril daily   Yes Historical Provider, MD   Multiple Vitamins-Minerals (CENTRUM SILVER 50+MEN) TABS Take 1 tablet by mouth daily   Yes Historical Provider, MD   rosuvastatin (CRESTOR) 5 mg tablet Take 1 tablet (5 mg total) by mouth daily 10/31/23  Yes Maday Chu PA-C   SYMBICORT 160-4.5 MCG/ACT inhaler Take 2 puffs by mouth 2 (two) times a day 4/9/18  Yes Historical Provider, MD   thiamine 50 MG tablet Take 1 tablet (50 mg total) by mouth daily 10/30/18   Cm Glass MD   TURMERIC PO Take by mouth daily     Historical Provider, MD       Inpatient Medications:  Scheduled Meds:   Current Facility-Administered Medications   Medication Dose Route Frequency Provider Last Rate    acetaminophen  650 mg Oral Q4H PRN KATINA Mann      albuterol  1 puff Inhalation Q6H PRN KATINA Mann      Artificial Tears  2 drop Both Eyes Q12H SARITA KATINA Mann      aspirin  81 mg Oral Daily KATINA Mann      atorvastatin  80 mg Oral Daily With Dinner KATINA Mann      budesonide-formoterol  2 puff Inhalation BID KATINA Mann      cycloSPORINE  1 drop Both Eyes BID KATINA Mann      fluticasone  2 spray Each Nare Daily KATINA Mann      folic acid  1 mg Oral Daily KATINA Mann      heparin (porcine)  3-20 Units/kg/hr (Order-Specific) Intravenous Titrated KATINA Mann 13.882 Units/kg/hr (04/18/24 0949)    heparin (porcine)  4,000 Units Intravenous Q6H PRN KATINA Mann      losartan  25 mg Oral Daily KATINA Mann      metoprolol succinate  25 mg Oral Daily KATINA Mann      nitroglycerin  1 inch Topical BID KATINA Mann      nitroglycerin  0.4 mg Sublingual Q5 Min PRN KATINA Mann      ondansetron  4 mg Intravenous Q6H PRN KATINA Mann      pantoprazole  40 mg Oral Early Morning KATINA Mann      polyethylene glycol  17 g Oral Daily KATINA Mann      sodium  "chloride  75 mL/hr Intravenous Continuous KATINA Mann 75 mL/hr (04/18/24 0957)    sodium chloride  125 mL/hr Intravenous Continuous KATINA Mann      thiamine  100 mg Oral Daily KATINA Mann       Continuous Infusions: heparin (porcine), 3-20 Units/kg/hr (Order-Specific), Last Rate: 13.882 Units/kg/hr (04/18/24 0949)  sodium chloride, 75 mL/hr, Last Rate: 75 mL/hr (04/18/24 0957)  sodium chloride, 125 mL/hr      PRN Meds:  acetaminophen, 650 mg, Q4H PRN  albuterol, 1 puff, Q6H PRN  heparin (porcine), 4,000 Units, Q6H PRN  nitroglycerin, 0.4 mg, Q5 Min PRN  ondansetron, 4 mg, Q6H PRN        Allergies:  Allergies   Allergen Reactions    Amoxicillin      Other reaction(s): Diarrhea    Amoxicillin-Pot Clavulanate      Other reaction(s): Allergy Date : 07/27/2015   GI    Metoprolol      Annotation - [id number]: Aggravtes asthma    Penicillins      Annotation - [id number]: Diarrhea       Review of Systems:  Review of Systems   Constitutional:  Positive for fatigue.   HENT: Negative.     Eyes: Negative.    Respiratory:  Positive for chest tightness and shortness of breath.    Cardiovascular:  Positive for chest pain. Negative for leg swelling.   Endocrine: Negative.    Genitourinary: Negative.    Musculoskeletal: Negative.    Skin: Negative.    Neurological: Negative.    Psychiatric/Behavioral: Negative.         Vital Signs:     Vitals:    04/18/24 0852 04/18/24 1100 04/18/24 1357 04/18/24 1415   BP: 140/94 140/94 108/66 107/73   BP Location: Right arm  Left arm Left arm   Pulse: 72 72 82 80   Resp:  16 12 12   Temp:   97.6 °F (36.4 °C)    TempSrc:   Oral    SpO2: 96%  95% 97%   Weight:  85.3 kg (188 lb)     Height:  5' 7\" (1.702 m)       Invasive Devices       Peripheral Intravenous Line  Duration             Peripheral IV 04/17/24 Left Antecubital <1 day    Peripheral IV 04/17/24 Left;Ventral (anterior) Wrist <1 day                    Physical Exam:  Physical Exam  Constitutional:       Appearance: " Normal appearance. He is well-developed.   HENT:      Head: Normocephalic and atraumatic.   Eyes:      Conjunctiva/sclera: Conjunctivae normal.   Neck:      Thyroid: No thyromegaly.      Vascular: No carotid bruit or JVD.      Trachea: No tracheal deviation.   Cardiovascular:      Rate and Rhythm: Normal rate and regular rhythm.      Pulses:           Carotid pulses are 2+ on the right side and 2+ on the left side.       Dorsalis pedis pulses are 2+ on the right side and 2+ on the left side.        Posterior tibial pulses are 2+ on the right side and 2+ on the left side.      Heart sounds: S1 normal and S2 normal.   Pulmonary:      Effort: No accessory muscle usage or respiratory distress.      Breath sounds: No wheezing or rales.   Chest:      Chest wall: No tenderness.   Abdominal:      General: Bowel sounds are normal.      Palpations: Abdomen is soft.      Tenderness: There is no abdominal tenderness.   Musculoskeletal:         General: Normal range of motion.      Cervical back: Full passive range of motion without pain and normal range of motion.   Skin:     General: Skin is warm and dry.   Neurological:      Mental Status: He is alert and oriented to person, place, and time.      Cranial Nerves: No cranial nerve deficit.      Sensory: No sensory deficit.   Psychiatric:         Speech: Speech normal.         Behavior: Behavior normal.         Lab Results:     Results from last 7 days   Lab Units 04/18/24  0439 04/17/24  1520   WBC Thousand/uL 6.62 6.36   HEMOGLOBIN g/dL 14.6 15.7   HEMATOCRIT % 43.9 46.8   PLATELETS Thousands/uL 283 313     Results from last 7 days   Lab Units 04/18/24  0439 04/17/24  1520 04/15/24  1255   POTASSIUM mmol/L 4.1 4.2 5.1   CHLORIDE mmol/L 105 103 98   CO2 mmol/L 22 25 23   BUN mg/dL 13 17 17   CREATININE mg/dL 1.16 1.17 1.50*   CALCIUM mg/dL 8.2* 9.0  --      Results from last 7 days   Lab Units 04/18/24  0858 04/18/24  0212 04/17/24 2004 04/17/24  1614   INR   --   --   --   1.06   PTT seconds 44* 95* 33 30     Lab Results   Component Value Date    HGBA1C 5.6 01/22/2019     Lab Results   Component Value Date    TROPONINI <0.02 05/17/2020       Imaging Studies:     Cardiac Catheterization: 2 vessel CAD. Report pending    Echocardiogram: Completed; Report pending.     I have personally reviewed pertinent reports.   and I have personally reviewed pertinent films in PACS    Assessment:  Principal Problem:    NSTEMI (non-ST elevated myocardial infarction) (HCC)  Active Problems:    Benign hypertension    Hyperlipidemia    Alcohol use, unspecified with other alcohol-induced disorder (HCC)    Asthma    Severe coronary artery disease; Ongoing CABG workup    Plan:  Risks and benefits of coronary artery bypass grafting were discussed in detail today with the patient.  They understand and wish to proceed with further workup and ultimately surgical intervention.  We have ordered routine preoperative laboratory and vascular studies.  Pending the results of these tests, they will be scheduled for surgery April 23 with Dr. Balwinder Sky D.O.    David S Cogan was comfortable with our recommendations, and their questions were answered to their satisfaction.  We will continue to evaluate the patient daily with further recommendations as work up is completed.  Thank you for allowing us to participate in the care of this patient.     SIGNATURE: Christopher Tirado PA-C  DATE: April 18, 2024  TIME: 2:24 PM    * This note was completed in part utilizing [employer] direct voice recognition software.   Grammatical errors, random word insertion, spelling mistakes, and incomplete sentences may be an occasional consequence of the system secondary to software limitations, ambient noise and hardware issues. At the time of dictation, efforts were made to edit, clarify and /or correct errors. Please read the chart carefully and recognize, using context, where substitutions have occurred.  If you have any questions  or concerns about the context, text or information contained within the body of this dictation, please contact myself, the provider, for further clarification.

## 2024-04-18 NOTE — H&P (VIEW-ONLY)
Consultation - Cardiothoracic Surgery   David S Cogan 68 y.o. male MRN: [id number]  [facility] Encounter: [id number]    Physician Requesting Consult: Krystyna Hickman MD    Reason for Consult / Principal Problem: MV CAD    Inpatient consult to Cardiac Surgery  Consult performed by: Christopher Tirado PA-C  Consult ordered by: KATINA Mann        History of Present Illness: David S Cogan is a 68 y.o. male without a longstanding cardiovascular past medical history.  This certainly he did see a cardiologist at [facility] when he previously experienced angina.  By report he underwent cardiac catheterization approximately 10 years ago.  He tells me that the results of the study did not identify any significant obstructive coronary disease.  Since that time he did not complete routine cardiology follow-up.    His current clinical course began several weeks ago.  At that time he began to note exertional angina and dyspnea.  Symptoms began with activity as he swam regularly for exercise.  Symptoms began to increase in intensity and duration.  Ultimately he was referred to the emergency department at [facility].  Upon evaluation NSTEMI was identified.  He was loaded with Brilinta 180 mg and transferred to [place] for cardiac catheterization.  The study was completed today.  Severe two-vessel coronary artery disease was identified.  Cardiovascular surgery consultation was obtained at this time.    During interview today he denies significant recurrence of symptoms since admission to the hospital.  His past medical history is otherwise significant for gastroesophageal reflux disease, hypertension, hyperlipidemia, asthma, and Fontanez's esophagus.    Past Medical History:  Past Medical History:   Diagnosis Date    Asthma     Last Assessed:10/16/17    Fontanez's esophagus     Colonic polyp     Last Assessed:10/16/17    Eczema     GERD (gastroesophageal reflux disease)     Last Assessed:10/16/17     High cholesterol     Last Assessed:10/16/17    Hypertension     Last Assessed:10/16/17    Non-neoplastic nevus     Last Assessed:17         Past Surgical History:   Past Surgical History:   Procedure Laterality Date    COLONOSCOPY      10/2013- egd/colon    LYMPHADENECTOMY      Axillary Lymph node removed-benign    TONSILLECTOMY      Last Assessed:10/16/17    VALVE REPLACEMENT      Heart Valve Replacement; Last Assessed:10/16/17         Family History:  Family History   Problem Relation Age of Onset    Cancer Father         groin, bone    Diabetes Paternal Grandfather          Social History:  Social History     Substance and Sexual Activity   Alcohol Use Yes    Alcohol/week: 14.0 standard drinks of alcohol    Types: 14 Cans of beer per week     Social History     Substance and Sexual Activity   Drug Use No     Social History     Tobacco Use   Smoking Status Former    Current packs/day: 0.00    Average packs/day: 1 pack/day for 14.0 years (14.0 ttl pk-yrs)    Types: Cigarettes    Start date: 1974    Quit date:     Years since quittin.3   Smokeless Tobacco Former    Types: Chew    Quit date:      Marital Status:       Home Medications:   Prior to Admission medications    Medication Sig Start Date End Date Taking? Authorizing Provider   albuterol (PROVENTIL HFA,VENTOLIN HFA) 90 mcg/act inhaler Inhale 90 mcg 2 (two) times a day as needed   Yes Historical Provider, MD   aspirin (ECOTRIN LOW STRENGTH) 81 mg EC tablet Take 1 tablet by mouth daily As needed   Yes Historical Provider, MD   cycloSPORINE (RESTASIS) 0.05 % ophthalmic emulsion Apply 0.05 application to eye 2 (two) times a day   Yes Historical Provider, MD   diltiazem (Dilt-XR) 120 MG 24 hr capsule Take 1 capsule (120 mg total) by mouth daily 10/31/23  Yes Maday Chu PA-C   esomeprazole (NexIUM) 40 MG capsule Take 1 capsule (40 mg total) by mouth daily in the early morning 4/16/24 10/13/24 Yes Maday Chu PA-C    mometasone (NASONEX) 50 mcg/act nasal spray 2 sprays into each nostril daily   Yes Historical Provider, MD   Multiple Vitamins-Minerals (CENTRUM SILVER 50+MEN) TABS Take 1 tablet by mouth daily   Yes Historical Provider, MD   rosuvastatin (CRESTOR) 5 mg tablet Take 1 tablet (5 mg total) by mouth daily 10/31/23  Yes Maday Chu PA-C   SYMBICORT 160-4.5 MCG/ACT inhaler Take 2 puffs by mouth 2 (two) times a day 4/9/18  Yes Historical Provider, MD   thiamine 50 MG tablet Take 1 tablet (50 mg total) by mouth daily 10/30/18   Cm Glass MD   TURMERIC PO Take by mouth daily     Historical Provider, MD       Inpatient Medications:  Scheduled Meds:   Current Facility-Administered Medications   Medication Dose Route Frequency Provider Last Rate    acetaminophen  650 mg Oral Q4H PRN KATINA Mann      albuterol  1 puff Inhalation Q6H PRN KATINA Mann      Artificial Tears  2 drop Both Eyes Q12H SARITA KATINA Mann      aspirin  81 mg Oral Daily KATINA Mann      atorvastatin  80 mg Oral Daily With Dinner KATINA Mann      budesonide-formoterol  2 puff Inhalation BID KATINA Mann      cycloSPORINE  1 drop Both Eyes BID KATINA Mann      fluticasone  2 spray Each Nare Daily KATINA Mann      folic acid  1 mg Oral Daily KATINA Mann      heparin (porcine)  3-20 Units/kg/hr (Order-Specific) Intravenous Titrated KATINA Mann 13.882 Units/kg/hr (04/18/24 0949)    heparin (porcine)  4,000 Units Intravenous Q6H PRN KATINA Mann      losartan  25 mg Oral Daily KATINA Mann      metoprolol succinate  25 mg Oral Daily KATINA Mann      nitroglycerin  1 inch Topical BID KATINA Mann      nitroglycerin  0.4 mg Sublingual Q5 Min PRN KATINA Mann      ondansetron  4 mg Intravenous Q6H PRN KATINA Mann      pantoprazole  40 mg Oral Early Morning KATINA Mann      polyethylene glycol  17 g Oral Daily KATINA Mann      sodium  "chloride  75 mL/hr Intravenous Continuous KATINA Mann 75 mL/hr (04/18/24 0957)    sodium chloride  125 mL/hr Intravenous Continuous KATINA Mann      thiamine  100 mg Oral Daily KATINA Mann       Continuous Infusions: heparin (porcine), 3-20 Units/kg/hr (Order-Specific), Last Rate: 13.882 Units/kg/hr (04/18/24 0949)  sodium chloride, 75 mL/hr, Last Rate: 75 mL/hr (04/18/24 0957)  sodium chloride, 125 mL/hr      PRN Meds:  acetaminophen, 650 mg, Q4H PRN  albuterol, 1 puff, Q6H PRN  heparin (porcine), 4,000 Units, Q6H PRN  nitroglycerin, 0.4 mg, Q5 Min PRN  ondansetron, 4 mg, Q6H PRN        Allergies:  Allergies   Allergen Reactions    Amoxicillin      Other reaction(s): Diarrhea    Amoxicillin-Pot Clavulanate      Other reaction(s): Allergy Date : 07/27/2015   GI    Metoprolol      Annotation - [id number]: Aggravtes asthma    Penicillins      Annotation - 06Nov2017: Diarrhea       Review of Systems:  Review of Systems   Constitutional:  Positive for fatigue.   HENT: Negative.     Eyes: Negative.    Respiratory:  Positive for chest tightness and shortness of breath.    Cardiovascular:  Positive for chest pain. Negative for leg swelling.   Endocrine: Negative.    Genitourinary: Negative.    Musculoskeletal: Negative.    Skin: Negative.    Neurological: Negative.    Psychiatric/Behavioral: Negative.         Vital Signs:     Vitals:    04/18/24 0852 04/18/24 1100 04/18/24 1357 04/18/24 1415   BP: 140/94 140/94 108/66 107/73   BP Location: Right arm  Left arm Left arm   Pulse: 72 72 82 80   Resp:  16 12 12   Temp:   97.6 °F (36.4 °C)    TempSrc:   Oral    SpO2: 96%  95% 97%   Weight:  85.3 kg (188 lb)     Height:  5' 7\" (1.702 m)       Invasive Devices       Peripheral Intravenous Line  Duration             Peripheral IV 04/17/24 Left Antecubital <1 day    Peripheral IV 04/17/24 Left;Ventral (anterior) Wrist <1 day                    Physical Exam:  Physical Exam  Constitutional:       Appearance: " Normal appearance. He is well-developed.   HENT:      Head: Normocephalic and atraumatic.   Eyes:      Conjunctiva/sclera: Conjunctivae normal.   Neck:      Thyroid: No thyromegaly.      Vascular: No carotid bruit or JVD.      Trachea: No tracheal deviation.   Cardiovascular:      Rate and Rhythm: Normal rate and regular rhythm.      Pulses:           Carotid pulses are 2+ on the right side and 2+ on the left side.       Dorsalis pedis pulses are 2+ on the right side and 2+ on the left side.        Posterior tibial pulses are 2+ on the right side and 2+ on the left side.      Heart sounds: S1 normal and S2 normal.   Pulmonary:      Effort: No accessory muscle usage or respiratory distress.      Breath sounds: No wheezing or rales.   Chest:      Chest wall: No tenderness.   Abdominal:      General: Bowel sounds are normal.      Palpations: Abdomen is soft.      Tenderness: There is no abdominal tenderness.   Musculoskeletal:         General: Normal range of motion.      Cervical back: Full passive range of motion without pain and normal range of motion.   Skin:     General: Skin is warm and dry.   Neurological:      Mental Status: He is alert and oriented to person, place, and time.      Cranial Nerves: No cranial nerve deficit.      Sensory: No sensory deficit.   Psychiatric:         Speech: Speech normal.         Behavior: Behavior normal.         Lab Results:     Results from last 7 days   Lab Units 04/18/24  0439 04/17/24  1520   WBC Thousand/uL 6.62 6.36   HEMOGLOBIN g/dL 14.6 15.7   HEMATOCRIT % 43.9 46.8   PLATELETS Thousands/uL 283 313     Results from last 7 days   Lab Units 04/18/24  0439 04/17/24  1520 04/15/24  1255   POTASSIUM mmol/L 4.1 4.2 5.1   CHLORIDE mmol/L 105 103 98   CO2 mmol/L 22 25 23   BUN mg/dL 13 17 17   CREATININE mg/dL 1.16 1.17 1.50*   CALCIUM mg/dL 8.2* 9.0  --      Results from last 7 days   Lab Units 04/18/24  0858 04/18/24  0212 04/17/24 2004 04/17/24  1614   INR   --   --   --   1.06   PTT seconds 44* 95* 33 30     Lab Results   Component Value Date    HGBA1C 5.6 01/22/2019     Lab Results   Component Value Date    TROPONINI <0.02 05/17/2020       Imaging Studies:     Cardiac Catheterization: 2 vessel CAD. Report pending    Echocardiogram: Completed; Report pending.     I have personally reviewed pertinent reports.   and I have personally reviewed pertinent films in PACS    Assessment:  Principal Problem:    NSTEMI (non-ST elevated myocardial infarction) (HCC)  Active Problems:    Benign hypertension    Hyperlipidemia    Alcohol use, unspecified with other alcohol-induced disorder (HCC)    Asthma    Severe coronary artery disease; Ongoing CABG workup    Plan:  Risks and benefits of coronary artery bypass grafting were discussed in detail today with the patient.  They understand and wish to proceed with further workup and ultimately surgical intervention.  We have ordered routine preoperative laboratory and vascular studies.  Pending the results of these tests, they will be scheduled for surgery April 23 with Dr. Balwinder Sky D.O.    David S Cogan was comfortable with our recommendations, and their questions were answered to their satisfaction.  We will continue to evaluate the patient daily with further recommendations as work up is completed.  Thank you for allowing us to participate in the care of this patient.     SIGNATURE: Christopher Tirado PA-C  DATE: April 18, 2024  TIME: 2:24 PM    * This note was completed in part utilizing [employer] direct voice recognition software.   Grammatical errors, random word insertion, spelling mistakes, and incomplete sentences may be an occasional consequence of the system secondary to software limitations, ambient noise and hardware issues. At the time of dictation, efforts were made to edit, clarify and /or correct errors. Please read the chart carefully and recognize, using context, where substitutions have occurred.  If you have any questions  or concerns about the context, text or information contained within the body of this dictation, please contact myself, the provider, for further clarification.

## 2024-04-18 NOTE — ASSESSMENT & PLAN NOTE
Patient presenting with one week history of intermittent chest pain/pressure. Described as up to an 8-9/10 in intensity, substernal, with radiation to the bilateral shoulders and upper arms. Associated symptoms include nausea/vomiting (end of last week), abdominal bloating (last week), and shortness of breath/fatigue (earlier this week); he denies any associated diaphoresis. Above symptoms improved with rest/Gasex; of note, patient has had somewhat similar symptoms in the past secondary to GERD.     Seen by PCP/cardiology earlier this week with EKG(s) at that time suggestive of age-indeterminate septal infarct with nonspecific t-wave changes. Subsequently advised to present to the ED, where troponin found to be elevated at 1,217 (2 hour brionna 1,119). Treated with Protonix, carafate, loaded with ASA, and started on heparin drip.    Cardiac cath 4/19: 85% stenosed mid LAD lesion, 100% stenosis circumflex (with collaterals to the LAD), mild diffuse RCA disease    CT surgery consulted by inpatient cardiology team given the above, with plans for CABG to be done next week    Update (4/20): Had episode of CP earlier in the morning.  EKG showed no change to prior study, tropes were drawn and currently pending.  Per patient he believes he is possibly does have anxiety for the upcoming potential CABG versus cardiac procedure.  Otherwise HDS.    4/21: Troponins yesterday 89/78/66.  EKG done at that time read as consistent with possible anterior lateral ischemia.  As of time of writing, patient has remained chest pain-free since this episode yesterday.    Plan  -Cardiology, CT surgery recommendations appreciated - plan for CABG next week on Tuesday  -Continue ASA; Brilinta on hold pending surgical intervention next week  -Continue metoprolol, atorvastatin as specified elsewhere; losartan on hold per CT surgery  -STAT EKG with recurrence of chest pain  -Nitro as needed for symptomatic chest pain

## 2024-04-18 NOTE — DISCHARGE INSTR - AVS FIRST PAGE
Instructions Following Open Heart Surgery      Protect your sternum (breast bone). Wires are placed during surgery to hold the sternum together. Do not lift anything heavier than 10 pounds for the first four weeks after surgery. (For example, a gallon of milk weighs 8 pounds) When you are seen for a routine postop check, you will be cleared to lift up to 25 lbs. Following three months from the time of surgery, you may lift without any restrictions.     Hug a pillow to your chest or cross your arms over your chest when you laugh, sneeze, or cough. You may resume sexual activity when you feel ready. Do not put any excessive pressure on your chest.    Be careful when you get into or out of a chair or bed.  Hug a pillow or cross your arms when you stand or sit. Do not twist as you move. Use only your legs to sit and stand. You may need a raised toilet seat if you have trouble standing up without using your arms.     No sleeping on side for the first 4 weeks. Limit daytime naps, to prevent difficulty sleeping at night.    Women: Wear a clean surgical bra every day.     Do not play sports that use your shoulder.  Examples include tennis and golf.    Do not drive until cleared by surgeon. Typically cleared to drive after one month, determination will be made at routine postop appointment. When a passenger in the car, wear a seat belt.    Continue you breathing exercises throughout the day with incentive spirometry    Activity: Your goal is to go on frequent walks, slowly increasing your activity level. Walking will help prevent leg swelling and prevent blood clots. When you start outpatient cardiac rehab in one month, you will be given additional instructions and a formal exercise plan. It is OK to go up steps, with help.     You may resume sexual activity when you are comfortable. Do not put excessive pressure on your chest.     Weigh yourself daily in the morning and keep of log of your weight. If your weight  increases more than 2 lbs per day or more than 5 lbs in a week, call your surgeon's office.    Keep your legs elevated when sitting. Pressure stockings may be worn to prevent significant leg swelling.     You may get routine vaccines any time after open heart surgery    Do not smoke:  Nicotine can damage blood vessels and make it more difficult to heal. Do not use e-cigarettes or smokeless tobacco in place of cigarettes or to help you quit. They still contain nicotine. Ask your primary care provider for information if you currently smoke and need help quitting.     Incision/Wound Care:    Shower daily. Gently wash your incision with warm water and mild soap. Do not scrub the area. Gently pat the area dry with a clean towel. If you have a bandage, dry the area and put on a new, clean bandage. Change your bandage at least daily, or if it gets wet or dirty. Always wash your hands before you care for your wound. Do not apply ointments, creams, lotions, powders, etc. If you develop signs of an infection (fever > 101, redness, warm skin, or drainage) please call your surgeon's office.     Do not pick at or touch puncture sites or incisions. Allow and scabs to come off on their own.     It is normal to have some drainage from the chest tube sites. Apply clean/dry dressings, until this resolves.    You may have discomfort or numbness along the incision for 3-4 weeks. It is normal to occasionally experience brief sharp shooing pains, tightness, or pulling sensations.    Do not take a bath or swim until cleared by surgeon.    As your incision heals, sometimes small tender lumps or pimple-like bumps develop which is due to your body attempting to “dissolve” the suture (stiches).     Call your local emergency number (415 in the [place]) or have someone call if:   You have squeezing, pressure, or pain in your chest or back, lightheadedness or sudden cold sweat  Short of breath that does not go away with rest  You cough up blood.  You  have a fast heartbeat that flutters. Or palpitations  You faint.  Signs of a stroke:  Numbness or drooping on one side of your face. Weakness in an arm or leg. Confusion or difficulty speaking. Dizziness, a severe headache, or vision loss    Call your surgeons office if:   You have bleeding that does not stop even after you apply pressure for 5 minutes.  You have redness, tenderness, or signs of infection at incision (pain, swelling, odor, or green/yellow discharge from incision site)  You have a severe headache.  You have a fever higher than 101°F (38.4°C).  You urinate less, or not at all.  You have persistent nausea, vomiting, or diarrhea  You hear persistent or worsening crunching or grinding in your sternum.  You have questions or concerns about your condition or care.      Diet:    Eat heart-healthy foods, low in unhealthy fats and sodium (salt). A heart healthy diet helps improve your cholesterol levels and lowers your risk for heart disease and stroke. You will receive formal education on healthy eating and label reading during your cardiac rehab in one month.   Choose foods that contain healthy fats, such as soybean, canola, olive, corn, and sunflower oils.  Limit unhealthy fats. Examples include:  Cholesterol  is found in animal foods, such as eggs and lobster, and in dairy products made from whole milk.    Saturated fat  is found in meats, such as hart and hamburger. It is also found in chicken or turkey skin, whole milk, and butter.     Trans fat  is found in packaged foods, such as potato chips and cookies. It is also in some fried foods, and shortening. Do not eat foods that contain trans fats.  Get 20 to 30 grams of fiber each day.  Fruits, vegetables, whole-grain foods, and legumes (cooked beans) are good sources of fiber.  Low Sodium: Limit to 2,000 mg or less each day, unless otherwise directed. Choose low-sodium or no-salt-added foods. Add little or no salt to food you prepare. Use herbs and  spices in place of salt.  Foods high in sodium include frozen dinners, macaroni and cheese, instant potatoes, canned vegetables, cured or smoked meats, hot dogs, hart and sausage, ketchup, barbecue sauce, salad dressing, pickles, olives, soy sauce, and miso.     Fluid Restriction: Fluid restriction after hospital discharge is advised. Limit your fluid intake to no more than 8 cups of liquid (8oz = 1cup) or 2000 mL per day.    Limit/Do not drink alcohol. Alcohol can damage heart and raise your blood pressure. The general recommended limit is 1 drink a day for women 21 or older and for men 65 or older. Do not have more than 3 drinks within 24 hours or 7 within a week. A drink of alcohol is 12 oz of beer, 5 oz of wine, or 1½ oz of liquor.        Narcotic Safety     What do I need to know about narcotic safety?    A narcotic is a type of medicine used to treat pain. When you are discharged from the hospital, you will be prescribed a narcotic called oxycodone. Pain control and management may help you rest, heal, and return to your daily activities. Do not take more than the recommended amount. Too much can cause a life-threatening overdose. Do not continue to take it after your pain stops.     How do I use narcotics safely?   Take prescribed narcotics exactly as directed.  You may develop tolerance. This means you keep needing higher doses to get the same effect. You may also develop narcotic use disorder. This means you are not able to control your narcotic use.  Do not give narcotics to others or take narcotics that belong to someone else.  Do not mix narcotics with other medicines or alcohol.  The combination can cause an overdose, or cause you to stop breathing.   Do not drive or operate heavy machinery after you use a narcotic.    Talk to your healthcare provider if you have any side effects.  Side effects include nausea, sleepiness, itching, and trouble thinking clearly.     What can I do to manage constipation?   Constipation is the most common side effect of narcotic medicine. Constipation is when you have hard, dry bowel movements, or you go longer than usual between bowel movements. The following are ways you can prevent or relieve constipation:  Drink liquids as allowed.  Drinking extra liquids will help soften and move your bowels. You should drink up to your maximum fluid allotment of 2 liters.    Eat high-fiber foods.  This may help decrease constipation by adding bulk to your bowel movements. High-fiber foods include fruits, vegetables, whole-grain breads and cereals, and beans  Supplements. You have been prescribed a fiber supplement called polyethylene glycol (Kathy lax) and a stool softener called docusate sodium (Colace). You should take these for as long as you continue narcotic medications.  Exercise regularly.  Regular physical activity can help stimulate your intestines. Walking is a good exercise to prevent or relieve constipation. Ask which exercises are best for you.    How do I store narcotics safely?   Store narcotics where others cannot easily get them.  Keep them in a locked cabinet or secure area. Do not  keep them in a purse or other bag you carry with you. A person may be looking for something else and find the narcotics.    Make sure narcotics are stored out of the reach of children.  A child can easily overdose on narcotics. Narcotics may look like candy to a small child.

## 2024-04-18 NOTE — PLAN OF CARE
Problem: SAFETY ADULT  Goal: Patient will remain free of falls  Description: INTERVENTIONS:  - Educate patient/family on patient safety including physical limitations  - Instruct patient to call for assistance with activity   - Consult OT/PT to assist with strengthening/mobility   - Keep Call bell within reach  - Keep bed low and locked with side rails adjusted as appropriate  - Keep care items and personal belongings within reach  - Initiate and maintain comfort rounds  - Make Fall Risk Sign visible to staff  - Offer Toileting every 2 Hours, in advance of need  - Initiate/Maintain bed alarm  - Obtain necessary fall risk management equipment  - Apply yellow socks and bracelet for high fall risk patients  - Consider moving patient to room near nurses station  Outcome: Progressing  Goal: Maintain or return to baseline ADL function  Description: INTERVENTIONS:  -  Assess patient's ability to carry out ADLs; assess patient's baseline for ADL function and identify physical deficits which impact ability to perform ADLs (bathing, care of mouth/teeth, toileting, grooming, dressing, etc.)  - Assess/evaluate cause of self-care deficits   - Assess range of motion  - Assess patient's mobility; develop plan if impaired  - Assess patient's need for assistive devices and provide as appropriate  - Encourage maximum independence but intervene and supervise when necessary  - Involve family in performance of ADLs  - Assess for home care needs following discharge   - Consider OT consult to assist with ADL evaluation and planning for discharge  - Provide patient education as appropriate  Outcome: Progressing  Goal: Maintains/Returns to pre admission functional level  Description: INTERVENTIONS:  - Perform AM-PAC 6 Click Basic Mobility/ Daily Activity assessment daily.  - Set and communicate daily mobility goal to care team and patient/family/caregiver.   - Collaborate with rehabilitation services on mobility goals if consulted  -  Perform Range of Motion 4 times a day.  - Reposition patient every 2 hours.  - Dangle patient 4 times a day  - Stand patient 3 times a day  - Ambulate patient 3 times a day  - Out of bed to chair 3 times a day   - Out of bed for meals 3 times a day  - Out of bed for toileting  - Record patient progress and toleration of activity level   Outcome: Progressing     Problem: DISCHARGE PLANNING  Goal: Discharge to home or other facility with appropriate resources  Description: INTERVENTIONS:  - Identify barriers to discharge w/patient and caregiver  - Arrange for needed discharge resources and transportation as appropriate  - Identify discharge learning needs (meds, wound care, etc.)  - Arrange for interpretive services to assist at discharge as needed  - Refer to Case Management Department for coordinating discharge planning if the patient needs post-hospital services based on physician/advanced practitioner order or complex needs related to functional status, cognitive ability, or social support system  Outcome: Progressing     Problem: Knowledge Deficit  Goal: Patient/family/caregiver demonstrates understanding of disease process, treatment plan, medications, and discharge instructions  Description: Complete learning assessment and assess knowledge base.  Interventions:  - Provide teaching at level of understanding  - Provide teaching via preferred learning methods  Outcome: Progressing     Problem: CARDIOVASCULAR - ADULT  Goal: Maintains optimal cardiac output and hemodynamic stability  Description: INTERVENTIONS:  - Monitor I/O, vital signs and rhythm  - Monitor for S/S and trends of decreased cardiac output  - Administer and titrate ordered vasoactive medications to optimize hemodynamic stability  - Assess quality of pulses, skin color and temperature  - Assess for signs of decreased coronary artery perfusion  - Instruct patient to report change in severity of symptoms  Outcome: Progressing  Goal: Absence of cardiac  dysrhythmias or at baseline rhythm  Description: INTERVENTIONS:  - Continuous cardiac monitoring, vital signs, obtain 12 lead EKG if ordered  - Administer antiarrhythmic and heart rate control medications as ordered  - Monitor electrolytes and administer replacement therapy as ordered  Outcome: Progressing     Problem: MOBILITY - ADULT  Goal: Maintain or return to baseline ADL function  Description: INTERVENTIONS:  -  Assess patient's ability to carry out ADLs; assess patient's baseline for ADL function and identify physical deficits which impact ability to perform ADLs (bathing, care of mouth/teeth, toileting, grooming, dressing, etc.)  - Assess/evaluate cause of self-care deficits   - Assess range of motion  - Assess patient's mobility; develop plan if impaired  - Assess patient's need for assistive devices and provide as appropriate  - Encourage maximum independence but intervene and supervise when necessary  - Involve family in performance of ADLs  - Assess for home care needs following discharge   - Consider OT consult to assist with ADL evaluation and planning for discharge  - Provide patient education as appropriate  Outcome: Progressing  Goal: Maintains/Returns to pre admission functional level  Description: INTERVENTIONS:  - Perform AM-PAC 6 Click Basic Mobility/ Daily Activity assessment daily.  - Set and communicate daily mobility goal to care team and patient/family/caregiver.   - Collaborate with rehabilitation services on mobility goals if consulted  - Perform Range of Motion 4 times a day.  - Reposition patient every 2 hours.  - Dangle patient 4 times a day  - Stand patient 3 times a day  - Ambulate patient 3 times a day  - Out of bed to chair 3 times a day   - Out of bed for meals 3 times a day  - Out of bed for toileting  - Record patient progress and toleration of activity level   Outcome: Progressing

## 2024-04-18 NOTE — ASSESSMENT & PLAN NOTE
Patient with history of HTN, treated as an outpatient with diltiazem 120 mg daily. BP on arrival 161/93, with most subsequent readings in the range of 130-150s/60-70s.    Plan  -Given ongoing mild hypertension, we will likely increase amlodipine to 10 mg daily; continue Toprol 50 mg daily  -Losartan on hold per CT surgery pending CABG next week  -Continue to monitor

## 2024-04-18 NOTE — ASSESSMENT & PLAN NOTE
Patient with history of HLD, with last lipid panel (October 2023) notable for cholesterol 298, triglycerides 195, HDL 69, . Was previously on treatment with Crestor 5 mg daily, though endorses overall poor compliance to this.    Plan  -Continue atorvastatin 80 mg daily

## 2024-04-18 NOTE — ED NOTES
Per SOD, just to rebolus with 4,000 heparin only, no increase In rate     Annita Monaco RN  04/17/24 [id number]

## 2024-04-18 NOTE — CONSULTS
Consultation - Cardiology Team 1  David S Cogan 68 y.o. male MRN: [id number]  Unit/Bed#: [facility] 209-01 Encounter: [id number]    Assessment/Plan     Principal Problem:    NSTEMI (non-ST elevated myocardial infarction) (HCC)  Active Problems:    Benign hypertension    Hyperlipidemia    Alcohol use, unspecified with other alcohol-induced disorder (HCC)    Asthma      Assessment    Chest pain with elevated troponin- concerning for NSTEMI  Presented with troponin 1217 which is down trending  ECG-sinus rhythm septal infarct pattern. No ST elevations  He has been having chest pain at times with radiation to the both arms both at rest and exertion for last 11 days.  Wife said he appeared ill 2 days ago, very short of breath while cutting the grass.  Patient is currently pain-free.  Has been loaded with aspirin, on beta-blocker, on statin.  Has been started on IV heparin and loaded with Brilinta.  Last chest discomfort was last evening walking to the bathroom.     Hypertension- mildly hypertensive   PTA-diltiazem 120 mg daily  Currently on Toprol 25 mg daily, losartan 25 mg daily.    Hyperlipidemia-inconsistently taking statin- crestor 5mg   FLP-cholesterol 298/triglyceride 195/HDL 69/calculated   Atorvastatin 40 mg was initiated    Regular alcohol use-can drink up to 6-8 alcoholic beverages in a day    Asthma- no recent exacerbation    History of tobacco abuse-quit 1988    Family history of premature CAD-father with CAD in his 50s.    Plan  Continue aspirin, statin ( increase to 80mg atorvastatin) , beta-blocker and IV heparin.  Continue Brilinta  Recommend [facility] for high suspicion of late presentation ACS. N.p.o. for left heart cath today PCI if indicated.  Patient agreeable to proceed.  Will add Nitropaste.  Will follow-up with transthoracic echocardiogram  Importance of medical compliance reviewed.    History of Present Illness   Physician Requesting Consult: Krystyna Hickman MD  Reason for Consult / Principal Problem:  chest pain    HPI: David S Cogan is a 68 y.o. year old male with history of GERD, hypertension, hyperlipidemia, regular alcohol use, asthma who presents with chest pain .  Patient has been experiencing chest discomfort since last Monday April 8th.  First episode was while he was swimming which she does on a regular basis.  It resolved with rest.  He went back to swimming and it reoccurred.  He has been having episodes of chest heaviness since both exertional and at rest.  Sometimes there is radiation to both arms.  He was thinking it may have been heartburn.  The episode at rest was a few nights ago after he had multiple alcoholic beverages and [place] of [place].  He awoke with chest pain.  He took an antiacid and felt better. He had nausea and vomiting.  2 days ago while mowing the grass he was very short of breath.  His wife was very concerned and she kept checking on him.  At one  point he was hunched over.  He said that particular day he did not actually have chest pain.  He was his PCP 4/16 who ordered an exercise stress test and echocardiogram.  He was advised to go to the ED if he has recurrent chest pain.  He saw cardiology yesterday for the first time ,Dr. Willis at the [facility] who recommended hospitalization . He presented mildly hypertensive.  Patient was given a full dose aspirin, loaded with Brilinta and started on IV heparin.     Presented with troponin of 1217 followed by 1119  ECG-septal infarct pattern    Patient was seen by a cardiologist 10 years ago for chest pain.  Reportedly underwent a stress test which she says was normal.  He also recalls about 8 years ago having a cardiac catheterization for chest pain and does not recall any significant blockages.  He said he was diagnosed with GERD placed on a PPI and his chest pain resolved.     Inpatient consult to Cardiology  Consult performed by: KATINA Li  Consult ordered by: Daniel Shah MD        Review of  Systems   Constitutional:  Positive for activity change.   HENT: Negative.     Eyes: Negative.    Respiratory:  Positive for shortness of breath.    Cardiovascular:  Positive for chest pain.   Gastrointestinal:  Positive for nausea and vomiting.   Endocrine: Negative.    Genitourinary: Negative.    Musculoskeletal: Negative.    Allergic/Immunologic: Negative.    Neurological: Negative.    Hematological: Negative.    Psychiatric/Behavioral: Negative.     All other systems reviewed and are negative.      Historical Information   Past Medical History:   Diagnosis Date    Asthma     Last Assessed:10/16/17    Fontanez's esophagus     Colonic polyp     Last Assessed:10/16/17    Eczema     GERD (gastroesophageal reflux disease)     Last Assessed:10/16/17    High cholesterol     Last Assessed:10/16/17    Hypertension     Last Assessed:10/16/17    Non-neoplastic nevus     Last Assessed:17     Past Surgical History:   Procedure Laterality Date    COLONOSCOPY      10/2013- egd/colon    LYMPHADENECTOMY      Axillary Lymph node removed-benign    TONSILLECTOMY      Last Assessed:10/16/17    VALVE REPLACEMENT      Heart Valve Replacement; Last Assessed:10/16/17     Social History     Substance and Sexual Activity   Alcohol Use Yes    Alcohol/week: 14.0 standard drinks of alcohol    Types: 14 Cans of beer per week     Social History     Substance and Sexual Activity   Drug Use No     Social History     Tobacco Use   Smoking Status Former    Current packs/day: 0.00    Average packs/day: 1 pack/day for 14.0 years (14.0 ttl pk-yrs)    Types: Cigarettes    Start date: 1974    Quit date:     Years since quittin.3   Smokeless Tobacco Former    Types: Chew    Quit date:      Family History:   Family History   Problem Relation Age of Onset    Cancer Father         groin, bone    Diabetes Paternal Grandfather        Meds/Allergies   current meds:   Current Facility-Administered Medications   Medication Dose Route  Frequency    acetaminophen (TYLENOL) tablet 650 mg  650 mg Oral Q4H PRN    albuterol (PROVENTIL HFA,VENTOLIN HFA) inhaler 1 puff  1 puff Inhalation Q6H PRN    Artificial Tears ophthalmic solution 2 drop  2 drop Both Eyes Q12H SARITA    aspirin (ECOTRIN LOW STRENGTH) EC tablet 81 mg  81 mg Oral Daily    atorvastatin (LIPITOR) tablet 40 mg  40 mg Oral Daily With Dinner    budesonide-formoterol (SYMBICORT) 160-4.5 mcg/act inhaler 2 puff  2 puff Inhalation BID    cycloSPORINE (RESTASIS) 0.05 % ophthalmic emulsion 1 drop  1 drop Both Eyes BID    diltiazem (DILACOR XR) 24 hr capsule 120 mg  120 mg Oral Daily    fluticasone (FLONASE) 50 mcg/act nasal spray 2 spray  2 spray Each Nare Daily    folic acid (FOLVITE) tablet 1 mg  1 mg Oral Daily    heparin (porcine) 25,000 units in 0.45% NaCl 250 mL infusion (premix)  3-20 Units/kg/hr (Order-Specific) Intravenous Titrated    heparin (porcine) injection 2,000 Units  2,000 Units Intravenous Q6H PRN    heparin (porcine) injection 4,000 Units  4,000 Units Intravenous Q6H PRN    losartan (COZAAR) tablet 25 mg  25 mg Oral Daily    metoprolol succinate (TOPROL-XL) 24 hr tablet 25 mg  25 mg Oral Daily    nitroglycerin (NITROSTAT) SL tablet 0.4 mg  0.4 mg Sublingual Q5 Min PRN    ondansetron (ZOFRAN) injection 4 mg  4 mg Intravenous Q6H PRN    pantoprazole (PROTONIX) EC tablet 40 mg  40 mg Oral Early Morning    polyethylene glycol (MIRALAX) packet 17 g  17 g Oral Daily    thiamine tablet 100 mg  100 mg Oral Daily    ticagrelor (BRILINTA) tablet 90 mg  90 mg Oral Q12H SARITA    and PTA meds:    Medications Prior to Admission   Medication    albuterol (PROVENTIL HFA,VENTOLIN HFA) 90 mcg/act inhaler    aspirin (ECOTRIN LOW STRENGTH) 81 mg EC tablet    cycloSPORINE (RESTASIS) 0.05 % ophthalmic emulsion    diltiazem (Dilt-XR) 120 MG 24 hr capsule    esomeprazole (NexIUM) 40 MG capsule    mometasone (NASONEX) 50 mcg/act nasal spray    Multiple Vitamins-Minerals (CENTRUM SILVER 50+MEN) TABS     rosuvastatin (CRESTOR) 5 mg tablet    SYMBICORT 160-4.5 MCG/ACT inhaler    thiamine 50 MG tablet    TURMERIC PO     Allergies   Allergen Reactions    Amoxicillin      Other reaction(s): Diarrhea    Amoxicillin-Pot Clavulanate      Other reaction(s): Allergy Date : 07/27/2015   GI    Metoprolol      Annotation - 06Nov2017: Aggravtes asthma    Penicillins      Annotation - 06Nov2017: Diarrhea       Objective   Vitals: Blood pressure 140/94, pulse 72, temperature 97.7 °F (36.5 °C), temperature source Oral, resp. rate 12, SpO2 96%.  Orthostatic Blood Pressures      Flowsheet Row Most Recent Value   Blood Pressure 140/94 filed at 04/18/2024 0852   Patient Position - Orthostatic VS Lying filed at 04/18/2024 0852              Intake/Output Summary (Last 24 hours) at 4/18/2024 0910  Last data filed at 4/18/2024 0801  Gross per 24 hour   Intake 145.16 ml   Output --   Net 145.16 ml       Invasive Devices       Peripheral Intravenous Line  Duration             Peripheral IV 04/17/24 Left Antecubital <1 day    Peripheral IV 04/17/24 Left;Ventral (anterior) Wrist <1 day                    Physical Exam: /94 (BP Location: Right arm)   Pulse 72   Temp 97.7 °F (36.5 °C) (Oral)   Resp 12   SpO2 96%   General Appearance:    Alert, cooperative, no distress, appears stated age   Head:    Normocephalic, no scleral icterus   Eyes:    PERRL   Nose:   Nares normal, septum midline, mucosa normal, no drainage    Throat:   Lips, mucosa, and tongue normal   Neck:   Supple, symmetrical, trachea midline     no JVD   Back:     Symmetric   Lungs:     Clear to auscultation bilaterally, respirations unlabored   Chest Wall:    No tenderness or deformity    Heart:    Regular rate and rhythm, S1 and S2 normal, no murmur, rub   or gallop   Abdomen:     Soft, non-tender, bowel sounds active all four quadrants,     no masses, no organomegaly   Extremities:   Extremities normal, atraumatic, no cyanosis or edema   Pulses:   2+ and symmetric all  extremities   Skin:   Skin color, texture, turgor normal, no rashes or lesions   Neurologic:   Alert and oriented to person place and time. No focal deficits       Lab Results:   Recent Results (from the past 72 hour(s))   Comprehensive metabolic panel    Collection Time: 04/15/24 12:55 PM   Result Value Ref Range    Glucose, Random 93 70 - 99 mg/dL    BUN 17 8 - 27 mg/dL    Creatinine 1.50 (H) 0.76 - 1.27 mg/dL    eGFR 50 (L) >59 mL/min/1.73    SL AMB BUN/CREATININE RATIO 11 10 - 24    Sodium 138 134 - 144 mmol/L    Potassium 5.1 3.5 - 5.2 mmol/L    Chloride 98 96 - 106 mmol/L    CO2 23 20 - 29 mmol/L    CALCIUM 10.0 8.6 - 10.2 mg/dL    Protein, Total 7.2 6.0 - 8.5 g/dL    Albumin 4.3 3.9 - 4.9 g/dL    Globulin, Total 2.9 1.5 - 4.5 g/dL    Albumin/Globulin Ratio 1.5 1.2 - 2.2    TOTAL BILIRUBIN 0.7 0.0 - 1.2 mg/dL    Alk Phos Isoenzymes 76 44 - 121 IU/L    AST 39 0 - 40 IU/L    ALT 23 0 - 44 IU/L   LDL cholesterol, direct    Collection Time: 04/15/24 12:55 PM   Result Value Ref Range    LDL Direct 134 (H) 0 - 99 mg/dL   ECG 12 lead    Collection Time: 04/17/24  2:55 PM   Result Value Ref Range    Ventricular Rate 78 BPM    Atrial Rate 78 BPM    AK Interval 180 ms    QRSD Interval 90 ms    QT Interval 390 ms    QTC Interval 444 ms    P Axis 73 degrees    QRS Axis 73 degrees    T Wave Axis 92 degrees   CBC and differential    Collection Time: 04/17/24  3:20 PM   Result Value Ref Range    WBC 6.36 4.31 - 10.16 Thousand/uL    RBC 5.11 3.88 - 5.62 Million/uL    Hemoglobin 15.7 12.0 - 17.0 g/dL    Hematocrit 46.8 36.5 - 49.3 %    MCV 92 82 - 98 fL    MCH 30.7 26.8 - 34.3 pg    MCHC 33.5 31.4 - 37.4 g/dL    RDW 13.7 11.6 - 15.1 %    MPV 10.7 8.9 - 12.7 fL    Platelets 313 149 - 390 Thousands/uL    nRBC 0 /100 WBCs    Segmented % 48 43 - 75 %    Immature Grans % 0 0 - 2 %    Lymphocytes % 36 14 - 44 %    Monocytes % 12 4 - 12 %    Eosinophils Relative 3 0 - 6 %    Basophils Relative 1 0 - 1 %    Absolute Neutrophils 3.06  "1.85 - 7.62 Thousands/µL    Absolute Immature Grans 0.02 0.00 - 0.20 Thousand/uL    Absolute Lymphocytes 2.28 0.60 - 4.47 Thousands/µL    Absolute Monocytes 0.76 0.17 - 1.22 Thousand/µL    Eosinophils Absolute 0.21 0.00 - 0.61 Thousand/µL    Basophils Absolute 0.03 0.00 - 0.10 Thousands/µL   Comprehensive metabolic panel    Collection Time: 04/17/24  3:20 PM   Result Value Ref Range    Sodium 137 135 - 147 mmol/L    Potassium 4.2 3.5 - 5.3 mmol/L    Chloride 103 96 - 108 mmol/L    CO2 25 21 - 32 mmol/L    ANION GAP 9 4 - 13 mmol/L    BUN 17 5 - 25 mg/dL    Creatinine 1.17 0.60 - 1.30 mg/dL    Glucose 94 65 - 140 mg/dL    Calcium 9.0 8.4 - 10.2 mg/dL    AST 26 13 - 39 U/L    ALT 18 7 - 52 U/L    Alkaline Phosphatase 62 34 - 104 U/L    Total Protein 7.3 6.4 - 8.4 g/dL    Albumin 4.1 3.5 - 5.0 g/dL    Total Bilirubin 0.53 0.20 - 1.00 mg/dL    eGFR 63 ml/min/1.73sq m   HS Troponin 0hr (reflex protocol)    Collection Time: 04/17/24  3:20 PM   Result Value Ref Range    hs TnI 0hr 1,217 (H) \"Refer to ACS Flowchart\"- see link ng/L   APTT six (6) hours after Heparin bolus/drip initiation or dosing change    Collection Time: 04/17/24  4:14 PM   Result Value Ref Range    PTT 30 23 - 37 seconds   Protime-INR    Collection Time: 04/17/24  4:14 PM   Result Value Ref Range    Protime 13.7 11.6 - 14.5 seconds    INR 1.06 0.84 - 1.19   HS Troponin I 2hr    Collection Time: 04/17/24  5:31 PM   Result Value Ref Range    hs TnI 2hr 1,119 (H) \"Refer to ACS Flowchart\"- see link ng/L    Delta 2hr hsTnI -98 <20 ng/L   HS Troponin I 4hr    Collection Time: 04/17/24  7:54 PM   Result Value Ref Range    hs TnI 4hr 1,112 (H) \"Refer to ACS Flowchart\"- see link ng/L    Delta 4hr hsTnI -105 <20 ng/L   APTT    Collection Time: 04/17/24  8:04 PM   Result Value Ref Range    PTT 33 23 - 37 seconds   APTT    Collection Time: 04/18/24  2:12 AM   Result Value Ref Range    PTT 95 (H) 23 - 37 seconds   Basic metabolic panel    Collection Time: 04/18/24  " 4:39 AM   Result Value Ref Range    Sodium 139 135 - 147 mmol/L    Potassium 4.1 3.5 - 5.3 mmol/L    Chloride 105 96 - 108 mmol/L    CO2 22 21 - 32 mmol/L    ANION GAP 12 4 - 13 mmol/L    BUN 13 5 - 25 mg/dL    Creatinine 1.16 0.60 - 1.30 mg/dL    Glucose 94 65 - 140 mg/dL    Calcium 8.2 (L) 8.4 - 10.2 mg/dL    eGFR 64 ml/min/1.73sq m   CBC and differential    Collection Time: 04/18/24  4:39 AM   Result Value Ref Range    WBC 6.62 4.31 - 10.16 Thousand/uL    RBC 4.74 3.88 - 5.62 Million/uL    Hemoglobin 14.6 12.0 - 17.0 g/dL    Hematocrit 43.9 36.5 - 49.3 %    MCV 93 82 - 98 fL    MCH 30.8 26.8 - 34.3 pg    MCHC 33.3 31.4 - 37.4 g/dL    RDW 13.7 11.6 - 15.1 %    MPV 10.3 8.9 - 12.7 fL    Platelets 283 149 - 390 Thousands/uL    nRBC 0 /100 WBCs    Segmented % 46 43 - 75 %    Immature Grans % 1 0 - 2 %    Lymphocytes % 38 14 - 44 %    Monocytes % 10 4 - 12 %    Eosinophils Relative 4 0 - 6 %    Basophils Relative 1 0 - 1 %    Absolute Neutrophils 3.13 1.85 - 7.62 Thousands/µL    Absolute Immature Grans 0.04 0.00 - 0.20 Thousand/uL    Absolute Lymphocytes 2.52 0.60 - 4.47 Thousands/µL    Absolute Monocytes 0.65 0.17 - 1.22 Thousand/µL    Eosinophils Absolute 0.23 0.00 - 0.61 Thousand/µL    Basophils Absolute 0.05 0.00 - 0.10 Thousands/µL   ECG 12 lead    Collection Time: 04/18/24  7:42 AM   Result Value Ref Range    Ventricular Rate 74 BPM    Atrial Rate 74 BPM    DE Interval 183 ms    QRSD Interval 88 ms    QT Interval 417 ms    QTC Interval 463 ms    P [place]bbering 69 degrees    QRS Axis 53 degrees    T Wave Axis 85 degrees     Imaging: I have personally reviewed pertinent reports.    EKG: NSR septal infarct pattern  VTE Prophylaxis: IV heparin    Code Status: Level 1 - Full Code  Advance Directive and Living Will:      Power of :    POLST:      Counseling / Coordination of Care  Total floor / unit time spent today 50 minutes.  Greater than 50% of total time was spent with the patient and / or family counseling and /  or coordination of care.

## 2024-04-18 NOTE — ASSESSMENT & PLAN NOTE
Patient (and his wife) endorse significant alcohol use; up [place] 6 drinks [place], 1-2 when at home. Reports he did stop drinking over the weekend but has since resumed.     Plan  -[facility] protocol  -Continue thiamine supplementation

## 2024-04-18 NOTE — ASSESSMENT & PLAN NOTE
Patient with history of asthma (no PFTs on file); treated as an outpatient with Symbicort and albuterol as needed. Endorsing some shortness of breath prior to presentation, although this appears more likely correlated to patient's cardiac complaints.    Plan  -Continue PTA inhaler regimen

## 2024-04-19 ENCOUNTER — APPOINTMENT (INPATIENT)
Dept: RADIOLOGY | Facility: HOSPITAL | Age: 69
DRG: 233 | End: 2024-04-19
Payer: MEDICARE

## 2024-04-19 LAB
ANION GAP SERPL CALCULATED.3IONS-SCNC: 8 MMOL/L (ref 4–13)
APTT PPP: 48 SECONDS (ref 23–37)
APTT PPP: 51 SECONDS (ref 23–37)
APTT PPP: 62 SECONDS (ref 23–37)
ATRIAL RATE: 83 BPM
ATRIAL RATE: 84 BPM
BUN SERPL-MCNC: 15 MG/DL (ref 5–25)
CALCIUM SERPL-MCNC: 8.1 MG/DL (ref 8.4–10.2)
CHLORIDE SERPL-SCNC: 108 MMOL/L (ref 96–108)
CO2 SERPL-SCNC: 23 MMOL/L (ref 21–32)
CREAT SERPL-MCNC: 1.22 MG/DL (ref 0.6–1.3)
ERYTHROCYTE [DISTWIDTH] IN BLOOD BY AUTOMATED COUNT: 13.8 % (ref 11.6–15.1)
GFR SERPL CREATININE-BSD FRML MDRD: 60 ML/MIN/1.73SQ M
GLUCOSE SERPL-MCNC: 92 MG/DL (ref 65–140)
HCT VFR BLD AUTO: 41.2 % (ref 36.5–49.3)
HGB BLD-MCNC: 13.6 G/DL (ref 12–17)
MCH RBC QN AUTO: 30.8 PG (ref 26.8–34.3)
MCHC RBC AUTO-ENTMCNC: 33 G/DL (ref 31.4–37.4)
MCV RBC AUTO: 93 FL (ref 82–98)
MRSA NOSE QL CULT: NORMAL
P AXIS: 62 DEGREES
P AXIS: 70 DEGREES
PLATELET # BLD AUTO: 284 THOUSANDS/UL (ref 149–390)
PMV BLD AUTO: 10.5 FL (ref 8.9–12.7)
POTASSIUM SERPL-SCNC: 3.7 MMOL/L (ref 3.5–5.3)
PR INTERVAL: 192 MS
PR INTERVAL: 196 MS
QRS AXIS: 34 DEGREES
QRS AXIS: 49 DEGREES
QRSD INTERVAL: 88 MS
QRSD INTERVAL: 88 MS
QT INTERVAL: 408 MS
QT INTERVAL: 417 MS
QTC INTERVAL: 483 MS
QTC INTERVAL: 490 MS
RBC # BLD AUTO: 4.42 MILLION/UL (ref 3.88–5.62)
SODIUM SERPL-SCNC: 139 MMOL/L (ref 135–147)
T WAVE AXIS: 78 DEGREES
T WAVE AXIS: 80 DEGREES
VENTRICULAR RATE: 83 BPM
VENTRICULAR RATE: 84 BPM
WBC # BLD AUTO: 6.75 THOUSAND/UL (ref 4.31–10.16)

## 2024-04-19 PROCEDURE — 85027 COMPLETE CBC AUTOMATED: CPT | Performed by: INTERNAL MEDICINE

## 2024-04-19 PROCEDURE — 80048 BASIC METABOLIC PNL TOTAL CA: CPT | Performed by: INTERNAL MEDICINE

## 2024-04-19 PROCEDURE — 93010 ELECTROCARDIOGRAM REPORT: CPT | Performed by: INTERNAL MEDICINE

## 2024-04-19 PROCEDURE — 99232 SBSQ HOSP IP/OBS MODERATE 35: CPT | Performed by: INTERNAL MEDICINE

## 2024-04-19 PROCEDURE — 93880 EXTRACRANIAL BILAT STUDY: CPT | Performed by: SURGERY

## 2024-04-19 PROCEDURE — NC001 PR NO CHARGE: Performed by: PHYSICIAN ASSISTANT

## 2024-04-19 PROCEDURE — 99233 SBSQ HOSP IP/OBS HIGH 50: CPT | Performed by: INTERNAL MEDICINE

## 2024-04-19 PROCEDURE — 85730 THROMBOPLASTIN TIME PARTIAL: CPT | Performed by: INTERNAL MEDICINE

## 2024-04-19 PROCEDURE — 93971 EXTREMITY STUDY: CPT | Performed by: SURGERY

## 2024-04-19 PROCEDURE — 71250 CT THORAX DX C-: CPT

## 2024-04-19 RX ORDER — POTASSIUM CHLORIDE 20 MEQ/1
40 TABLET, EXTENDED RELEASE ORAL ONCE
Status: COMPLETED | OUTPATIENT
Start: 2024-04-19 | End: 2024-04-19

## 2024-04-19 RX ORDER — METOPROLOL SUCCINATE 50 MG/1
50 TABLET, EXTENDED RELEASE ORAL DAILY
Status: DISCONTINUED | OUTPATIENT
Start: 2024-04-20 | End: 2024-04-23

## 2024-04-19 RX ORDER — METOPROLOL SUCCINATE 25 MG/1
25 TABLET, EXTENDED RELEASE ORAL ONCE
Status: COMPLETED | OUTPATIENT
Start: 2024-04-19 | End: 2024-04-19

## 2024-04-19 RX ORDER — CHLORHEXIDINE GLUCONATE ORAL RINSE 1.2 MG/ML
15 SOLUTION DENTAL EVERY 12 HOURS SCHEDULED
Status: DISCONTINUED | OUTPATIENT
Start: 2024-04-19 | End: 2024-04-23

## 2024-04-19 RX ORDER — AMLODIPINE BESYLATE 5 MG/1
5 TABLET ORAL DAILY
Status: DISCONTINUED | OUTPATIENT
Start: 2024-04-19 | End: 2024-04-21

## 2024-04-19 RX ORDER — HEPARIN SODIUM 1000 [USP'U]/ML
2000 INJECTION, SOLUTION INTRAVENOUS; SUBCUTANEOUS EVERY 6 HOURS PRN
Status: DISCONTINUED | OUTPATIENT
Start: 2024-04-19 | End: 2024-04-23

## 2024-04-19 RX ADMIN — POLYETHYLENE GLYCOL 3350 17 G: 17 POWDER, FOR SOLUTION ORAL at 08:08

## 2024-04-19 RX ADMIN — ASPIRIN 81 MG: 81 TABLET, COATED ORAL at 08:08

## 2024-04-19 RX ADMIN — DEXTRAN 70, GLYCERIN, HYPROMELLOSE 2 DROP: 1; 2; 3 SOLUTION/ DROPS OPHTHALMIC at 08:09

## 2024-04-19 RX ADMIN — PANTOPRAZOLE SODIUM 40 MG: 40 TABLET, DELAYED RELEASE ORAL at 05:56

## 2024-04-19 RX ADMIN — THIAMINE HCL TAB 100 MG 100 MG: 100 TAB at 08:08

## 2024-04-19 RX ADMIN — AMLODIPINE BESYLATE 5 MG: 5 TABLET ORAL at 11:30

## 2024-04-19 RX ADMIN — SODIUM CHLORIDE 75 ML/HR: 0.9 INJECTION, SOLUTION INTRAVENOUS at 03:25

## 2024-04-19 RX ADMIN — METOPROLOL SUCCINATE 25 MG: 25 TABLET, FILM COATED, EXTENDED RELEASE ORAL at 08:08

## 2024-04-19 RX ADMIN — CYCLOSPORINE 1 DROP: 0.5 EMULSION OPHTHALMIC at 08:10

## 2024-04-19 RX ADMIN — ATORVASTATIN CALCIUM 80 MG: 80 TABLET, FILM COATED ORAL at 17:00

## 2024-04-19 RX ADMIN — HEPARIN SODIUM 2000 UNITS: 1000 INJECTION INTRAVENOUS; SUBCUTANEOUS at 05:54

## 2024-04-19 RX ADMIN — FOLIC ACID 1 MG: 1 TABLET ORAL at 08:08

## 2024-04-19 RX ADMIN — NITROGLYCERIN 1 INCH: 20 OINTMENT TOPICAL at 01:38

## 2024-04-19 RX ADMIN — BUDESONIDE AND FORMOTEROL FUMARATE DIHYDRATE 2 PUFF: 160; 4.5 AEROSOL RESPIRATORY (INHALATION) at 17:01

## 2024-04-19 RX ADMIN — FLUTICASONE PROPIONATE 2 SPRAY: 50 SPRAY, METERED NASAL at 10:07

## 2024-04-19 RX ADMIN — CYCLOSPORINE 1 DROP: 0.5 EMULSION OPHTHALMIC at 21:14

## 2024-04-19 RX ADMIN — BUDESONIDE AND FORMOTEROL FUMARATE DIHYDRATE 2 PUFF: 160; 4.5 AEROSOL RESPIRATORY (INHALATION) at 08:09

## 2024-04-19 RX ADMIN — CHLORHEXIDINE GLUCONATE 0.12% ORAL RINSE 15 ML: 1.2 LIQUID ORAL at 21:29

## 2024-04-19 RX ADMIN — HEPARIN SODIUM 13.8 UNITS/KG/HR: 10000 INJECTION, SOLUTION INTRAVENOUS at 18:21

## 2024-04-19 RX ADMIN — MUPIROCIN 1 APPLICATION: 20 OINTMENT TOPICAL at 21:29

## 2024-04-19 RX ADMIN — POTASSIUM CHLORIDE 40 MEQ: 1500 TABLET, EXTENDED RELEASE ORAL at 08:08

## 2024-04-19 RX ADMIN — HEPARIN SODIUM 2000 UNITS: 1000 INJECTION INTRAVENOUS; SUBCUTANEOUS at 21:10

## 2024-04-19 RX ADMIN — METOPROLOL SUCCINATE 25 MG: 25 TABLET, FILM COATED, EXTENDED RELEASE ORAL at 10:07

## 2024-04-19 NOTE — PROGRESS NOTES
Progress Note - Cardiology Team 1  David S Cogan 68 y.o. male MRN: [id number]  Unit/Bed#: [facility] 430-01 Encounter: [id number]        Principal Problem:    NSTEMI (non-ST elevated myocardial infarction) (HCC)  Active Problems:    Benign hypertension    Hyperlipidemia    Alcohol use, unspecified with other alcohol-induced disorder (HCC)    Asthma      Assessment     Chest pain with elevated troponin consistent with NSTEMI  Presented with troponin 1217 which is down trending, late presentation  ECG-sinus rhythm septal infarct pattern. No ST elevations  He has been having chest pain at times with radiation to the both arms both at rest and exertion for last 11 days.  Started on asa, statin, BB, IV heparin     S/p LHC-mid LAD 85%, % with collaterals from the LAD, RCA mild diffuse CAD.  Mildly elevated LVEDP  CT surgery consulted- w/u in progress , surgery next week     TTE-normal LV function , grade 1 diastolic dysfunction.     Hypertension- mildly hypertensive   PTA-diltiazem 120 mg daily  Currently on Toprol 25 mg daily, losartan 25 mg daily ( held pending surgery)      Hyperlipidemia-inconsistently taking statin- crestor 5mg   FLP-cholesterol 298/triglyceride 195/HDL 69/calculated   Atorvastatin 40 mg was initiated, increased to 80mg      Regular alcohol use-can drink up to 6-8 alcoholic beverages in a day     Asthma- no recent exacerbation     History of tobacco abuse-quit 1988     Family history of premature CAD-father with CAD in his 50s.     Plan  Continue aspirin, statin ( increase to 80mg atorvastatin) , beta-blocker discontinued brilinta. Continue IV heparin  Increase toprol to 50mg  CT surgical w/u  Pt aware to notify us for chest pain  Continue telemetry    Subjective/Objective   Chief Complaint/subjective  No events overnight.  No chest pain or shortness of breath.  Talk to patient about CT surgery.  Patient is aware to call us for any chest pain.        Vitals: /99   Pulse 83   Temp 98.1  "°F (36.7 °C)   Resp 18   Ht 5' 7\" (1.702 m)   Wt 85.3 kg (188 lb)   SpO2 97%   BMI 29.44 kg/m²     Vitals:    04/18/24 1100   Weight: 85.3 kg (188 lb)     Orthostatic Blood Pressures      Flowsheet Row Most Recent Value   Blood Pressure 155/99 filed at 04/19/2024 0726   Patient Position - Orthostatic VS Lying filed at 04/18/2024 2255              Intake/Output Summary (Last 24 hours) at 4/19/2024 1003  Last data filed at 4/19/2024 0854  Gross per 24 hour   Intake 1922.25 ml   Output 850 ml   Net 1072.25 ml       Invasive Devices       Peripheral Intravenous Line  Duration             Peripheral IV 04/17/24 Left Antecubital 1 day    Peripheral IV 04/17/24 Left;Ventral (anterior) Wrist 1 day                    Current Facility-Administered Medications   Medication Dose Route Frequency    acetaminophen (TYLENOL) tablet 650 mg  650 mg Oral Q4H PRN    albuterol (PROVENTIL HFA,VENTOLIN HFA) inhaler 1 puff  1 puff Inhalation Q6H PRN    Artificial Tears ophthalmic solution 2 drop  2 drop Both Eyes Q12H SARITA    aspirin (ECOTRIN LOW STRENGTH) EC tablet 81 mg  81 mg Oral Daily    atorvastatin (LIPITOR) tablet 80 mg  80 mg Oral Daily With Dinner    budesonide-formoterol (SYMBICORT) 160-4.5 mcg/act inhaler 2 puff  2 puff Inhalation BID    [Transfer Hold] chlorhexidine (PERIDEX) 0.12 % oral rinse 15 mL  15 mL Mouth/Throat Q12H SARITA    cycloSPORINE (RESTASIS) 0.05 % ophthalmic emulsion 1 drop  1 drop Both Eyes BID    fluticasone (FLONASE) 50 mcg/act nasal spray 2 spray  2 spray Each Nare Daily    folic acid (FOLVITE) tablet 1 mg  1 mg Oral Daily    heparin (porcine) 25,000 units in 0.45% NaCl 250 mL infusion (premix)  3-20 Units/kg/hr (Order-Specific) Intravenous Titrated    heparin (porcine) injection 2,000 Units  2,000 Units Intravenous Q6H PRN    heparin (porcine) injection 4,000 Units  4,000 Units Intravenous Q6H PRN    metoprolol succinate (TOPROL-XL) 24 hr tablet 25 mg  25 mg Oral Once    [START ON 4/20/2024] metoprolol " "succinate (TOPROL-XL) 24 hr tablet 50 mg  50 mg Oral Daily    [Transfer Hold] mupirocin (BACTROBAN) 2 % nasal ointment 1 Application  1 Application Nasal Q12H SARITA    nitroglycerin (NITRO-BID) 2 % TD ointment 1 inch  1 inch Topical BID PRN    nitroglycerin (NITROSTAT) SL tablet 0.4 mg  0.4 mg Sublingual Q5 Min PRN    ondansetron (ZOFRAN) injection 4 mg  4 mg Intravenous Q6H PRN    pantoprazole (PROTONIX) EC tablet 40 mg  40 mg Oral Early Morning    polyethylene glycol (MIRALAX) packet 17 g  17 g Oral Daily    sodium chloride 0.9 % infusion  75 mL/hr Intravenous Continuous    thiamine tablet 100 mg  100 mg Oral Daily         Physical Exam: /99   Pulse 83   Temp 98.1 °F (36.7 °C)   Resp 18   Ht 5' 7\" (1.702 m)   Wt 85.3 kg (188 lb)   SpO2 97%   BMI 29.44 kg/m²     General Appearance:    Alert, cooperative, no distress, appears stated age   Head:    Normocephalic, no scleral icterus   Eyes:    PERRL   Nose:   Nares normal, septum midline, no drainage    Throat:   Lips, mucosa, and tongue normal   Neck:   Supple, symmetrical, trachea midline,       no carotid    bruit or JVD   Back:     Symmetric, no CVA tenderness   Lungs:     Clear to auscultation bilaterally, respirations unlabored   Chest Wall:    No tenderness or deformity    Heart:    Regular rate and rhythm, S1 and S2 normal, no murmur, rub   or gallop   Abdomen:     Soft, non-tender, bowel sounds active all four quadrants,     no masses, no organomegaly   Extremities:   Extremities normal, atraumatic, no cyanosis or edema   Pulses:   2+ and symmetric all extremities   Skin:   Skin color, texture, turgor normal, no rashes or lesions   Neurologic:   Alert and oriented to person place and time, no focal deficits                 Lab Results:   Recent Results (from the past 72 hour(s))   ECG 12 lead    Collection Time: 04/17/24  2:55 PM   Result Value Ref Range    Ventricular Rate 78 BPM    Atrial Rate 78 BPM    [place] Interval 180 ms    QRSD Interval 90 ms " "   QT Interval 390 ms    QTC Interval 444 ms    P Axis 73 degrees    QRS Axis 73 degrees    T Wave Axis 92 degrees   CBC and differential    Collection Time: 04/17/24  3:20 PM   Result Value Ref Range    WBC 6.36 4.31 - 10.16 Thousand/uL    RBC 5.11 3.88 - 5.62 Million/uL    Hemoglobin 15.7 12.0 - 17.0 g/dL    Hematocrit 46.8 36.5 - 49.3 %    MCV 92 82 - 98 fL    MCH 30.7 26.8 - 34.3 pg    MCHC 33.5 31.4 - 37.4 g/dL    RDW 13.7 11.6 - 15.1 %    MPV 10.7 8.9 - 12.7 fL    Platelets 313 149 - 390 Thousands/uL    nRBC 0 /100 WBCs    Segmented % 48 43 - 75 %    Immature Grans % 0 0 - 2 %    Lymphocytes % 36 14 - 44 %    Monocytes % 12 4 - 12 %    Eosinophils Relative 3 0 - 6 %    Basophils Relative 1 0 - 1 %    Absolute Neutrophils 3.06 1.85 - 7.62 Thousands/µL    Absolute Immature Grans 0.02 0.00 - 0.20 Thousand/uL    Absolute Lymphocytes 2.28 0.60 - 4.47 Thousands/µL    Absolute Monocytes 0.76 0.17 - 1.22 Thousand/µL    Eosinophils Absolute 0.21 0.00 - 0.61 Thousand/µL    Basophils Absolute 0.03 0.00 - 0.10 Thousands/µL   Comprehensive metabolic panel    Collection Time: 04/17/24  3:20 PM   Result Value Ref Range    Sodium 137 135 - 147 mmol/L    Potassium 4.2 3.5 - 5.3 mmol/L    Chloride 103 96 - 108 mmol/L    CO2 25 21 - 32 mmol/L    ANION GAP 9 4 - 13 mmol/L    BUN 17 5 - 25 mg/dL    Creatinine 1.17 0.60 - 1.30 mg/dL    Glucose 94 65 - 140 mg/dL    Calcium 9.0 8.4 - 10.2 mg/dL    AST 26 13 - 39 U/L    ALT 18 7 - 52 U/L    Alkaline Phosphatase 62 34 - 104 U/L    Total Protein 7.3 6.4 - 8.4 g/dL    Albumin 4.1 3.5 - 5.0 g/dL    Total Bilirubin 0.53 0.20 - 1.00 mg/dL    eGFR 63 ml/min/1.73sq m   HS Troponin 0hr (reflex protocol)    Collection Time: 04/17/24  3:20 PM   Result Value Ref Range    hs TnI 0hr 1,217 (H) \"Refer to ACS Flowchart\"- see link ng/L   APTT six (6) hours after Heparin bolus/drip initiation or dosing change    Collection Time: 04/17/24  4:14 PM   Result Value Ref Range    PTT 30 23 - 37 seconds " "  Protime-INR    Collection Time: 04/17/24  4:14 PM   Result Value Ref Range    Protime 13.7 11.6 - 14.5 seconds    INR 1.06 0.84 - 1.19   HS Troponin I 2hr    Collection Time: 04/17/24  5:31 PM   Result Value Ref Range    hs TnI 2hr 1,119 (H) \"Refer to ACS Flowchart\"- see link ng/L    Delta 2hr hsTnI -98 <20 ng/L   HS Troponin I 4hr    Collection Time: 04/17/24  7:54 PM   Result Value Ref Range    hs TnI 4hr 1,112 (H) \"Refer to ACS Flowchart\"- see link ng/L    Delta 4hr hsTnI -105 <20 ng/L   APTT    Collection Time: 04/17/24  8:04 PM   Result Value Ref Range    PTT 33 23 - 37 seconds   APTT    Collection Time: 04/18/24  2:12 AM   Result Value Ref Range    PTT 95 (H) 23 - 37 seconds   Basic metabolic panel    Collection Time: 04/18/24  4:39 AM   Result Value Ref Range    Sodium 139 135 - 147 mmol/L    Potassium 4.1 3.5 - 5.3 mmol/L    Chloride 105 96 - 108 mmol/L    CO2 22 21 - 32 mmol/L    ANION GAP 12 4 - 13 mmol/L    BUN 13 5 - 25 mg/dL    Creatinine 1.16 0.60 - 1.30 mg/dL    Glucose 94 65 - 140 mg/dL    Calcium 8.2 (L) 8.4 - 10.2 mg/dL    eGFR 64 ml/min/1.73sq m   CBC and differential    Collection Time: 04/18/24  4:39 AM   Result Value Ref Range    WBC 6.62 4.31 - 10.16 Thousand/uL    RBC 4.74 3.88 - 5.62 Million/uL    Hemoglobin 14.6 12.0 - 17.0 g/dL    Hematocrit 43.9 36.5 - 49.3 %    MCV 93 82 - 98 fL    MCH 30.8 26.8 - 34.3 pg    MCHC 33.3 31.4 - 37.4 g/dL    RDW 13.7 11.6 - 15.1 %    MPV 10.3 8.9 - 12.7 fL    Platelets 283 149 - 390 Thousands/uL    nRBC 0 /100 WBCs    Segmented % 46 43 - 75 %    Immature Grans % 1 0 - 2 %    Lymphocytes % 38 14 - 44 %    Monocytes % 10 4 - 12 %    Eosinophils Relative 4 0 - 6 %    Basophils Relative 1 0 - 1 %    Absolute Neutrophils 3.13 1.85 - 7.62 Thousands/µL    Absolute Immature Grans 0.04 0.00 - 0.20 Thousand/uL    Absolute Lymphocytes 2.52 0.60 - 4.47 Thousands/µL    Absolute Monocytes 0.65 0.17 - 1.22 Thousand/µL    Eosinophils Absolute 0.23 0.00 - 0.61 Thousand/µL "    Basophils Absolute 0.05 0.00 - 0.10 Thousands/µL   ECG 12 lead    Collection Time: 04/18/24  7:42 AM   Result Value Ref Range    Ventricular Rate 74 BPM    Atrial Rate 74 BPM    DC Interval 183 ms    QRSD Interval 88 ms    QT Interval 417 ms    QTC Interval 463 ms    P [place] 69 degrees    QRS Axis 53 degrees    T Wave Axis 85 degrees   APTT    Collection Time: 04/18/24  8:58 AM   Result Value Ref Range    PTT 44 (H) 23 - 37 seconds   Echo complete w/ contrast if indicated    Collection Time: 04/18/24 11:00 AM   Result Value Ref Range    RAA A4C 13.8 cm2    LA Volume Index (BP) 26.9 mL/m2    LV Diastolic Volume (BP) 98 mL    LV Systolic Volume (BP) 42 mL    MV Peak A Jonas 1.21 m/s    MV stenosis pressure 1/2 time 52 ms    MV Peak E Jonas 82 cm/s    E wave deceleration time 179 ms    E/A ratio 0.68     MV valve area p 1/2 method 4.23     RVID d 2.6 cm    A4C EF 55 %    Left ventricular stroke volume (2D) 40.00 mL    IVSd 1.20 cm    Tricuspid annular plane systolic excursion 2.00 cm    Ao root 3.10 cm    LVPWd 0.80 cm    LA size 3.1 cm    Asc Ao 3.1 cm    LA volume (BP) 53 mL    EF 58 %    FS 30 28 - 44    LVIDS 2.80 cm    IVS 1.2 cm    LVIDd 4.00 cm    LA length (A2C) 5.00 cm    LEFT VENTRICLE SYSTOLIC VOLUME (MOD BIPLANE) 2D 29 mL    LV DIASTOLIC VOLUME (MOD BIPLANE) 2D 69 mL    Left Atrium Area-systolic Four Chamber 17.5 cm2    Left Atrium Area-systolic Apical Two Chamber 20 cm2    MV E' Tissue Velocity Septal 6 cm/s    LVSV, 2D 40 mL    BSA 1.97 m2    LV Diastolic Volume Index (BP) 49.7 mL/m2    LV Systolic Volume Index (BP) 21.3 mL/m2    LV EF 55    ECG 12 lead    Collection Time: 04/18/24  2:00 PM   Result Value Ref Range    Ventricular Rate 84 BPM    Atrial Rate 84 BPM    DC Interval 196 ms    QRSD Interval 88 ms    QT Interval 408 ms    QTC Interval 483 ms    P Axis 62 degrees    QRS Axis 34 degrees    T Wave Axis 80 degrees   ECG 12 lead    Collection Time: 04/18/24  2:01 PM   Result Value Ref Range     Ventricular Rate 83 BPM    Atrial Rate 83 BPM    CA Interval 192 ms    QRSD Interval 88 ms    QT Interval 417 ms    QTC Interval 490 ms    P Axis 70 degrees    QRS Axis 49 degrees    T Wave Axis 78 degrees   APTT    Collection Time: 04/18/24  3:18 PM   Result Value Ref Range    PTT 91 (H) 23 - 37 seconds   Hemoglobin A1c w/EAG Estimation    Collection Time: 04/18/24  3:18 PM   Result Value Ref Range    Hemoglobin A1C 5.9 (H) Normal 4.0-5.6%; PreDiabetic 5.7-6.4%; Diabetic >=6.5%; Glycemic control for adults with diabetes <7.0% %     mg/dl   Type and screen    Collection Time: 04/18/24  3:18 PM   Result Value Ref Range    ABO Grouping O     Rh Factor Negative     Antibody Screen Negative     Specimen Expiration Date [id number]    Lipid panel    Collection Time: 04/18/24  3:18 PM   Result Value Ref Range    Cholesterol 182 See Comment mg/dL    Triglycerides 80 See Comment mg/dL    HDL, Direct 48 >=40 mg/dL    LDL Calculated 118 (H) 0 - 100 mg/dL    Non-HDL-Chol (CHOL-HDL) 134 mg/dl   ABORh Recheck - Contact Blood Bank Prior to Collection    Collection Time: 04/18/24  4:17 PM   Result Value Ref Range    ABO Grouping O     Rh Factor Negative    APTT    Collection Time: 04/19/24  3:57 AM   Result Value Ref Range    PTT 48 (H) 23 - 37 seconds   Basic metabolic panel    Collection Time: 04/19/24  3:57 AM   Result Value Ref Range    Sodium 139 135 - 147 mmol/L    Potassium 3.7 3.5 - 5.3 mmol/L    Chloride 108 96 - 108 mmol/L    CO2 23 21 - 32 mmol/L    ANION GAP 8 4 - 13 mmol/L    BUN 15 5 - 25 mg/dL    Creatinine 1.22 0.60 - 1.30 mg/dL    Glucose 92 65 - 140 mg/dL    Calcium 8.1 (L) 8.4 - 10.2 mg/dL    eGFR 60 ml/min/1.73sq m   CBC    Collection Time: 04/19/24  3:57 AM   Result Value Ref Range    WBC 6.75 4.31 - 10.16 Thousand/uL    RBC 4.42 3.88 - 5.62 Million/uL    Hemoglobin 13.6 12.0 - 17.0 g/dL    Hematocrit 41.2 36.5 - 49.3 %    MCV 93 82 - 98 fL    MCH 30.8 26.8 - 34.3 pg    MCHC 33.0 31.4 - 37.4 g/dL    RDW  13.8 11.6 - 15.1 %    Platelets 284 149 - 390 Thousands/uL    MPV 10.5 8.9 - 12.7 fL          Mid LAD lesion is 85% stenosed in a distally small caliber vessel    OM3 100% stenosed with collaterals from the LAD    RCA with mild diffuse CAD    LVEDP is mildly elevated without gradient on LV-AO pullback     Left Ventricle Left ventricular cavity size is normal. Wall thickness is mildly increased. There is mild concentric hypertrophy. The left ventricular ejection fraction is 55%. Systolic function is normal. Wall motion is normal. Diastolic function is mildly abnormal, consistent with grade I (abnormal) relaxation.   Right Ventricle Right ventricular cavity size is normal. Systolic function is normal.   Left Atrium The atrium is normal in size.   Right Atrium The atrium is normal in size.   Aortic Valve The aortic valve is trileaflet. The leaflets are not thickened. The leaflets are not calcified. The leaflets exhibit normal mobility. There is no evidence of regurgitation. The aortic valve has no significant stenosis.   Mitral Valve Mitral valve structure is normal.  There is mild regurgitation. There is no evidence of stenosis.   Tricuspid Valve Tricuspid valve structure is normal. There is trace regurgitation. There is no evidence of stenosis.   Pulmonic Valve Pulmonic valve structure is normal. There is no evidence of regurgitation. There is no evidence of stenosis.   Ascending Aorta The aortic root is normal in size. The ascending aorta is normal in size. The aortic root is 3.10 cm. The ascending aorta is 3.1 cm.   IVC/SVC The inferior vena cava is not well visualized.   Pericardium There is no pericardial effusion. The pericardium is normal in appearance.     Imaging: I have personally reviewed pertinent reports.    Tele- nsr    Counseling / Coordination of Care  Total time spent today 25 minutes. Greater than 50% of total time was spent with the patient and / or family counseling and / or coordination of  care.

## 2024-04-19 NOTE — PROGRESS NOTES
INTERNAL MEDICINE RESIDENCY PROGRESS NOTE     Name: David S Cogan   Age & Sex: 68 y.o. male   MRN: [id number]  Unit/Bed#: [facility] 430-01   Encounter: [id number]  Team: SOD Team C     PATIENT INFORMATION     Name: David S Cogan   Age & Sex: 68 y.o. male   MRN: [id number]  Hospital Stay Days: 2    ASSESSMENT/PLAN     Principal Problem:    NSTEMI (non-ST elevated myocardial infarction) ([facility])  Active Problems:    Benign hypertension    Hyperlipidemia    Alcohol use, unspecified with other alcohol-induced disorder ([facility])    Asthma      * NSTEMI (non-ST elevated myocardial infarction) ([facility])  Assessment & Plan  Patient presenting with one week history of intermittent chest pain/pressure. Described as up to an 8-9/10 in intensity, substernal, with radiation to the bilateral shoulders and upper arms. Associated symptoms include nausea/vomiting (end of last week), abdominal bloating (last week), and shortness of breath/fatigue (earlier this week); he denies any associated diaphoresis. Above symptoms improved with rest/Gasex; of note, patient has had somewhat similar symptoms in the past secondary to GERD.     Seen by PCP/cardiology earlier this week with EKG(s) at that time suggestive of age-indeterminate septal infarct with nonspecific t-wave changes. Subsequently advised to present to the ED, where troponin found to be elevated at 1,217 (2 hour brionna 1,119). Treated with Protonix, carafate, loaded with ASA, and started on heparin drip.    Cardiac cath 4/19: 85% stenosed mid LAD lesion, 100% stenosis circumflex (with collaterals to the LAD), mild diffuse RCA disease    CT surgery consulted by inpatient cardiology team given the above, with plans for CABG to be done next week    Plan  -Cardiology, CT surgery recommendations appreciated - plan for CABG next week on Tuesday  -Continue ASA; Brilinta on hold pending surgical intervention next week  -Continue metoprolol, atorvastatin as specified elsewhere; losartan on hold per CT  surgery  -STAT EKG with recurrence of chest pain  -Nitro as needed for symptomatic chest pain    Asthma  Assessment & Plan  Patient with history of asthma (no PFTs on file); treated as an outpatient with Symbicort and albuterol as needed. Endorsing some shortness of breath prior to presentation, although this appears more likely correlated to patient's cardiac complaints.    Plan  -Continue PTA inhaler regimen    Alcohol use, unspecified with other alcohol-induced disorder ([facility])  Assessment & Plan  Patient (and his wife) endorse significant alcohol use; up to 6 drinks [place], 1-2 when at home. Reports he did stop drinking over the weekend but has since resumed.     Plan  -[facility] protocol  -Continue thiamine supplementation    Hyperlipidemia  Assessment & Plan  Patient with history of HLD, with last lipid panel (October 2023) notable for cholesterol 298, triglycerides 195, HDL 69, . Was previously on treatment with Crestor 5 mg daily, though endorses overall poor compliance to this.    Plan  -Continue atorvastatin 80 mg daily    Benign hypertension  Assessment & Plan  Patient with history of HTN, treated as an outpatient with diltiazem 120 mg daily. BP on arrival 161/93, with most subsequent readings in the range of 130-150s/60-70s.    Plan  -Given ongoing mild hypertension, will increase Toprol to 50 mg daily; will also add amlodipine 5 mg daily as losartan currently on hold per CTS  -Continue to monitor        Disposition: Remain admitted pending CABG, planned for early next week    SUBJECTIVE     Patient seen and examined. No acute events overnight.  This morning, patient is without acute complaints.  He has had no further episodes of chest pain over the last 24 hours.  He denies any fevers/chills, chest pain, shortness of breath, abdominal pain, or other symptoms at present.    OBJECTIVE     Vitals:    04/19/24 0400 04/19/24 0726 04/19/24 1007 04/19/24 1054   BP:  155/99 135/86 154/98    Pulse: 75 83 73 77   Resp:  18  18   Temp:  98.1 °F (36.7 °C)  97.9 °F (36.6 °C)   TempSrc:       SpO2:  97%  95%   Weight:       Height:          Temperature:   Temp (24hrs), Av.8 °F (36.6 °C), Min:97.5 °F (36.4 °C), Max:98.1 °F (36.7 °C)    Temperature: 97.9 °F (36.6 °C)  Intake & Output:  I/O          0701   0700  0701   0700    P.O.  716    I.V. (mL/kg)  1351.4 (15.8)    Total Intake(mL/kg)  2067.4 (24.2)    Urine (mL/kg/hr)  200 (0.1)    Total Output  200    Net  +1867.4                Weights:   IBW (Ideal Body Weight): 66.1 kg    Body mass index is 29.44 kg/m².  Weight (last 2 days)       Date/Time Weight    24 1100 85.3 (188)          Physical Exam  Vitals and nursing note reviewed.   Constitutional:       General: He is not in acute distress.     Appearance: Normal appearance. He is not ill-appearing.   HENT:      Head: Normocephalic and atraumatic.      Right Ear: External ear normal.      Left Ear: External ear normal.      Mouth/Throat:      Mouth: Mucous membranes are moist.      Pharynx: Oropharynx is clear.   Eyes:      Extraocular Movements: Extraocular movements intact.      Conjunctiva/sclera: Conjunctivae normal.      Pupils: Pupils are equal, round, and reactive to light.      Comments: Wears glasses   Cardiovascular:      Rate and Rhythm: Normal rate and regular rhythm.      Heart sounds: Normal heart sounds. No murmur heard.  Pulmonary:      Effort: Pulmonary effort is normal. No respiratory distress.      Breath sounds: Normal breath sounds.   Abdominal:      General: Abdomen is flat. Bowel sounds are normal. There is no distension.      Palpations: Abdomen is soft.      Tenderness: There is no abdominal tenderness.   Musculoskeletal:      Right lower leg: No edema.      Left lower leg: No edema.   Skin:     General: Skin is warm and dry.   Neurological:      Mental Status: He is alert. Mental status is at baseline.   Psychiatric:         Mood and Affect: Mood  normal.         Behavior: Behavior normal.       LABORATORY DATA     Labs: I have personally reviewed pertinent reports.  Results from last 7 days   Lab Units 04/19/24  0357 04/18/24  0439 04/17/24  1520   WBC Thousand/uL 6.75 6.62 6.36   HEMOGLOBIN g/dL 13.6 14.6 15.7   HEMATOCRIT % 41.2 43.9 46.8   PLATELETS Thousands/uL 284 283 313   SEGS PCT %  --  46 48   MONO PCT %  --  10 12   EOS PCT %  --  4 3      Results from last 7 days   Lab Units 04/19/24  0357 04/18/24  0439 04/17/24  1520 04/15/24  1255   POTASSIUM mmol/L 3.7 4.1 4.2 5.1   CHLORIDE mmol/L 108 105 103 98   CO2 mmol/L 23 22 25 23   BUN mg/dL 15 13 17 17   CREATININE mg/dL 1.22 1.16 1.17 1.50*   CALCIUM mg/dL 8.1* 8.2* 9.0  --    ALK PHOS U/L  --   --  62  --    ALT U/L  --   --  18 23   AST U/L  --   --  26 39              Results from last 7 days   Lab Units 04/19/24  0357 04/18/24  1518 04/18/24  0858 04/17/24 2004 04/17/24  1614   INR   --   --   --   --  1.06   PTT seconds 48* 91* 44*   < > 30    < > = values in this interval not displayed.               IMAGING & DIAGNOSTIC TESTING     Radiology Results: I have personally reviewed pertinent reports.  XR chest 2 views    Result Date: 4/17/2024  Impression: No acute cardiopulmonary disease. Workstation performed: [id number]     Other Diagnostic Testing: I have personally reviewed pertinent reports.    ACTIVE MEDICATIONS     Current Facility-Administered Medications   Medication Dose Route Frequency    acetaminophen (TYLENOL) tablet 650 mg  650 mg Oral Q4H PRN    albuterol (PROVENTIL HFA,VENTOLIN HFA) inhaler 1 puff  1 puff Inhalation Q6H PRN    amLODIPine (NORVASC) tablet 5 mg  5 mg Oral Daily    Artificial Tears ophthalmic solution 2 drop  2 drop Both Eyes Q12H [facility]    aspirin (ECOTRIN LOW STRENGTH) EC tablet 81 mg  81 mg Oral Daily    atorvastatin (LIPITOR) tablet 80 mg  80 mg Oral Daily With Dinner    budesonide-formoterol (SYMBICORT) 160-4.5 mcg/act inhaler 2 puff  2 puff Inhalation BID     "[Transfer Hold] chlorhexidine (PERIDEX) 0.12 % oral rinse 15 mL  15 mL Mouth/Throat Q12H SARITA    cycloSPORINE (RESTASIS) 0.05 % ophthalmic emulsion 1 drop  1 drop Both Eyes BID    fluticasone (FLONASE) 50 mcg/act nasal spray 2 spray  2 spray Each Nare Daily    folic acid (FOLVITE) tablet 1 mg  1 mg Oral Daily    heparin (porcine) 25,000 units in 0.45% NaCl 250 mL infusion (premix)  3-20 Units/kg/hr (Order-Specific) Intravenous Titrated    heparin (porcine) injection 2,000 Units  2,000 Units Intravenous Q6H PRN    heparin (porcine) injection 4,000 Units  4,000 Units Intravenous Q6H PRN    [START ON 4/20/2024] metoprolol succinate (TOPROL-XL) 24 hr tablet 50 mg  50 mg Oral Daily    [Transfer Hold] mupirocin (BACTROBAN) 2 % nasal ointment 1 Application  1 Application Nasal Q12H SARITA    nitroglycerin (NITRO-BID) 2 % TD ointment 1 inch  1 inch Topical BID PRN    nitroglycerin (NITROSTAT) SL tablet 0.4 mg  0.4 mg Sublingual Q5 Min PRN    ondansetron (ZOFRAN) injection 4 mg  4 mg Intravenous Q6H PRN    pantoprazole (PROTONIX) EC tablet 40 mg  40 mg Oral Early Morning    polyethylene glycol (MIRALAX) packet 17 g  17 g Oral Daily    sodium chloride 0.9 % infusion  75 mL/hr Intravenous Continuous    thiamine tablet 100 mg  100 mg Oral Daily       VTE Pharmacologic Prophylaxis: Heparin  VTE Mechanical Prophylaxis: sequential compression device    Portions of the record may have been created with voice recognition software.  Occasional wrong word or \"sound a like\" substitutions may have occurred due to the inherent limitations of voice recognition software.  Read the chart carefully and recognize, using context, where substitutions have occurred.  ==  Daniel Shah MD  [facility]  Internal Medicine Residency PGY-1      "

## 2024-04-19 NOTE — PROGRESS NOTES
Progress Note - Cardiothoracic Surgery   David S Cogan 68 y.o. male MRN: [id number]  Unit/Bed#: [facility] 430-01 Encounter: [id number]      24 Hour Events: No events or complaints, remains on heparin gtt.   Consent obtained, echo, carotids, and VM reviewed without significant findings. CT chest and MRSA pending. Remains on heparin gtt. Cr trending up 1.22.    Medications:   Scheduled Meds:  Current Facility-Administered Medications   Medication Dose Route Frequency Provider Last Rate    acetaminophen  650 mg Oral Q4H PRN KATINA Mann      albuterol  1 puff Inhalation Q6H PRN KATINA Mann      Artificial Tears  2 drop Both Eyes Q12H SARITA KATINA Mann      aspirin  81 mg Oral Daily KATINA Mann      atorvastatin  80 mg Oral Daily With Dinner KATINA Mann      budesonide-formoterol  2 puff Inhalation BID KATINA Mann      [Transfer Hold] chlorhexidine  15 mL Mouth/Throat Q12H [facility] Christopher Tirado PA-C      cycloSPORINE  1 drop Both Eyes BID KATINA Mann      fluticasone  2 spray Each Nare Daily KATINA Mann      folic acid  1 mg Oral Daily KATINA Mann      heparin (porcine)  3-20 Units/kg/hr (Order-Specific) Intravenous Titrated KATINA Mann 13.8 Units/kg/hr (04/19/24 0555)    heparin (porcine)  2,000 Units Intravenous Q6H PRN Daniel Shah MD      heparin (porcine)  4,000 Units Intravenous Q6H PRN KATINA Mann      losartan  25 mg Oral Daily KATINA Mann      metoprolol succinate  25 mg Oral Daily KATINA Mann      [Transfer Hold] mupirocin  1 Application Nasal Q12H [facility] Christopher Tirado PA-C      nitroglycerin  1 inch Topical BID KATINA Mann      nitroglycerin  0.4 mg Sublingual Q5 Min PRN KATINA Mann      ondansetron  4 mg Intravenous Q6H PRN KATINA Mann      pantoprazole  40 mg Oral Early Morning KATINA Mann      polyethylene glycol  17 g Oral Daily KATINA Mann      sodium chloride  75 mL/hr Intravenous  Continuous KATINA Mann 75 mL/hr (24 0325)    thiamine  100 mg Oral Daily KATINA Mann       Continuous Infusions:heparin (porcine), 3-20 Units/kg/hr (Order-Specific), Last Rate: 13.8 Units/kg/hr (24 0555)  sodium chloride, 75 mL/hr, Last Rate: 75 mL/hr (24)        Results:   Results from last 7 days   Lab Units 24  0357 24  1520   WBC Thousand/uL 6.75 6.62 6.36   HEMOGLOBIN g/dL 13.6 14.6 15.7   HEMATOCRIT % 41.2 43.9 46.8   PLATELETS Thousands/uL 284 283 313     Results from last 7 days   Lab Units 24  1520   POTASSIUM mmol/L 3.7 4.1 4.2   CHLORIDE mmol/L 108 105 103   CO2 mmol/L 23 22 25   BUN mg/dL 15 13 17   CREATININE mg/dL 1.22 1.16 1.17   CALCIUM mg/dL 8.1* 8.2* 9.0     Results from last 7 days   Lab Units 24  03524  1518 24  0858 24  1614   INR   --   --   --   --  1.06   PTT seconds 48* 91* 44*   < > 30    < > = values in this interval not displayed.       Studies:   Cardiac Cath: mLAD 85%, dCx 100%     Echo: EF 55%, mild MR.      Carotid US: b/l <50%, flow antegrade, no sig. Subclavian disease     Vein Mappin piece in R, adequate on L     CXR: No acute cardiopulmonary disease     CT Chest: ordered - pending read     EKG: NSR       Vitals:   Vitals:    24 1930 24   BP: 111/80 127/75 135/75 131/73   BP Location: Left arm  Left arm    Pulse: 94 89 81 (!) 110   Resp:  15 17 18   Temp: 97.5 °F (36.4 °C) 97.8 °F (36.6 °C) 97.7 °F (36.5 °C) 97.7 °F (36.5 °C)   TempSrc: Oral  Oral    SpO2: 94% 93% 95% 97%   Weight:       Height:           Physical Exam:    General: No acute distress and Normal appearance  HEENT/NECK:  Normocephalic. Atraumatic.  No jugular venous distention.    Cardiac: Regular rate and rhythm and No murmurs/rubs/gallops  Pulmonary:  Breath sounds clear bilaterally and No rales/rhonchi/wheezes  Abdomen:   Non-tender, Non-distended, and Normal bowel sounds  Extremities: Extremities warm/dry and No edema B/L  Neuro: Alert and oriented X 3 and No focal deficits  Skin: Warm/Dry, without rashes or lesions.      Assessment:    Severe coronary artery disease; Ongoing CABG workup    Plan:  -D/C losartan, monitor Cr    Patient agreeable to proceed with surgery; Informed consent signed    Carotid ultrasound completed, and acceptable    Vein mapping completed, and acceptable    Blood type and cross match ordered for 4/22    Continue Mupirocin 2% nasal ointment q 12 hrs    Continue topical chlorhexidine bath and mouth rise    coronary artery bypass grafting scheduled for 4/23 with Dr. Balwinder Sky D.O.    SIGNATURE: Paty Gonzalez PA-C  DATE: April 19, 2024  TIME: 7:21 AM    * This note was completed in part utilizing [employer] direct voice recognition software.   Grammatical errors, random word insertion, spelling mistakes, and incomplete sentences may be an occasional consequence of the system secondary to software limitations, ambient noise and hardware issues. At the time of dictation, efforts were made to edit, clarify and /or correct errors. Please read the chart carefully and recognize, using context, where substitutions have occurred.  If you have any questions or concerns about the context, text or information contained within the body of this dictation, please contact myself, the provider, for further clarification.

## 2024-04-19 NOTE — QUICK NOTE
Patient's partner updated at bedside during morning rounds.  All questions answered during this time.

## 2024-04-20 LAB
2HR DELTA HS TROPONIN: -11 NG/L
4HR DELTA HS TROPONIN: -23 NG/L
APTT PPP: 83 SECONDS (ref 23–37)
APTT PPP: 89 SECONDS (ref 23–37)
ATRIAL RATE: 72 BPM
CARDIAC TROPONIN I PNL SERPL HS: 66 NG/L
CARDIAC TROPONIN I PNL SERPL HS: 78 NG/L
CARDIAC TROPONIN I PNL SERPL HS: 89 NG/L
P AXIS: 69 DEGREES
PR INTERVAL: 192 MS
QRS AXIS: 56 DEGREES
QRSD INTERVAL: 100 MS
QT INTERVAL: 380 MS
QTC INTERVAL: 416 MS
T WAVE AXIS: 104 DEGREES
VENTRICULAR RATE: 72 BPM

## 2024-04-20 PROCEDURE — 84484 ASSAY OF TROPONIN QUANT: CPT

## 2024-04-20 PROCEDURE — 93010 ELECTROCARDIOGRAM REPORT: CPT | Performed by: INTERNAL MEDICINE

## 2024-04-20 PROCEDURE — 85730 THROMBOPLASTIN TIME PARTIAL: CPT | Performed by: INTERNAL MEDICINE

## 2024-04-20 PROCEDURE — 93005 ELECTROCARDIOGRAM TRACING: CPT

## 2024-04-20 PROCEDURE — 99232 SBSQ HOSP IP/OBS MODERATE 35: CPT | Performed by: INTERNAL MEDICINE

## 2024-04-20 RX ADMIN — BUDESONIDE AND FORMOTEROL FUMARATE DIHYDRATE 2 PUFF: 160; 4.5 AEROSOL RESPIRATORY (INHALATION) at 08:40

## 2024-04-20 RX ADMIN — MUPIROCIN 1 APPLICATION: 20 OINTMENT TOPICAL at 20:48

## 2024-04-20 RX ADMIN — MUPIROCIN 1 APPLICATION: 20 OINTMENT TOPICAL at 08:40

## 2024-04-20 RX ADMIN — FLUTICASONE PROPIONATE 2 SPRAY: 50 SPRAY, METERED NASAL at 08:41

## 2024-04-20 RX ADMIN — METOPROLOL SUCCINATE 50 MG: 50 TABLET, EXTENDED RELEASE ORAL at 08:40

## 2024-04-20 RX ADMIN — ASPIRIN 81 MG: 81 TABLET, COATED ORAL at 08:40

## 2024-04-20 RX ADMIN — BUDESONIDE AND FORMOTEROL FUMARATE DIHYDRATE 2 PUFF: 160; 4.5 AEROSOL RESPIRATORY (INHALATION) at 17:42

## 2024-04-20 RX ADMIN — DEXTRAN 70, GLYCERIN, HYPROMELLOSE 2 DROP: 1; 2; 3 SOLUTION/ DROPS OPHTHALMIC at 08:41

## 2024-04-20 RX ADMIN — HEPARIN SODIUM 15.8 UNITS/KG/HR: 10000 INJECTION, SOLUTION INTRAVENOUS at 13:51

## 2024-04-20 RX ADMIN — PANTOPRAZOLE SODIUM 40 MG: 40 TABLET, DELAYED RELEASE ORAL at 05:08

## 2024-04-20 RX ADMIN — AMLODIPINE BESYLATE 5 MG: 5 TABLET ORAL at 08:40

## 2024-04-20 RX ADMIN — CYCLOSPORINE 1 DROP: 0.5 EMULSION OPHTHALMIC at 14:18

## 2024-04-20 RX ADMIN — THIAMINE HCL TAB 100 MG 100 MG: 100 TAB at 08:40

## 2024-04-20 RX ADMIN — CYCLOSPORINE 1 DROP: 0.5 EMULSION OPHTHALMIC at 20:48

## 2024-04-20 RX ADMIN — POLYETHYLENE GLYCOL 3350 17 G: 17 POWDER, FOR SOLUTION ORAL at 08:39

## 2024-04-20 RX ADMIN — CHLORHEXIDINE GLUCONATE 0.12% ORAL RINSE 15 ML: 1.2 LIQUID ORAL at 20:48

## 2024-04-20 RX ADMIN — FOLIC ACID 1 MG: 1 TABLET ORAL at 08:40

## 2024-04-20 RX ADMIN — CHLORHEXIDINE GLUCONATE 0.12% ORAL RINSE 15 ML: 1.2 LIQUID ORAL at 08:39

## 2024-04-20 RX ADMIN — ATORVASTATIN CALCIUM 80 MG: 80 TABLET, FILM COATED ORAL at 17:41

## 2024-04-20 NOTE — PLAN OF CARE
Problem: SAFETY ADULT  Goal: Patient will remain free of falls  Description: INTERVENTIONS:  - Educate patient/family on patient safety including physical limitations  - Instruct patient to call for assistance with activity   - Consult OT/PT to assist with strengthening/mobility   - Keep Call bell within reach  - Keep bed low and locked with side rails adjusted as appropriate  - Keep care items and personal belongings within reach  - Initiate and maintain comfort rounds  - Make Fall Risk Sign visible to staff  - Offer Toileting every 4 Hours, in advance of need  - Initiate/Maintain bed alarm  - Obtain necessary fall risk management equipment: yes  - Apply yellow socks and bracelet for high fall risk patients  - Consider moving patient to room near nurses station  Outcome: Progressing  Goal: Maintain or return to baseline ADL function  Description: INTERVENTIONS:  -  Assess patient's ability to carry out ADLs; assess patient's baseline for ADL function and identify physical deficits which impact ability to perform ADLs (bathing, care of mouth/teeth, toileting, grooming, dressing, etc.)  - Assess/evaluate cause of self-care deficits   - Assess range of motion  - Assess patient's mobility; develop plan if impaired  - Assess patient's need for assistive devices and provide as appropriate  - Encourage maximum independence but intervene and supervise when necessary  - Involve family in performance of ADLs  - Assess for home care needs following discharge   - Consider OT consult to assist with ADL evaluation and planning for discharge  - Provide patient education as appropriate  Outcome: Progressing  Goal: Maintains/Returns to pre admission functional level  Description: INTERVENTIONS:  - Perform AM-PAC 6 Click Basic Mobility/ Daily Activity assessment daily.  - Set and communicate daily mobility goal to care team and patient/family/caregiver.   - Collaborate with rehabilitation services on mobility goals if consulted  -  Perform Range of Motion 4 times a day.  - Reposition patient every 4 hours.  - Dangle patient 4 times a day  - Stand patient 4 times a day  - Ambulate patient 4 times a day  - Out of bed to chair 4 times a day   - Out of bed for meals 4 times a day  - Out of bed for toileting  - Record patient progress and toleration of activity level   Outcome: Progressing

## 2024-04-20 NOTE — PROGRESS NOTES
INTERNAL MEDICINE RESIDENCY PROGRESS NOTE     Name: David S Cogan   Age & Sex: 68 y.o. male   MRN: [id number]  Unit/Bed#: [facility] 430-01   Encounter: [id number]  Team: SOD Team C     PATIENT INFORMATION     Name: David S Cogan   Age & Sex: 68 y.o. male   MRN: [id number]  Hospital Stay Days: 3    ASSESSMENT/PLAN     Principal Problem:    NSTEMI (non-ST elevated myocardial infarction) (HCC)  Active Problems:    Benign hypertension    Hyperlipidemia    Alcohol use, unspecified with other alcohol-induced disorder (HCC)    Asthma      Asthma  Assessment & Plan  Patient with history of asthma (no PFTs on file); treated as an outpatient with Symbicort and albuterol as needed. Endorsing some shortness of breath prior to presentation, although this appears more likely correlated to patient's cardiac complaints.    Plan  -Continue PTA inhaler regimen    Alcohol use, unspecified with other alcohol-induced disorder (HCC)  Assessment & Plan  Patient (and his wife) endorse significant alcohol use; up to 6 drinks [place], 1-2 when at home. Reports he did stop drinking over the weekend but has since resumed.     Plan  -[facility] protocol  -Continue thiamine supplementation    Hyperlipidemia  Assessment & Plan  Patient with history of HLD, with last lipid panel (October 2023) notable for cholesterol 298, triglycerides 195, HDL 69, . Was previously on treatment with Crestor 5 mg daily, though endorses overall poor compliance to this.    Plan  -Continue atorvastatin 80 mg daily    Benign hypertension  Assessment & Plan  Patient with history of HTN, treated as an outpatient with diltiazem 120 mg daily. BP on arrival 161/93, with most subsequent readings in the range of 130-150s/60-70s.    Plan  -Given ongoing mild hypertension, will increase Toprol to 50 mg daily; will also add amlodipine 5 mg daily as losartan currently on hold per CTS  -Continue to monitor    * NSTEMI (non-ST elevated myocardial infarction)  ([facility])  Assessment & Plan  Patient presenting with one week history of intermittent chest pain/pressure. Described as up to an 8-9/10 in intensity, substernal, with radiation to the bilateral shoulders and upper arms. Associated symptoms include nausea/vomiting (end of last week), abdominal bloating (last week), and shortness of breath/fatigue (earlier this week); he denies any associated diaphoresis. Above symptoms improved with rest/Gasex; of note, patient has had somewhat similar symptoms in the past secondary to GERD.     Seen by PCP/cardiology earlier this week with EKG(s) at that time suggestive of age-indeterminate septal infarct with nonspecific t-wave changes. Subsequently advised to present to the ED, where troponin found to be elevated at 1,217 (2 hour brionna 1,119). Treated with Protonix, carafate, loaded with ASA, and started on heparin drip.    Cardiac cath 4/19: 85% stenosed mid LAD lesion, 100% stenosis circumflex (with collaterals to the LAD), mild diffuse RCA disease    CT surgery consulted by inpatient cardiology team given the above, with plans for CABG to be done next week    Update (4/20): Had episode of CP earlier in the morning.  EKG showed no change to prior study, tropes were drawn and currently pending.  Per patient he believes he is possibly does have anxiety for the upcoming potential CABG versus cardiac procedure.  Otherwise HDS.    Plan  -Cardiology, CT surgery recommendations appreciated - plan for CABG next week on Tuesday  -Continue ASA; Brilinta on hold pending surgical intervention next week  -Continue metoprolol, atorvastatin as specified elsewhere; losartan on hold per CT surgery  -STAT EKG with recurrence of chest pain  -Nitro as needed for symptomatic chest pain  - F/u EKG, Tropes from CP episode on 4/20 AM        Disposition: Pending CT surgery eval for potential CABG.  Will defer dispo planning to postop/postprocedure.    SUBJECTIVE / INTERVAL HISTORY     NAEO, however this  morning Mr. Cogan was seen slightly uncomfortable in hospital bed.  He was complaining of chest pain which was similar to the same pains that brought him in here yesterday.  EKG was obtained and showed no RYLAND, and otherwise was not changed from prior study on admission.  Tropes were drawn and still pending.  Telemetry also with no ST elevations or other concerning signs for conversion from NSTEMI to STEMI.    Review of Systems   Constitutional:  Negative for chills and fever.   HENT:  Negative for ear pain and sore throat.    Eyes:  Negative for pain and visual disturbance.   Respiratory:  Negative for cough and shortness of breath.    Cardiovascular:  Positive for chest pain. Negative for palpitations.   Gastrointestinal:  Negative for abdominal pain and vomiting.   Genitourinary:  Negative for dysuria and hematuria.   Musculoskeletal:  Negative for arthralgias and back pain.   Skin:  Negative for color change and rash.   Neurological:  Negative for seizures and syncope.   Psychiatric/Behavioral:  The patient is nervous/anxious.    All other systems reviewed and are negative.      OBJECTIVE     Vitals:    24 0352 24 0600 24 0709 24 0832   BP: 122/82  149/95 152/95   Pulse: 65  72 82   Resp:   16    Temp: 97.7 °F (36.5 °C)  (!) 97.4 °F (36.3 °C)    TempSrc:       SpO2: 97%  97% 95%   Weight:  82.4 kg (181 lb 10.5 oz)     Height:            Temperature:   Temp (24hrs), Av.8 °F (36.6 °C), Min:97.4 °F (36.3 °C), Max:98.4 °F (36.9 °C)    Temperature: (!) 97.4 °F (36.3 °C)    Intake & Output:  I/O          07 07 07 07 07 0700    P.O. 716 480     I.V. (mL/kg) 1351.4 (15.8) 496.3 (6)     Total Intake(mL/kg) 2067.4 (24.2) 976.3 (11.8)     Urine (mL/kg/hr) 200 (0.1) 3525 (1.8)     Stool  0     Total Output 200 3525     Net +1867.4 -2548.7            Unmeasured Stool Occurrence  1 x             Weights:   IBW (Ideal Body Weight): 66.1 kg    Body mass  index is 28.45 kg/m².  Weight (last 2 days)       Date/Time Weight    04/20/24 0600 82.4 (181.66)    04/18/24 1100 85.3 (188)              Physical Exam:     Physical Exam  Vitals and nursing note reviewed.   Constitutional:       General: He is not in acute distress.     Appearance: He is well-developed.   HENT:      Head: Normocephalic and atraumatic.   Eyes:      Conjunctiva/sclera: Conjunctivae normal.   Cardiovascular:      Rate and Rhythm: Normal rate and regular rhythm.      Heart sounds: No murmur heard.  Pulmonary:      Effort: Pulmonary effort is normal. No respiratory distress.      Breath sounds: Normal breath sounds.   Abdominal:      Palpations: Abdomen is soft.      Tenderness: There is no abdominal tenderness.   Musculoskeletal:         General: No swelling.      Cervical back: Neck supple.   Skin:     General: Skin is warm and dry.      Capillary Refill: Capillary refill takes less than 2 seconds.   Neurological:      Mental Status: He is alert.   Psychiatric:         Mood and Affect: Mood normal.         LABORATORY DATA     Labs: I have personally reviewed pertinent reports.  Results from last 7 days   Lab Units 04/19/24  0357 04/18/24  0439 04/17/24  1520   WBC Thousand/uL 6.75 6.62 6.36   HEMOGLOBIN g/dL 13.6 14.6 15.7   HEMATOCRIT % 41.2 43.9 46.8   PLATELETS Thousands/uL 284 283 313   SEGS PCT %  --  46 48   MONO PCT %  --  10 12   EOS PCT %  --  4 3      Results from last 7 days   Lab Units 04/19/24  0357 04/18/24  0439 04/17/24  1520 04/15/24  1255   POTASSIUM mmol/L 3.7 4.1 4.2 5.1   CHLORIDE mmol/L 108 105 103 98   CO2 mmol/L 23 22 25 23   BUN mg/dL 15 13 17 17   CREATININE mg/dL 1.22 1.16 1.17 1.50*   CALCIUM mg/dL 8.1* 8.2* 9.0  --    ALK PHOS U/L  --   --  62  --    ALT U/L  --   --  18 23   AST U/L  --   --  26 39              Results from last 7 days   Lab Units 04/20/24  0347 04/19/24 2014 04/19/24  1225 04/17/24 2004 04/17/24  1614   INR   --   --   --   --  1.06   PTT seconds 89*  51* 62*   < > 30    < > = values in this interval not displayed.               IMAGING & DIAGNOSTIC TESTING     Radiology Results: I have personally reviewed pertinent reports.  CT chest wo contrast    Result Date: 4/19/2024  Impression: Normal caliber ascending aorta with no calcification. Mild bilateral lower lobe juxtapleural reticulation. It is uncertain if these interstitial lung abnormalities represent pulmonary fibrosis as there is no architectural distortion such as traction bronchiectasis/bronchiolectasis or honeycombing. Recommend  follow-up with a high-resolution chest CT in 1 year. This study was marked in Epic for immediate notification and follow-up. Workstation performed: [id number]     XR chest 2 views    Result Date: 4/17/2024  Impression: No acute cardiopulmonary disease. Workstation performed: [id number]     Other Diagnostic Testing: I have personally reviewed pertinent reports.    ACTIVE MEDICATIONS     Current Facility-Administered Medications   Medication Dose Route Frequency    acetaminophen (TYLENOL) tablet 650 mg  650 mg Oral Q4H PRN    albuterol (PROVENTIL HFA,VENTOLIN HFA) inhaler 1 puff  1 puff Inhalation Q6H PRN    amLODIPine (NORVASC) tablet 5 mg  5 mg Oral Daily    Artificial Tears ophthalmic solution 2 drop  2 drop Both Eyes Q12H SARITA    aspirin (ECOTRIN LOW STRENGTH) EC tablet 81 mg  81 mg Oral Daily    atorvastatin (LIPITOR) tablet 80 mg  80 mg Oral Daily With Dinner    budesonide-formoterol (SYMBICORT) 160-4.5 mcg/act inhaler 2 puff  2 puff Inhalation BID    chlorhexidine (PERIDEX) 0.12 % oral rinse 15 mL  15 mL Mouth/Throat Q12H SARITA    cycloSPORINE (RESTASIS) 0.05 % ophthalmic emulsion 1 drop  1 drop Both Eyes BID    fluticasone (FLONASE) 50 mcg/act nasal spray 2 spray  2 spray Each Nare Daily    folic acid (FOLVITE) tablet 1 mg  1 mg Oral Daily    heparin (porcine) 25,000 units in 0.45% NaCl 250 mL infusion (premix)  3-20 Units/kg/hr (Order-Specific) Intravenous Titrated     "heparin (porcine) injection 2,000 Units  2,000 Units Intravenous Q6H PRN    heparin (porcine) injection 4,000 Units  4,000 Units Intravenous Q6H PRN    metoprolol succinate (TOPROL-XL) 24 hr tablet 50 mg  50 mg Oral Daily    mupirocin (BACTROBAN) 2 % nasal ointment 1 Application  1 Application Nasal Q12H SARITA    nitroglycerin (NITRO-BID) 2 % TD ointment 1 inch  1 inch Topical BID PRN    nitroglycerin (NITROSTAT) SL tablet 0.4 mg  0.4 mg Sublingual Q5 Min PRN    ondansetron (ZOFRAN) injection 4 mg  4 mg Intravenous Q6H PRN    pantoprazole (PROTONIX) EC tablet 40 mg  40 mg Oral Early Morning    polyethylene glycol (MIRALAX) packet 17 g  17 g Oral Daily    thiamine tablet 100 mg  100 mg Oral Daily       VTE Pharmacologic Prophylaxis: Heparin  VTE Mechanical Prophylaxis: sequential compression device    Portions of the record may have been created with voice recognition software.  Occasional wrong word or \"sound a like\" substitutions may have occurred due to the inherent limitations of voice recognition software.  Read the chart carefully and recognize, using context, where substitutions have occurred.    == == == == == == == == == == == == == == == == == == == == ==     Daniel Topete MD  [facility]  Internal Medicine Resident, PGY-2      "

## 2024-04-20 NOTE — PROGRESS NOTES
"Cardiology Progress Note - David S Cogan 68 y.o. male MRN: [id number]    Unit/Bed#: [facility] 430-01 Encounter: [id number]      Assessment:  Principal Problem:    NSTEMI (non-ST elevated myocardial infarction) (HCC)  Active Problems:    Benign hypertension    Hyperlipidemia    Alcohol use, unspecified with other alcohol-induced disorder (HCC)    Asthma      Plan:  Patient with no issues overnight.  He has no chest pain or significant dyspnea.  He continues on intravenous heparin.  Echocardiogram 4/18 demonstrates LVEF of 55% with no significant valve disease.  Will continue present medical regimen.  CABG next week.    Subjective:   Patient seen and examined.  No significant events overnight.  negative.    Objective:     Vitals: Blood pressure 149/95, pulse 72, temperature (!) 97.4 °F (36.3 °C), resp. rate 16, height 5' 7\" (1.702 m), weight 82.4 kg (181 lb 10.5 oz), SpO2 97%., Body mass index is 28.45 kg/m².,   Orthostatic Blood Pressures      Flowsheet Row Most Recent Value   Blood Pressure 149/95 filed at 04/20/2024 0709   Patient Position - Orthostatic VS Lying filed at 04/18/2024 2255        ,      Intake/Output Summary (Last 24 hours) at 4/20/2024 0722  Last data filed at 4/20/2024 0353  Gross per 24 hour   Intake 976.28 ml   Output 3275 ml   Net -2298.72 ml       No significant arrhythmias seen on telemetry review.       Physical Exam:    GEN: David S Cogan appears well, alert and oriented x 3, pleasant and cooperative   NECK: supple, no carotid bruits, no JVD or HJR  HEART: normal rate, regular rhythm, normal S1 and S2, no murmurs, clicks, gallops or rubs   LUNGS: clear to auscultation bilaterally; no wheezes, rales, or rhonchi   ABDOMEN: normal bowel sounds, soft, no tenderness, no distention  EXTREMITIES: peripheral pulses normal; no clubbing, cyanosis, or edema  SKIN: warm and well perfused, no suspicious lesions on exposed skin    Labs & Results:    Admission on 04/17/2024   Component Date Value    Ventricular " Rate 04/17/2024 78     Atrial Rate 04/17/2024 78     AL Interval 04/17/2024 180     QRSD Interval 04/17/2024 90     QT Interval 04/17/2024 390     QTC Interval 04/17/2024 444     P Axis 04/17/2024 73     QRS Axis 04/17/2024 73     T Wave Axis 04/17/2024 92     WBC 04/17/2024 6.36     RBC 04/17/2024 5.11     Hemoglobin 04/17/2024 15.7     Hematocrit 04/17/2024 46.8     MCV 04/17/2024 92     MCH 04/17/2024 30.7     MCHC 04/17/2024 33.5     RDW 04/17/2024 13.7     MPV 04/17/2024 10.7     Platelets 04/17/2024 313     nRBC 04/17/2024 0     Segmented % 04/17/2024 48     Immature Grans % 04/17/2024 0     Lymphocytes % 04/17/2024 36     Monocytes % 04/17/2024 12     Eosinophils Relative 04/17/2024 3     Basophils Relative 04/17/2024 1     Absolute Neutrophils 04/17/2024 3.06     Absolute Immature Grans 04/17/2024 0.02     Absolute Lymphocytes 04/17/2024 2.28     Absolute Monocytes 04/17/2024 0.76     Eosinophils Absolute 04/17/2024 0.21     Basophils Absolute 04/17/2024 0.03     Sodium 04/17/2024 137     Potassium 04/17/2024 4.2     Chloride 04/17/2024 103     CO2 04/17/2024 25     ANION GAP 04/17/2024 9     BUN 04/17/2024 17     Creatinine 04/17/2024 1.17     Glucose 04/17/2024 94     Calcium 04/17/2024 9.0     AST 04/17/2024 26     ALT 04/17/2024 18     Alkaline Phosphatase 04/17/2024 62     Total Protein 04/17/2024 7.3     Albumin 04/17/2024 4.1     Total Bilirubin 04/17/2024 0.53     eGFR 04/17/2024 63     hs TnI 0hr 04/17/2024 1,217 (H)     hs TnI 2hr 04/17/2024 1,119 (H)     Delta 2hr hsTnI 04/17/2024 -98     PTT 04/17/2024 30     Protime 04/17/2024 13.7     INR 04/17/2024 1.06     hs TnI 4hr 04/17/2024 1,112 (H)     Delta 4hr hsTnI 04/17/2024 -105     RAA A4C 04/18/2024 13.8     LA Volume Index (BP) 04/18/2024 26.9     LV Diastolic Volume (BP) 04/18/2024 98     LV Systolic Volume (BP) 04/18/2024 42     MV Peak A Jonas 04/18/2024 1.21     MV stenosis pressure 1/2* 04/18/2024 52     MV Peak E Jonas 04/18/2024 82     E wave  deceleration time 04/18/2024 179     E/A ratio 04/18/2024 0.68     MV valve area p 1/2 meth* 04/18/2024 4.23     RVID d 04/18/2024 2.6     A4C EF 04/18/2024 55     Left ventricular stroke * 04/18/2024 40.00     IVSd 04/18/2024 1.20     Tricuspid annular plane * 04/18/2024 2.00     Ao root 04/18/2024 3.10     LVPWd 04/18/2024 0.80     LA size 04/18/2024 3.1     Asc Ao 04/18/2024 3.1     LA volume (BP) 04/18/2024 53     EF 04/18/2024 58     FS 04/18/2024 30     LVIDS 04/18/2024 2.80     IVS 04/18/2024 1.2     LVIDd 04/18/2024 4.00     LA length (A2C) 04/18/2024 5.00     LEFT VENTRICLE SYSTOLIC * 04/18/2024 29     LV DIASTOLIC VOLUME (MOD* 04/18/2024 69     Left Atrium Area-systoli* 04/18/2024 17.5     Left Atrium Area-systoli* 04/18/2024 20     MV E' Tissue Velocity Se* 04/18/2024 6     LVSV, 2D 04/18/2024 40     BSA 04/18/2024 1.97     LV Diastolic Volume Inde* 04/18/2024 49.7     LV Systolic Volume Index* 04/18/2024 21.3     LV EF 04/18/2024 55     PTT 04/17/2024 33     PTT 04/18/2024 95 (H)     Sodium 04/18/2024 139     Potassium 04/18/2024 4.1     Chloride 04/18/2024 105     CO2 04/18/2024 22     ANION GAP 04/18/2024 12     BUN 04/18/2024 13     Creatinine 04/18/2024 1.16     Glucose 04/18/2024 94     Calcium 04/18/2024 8.2 (L)     eGFR 04/18/2024 64     WBC 04/18/2024 6.62     RBC 04/18/2024 4.74     Hemoglobin 04/18/2024 14.6     Hematocrit 04/18/2024 43.9     MCV 04/18/2024 93     MCH 04/18/2024 30.8     MCHC 04/18/2024 33.3     RDW 04/18/2024 13.7     MPV 04/18/2024 10.3     Platelets 04/18/2024 283     nRBC 04/18/2024 0     Segmented % 04/18/2024 46     Immature Grans % 04/18/2024 1     Lymphocytes % 04/18/2024 38     Monocytes % 04/18/2024 10     Eosinophils Relative 04/18/2024 4     Basophils Relative 04/18/2024 1     Absolute Neutrophils 04/18/2024 3.13     Absolute Immature Grans 04/18/2024 0.04     Absolute Lymphocytes 04/18/2024 2.52     Absolute Monocytes 04/18/2024 0.65     Eosinophils Absolute  04/18/2024 0.23     Basophils Absolute 04/18/2024 0.05     PTT 04/18/2024 44 (H)     Ventricular Rate 04/18/2024 74     Atrial Rate 04/18/2024 74     CA Interval 04/18/2024 183     QRSD Interval 04/18/2024 88     QT Interval 04/18/2024 417     QTC Interval 04/18/2024 463     P Axis 04/18/2024 69     QRS Axis 04/18/2024 53     T Wave Axis 04/18/2024 85     PTT 04/18/2024 91 (H)     Ventricular Rate 04/18/2024 84     Atrial Rate 04/18/2024 84     CA Interval 04/18/2024 196     QRSD Interval 04/18/2024 88     QT Interval 04/18/2024 408     QTC Interval 04/18/2024 483     P Axis 04/18/2024 62     QRS Axis 04/18/2024 34     T Wave [place] 04/18/2024 80     Ventricular Rate 04/18/2024 83     Atrial Rate 04/18/2024 83     CA Interval 04/18/2024 192     QRSD Interval 04/18/2024 88     QT Interval 04/18/2024 417     QTC Interval 04/18/2024 490     P Axis 04/18/2024 70     QRS Axis 04/18/2024 49     T Wave [place] 04/18/2024 78     Hemoglobin A1C 04/18/2024 5.9 (H)     EAG 04/18/2024 123     MRSA Culture Only 04/18/2024 No Methicillin Resistant Staphlyococcus aureus (MRSA) isolated     ABO Grouping 04/18/2024 O     Rh Factor 04/18/2024 Negative     Antibody Screen 04/18/2024 Negative     Specimen Expiration Date 04/18/2024 [id number]     Cholesterol 04/18/2024 182     Triglycerides 04/18/2024 80     HDL, Direct 04/18/2024 48     LDL Calculated 04/18/2024 118 (H)     Non-HDL-Chol (CHOL-HDL) 04/18/2024 134     ABO Grouping 04/18/2024 O     Rh Factor 04/18/2024 Negative     PTT 04/19/2024 48 (H)     Sodium 04/19/2024 139     Potassium 04/19/2024 3.7     Chloride 04/19/2024 108     CO2 04/19/2024 23     ANION GAP 04/19/2024 8     BUN 04/19/2024 15     Creatinine 04/19/2024 1.22     Glucose 04/19/2024 92     Calcium 04/19/2024 8.1 (L)     eGFR 04/19/2024 60     WBC 04/19/2024 6.75     RBC 04/19/2024 4.42     Hemoglobin 04/19/2024 13.6     Hematocrit 04/19/2024 41.2     MCV 04/19/2024 93     MCH 04/19/2024 30.8     MCHC 04/19/2024 33.0      RDW 04/19/2024 13.8     Platelets 04/19/2024 284     MPV 04/19/2024 10.5     PTT 04/19/2024 62 (H)     PTT 04/19/2024 51 (H)     PTT 04/20/2024 89 (H)        VAS lower limb vein mapping bypass graft    Result Date: 4/19/2024  Narrative:  [facility]PORT [facility]INICAL: Indications: Patient presents for surgical clearance and general health evaluation secondary to future open heart surgery. Patient is asymptomatic at this time. Operative History: Heart valve replacement Risk Factors The patient has history of HTN, Hyperlipidemia and previous smoking (quit >10yrs ago).  FINDINGS:  Right           AP (mm)  GSV Inguinal        6.0  GSV Prox Thigh      2.8  GSV Mid Thigh       2.7  GSV Dist Thigh      2.6  GSV Knee            1.5  GSV Prox Calf       1.8  GSV Mid Calf        1.7  GSV Dist Calf       2.4   Left            AP (mm)  GSV Inguinal        4.9  GSV Prox Thigh      4.2  GSV Mid Thigh       3.9  GSV Dist Thigh      3.8  GSV Knee            2.5  GSV Prox Calf       2.5  GSV Mid Calf        1.7  GSV Dist Calf       2.2     CONCLUSION: Impression: RIGHT LOWER LIMB: The great saphenous vein is patent  from the groin to the ankle. The intraluminal diameter measurements range from 1.5 mm to 6.0 mm throughout. LEFT LOWER LIMB: The great saphenous vein is patent  from the groin to the ankle. The intraluminal diameter measurements range from 1.7 mm to 4.9 mm throughout.  SIGNATURE: Electronically Signed by: ANDREA JACKSON DO on 2024-04-19 12:35:20 PM    VAS carotid complete study    Result Date: 4/19/2024  Narrative:  [facility] REPORT [facility]IN[facility]: Indications: Patient presents for surgical clearance and general health evaluation secondary to future open heart surgery. Patient is asymptomatic at this time. Operative History: Heart valve replacement Risk Factors The patient has history of HTN, Hyperlipidemia and previous smoking (quit >10yrs ago). Clinical Right Pressure:  114/76 mm Hg, Left Pressure: IV.   FINDINGS:  Right        Impression  PSV  EDV (cm/s)  Direction of Flow  Ratio  Dist. ICA                 51          22                      0.63  Mid. ICA                  49          21                      0.61  Prox. ICA    Normal       34          15                      0.42  Dist CCA                  61          16                            Mid CCA                   81          10                      1.42  Prox CCA                  57          15                            Ext Carotid               98           5                      1.21  Prox Vert                 28           5  Antegrade                 Subclavian                84           0                             Left         Impression  PSV  EDV (cm/s)  Direction of Flow  Ratio  Dist. ICA                 51          19                      0.72  Mid. ICA                  60          24                      0.85  Prox. ICA    1 - 49%      69          24                      0.97  Dist CCA                  56          10                            Mid CCA                   71          18                      0.94  Prox CCA                  76          17                            Ext Carotid               78           7                      1.11  Prox Vert                 41          13  Antegrade                 Subclavian               124           0                               CONCLUSION: Impression RIGHT: There is no evidence of arterial disease throughout the extracranial carotid system. Vertebral artery flow is antegrade. There is no significant subclavian artery disease.  LEFT: There is <50% stenosis noted in the internal carotid artery. Plaque is homogenous and smooth. Vertebral artery flow is antegrade. There is no significant subclavian artery disease.  No previous study to compare.  SIGNATURE: Electronically Signed by: ANDREA JACKSON DO on 2024-04-19 12:03:42 PM    CT chest wo contrast    Result Date: 4/19/2024  Narrative: CT CHEST  WITHOUT IV CONTRAST INDICATION:   preop CABG. COMPARISON: CXR 4/17/2024. TECHNIQUE: Chest CT without intravenous contrast.  Axial, sagittal, coronal 2D reformats and coronal MIPS from source data. Radiation dose length product (DLP):  685.87 mGy-cm . Radiation dose exposure minimized using iterative reconstruction and automated exposure control. FINDINGS: LUNGS: No acute disease. Mild bilateral lower lobe juxtapleural reticulation. No architectural distortion such as traction bronchiectasis/bronchiolectasis or honeycombing. Benign calcified granulomas. AIRWAYS: No significant filling defects. PLEURA:  Unremarkable. HEART/GREAT VESSELS: Normal heart size. Mild coronary artery calcification indicating atherosclerotic heart disease. Normal caliber ascending aorta with no calcification. No pericardial effusion or adhesion. MEDIASTINUM AND ALESSANDRA:  Unremarkable. CHEST WALL AND LOWER NECK: Unremarkable. UPPER ABDOMEN: Bilateral renal cysts. OSSEOUS STRUCTURES: Normal for age.     Impression: Normal caliber ascending aorta with no calcification. Mild bilateral lower lobe juxtapleural reticulation. It is uncertain if these interstitial lung abnormalities represent pulmonary fibrosis as there is no architectural distortion such as traction bronchiectasis/bronchiolectasis or honeycombing. Recommend  follow-up with a high-resolution chest CT in 1 year. This study was marked in Epic for immediate notification and follow-up. Workstation performed: [id number]     Echo complete w/ contrast if indicated    Result Date: 4/18/2024  Narrative:   Left Ventricle: Left ventricular cavity size is normal. Wall thickness is mildly increased. There is mild concentric hypertrophy. The left ventricular ejection fraction is 55%. Systolic function is normal. Wall motion is normal. Diastolic function is mildly abnormal, consistent with grade I (abnormal) relaxation.   Right Ventricle: Right ventricular cavity size is normal. Systolic function is  normal.   Mitral Valve: There is mild regurgitation.     Cardiac catheterization    Result Date: 4/18/2024  Narrative:   Mid LAD lesion is 85% stenosed in a distally small caliber vessel   OM3 100% stenosed with collaterals from the LAD   RCA with mild diffuse CAD   LVEDP is mildly elevated without gradient on LV-AO pullback     XR chest 2 views    Result Date: 4/17/2024  Narrative: XR CHEST PA & LATERAL DUAL ENERGY SUBTRACTION. INDICATION:  chest pain. COMPARISON: CXR 1/29/2014. FINDINGS: Clear lungs. No pneumothorax or pleural effusion. Unremarkable cardiomediastinal silhouette. Bones are unremarkable for age. Unremarkable upper abdomen.     Impression: No acute cardiopulmonary disease. Workstation performed: [id number]       EKG personally reviewed by Cali Giron MD.     Counseling / Coordination of Care  Total floor / unit time spent today 30 minutes.  Greater than 50% of total time was spent with the patient and / or family counseling and / or coordination of care.

## 2024-04-21 ENCOUNTER — APPOINTMENT (INPATIENT)
Dept: NON INVASIVE DIAGNOSTICS | Facility: HOSPITAL | Age: 69
DRG: 233 | End: 2024-04-21
Payer: MEDICARE

## 2024-04-21 LAB
ANION GAP SERPL CALCULATED.3IONS-SCNC: 10 MMOL/L (ref 4–13)
APTT PPP: 85 SECONDS (ref 23–37)
BUN SERPL-MCNC: 14 MG/DL (ref 5–25)
CALCIUM SERPL-MCNC: 9.2 MG/DL (ref 8.4–10.2)
CHLORIDE SERPL-SCNC: 104 MMOL/L (ref 96–108)
CO2 SERPL-SCNC: 25 MMOL/L (ref 21–32)
CREAT SERPL-MCNC: 1.22 MG/DL (ref 0.6–1.3)
ERYTHROCYTE [DISTWIDTH] IN BLOOD BY AUTOMATED COUNT: 13.6 % (ref 11.6–15.1)
GFR SERPL CREATININE-BSD FRML MDRD: 60 ML/MIN/1.73SQ M
GLUCOSE SERPL-MCNC: 97 MG/DL (ref 65–140)
HCT VFR BLD AUTO: 48.1 % (ref 36.5–49.3)
HGB BLD-MCNC: 15.9 G/DL (ref 12–17)
MCH RBC QN AUTO: 30.5 PG (ref 26.8–34.3)
MCHC RBC AUTO-ENTMCNC: 33.1 G/DL (ref 31.4–37.4)
MCV RBC AUTO: 92 FL (ref 82–98)
PLATELET # BLD AUTO: 330 THOUSANDS/UL (ref 149–390)
PMV BLD AUTO: 10.5 FL (ref 8.9–12.7)
POTASSIUM SERPL-SCNC: 3.7 MMOL/L (ref 3.5–5.3)
RBC # BLD AUTO: 5.22 MILLION/UL (ref 3.88–5.62)
SODIUM SERPL-SCNC: 139 MMOL/L (ref 135–147)
WBC # BLD AUTO: 6.63 THOUSAND/UL (ref 4.31–10.16)

## 2024-04-21 PROCEDURE — 99232 SBSQ HOSP IP/OBS MODERATE 35: CPT | Performed by: INTERNAL MEDICINE

## 2024-04-21 PROCEDURE — 85730 THROMBOPLASTIN TIME PARTIAL: CPT | Performed by: INTERNAL MEDICINE

## 2024-04-21 PROCEDURE — 80048 BASIC METABOLIC PNL TOTAL CA: CPT

## 2024-04-21 PROCEDURE — 85027 COMPLETE CBC AUTOMATED: CPT

## 2024-04-21 RX ORDER — AMLODIPINE BESYLATE 2.5 MG/1
2.5 TABLET ORAL ONCE
Status: COMPLETED | OUTPATIENT
Start: 2024-04-21 | End: 2024-04-21

## 2024-04-21 RX ORDER — POTASSIUM CHLORIDE 20 MEQ/1
40 TABLET, EXTENDED RELEASE ORAL ONCE
Status: COMPLETED | OUTPATIENT
Start: 2024-04-21 | End: 2024-04-21

## 2024-04-21 RX ADMIN — HEPARIN SODIUM 15.8 UNITS/KG/HR: 10000 INJECTION, SOLUTION INTRAVENOUS at 06:37

## 2024-04-21 RX ADMIN — CHLORHEXIDINE GLUCONATE 0.12% ORAL RINSE 15 ML: 1.2 LIQUID ORAL at 08:00

## 2024-04-21 RX ADMIN — CHLORHEXIDINE GLUCONATE 0.12% ORAL RINSE 15 ML: 1.2 LIQUID ORAL at 21:18

## 2024-04-21 RX ADMIN — AMLODIPINE BESYLATE 5 MG: 5 TABLET ORAL at 08:00

## 2024-04-21 RX ADMIN — METOPROLOL SUCCINATE 50 MG: 50 TABLET, EXTENDED RELEASE ORAL at 08:00

## 2024-04-21 RX ADMIN — POTASSIUM CHLORIDE 40 MEQ: 1500 TABLET, EXTENDED RELEASE ORAL at 08:00

## 2024-04-21 RX ADMIN — CYCLOSPORINE 1 DROP: 0.5 EMULSION OPHTHALMIC at 08:01

## 2024-04-21 RX ADMIN — FOLIC ACID 1 MG: 1 TABLET ORAL at 08:00

## 2024-04-21 RX ADMIN — FLUTICASONE PROPIONATE 2 SPRAY: 50 SPRAY, METERED NASAL at 08:01

## 2024-04-21 RX ADMIN — ASPIRIN 81 MG: 81 TABLET, COATED ORAL at 08:00

## 2024-04-21 RX ADMIN — MUPIROCIN 1 APPLICATION: 20 OINTMENT TOPICAL at 21:18

## 2024-04-21 RX ADMIN — AMLODIPINE BESYLATE 2.5 MG: 2.5 TABLET ORAL at 11:53

## 2024-04-21 RX ADMIN — THIAMINE HCL TAB 100 MG 100 MG: 100 TAB at 08:00

## 2024-04-21 RX ADMIN — CYCLOSPORINE 1 DROP: 0.5 EMULSION OPHTHALMIC at 21:18

## 2024-04-21 RX ADMIN — ATORVASTATIN CALCIUM 80 MG: 80 TABLET, FILM COATED ORAL at 18:37

## 2024-04-21 RX ADMIN — PANTOPRAZOLE SODIUM 40 MG: 40 TABLET, DELAYED RELEASE ORAL at 06:37

## 2024-04-21 RX ADMIN — POLYETHYLENE GLYCOL 3350 17 G: 17 POWDER, FOR SOLUTION ORAL at 08:01

## 2024-04-21 RX ADMIN — BUDESONIDE AND FORMOTEROL FUMARATE DIHYDRATE 2 PUFF: 160; 4.5 AEROSOL RESPIRATORY (INHALATION) at 18:37

## 2024-04-21 RX ADMIN — BUDESONIDE AND FORMOTEROL FUMARATE DIHYDRATE 2 PUFF: 160; 4.5 AEROSOL RESPIRATORY (INHALATION) at 08:01

## 2024-04-21 RX ADMIN — MUPIROCIN 1 APPLICATION: 20 OINTMENT TOPICAL at 08:00

## 2024-04-21 NOTE — PROGRESS NOTES
INTERNAL MEDICINE RESIDENCY PROGRESS NOTE     Name: David S Cogan   Age & Sex: 68 y.o. male   MRN: [id number]  Unit/Bed#: [facility] 430-01   Encounter: [id number]  Team: SOD Team C     PATIENT INFORMATION     Name: David S Cogan   Age & Sex: 68 y.o. male   MRN: [id number]  Hospital Stay Days: 4    ASSESSMENT/PLAN     Principal Problem:    NSTEMI (non-ST elevated myocardial infarction) ([facility])  Active Problems:    Benign hypertension    Hyperlipidemia    Alcohol use, unspecified with other alcohol-induced disorder ([facility])    Asthma      * NSTEMI (non-ST elevated myocardial infarction) ([facility])  Assessment & Plan  Patient presenting with one week history of intermittent chest pain/pressure. Described as up to an 8-9/10 in intensity, substernal, with radiation to the bilateral shoulders and upper arms. Associated symptoms include nausea/vomiting (end of last week), abdominal bloating (last week), and shortness of breath/fatigue (earlier this week); he denies any associated diaphoresis. Above symptoms improved with rest/Gasex; of note, patient has had somewhat similar symptoms in the past secondary to GERD.     Seen by PCP/cardiology earlier this week with EKG(s) at that time suggestive of age-indeterminate septal infarct with nonspecific t-wave changes. Subsequently advised to present to the ED, where troponin found to be elevated at 1,217 (2 hour brionna 1,119). Treated with Protonix, carafate, loaded with ASA, and started on heparin drip.    Cardiac cath 4/19: 85% stenosed mid LAD lesion, 100% stenosis circumflex (with collaterals to the LAD), mild diffuse RCA disease    CT surgery consulted by inpatient cardiology team given the above, with plans for CABG to be done next week    Update (4/20): Had episode of CP earlier in the morning.  EKG showed no change to prior study, tropes were drawn and currently pending.  Per patient he believes he is possibly does have anxiety for the upcoming potential CABG versus cardiac procedure.   Otherwise HDS.    4/21: Troponins yesterday 89/78/66.  EKG done at that time read as consistent with possible anterior lateral ischemia.  As of time of writing, patient has remained chest pain-free since this episode yesterday.    Plan  -Cardiology, CT surgery recommendations appreciated - plan for CABG next week on Tuesday  -Continue ASA; Brilinta on hold pending surgical intervention next week  -Continue metoprolol, atorvastatin as specified elsewhere; losartan on hold per CT surgery  -STAT EKG with recurrence of chest pain  -Nitro as needed for symptomatic chest pain    Asthma  Assessment & Plan  Patient with history of asthma (no PFTs on file); treated as an outpatient with Symbicort and albuterol as needed. Endorsing some shortness of breath prior to presentation, although this appears more likely correlated to patient's cardiac complaints.    Plan  -Continue PTA inhaler regimen    Alcohol use, unspecified with other alcohol-induced disorder ([facility]CC)  Assessment & Plan  Patient (and his wife) endorse significant alcohol use; up to 6 drinks [place], 1-2 when at home. Reports he did stop drinking over the weekend but has since resumed.     Plan  -[facility] protocol  -Continue thiamine supplementation    Hyperlipidemia  Assessment & Plan  Patient with history of HLD, with last lipid panel (October 2023) notable for cholesterol 298, triglycerides 195, HDL 69, . Was previously on treatment with Crestor 5 mg daily, though endorses overall poor compliance to this.    Plan  -Continue atorvastatin 80 mg daily    Benign hypertension  Assessment & Plan  Patient with history of HTN, treated as an outpatient with diltiazem 120 mg daily. BP on arrival 161/93, with most subsequent readings in the range of 130-150s/60-70s.    Plan  -Given ongoing mild hypertension, we will likely increase amlodipine to 10 mg daily; continue Toprol 50 mg daily  -Losartan on hold per CT surgery pending CABG next week  -Continue  to monitor        Disposition: Remain admitted pending CABG, planned for      SUBJECTIVE     Patient seen and examined. No acute events overnight.  This morning, patient is without acute complaints.  He denies any further episodes of chest pain since yesterday (he describes this episode yesterday as occurring after walking around and states that he felt slightly more short of breath and slightly lightheaded, though this resolved with rest).  He denies any fever/chills, chest pain, shortness of breath, abdominal pain, or other symptoms at present.    OBJECTIVE     Vitals:    24 2312 24 0336 24 0600 24 0654   BP: 120/66 119/80  135/94   Pulse: 70 76  68   Resp: 15   16   Temp: 98.1 °F (36.7 °C) 97.6 °F (36.4 °C)  (!) 97.4 °F (36.3 °C)   TempSrc:       SpO2: 93% 95%  95%   Weight:   81.8 kg (180 lb 5.4 oz)    Height:          Temperature:   Temp (24hrs), Av.8 °F (36.6 °C), Min:97.4 °F (36.3 °C), Max:98.1 °F (36.7 °C)    Temperature: (!) 97.4 °F (36.3 °C)  Intake & Output:  I/O          0701   0700  07 0700    P.O. 480 360    I.V. (mL/kg) 496.3 (6) 318 (3.9)    Total Intake(mL/kg) 976.3 (11.8) 678 (8.2)    Urine (mL/kg/hr) 3525 (1.8) 1375 (0.7)    Stool 0 0    Total Output 3525 1375    Net -2548.7 -697          Unmeasured Urine Occurrence  3 x    Unmeasured Stool Occurrence 1 x 1 x          Weights:   IBW (Ideal Body Weight): 66.1 kg    Body mass index is 28.24 kg/m².  Weight (last 2 days)       Date/Time Weight    24 06 81.8 (180.34)    24 06 82.4 (181.66)          Physical Exam  Vitals and nursing note reviewed.   Constitutional:       Appearance: Normal appearance.   HENT:      Head: Normocephalic and atraumatic.      Right Ear: External ear normal.      Left Ear: External ear normal.      Nose: Nose normal.      Mouth/Throat:      Mouth: Mucous membranes are moist.      Pharynx: Oropharynx is clear.   Eyes:      Extraocular Movements:  Extraocular movements intact.      Conjunctiva/sclera: Conjunctivae normal.      Pupils: Pupils are equal, round, and reactive to light.   Cardiovascular:      Rate and Rhythm: Normal rate and regular rhythm.      Heart sounds: Normal heart sounds. No murmur heard.  Pulmonary:      Effort: Pulmonary effort is normal. No respiratory distress.      Breath sounds: Normal breath sounds.   Abdominal:      General: Abdomen is flat. Bowel sounds are normal. There is no distension.      Palpations: Abdomen is soft.      Tenderness: There is no abdominal tenderness.   Musculoskeletal:      Right lower leg: No edema.      Left lower leg: No edema.   Skin:     General: Skin is warm and dry.   Neurological:      Mental Status: He is alert and oriented to person, place, and time. Mental status is at baseline.   Psychiatric:         Mood and Affect: Mood normal.         Behavior: Behavior normal.       LABORATORY DATA     Labs: I have personally reviewed pertinent reports.  Results from last 7 days   Lab Units 04/21/24 0442 04/19/24 0357 04/18/24  0439 04/17/24  1520   WBC Thousand/uL 6.63 6.75 6.62 6.36   HEMOGLOBIN g/dL 15.9 13.6 14.6 15.7   HEMATOCRIT % 48.1 41.2 43.9 46.8   PLATELETS Thousands/uL 330 284 283 313   SEGS PCT %  --   --  46 48   MONO PCT %  --   --  10 12   EOS PCT %  --   --  4 3      Results from last 7 days   Lab Units 04/21/24 0442 04/19/24 0357 04/18/24  0439 04/17/24  1520 04/17/24  1520 04/15/24  1255   POTASSIUM mmol/L 3.7 3.7 4.1   < > 4.2 5.1   CHLORIDE mmol/L 104 108 105   < > 103 98   CO2 mmol/L 25 23 22   < > 25 23   BUN mg/dL 14 15 13   < > 17 17   CREATININE mg/dL 1.22 1.22 1.16   < > 1.17 1.50*   CALCIUM mg/dL 9.2 8.1* 8.2*   < > 9.0  --    ALK PHOS U/L  --   --   --   --  62  --    ALT U/L  --   --   --   --  18 23   AST U/L  --   --   --   --  26 39    < > = values in this interval not displayed.              Results from last 7 days   Lab Units 04/21/24  0442 04/20/24  0943 04/20/24  0347  04/17/24 2004 04/17/24  1614   INR   --   --   --   --  1.06   PTT seconds 85* 83* 89*   < > 30    < > = values in this interval not displayed.               IMAGING & DIAGNOSTIC TESTING     Radiology Results: I have personally reviewed pertinent reports.  CT chest wo contrast    Result Date: 4/19/2024  Impression: Normal caliber ascending aorta with no calcification. Mild bilateral lower lobe juxtapleural reticulation. It is uncertain if these interstitial lung abnormalities represent pulmonary fibrosis as there is no architectural distortion such as traction bronchiectasis/bronchiolectasis or honeycombing. Recommend  follow-up with a high-resolution chest CT in 1 year. This study was marked in Epic for immediate notification and follow-up. Workstation performed: [id number]     XR chest 2 views    Result Date: 4/17/2024  Impression: No acute cardiopulmonary disease. Workstation performed: [id number]     Other Diagnostic Testing: I have personally reviewed pertinent reports.    ACTIVE MEDICATIONS     Current Facility-Administered Medications   Medication Dose Route Frequency    acetaminophen (TYLENOL) tablet 650 mg  650 mg Oral Q4H PRN    albuterol (PROVENTIL HFA,VENTOLIN HFA) inhaler 1 puff  1 puff Inhalation Q6H PRN    amLODIPine (NORVASC) tablet 5 mg  5 mg Oral Daily    Artificial Tears ophthalmic solution 2 drop  2 drop Both Eyes Q12H SARITA    aspirin (ECOTRIN LOW STRENGTH) EC tablet 81 mg  81 mg Oral Daily    atorvastatin (LIPITOR) tablet 80 mg  80 mg Oral Daily With Dinner    budesonide-formoterol (SYMBICORT) 160-4.5 mcg/act inhaler 2 puff  2 puff Inhalation BID    chlorhexidine (PERIDEX) 0.12 % oral rinse 15 mL  15 mL Mouth/Throat Q12H SARITA    cycloSPORINE (RESTASIS) 0.05 % ophthalmic emulsion 1 drop  1 drop Both Eyes BID    fluticasone (FLONASE) 50 mcg/act nasal spray 2 spray  2 spray Each Nare Daily    folic acid (FOLVITE) tablet 1 mg  1 mg Oral Daily    heparin (porcine) 25,000 units in 0.45% NaCl 250 mL  "infusion (premix)  3-20 Units/kg/hr (Order-Specific) Intravenous Titrated    heparin (porcine) injection 2,000 Units  2,000 Units Intravenous Q6H PRN    heparin (porcine) injection 4,000 Units  4,000 Units Intravenous Q6H PRN    metoprolol succinate (TOPROL-XL) 24 hr tablet 50 mg  50 mg Oral Daily    mupirocin (BACTROBAN) 2 % nasal ointment 1 Application  1 Application Nasal Q12H SARITA    nitroglycerin (NITRO-BID) 2 % TD ointment 1 inch  1 inch Topical BID PRN    nitroglycerin (NITROSTAT) SL tablet 0.4 mg  0.4 mg Sublingual Q5 Min PRN    ondansetron (ZOFRAN) injection 4 mg  4 mg Intravenous Q6H PRN    pantoprazole (PROTONIX) EC tablet 40 mg  40 mg Oral Early Morning    polyethylene glycol (MIRALAX) packet 17 g  17 g Oral Daily    thiamine tablet 100 mg  100 mg Oral Daily       VTE Pharmacologic Prophylaxis: Heparin  VTE Mechanical Prophylaxis: sequential compression device    Portions of the record may have been created with voice recognition software.  Occasional wrong word or \"sound a like\" substitutions may have occurred due to the inherent limitations of voice recognition software.  Read the chart carefully and recognize, using context, where substitutions have occurred.  ==  Daniel Shah MD  [facility]  Internal Medicine Residency PGY-1       "

## 2024-04-21 NOTE — QUICK NOTE
Updated patient's family members at bedside during morning rounds.  All questions answered at this time.

## 2024-04-21 NOTE — PROGRESS NOTES
"Cardiology Progress Note - David S Cogan 68 y.o. male MRN: [id number]    Unit/Bed#: [facility] 430-01 Encounter: [id number]      Assessment:  Principal Problem:    NSTEMI (non-ST elevated myocardial infarction) ([facility])  Active Problems:    Benign hypertension    Hyperlipidemia    Alcohol use, unspecified with other alcohol-induced disorder ([facility])    Asthma      Plan:  Patient with no issues overnight.  He has no chest pain or significant dyspnea.  He continues on intravenous heparin.  Echocardiogram 4/18 demonstrates LVEF of 55% with no significant valve disease.  BMP today with potassium of 3.7 and creatinine of 1.22.  Will continue present medical regimen.  CABG on Tuesday.    Subjective:   Patient seen and examined.  No significant events overnight.  negative.    Objective:     Vitals: Blood pressure 135/94, pulse 68, temperature (!) 97.4 °F (36.3 °C), resp. rate 16, height 5' 7\" (1.702 m), weight 81.8 kg (180 lb 5.4 oz), SpO2 95%., Body mass index is 28.24 kg/m².,   Orthostatic Blood Pressures      Flowsheet Row Most Recent Value   Blood Pressure 135/94 filed at 04/21/2024 0654   Patient Position - Orthostatic VS Lying filed at 04/18/2024 2255        ,      Intake/Output Summary (Last 24 hours) at 4/21/2024 0722  Last data filed at 4/21/2024 0537  Gross per 24 hour   Intake 678.02 ml   Output 1375 ml   Net -696.98 ml       No significant arrhythmias seen on telemetry review.       Physical Exam:    GEN: David S Cogan appears well, alert and oriented x 3, pleasant and cooperative   NECK: supple, no carotid bruits, no JVD or HJR  HEART: normal rate, regular rhythm, normal S1 and S2, no murmurs, clicks, gallops or rubs   LUNGS: clear to auscultation bilaterally; no wheezes, rales, or rhonchi   ABDOMEN: normal bowel sounds, soft, no tenderness, no distention  EXTREMITIES: peripheral pulses normal; no clubbing, cyanosis, or edema  SKIN: warm and well perfused, no suspicious lesions on exposed skin    Labs & Results:    No " results displayed because visit has over 200 results.          VAS lower limb vein mapping bypass graft    Result Date: 4/19/2024  Narrative:  [facility] REPORT CLINICAL: Indications: Patient presents for surgical clearance and general health evaluation secondary to future open heart surgery. Patient is asymptomatic at this time. Operative History: Heart valve replacement Risk Factors The patient has history of HTN, Hyperlipidemia and previous smoking (quit >10yrs ago).  FINDINGS:  Right           AP (mm)  GSV Inguinal        6.0  GSV Prox Thigh      2.8  GSV Mid Thigh       2.7  GSV Dist Thigh      2.6  GSV Knee            1.5  GSV Prox Calf       1.8  GSV Mid Calf        1.7  GSV Dist Calf       2.4   Left            AP (mm)  GSV Inguinal        4.9  GSV Prox Thigh      4.2  GSV Mid Thigh       3.9  GSV Dist Thigh      3.8  GSV Knee            2.5  GSV Prox Calf       2.5  GSV Mid Calf        1.7  GSV Dist Calf       2.2     CONCLUSION: Impression: RIGHT LOWER LIMB: The great saphenous vein is patent  from the groin to the ankle. The intraluminal diameter measurements range from 1.5 mm to 6.0 mm throughout. LEFT LOWER LIMB: The great saphenous vein is patent  from the groin to the ankle. The intraluminal diameter measurements range from 1.7 mm to 4.9 mm throughout.  SIGNATURE: Electronically Signed by: ANDREA JACKSON DO on 2024-04-19 12:35:20 PM    VAS carotid complete study    Result Date: 4/19/2024  Narrative:  [facility] REPORT CLINICAL: Indications: Patient presents for surgical clearance and general health evaluation secondary to future open heart surgery. Patient is asymptomatic at this time. Operative History: Heart valve replacement Risk Factors The patient has history of HTN, Hyperlipidemia and previous smoking (quit >10yrs ago). Clinical Right Pressure:  114/76 mm Hg, Left Pressure: IV.  FINDINGS:  Right        Impression  PSV  EDV (cm/s)  Direction of Flow  Ratio  Dist. ICA                  51          22                      0.63  Mid. ICA                  49          21                      0.61  Prox. ICA    Normal       34          15                      0.42  Dist CCA                  61          16                            Mid CCA                   81          10                      1.42  Prox CCA                  57          15                            Ext Carotid               98           5                      1.21  Prox Vert                 28           5  Antegrade                 Subclavian                84           0                             Left         Impression  PSV  EDV (cm/s)  Direction of Flow  Ratio  Dist. ICA                 51          19                      0.72  Mid. ICA                  60          24                      0.85  Prox. ICA    1 - 49%      69          24                      0.97  Dist CCA                  56          10                            Mid CCA                   71          18                      0.94  Prox CCA                  76          17                            Ext Carotid               78           7                      1.11  Prox Vert                 41          13  Antegrade                 Subclavian               124           0                               CONCLUSION: Impression RIGHT: There is no evidence of arterial disease throughout the extracranial carotid system. Vertebral artery flow is antegrade. There is no significant subclavian artery disease.  LEFT: There is <50% stenosis noted in the internal carotid artery. Plaque is homogenous and smooth. Vertebral artery flow is antegrade. There is no significant subclavian artery disease.  No previous study to compare.  SIGNATURE: Electronically Signed by: ANDREA JACKSON DO on 2024-04-19 12:03:42 PM    CT chest wo contrast    Result Date: 4/19/2024  Narrative: CT CHEST WITHOUT IV CONTRAST INDICATION:   preop CABG. COMPARISON: CXR 4/17/2024. TECHNIQUE: Chest CT without  intravenous contrast.  Axial, sagittal, coronal 2D reformats and coronal MIPS from source data. Radiation dose length product (DLP):  685.87 mGy-cm . Radiation dose exposure minimized using iterative reconstruction and automated exposure control. FINDINGS: LUNGS: No acute disease. Mild bilateral lower lobe juxtapleural reticulation. No architectural distortion such as traction bronchiectasis/bronchiolectasis or honeycombing. Benign calcified granulomas. AIRWAYS: No significant filling defects. PLEURA:  Unremarkable. HEART/GREAT VESSELS: Normal heart size. Mild coronary artery calcification indicating atherosclerotic heart disease. Normal caliber ascending aorta with no calcification. No pericardial effusion or adhesion. MEDIASTINUM AND ALESSANDRA:  Unremarkable. CHEST WALL AND LOWER NECK: Unremarkable. UPPER ABDOMEN: Bilateral renal cysts. OSSEOUS STRUCTURES: Normal for age.     Impression: Normal caliber ascending aorta with no calcification. Mild bilateral lower lobe juxtapleural reticulation. It is uncertain if these interstitial lung abnormalities represent pulmonary fibrosis as there is no architectural distortion such as traction bronchiectasis/bronchiolectasis or honeycombing. Recommend  follow-up with a high-resolution chest CT in 1 year. This study was marked in Epic for immediate notification and follow-up. Workstation performed: [id number]     Echo complete w/ contrast if indicated    Result Date: 4/18/2024  Narrative:   Left Ventricle: Left ventricular cavity size is normal. Wall thickness is mildly increased. There is mild concentric hypertrophy. The left ventricular ejection fraction is 55%. Systolic function is normal. Wall motion is normal. Diastolic function is mildly abnormal, consistent with grade I (abnormal) relaxation.   Right Ventricle: Right ventricular cavity size is normal. Systolic function is normal.   Mitral Valve: There is mild regurgitation.     Cardiac catheterization    Result Date:  4/18/2024  Narrative:   Mid LAD lesion is 85% stenosed in a distally small caliber vessel   OM3 100% stenosed with collaterals from the LAD   RCA with mild diffuse CAD   LVEDP is mildly elevated without gradient on LV-AO pullback     XR chest 2 views    Result Date: 4/17/2024  Narrative: XR CHEST PA & LATERAL DUAL ENERGY SUBTRACTION. INDICATION:  chest pain. COMPARISON: CXR 1/29/2014. FINDINGS: Clear lungs. No pneumothorax or pleural effusion. Unremarkable cardiomediastinal silhouette. Bones are unremarkable for age. Unremarkable upper abdomen.     Impression: No acute cardiopulmonary disease. Workstation performed: [id number]       EKG personally reviewed by Cali Giron MD.     Counseling / Coordination of Care  Total floor / unit time spent today 30 minutes.  Greater than 50% of total time was spent with the patient and / or family counseling and / or coordination of care.

## 2024-04-22 ENCOUNTER — ANESTHESIA EVENT (INPATIENT)
Dept: PERIOP | Facility: HOSPITAL | Age: 69
End: 2024-04-22
Payer: MEDICARE

## 2024-04-22 ENCOUNTER — APPOINTMENT (INPATIENT)
Dept: RADIOLOGY | Facility: HOSPITAL | Age: 69
DRG: 233 | End: 2024-04-22
Payer: MEDICARE

## 2024-04-22 LAB
ABO GROUP BLD: NORMAL
ANION GAP SERPL CALCULATED.3IONS-SCNC: 9 MMOL/L (ref 4–13)
APTT PPP: 85 SECONDS (ref 23–37)
BASOPHILS # BLD AUTO: 0.07 THOUSANDS/ÂΜL (ref 0–0.1)
BASOPHILS NFR BLD AUTO: 1 % (ref 0–1)
BLD GP AB SCN SERPL QL: NEGATIVE
BUN SERPL-MCNC: 16 MG/DL (ref 5–25)
CALCIUM SERPL-MCNC: 9.2 MG/DL (ref 8.4–10.2)
CHLORIDE SERPL-SCNC: 105 MMOL/L (ref 96–108)
CO2 SERPL-SCNC: 23 MMOL/L (ref 21–32)
CREAT SERPL-MCNC: 1.16 MG/DL (ref 0.6–1.3)
EOSINOPHIL # BLD AUTO: 0.28 THOUSAND/ÂΜL (ref 0–0.61)
EOSINOPHIL NFR BLD AUTO: 4 % (ref 0–6)
ERYTHROCYTE [DISTWIDTH] IN BLOOD BY AUTOMATED COUNT: 13.6 % (ref 11.6–15.1)
GFR SERPL CREATININE-BSD FRML MDRD: 64 ML/MIN/1.73SQ M
GLUCOSE SERPL-MCNC: 100 MG/DL (ref 65–140)
GLUCOSE SERPL-MCNC: 149 MG/DL (ref 65–140)
HCT VFR BLD AUTO: 49.2 % (ref 36.5–49.3)
HGB BLD-MCNC: 16.3 G/DL (ref 12–17)
IMM GRANULOCYTES # BLD AUTO: 0.03 THOUSAND/UL (ref 0–0.2)
IMM GRANULOCYTES NFR BLD AUTO: 0 % (ref 0–2)
LYMPHOCYTES # BLD AUTO: 2.87 THOUSANDS/ÂΜL (ref 0.6–4.47)
LYMPHOCYTES NFR BLD AUTO: 40 % (ref 14–44)
MCH RBC QN AUTO: 31.1 PG (ref 26.8–34.3)
MCHC RBC AUTO-ENTMCNC: 33.1 G/DL (ref 31.4–37.4)
MCV RBC AUTO: 94 FL (ref 82–98)
MONOCYTES # BLD AUTO: 0.54 THOUSAND/ÂΜL (ref 0.17–1.22)
MONOCYTES NFR BLD AUTO: 8 % (ref 4–12)
NEUTROPHILS # BLD AUTO: 3.45 THOUSANDS/ÂΜL (ref 1.85–7.62)
NEUTS SEG NFR BLD AUTO: 47 % (ref 43–75)
NRBC BLD AUTO-RTO: 0 /100 WBCS
PLATELET # BLD AUTO: 316 THOUSANDS/UL (ref 149–390)
PMV BLD AUTO: 10.3 FL (ref 8.9–12.7)
POTASSIUM SERPL-SCNC: 3.9 MMOL/L (ref 3.5–5.3)
RBC # BLD AUTO: 5.24 MILLION/UL (ref 3.88–5.62)
RH BLD: NEGATIVE
SODIUM SERPL-SCNC: 137 MMOL/L (ref 135–147)
SPECIMEN EXPIRATION DATE: NORMAL
WBC # BLD AUTO: 7.24 THOUSAND/UL (ref 4.31–10.16)

## 2024-04-22 PROCEDURE — 86850 RBC ANTIBODY SCREEN: CPT

## 2024-04-22 PROCEDURE — 82948 REAGENT STRIP/BLOOD GLUCOSE: CPT

## 2024-04-22 PROCEDURE — 99232 SBSQ HOSP IP/OBS MODERATE 35: CPT | Performed by: INTERNAL MEDICINE

## 2024-04-22 PROCEDURE — 85730 THROMBOPLASTIN TIME PARTIAL: CPT | Performed by: INTERNAL MEDICINE

## 2024-04-22 PROCEDURE — 76705 ECHO EXAM OF ABDOMEN: CPT

## 2024-04-22 PROCEDURE — NC001 PR NO CHARGE: Performed by: PHYSICIAN ASSISTANT

## 2024-04-22 PROCEDURE — 80048 BASIC METABOLIC PNL TOTAL CA: CPT

## 2024-04-22 PROCEDURE — 94664 DEMO&/EVAL PT USE INHALER: CPT

## 2024-04-22 PROCEDURE — 86923 COMPATIBILITY TEST ELECTRIC: CPT

## 2024-04-22 PROCEDURE — 93923 UPR/LXTR ART STDY 3+ LVLS: CPT | Performed by: SURGERY

## 2024-04-22 PROCEDURE — 85025 COMPLETE CBC W/AUTO DIFF WBC: CPT

## 2024-04-22 PROCEDURE — 86901 BLOOD TYPING SEROLOGIC RH(D): CPT

## 2024-04-22 PROCEDURE — 86900 BLOOD TYPING SEROLOGIC ABO: CPT

## 2024-04-22 RX ORDER — CHLORHEXIDINE GLUCONATE ORAL RINSE 1.2 MG/ML
15 SOLUTION DENTAL EVERY 12 HOURS SCHEDULED
Status: DISCONTINUED | OUTPATIENT
Start: 2024-04-22 | End: 2024-04-23

## 2024-04-22 RX ADMIN — HEPARIN SODIUM 15.8 UNITS/KG/HR: 10000 INJECTION, SOLUTION INTRAVENOUS at 19:38

## 2024-04-22 RX ADMIN — CHLORHEXIDINE GLUCONATE 0.12% ORAL RINSE 15 ML: 1.2 LIQUID ORAL at 08:40

## 2024-04-22 RX ADMIN — DEXTRAN 70, GLYCERIN, HYPROMELLOSE 2 DROP: 1; 2; 3 SOLUTION/ DROPS OPHTHALMIC at 08:55

## 2024-04-22 RX ADMIN — FOLIC ACID 1 MG: 1 TABLET ORAL at 08:41

## 2024-04-22 RX ADMIN — MUPIROCIN 1 APPLICATION: 20 OINTMENT TOPICAL at 08:41

## 2024-04-22 RX ADMIN — POLYETHYLENE GLYCOL 3350 17 G: 17 POWDER, FOR SOLUTION ORAL at 08:41

## 2024-04-22 RX ADMIN — ATORVASTATIN CALCIUM 80 MG: 80 TABLET, FILM COATED ORAL at 17:02

## 2024-04-22 RX ADMIN — ASPIRIN 81 MG: 81 TABLET, COATED ORAL at 08:41

## 2024-04-22 RX ADMIN — THIAMINE HCL TAB 100 MG 100 MG: 100 TAB at 08:41

## 2024-04-22 RX ADMIN — AMLODIPINE BESYLATE 7.5 MG: 5 TABLET ORAL at 08:41

## 2024-04-22 RX ADMIN — PANTOPRAZOLE SODIUM 40 MG: 40 TABLET, DELAYED RELEASE ORAL at 06:34

## 2024-04-22 RX ADMIN — CYCLOSPORINE 1 DROP: 0.5 EMULSION OPHTHALMIC at 20:05

## 2024-04-22 RX ADMIN — HEPARIN SODIUM 15.8 UNITS/KG/HR: 10000 INJECTION, SOLUTION INTRAVENOUS at 00:50

## 2024-04-22 RX ADMIN — METOPROLOL SUCCINATE 50 MG: 50 TABLET, EXTENDED RELEASE ORAL at 08:41

## 2024-04-22 RX ADMIN — CHLORHEXIDINE GLUCONATE 0.12% ORAL RINSE 15 ML: 1.2 LIQUID ORAL at 20:05

## 2024-04-22 RX ADMIN — MUPIROCIN 1 APPLICATION: 20 OINTMENT TOPICAL at 20:05

## 2024-04-22 RX ADMIN — BUDESONIDE AND FORMOTEROL FUMARATE DIHYDRATE 2 PUFF: 160; 4.5 AEROSOL RESPIRATORY (INHALATION) at 08:42

## 2024-04-22 RX ADMIN — FLUTICASONE PROPIONATE 2 SPRAY: 50 SPRAY, METERED NASAL at 08:42

## 2024-04-22 RX ADMIN — CYCLOSPORINE 1 DROP: 0.5 EMULSION OPHTHALMIC at 08:42

## 2024-04-22 RX ADMIN — BUDESONIDE AND FORMOTEROL FUMARATE DIHYDRATE 2 PUFF: 160; 4.5 AEROSOL RESPIRATORY (INHALATION) at 17:02

## 2024-04-22 NOTE — PROGRESS NOTES
INTERNAL MEDICINE RESIDENCY PROGRESS NOTE     Name: David S Cogan   Age & Sex: 68 y.o. male   MRN: [id number]  Unit/Bed#: [facility] 430-01   Encounter: [id number]  Team: SOD Team C     PATIENT INFORMATION     Name: David S Cogan   Age & Sex: 68 y.o. male   MRN: [id number]  Hospital Stay Days: 5    ASSESSMENT/PLAN     Principal Problem:    NSTEMI (non-ST elevated myocardial infarction) ([facility])  Active Problems:    Asthma    Alcohol use, unspecified with other alcohol-induced disorder (HCC)    Benign hypertension    Hyperlipidemia      * NSTEMI (non-ST elevated myocardial infarction) ([facility])  Assessment & Plan  Patient presenting with one week history of intermittent chest pain/pressure. Described as up to an 8-9/10 in intensity, substernal, with radiation to the bilateral shoulders and upper arms. Associated symptoms include nausea/vomiting (end of last week), abdominal bloating (last week), and shortness of breath/fatigue (earlier this week); he denies any associated diaphoresis. Above symptoms improved with rest/Gasex; of note, patient has had somewhat similar symptoms in the past secondary to GERD.     Seen by PCP/cardiology earlier this week with EKG(s) at that time suggestive of age-indeterminate septal infarct with nonspecific t-wave changes. Subsequently advised to present to the ED, where troponin found to be elevated at 1,217 (2 hour brionna 1,119). Treated with Protonix, carafate, loaded with ASA, and started on heparin drip.    Cardiac cath 4/19: 85% stenosed mid LAD lesion, 100% stenosis circumflex (with collaterals to the LAD), mild diffuse RCA disease    CT surgery consulted by inpatient cardiology team given the above, with plans for CABG to be done next week    Update (4/20): Had episode of CP earlier in the morning.  EKG showed no change to prior study, tropes were drawn and currently pending.  Per patient he believes he is possibly does have anxiety for the upcoming potential CABG versus cardiac procedure.   Otherwise HDS.    4/21: Troponins yesterday 89/78/66.  EKG done at that time read as consistent with possible anterior lateral ischemia.  As of time of writing, patient has remained chest pain-free since this episode yesterday.    Plan  -Cardiology, CT surgery recommendations appreciated - plan for CABG next week on Tuesday  -Continue ASA; Brilinta on hold pending surgical intervention next week  -Continue metoprolol, atorvastatin as specified elsewhere; losartan on hold per CT surgery  -STAT EKG with recurrence of chest pain  -Nitro as needed for symptomatic chest pain    Asthma  Assessment & Plan  Patient with history of asthma (no PFTs on file); treated as an outpatient with Symbicort and albuterol as needed. Endorsing some shortness of breath prior to presentation, although this appears more likely correlated to patient's cardiac complaints.    Plan  -Continue PTA inhaler regimen    Alcohol use, unspecified with other alcohol-induced disorder (HCC)  Assessment & Plan  Patient (and his wife) endorse significant alcohol use; up to 6 drinks [place], 1-2 when at home. Reports he did stop drinking over the weekend but has since resumed.     Plan  -[facility] protocol  -Continue thiamine supplementation    Benign hypertension  Assessment & Plan  Patient with history of HTN, treated as an outpatient with diltiazem 120 mg daily. BP on arrival 161/93, with most subsequent readings in the range of 130-150s/60-70s.    Plan  -Given ongoing mild hypertension, we will likely increase amlodipine to 10 mg daily; continue Toprol 50 mg daily  -Losartan on hold per CT surgery pending CABG next week  -Continue to monitor    Hyperlipidemia  Assessment & Plan  Patient with history of HLD, with last lipid panel (October 2023) notable for cholesterol 298, triglycerides 195, HDL 69, . Was previously on treatment with Crestor 5 mg daily, though endorses overall poor compliance to this.    Plan  -Continue atorvastatin 80  mg daily        Disposition: pending CABG on .     SUBJECTIVE     Patient seen and examined. No acute events overnight. Reporting feeling well this morning. Denies sx of chest pain, SOB, abdominal pain, nausea, vomiting, diarrhea, dysuria, or hematuria.     OBJECTIVE     Vitals:    24 0658 24 0900 24 1102 24 1515   BP: 128/86  130/86 143/94   Pulse: 65 100 69 79   Resp: 18  20 18   Temp: 97.8 °F (36.6 °C)  97.8 °F (36.6 °C) 97.7 °F (36.5 °C)   TempSrc:       SpO2: 97% 96% 96% 97%   Weight:       Height:          Temperature:   Temp (24hrs), Av.8 °F (36.6 °C), Min:97.6 °F (36.4 °C), Max:98.2 °F (36.8 °C)    Temperature: 97.7 °F (36.5 °C)  Intake & Output:  I/O          07 07 0701   07 07 0700    P.O. 360 2130 300    I.V. (mL/kg) 318 (3.9) 353.8 (4.4)     Total Intake(mL/kg) 678 (8.3) 2483.8 (30.6) 300 (3.7)    Urine (mL/kg/hr) 1375 (0.7) 1875 (1) 525 (3)    Stool 0 0     Total Output 1375 1875 525    Net -697 +608.8 -225           Unmeasured Urine Occurrence 3 x 1 x     Unmeasured Stool Occurrence 1 x 1 x           Weights:   IBW (Ideal Body Weight): 66.1 kg    Body mass index is 28.07 kg/m².  Weight (last 2 days)       Date/Time Weight    24 0600 81.3 (179.23)    24 0600 81.8 (180.34)    24 06 82.4 (181.66)          Physical Exam  Vitals and nursing note reviewed.   Constitutional:       Appearance: Normal appearance.   HENT:      Head: Normocephalic and atraumatic.      Right Ear: External ear normal.      Left Ear: External ear normal.      Nose: Nose normal.      Mouth/Throat:      Mouth: Mucous membranes are moist.      Pharynx: Oropharynx is clear.   Eyes:      Extraocular Movements: Extraocular movements intact.      Conjunctiva/sclera: Conjunctivae normal.      Pupils: Pupils are equal, round, and reactive to light.   Cardiovascular:      Rate and Rhythm: Normal rate and regular rhythm.      Heart sounds: Normal  heart sounds. No murmur heard.  Pulmonary:      Effort: Pulmonary effort is normal. No respiratory distress.      Breath sounds: Normal breath sounds.   Abdominal:      General: Abdomen is flat. Bowel sounds are normal. There is no distension.      Palpations: Abdomen is soft.      Tenderness: There is no abdominal tenderness.   Musculoskeletal:      Right lower leg: No edema.      Left lower leg: No edema.   Skin:     General: Skin is warm and dry.      Capillary Refill: Capillary refill takes less than 2 seconds.      Coloration: Skin is not jaundiced.   Neurological:      Mental Status: He is alert and oriented to person, place, and time. Mental status is at baseline.   Psychiatric:         Mood and Affect: Mood normal.         Behavior: Behavior normal.       LABORATORY DATA     Labs: I have personally reviewed pertinent reports.  Results from last 7 days   Lab Units 04/22/24 0508 04/21/24 0442 04/19/24 0357 04/18/24 0439 04/17/24  1520   WBC Thousand/uL 7.24 6.63 6.75 6.62 6.36   HEMOGLOBIN g/dL 16.3 15.9 13.6 14.6 15.7   HEMATOCRIT % 49.2 48.1 41.2 43.9 46.8   PLATELETS Thousands/uL 316 330 284 283 313   SEGS PCT % 47  --   --  46 48   MONO PCT % 8  --   --  10 12   EOS PCT % 4  --   --  4 3      Results from last 7 days   Lab Units 04/22/24 0508 04/21/24 0442 04/19/24 0357 04/18/24 0439 04/17/24  1520   POTASSIUM mmol/L 3.9 3.7 3.7   < > 4.2   CHLORIDE mmol/L 105 104 108   < > 103   CO2 mmol/L 23 25 23   < > 25   BUN mg/dL 16 14 15   < > 17   CREATININE mg/dL 1.16 1.22 1.22   < > 1.17   CALCIUM mg/dL 9.2 9.2 8.1*   < > 9.0   ALK PHOS U/L  --   --   --   --  62   ALT U/L  --   --   --   --  18   AST U/L  --   --   --   --  26    < > = values in this interval not displayed.              Results from last 7 days   Lab Units 04/22/24 0508 04/21/24 0442 04/20/24  0943 04/17/24 2004 04/17/24  1614   INR   --   --   --   --  1.06   PTT seconds 85* 85* 83*   < > 30    < > = values in this interval not  displayed.               IMAGING & DIAGNOSTIC TESTING     Radiology Results: I have personally reviewed pertinent reports.  CT chest wo contrast    Result Date: 4/19/2024  Impression: Normal caliber ascending aorta with no calcification. Mild bilateral lower lobe juxtapleural reticulation. It is uncertain if these interstitial lung abnormalities represent pulmonary fibrosis as there is no architectural distortion such as traction bronchiectasis/bronchiolectasis or honeycombing. Recommend  follow-up with a high-resolution chest CT in 1 year. This study was marked in Epic for immediate notification and follow-up. Workstation performed: [id number]     XR chest 2 views    Result Date: 4/17/2024  Impression: No acute cardiopulmonary disease. Workstation performed: [id number]     Other Diagnostic Testing: I have personally reviewed pertinent reports.    ACTIVE MEDICATIONS     Current Facility-Administered Medications   Medication Dose Route Frequency    acetaminophen (TYLENOL) tablet 650 mg  650 mg Oral Q4H PRN    albuterol (PROVENTIL HFA,VENTOLIN HFA) inhaler 1 puff  1 puff Inhalation Q6H PRN    amLODIPine (NORVASC) tablet 7.5 mg  7.5 mg Oral Daily    Artificial Tears ophthalmic solution 2 drop  2 drop Both Eyes Q12H SARITA    aspirin (ECOTRIN LOW STRENGTH) EC tablet 81 mg  81 mg Oral Daily    atorvastatin (LIPITOR) tablet 80 mg  80 mg Oral Daily With Dinner    budesonide-formoterol (SYMBICORT) 160-4.5 mcg/act inhaler 2 puff  2 puff Inhalation BID    chlorhexidine (PERIDEX) 0.12 % oral rinse 15 mL  15 mL Mouth/Throat Q12H SARITA    chlorhexidine (PERIDEX) 0.12 % oral rinse 15 mL  15 mL Swish & Spit Q12H SARITA    cycloSPORINE (RESTASIS) 0.05 % ophthalmic emulsion 1 drop  1 drop Both Eyes BID    fluticasone (FLONASE) 50 mcg/act nasal spray 2 spray  2 spray Each Nare Daily    folic acid (FOLVITE) tablet 1 mg  1 mg Oral Daily    heparin (porcine) 25,000 units in 0.45% NaCl 250 mL infusion (premix)  3-20 Units/kg/hr (Order-Specific)  "Intravenous Titrated    heparin (porcine) injection 2,000 Units  2,000 Units Intravenous Q6H PRN    heparin (porcine) injection 4,000 Units  4,000 Units Intravenous Q6H PRN    metoprolol succinate (TOPROL-XL) 24 hr tablet 50 mg  50 mg Oral Daily    [START ON 4/23/2024] metoprolol tartrate (LOPRESSOR) partial tablet 12.5 mg  12.5 mg Oral Once    mupirocin (BACTROBAN) 2 % nasal ointment 1 Application  1 Application Nasal Q12H SARITA    mupirocin (BACTROBAN) 2 % nasal ointment 1 Application  1 Application Nasal Once    nitroglycerin (NITRO-BID) 2 % TD ointment 1 inch  1 inch Topical BID PRN    nitroglycerin (NITROSTAT) SL tablet 0.4 mg  0.4 mg Sublingual Q5 Min PRN    ondansetron (ZOFRAN) injection 4 mg  4 mg Intravenous Q6H PRN    pantoprazole (PROTONIX) EC tablet 40 mg  40 mg Oral Early Morning    polyethylene glycol (MIRALAX) packet 17 g  17 g Oral Daily    thiamine tablet 100 mg  100 mg Oral Daily       VTE Pharmacologic Prophylaxis: Heparin  VTE Mechanical Prophylaxis: sequential compression device and foot pump applied    Portions of the record may have been created with voice recognition software.  Occasional wrong word or \"sound a like\" substitutions may have occurred due to the inherent limitations of voice recognition software.  Read the chart carefully and recognize, using context, where substitutions have occurred.  ==  Hung Tatum MD  [facility]  Internal Medicine Residency PGY-1       "

## 2024-04-22 NOTE — PLAN OF CARE
Problem: SAFETY ADULT  Goal: Patient will remain free of falls  Description: INTERVENTIONS:  - Educate patient/family on patient safety including physical limitations  - Instruct patient to call for assistance with activity   - Consult OT/PT to assist with strengthening/mobility   - Keep Call bell within reach  - Keep bed low and locked with side rails adjusted as appropriate  - Keep care items and personal belongings within reach  - Initiate and maintain comfort rounds  - Make Fall Risk Sign visible to staff  - Apply yellow socks and bracelet for high fall risk patients  - Consider moving patient to room near nurses station  Outcome: Progressing  Goal: Maintain or return to baseline ADL function  Description: INTERVENTIONS:  -  Assess patient's ability to carry out ADLs; assess patient's baseline for ADL function and identify physical deficits which impact ability to perform ADLs (bathing, care of mouth/teeth, toileting, grooming, dressing, etc.)  - Assess/evaluate cause of self-care deficits   - Assess range of motion  - Assess patient's mobility; develop plan if impaired  - Assess patient's need for assistive devices and provide as appropriate  - Encourage maximum independence but intervene and supervise when necessary  - Involve family in performance of ADLs  - Assess for home care needs following discharge   - Consider OT consult to assist with ADL evaluation and planning for discharge  - Provide patient education as appropriate  Outcome: Progressing  Goal: Maintains/Returns to pre admission functional level  Description: INTERVENTIONS:  - Perform AM-PAC 6 Click Basic Mobility/ Daily Activity assessment daily.  - Set and communicate daily mobility goal to care team and patient/family/caregiver.   - Out of bed for toileting  - Record patient progress and toleration of activity level   Outcome: Progressing

## 2024-04-22 NOTE — PROGRESS NOTES
Progress Note - Cardiothoracic Surgery   David S Cogan 68 y.o. male MRN: [id number]  Unit/Bed#: [facility] 430-01 Encounter: [id number]      24 Hour Events: Feeling well today. Just returned from a walk around the unit. Denies CP or SOB. Remains on Heparin infusion.     Medications:   Scheduled Meds:  Current Facility-Administered Medications   Medication Dose Route Frequency Provider Last Rate    acetaminophen  650 mg Oral Q4H PRN Ayers Ragab, DO      albuterol  1 puff Inhalation Q6H PRN Ayers Ragab, DO      amLODIPine  7.5 mg Oral Daily Steff F Minnix, DO      Artificial Tears  2 drop Both Eyes Q12H SARITA Ayers Ragab, DO      aspirin  81 mg Oral Daily Ayers Ragab, DO      atorvastatin  80 mg Oral Daily With Dinner Ayers Ragab, DO      budesonide-formoterol  2 puff Inhalation BID Ayers Ragab, DO      chlorhexidine  15 mL Mouth/Throat Q12H SARITA Ayers Ragab, DO      cycloSPORINE  1 drop Both Eyes BID Ayers Ragab, DO      fluticasone  2 spray Each Nare Daily Ayers Ragab, DO      folic acid  1 mg Oral Daily Ayers Ragab, DO      heparin (porcine)  3-20 Units/kg/hr (Order-Specific) Intravenous Titrated Ayers Ragab, DO 15.8 Units/kg/hr (04/22/24 0050)    heparin (porcine)  2,000 Units Intravenous Q6H PRN Ayers Ragab, DO      heparin (porcine)  4,000 Units Intravenous Q6H PRN Ayers Ragab, DO      metoprolol succinate  50 mg Oral Daily Ayers Ragab, DO      mupirocin  1 Application Nasal Q12H [facility] Ayers Ragab, DO      nitroglycerin  1 inch Topical BID PRN Ayers Ragab, DO      nitroglycerin  0.4 mg Sublingual Q5 Min PRN Ayers Ragab, DO      ondansetron  4 mg Intravenous Q6H PRN Ayers Ragab, DO      pantoprazole  40 mg Oral Early Morning Ayers Ragab, DO      polyethylene glycol  17 g Oral Daily Ayers Ragab, DO      thiamine  100 mg Oral Daily Ayers Ragab, DO       Continuous Infusions:heparin (porcine), 3-20 Units/kg/hr (Order-Specific), Last Rate: 15.8 Units/kg/hr (04/22/24  0050)        Results:   Results from last 7 days   Lab Units 04/22/24  0508 04/21/24  0442 04/19/24  0357   WBC Thousand/uL 7.24 6.63 6.75   HEMOGLOBIN g/dL 16.3 15.9 13.6   HEMATOCRIT % 49.2 48.1 41.2   PLATELETS Thousands/uL 316 330 284     Results from last 7 days   Lab Units 04/22/24  0508 04/21/24  0442 04/19/24  0357   POTASSIUM mmol/L 3.9 3.7 3.7   CHLORIDE mmol/L 105 104 108   CO2 mmol/L 23 25 23   BUN mg/dL 16 14 15   CREATININE mg/dL 1.16 1.22 1.22   CALCIUM mg/dL 9.2 9.2 8.1*     Results from last 7 days   Lab Units 04/22/24  0508 04/21/24  0442 04/20/24  0943 04/17/24 2004 04/17/24  1614   INR   --   --   --   --  1.06   PTT seconds 85* 85* 83*   < > 30    < > = values in this interval not displayed.       Studies:     Cardiac Catheterization:   Left Anterior Descending   Mid LAD lesion is 85% stenosed. GUILLERMO flow is 3.      Left Circumflex   Dist Cx lesion is 100% stenosed. GUILLERMO flow is 0.      Third Left Posterolateral Branch   Collaterals   3rd LPL filled by collaterals from Dist LAD.         Right Coronary Artery   There is mild diffuse disease throughout the vessel.          Echocardiogram:   Left Ventricle Left ventricular cavity size is normal. Wall thickness is mildly increased. There is mild concentric hypertrophy. The left ventricular ejection fraction is 55%. Systolic function is normal. Wall motion is normal. Diastolic function is mildly abnormal, consistent with grade I (abnormal) relaxation.   Right Ventricle Right ventricular cavity size is normal. Systolic function is normal.   Left Atrium The atrium is normal in size.   Right Atrium The atrium is normal in size.   Aortic Valve The aortic valve is trileaflet. The leaflets are not thickened. The leaflets are not calcified. The leaflets exhibit normal mobility. There is no evidence of regurgitation. The aortic valve has no significant stenosis.   Mitral Valve Mitral valve structure is normal.  There is mild regurgitation. There is no  evidence of stenosis.   Tricuspid Valve Tricuspid valve structure is normal. There is trace regurgitation. There is no evidence of stenosis.   Pulmonic Valve Pulmonic valve structure is normal. There is no evidence of regurgitation. There is no evidence of stenosis.   Ascending Aorta The aortic root is normal in size. The ascending aorta is normal in size. The aortic root is 3.10 cm. The ascending aorta is 3.1 cm.   IVC/SVC The inferior vena cava is not well visualized.   Pericardium There is no pericardial effusion. The pericardium is normal in appearance.     Left Ventricle Measurements    Function/Volumes   A4C EF 55 %         LV Diastolic Volume (BP) 98 mL         LV Systolic Volume (BP) 42 mL         EF 58 %         Dimensions   LVIDd 4 cm         LVIDS 2.8 cm         IVSd 1.2 cm         LVPWd 0.8 cm         FS 30         Diastolic Filling   MV E' Tissue Velocity Septal 6 cm/s         LA Volume Index (BP) 26.9 mL/m2         E/A ratio 0.68         E wave deceleration time 179 ms         MV Peak E Jonas 82 cm/s         MV Peak A Jonas 1.21 m/s               Right Ventricle Measurements    Dimensions   RVID d 2.6 cm         Tricuspid annular plane systolic excursion 2 cm               Left Atrium Measurements    Dimensions   LA size 3.1 cm         LA length (A2C) 5 cm         Volumes   LA volume (BP) 53 mL         LA Volume Index (BP) 26.9 mL/m2               Right Atrium Measurements    Dimensions   RAA A4C 13.8 cm2               Mitral Valve Measurements    Stenosis   MV stenosis pressure 1/2 time 52 ms         MV valve area p 1/2 method 4.23               Aorta Measurements    Aortic Dimensions   Ao root 3.1 cm         Asc Ao 3.1 cm             Carotid artery duplex:   0%  right carotid stenosis. Vertebral artery flow is antegrade and There is no significant subclavian artery   < 50% left carotid stenosis. Vertebral artery flow is antegrade and There is no significant subclavian artery    Greater saphenous vein  mapping: Adequate conduit for CABG, bilaterally    Palmar arch: Evaluation shows an Incomplete palmar arch on the Left when interrogated with alternate compression and release of the radial and ulnar arteries.    CT chest: Normal caliber ascending aorta with no calcification.   Mild bilateral lower lobe juxtapleural reticulation. It is uncertain if these interstitial lung abnormalities represent pulmonary fibrosis as there is no architectural distortion such as traction bronchiectasis/bronchiolectasis or honeycombing. Recommend   follow-up with a high-resolution chest CT in 1 year.    Vitals:   Vitals:    04/21/24 2222 04/22/24 0340 04/22/24 0600 04/22/24 0658   BP: 123/84 122/84  128/86   Pulse:  68  65   Resp: 18 16  18   Temp: 97.9 °F (36.6 °C) 97.6 °F (36.4 °C)  97.8 °F (36.6 °C)   TempSrc:       SpO2:  97%  97%   Weight:   81.3 kg (179 lb 3.7 oz)    Height:           Physical Exam:    General: No acute distress, Alert, and Normal appearance  HEENT/NECK:  Normocephalic. Atraumatic.  No jugular venous distention.    Cardiac: Regular rate and rhythm  Pulmonary:  Breath sounds clear bilaterally and No rales/rhonchi/wheezes  Abdomen:  Non-tender and Non-distended  Extremities: Extremities warm/dry  Neuro: Alert and oriented X 3, Sensation is grossly intact, and No focal deficits  Skin: Warm/Dry, without rashes or lesions.      Assessment:    Severe coronary artery disease; Ongoing CABG workup    Plan:  Patient agreeable to proceed with surgery; all questions and concerns addressed. Informed consent signed and on chart.    Carotid ultrasound completed, and acceptable    Vein mapping completed, and acceptable    Palmar arches show incomplete palmar arch on left    RUQ US scheduled for this afternoon. Will follow up on results once completed.    Blood type and cross match completed    Continue Mupirocin 2% nasal ointment q 12 hrs    Continue topical chlorhexidine bath and mouth rise    NPO after midnight  RBC  prepared  Hold Heparin at 0600  Preoperative beta blocker, insulin, and antibiotics ordered  coronary artery bypass grafting scheduled for tomorrow with Dr. Balwinder Sky D.O.    SIGNATURE: Rukhsana Broderick PA-C  DATE: April 22, 2024  TIME: 10:01 AM    * This note was completed in part utilizing [employer] direct voice recognition software.   Grammatical errors, random word insertion, spelling mistakes, and incomplete sentences may be an occasional consequence of the system secondary to software limitations, ambient noise and hardware issues. At the time of dictation, efforts were made to edit, clarify and /or correct errors. Please read the chart carefully and recognize, using context, where substitutions have occurred.  If you have any questions or concerns about the context, text or information contained within the body of this dictation, please contact myself, the provider, for further clarification.

## 2024-04-22 NOTE — PLAN OF CARE
Problem: SAFETY ADULT  Goal: Maintain or return to baseline ADL function  Description: INTERVENTIONS:  -  Assess patient's ability to carry out ADLs; assess patient's baseline for ADL function and identify physical deficits which impact ability to perform ADLs (bathing, care of mouth/teeth, toileting, grooming, dressing, etc.)  - Assess/evaluate cause of self-care deficits   - Assess range of motion  - Assess patient's mobility; develop plan if impaired  - Assess patient's need for assistive devices and provide as appropriate  - Encourage maximum independence but intervene and supervise when necessary  - Involve family in performance of ADLs  - Assess for home care needs following discharge   - Consider OT consult to assist with ADL evaluation and planning for discharge  - Provide patient education as appropriate  Outcome: Progressing     Problem: Knowledge Deficit  Goal: Patient/family/caregiver demonstrates understanding of disease process, treatment plan, medications, and discharge instructions  Description: Complete learning assessment and assess knowledge base.  Interventions:  - Provide teaching at level of understanding  - Provide teaching via preferred learning methods  Outcome: Progressing

## 2024-04-22 NOTE — RESPIRATORY THERAPY NOTE
Resp care   04/22/24 0900   Respiratory Protocol   Protocol Initiated? Yes   Protocol Selection Respiratory   Language Barrier? No   Medical & Social History Reviewed? Yes   Diagnostic Studies Reviewed? Yes   Physical Assessment Performed? Yes   Respiratory Plan Discontinue Protocol   Respiratory Assessment   Assessment Type Assess only   General Appearance Alert;Awake   Respiratory Pattern Normal   Chest Assessment Chest expansion symmetrical   Bilateral Breath Sounds Clear   Cough Non-productive   Resp Comments Pt assesed per respiratory protocol. Pt admitted for chest pain and is S/P cardiac cath. Pt awake and alert on room air with no distress noted. pt states breathing is at baseline and he hasn't needed rescue inhaler in a long time. Pt appears well versed on home inhalers.  CXR clear. Will D/C respiratory protocol.

## 2024-04-22 NOTE — ANESTHESIA PREPROCEDURE EVALUATION
Procedure:  CORONARY ARTERY BYPASS GRAFT (CABG) 2-3 VESSELS (Chest)    Relevant Problems   CARDIO   (+) Benign hypertension   (+) Essential hypertension   (+) Hyperlipidemia   (+) NSTEMI (non-ST elevated myocardial infarction) (HCC)      ENDO   (+) Subclinical hypothyroidism      /RENAL   (+) Benign prostatic hyperplasia      PULMONARY   (+) Asthma      Behavioral Health   (+) Alcohol use, unspecified with other alcohol-induced disorder (HCC)      Hx aguayo's esophagus  GERD    Cardiac Catheterization:   Left Anterior Descending   Mid LAD lesion is 85% stenosed. GUILLERMO flow is 3.      Left Circumflex   Dist Cx lesion is 100% stenosed. GUILLERMO flow is 0.      Third Left Posterolateral Branch   Collaterals   3rd LPL filled by collaterals from Dist LAD.          Right Coronary Artery   There is mild diffuse disease throughout the vessel.        ECHO: mild LVH. EF 55% grade I DD. Nml RVSF. Mild MR, trace TR, no PI    Incomplete palmar arch of left hand    Physical Exam    Airway    Mallampati score: III  TM Distance: >3 FB  Neck ROM: full     Dental   Comment: Chipped tooth left lower molar, not loose     Cardiovascular      Pulmonary      Other Findings        Anesthesia Plan  ASA Score- 4     Anesthesia Type- general with ASA Monitors.         Additional Monitors: arterial line.    Airway Plan: ETT.    Comment: GA with ETT, IV, Cicero (right radial), TLC/PAC, OBDULIO, blood products, postop mechanical ventilation  .       Plan Factors-    Chart reviewed.    Patient summary reviewed.    Patient is not a current smoker.              Induction- intravenous.    Postoperative Plan-     Informed Consent- Anesthetic plan and risks discussed with patient.

## 2024-04-22 NOTE — PROGRESS NOTES
"Cardiology Progress Note - David S Cogan 68 y.o. male MRN: [id number]    Unit/Bed#: [facility] 430-01 Encounter: [id number]      Assessment:  Principal Problem:    NSTEMI (non-ST elevated myocardial infarction) (HCC)  Active Problems:    Benign hypertension    Hyperlipidemia    Alcohol use, unspecified with other alcohol-induced disorder (HCC)    Asthma      Plan:  Patient with no issues overnight.  He has no chest pain or significant dyspnea.  He continues on intravenous heparin.  Echocardiogram 4/18 demonstrates LVEF of 55% with no significant valve disease.  BMP today with potassium of 3.9 and creatinine of 1.16.  Will continue present medical regimen.  Vital signs stable.  CABG on Tuesday.    Subjective:   Patient seen and examined.  No significant events overnight.  negative.    Objective:     Vitals: Blood pressure 128/86, pulse 65, temperature 97.8 °F (36.6 °C), resp. rate 18, height 5' 7\" (1.702 m), weight 81.3 kg (179 lb 3.7 oz), SpO2 97%., Body mass index is 28.07 kg/m².,   Orthostatic Blood Pressures      Flowsheet Row Most Recent Value   Blood Pressure 128/86 filed at 04/22/2024 0658   Patient Position - Orthostatic VS Lying filed at 04/18/2024 2255        ,      Intake/Output Summary (Last 24 hours) at 4/22/2024 0734  Last data filed at 4/22/2024 0601  Gross per 24 hour   Intake 2483.76 ml   Output 1875 ml   Net 608.76 ml       No significant arrhythmias seen on telemetry review.       Physical Exam:    GEN: David S Cogan appears well, alert and oriented x 3, pleasant and cooperative   NECK: supple, no carotid bruits, no JVD or HJR  HEART: normal rate, regular rhythm, normal S1 and S2, no murmurs, clicks, gallops or rubs   LUNGS: clear to auscultation bilaterally; no wheezes, rales, or rhonchi   ABDOMEN: normal bowel sounds, soft, no tenderness, no distention  EXTREMITIES: peripheral pulses normal; no clubbing, cyanosis, or edema  SKIN: warm and well perfused, no suspicious lesions on exposed skin    Labs & " Results:    No results displayed because visit has over 200 results.          VAS lower limb vein mapping bypass graft    Result Date: 4/19/2024  Narrative:  [facility]PORT [facility]INICAL: Indications: Patient presents for surgical clearance and general health evaluation secondary to future open heart surgery. Patient is asymptomatic at this time. Operative History: Heart valve replacement Risk Factors The patient has history of HTN, Hyperlipidemia and previous smoking (quit >10yrs ago).  FINDINGS:  Right           AP (mm)  GSV Inguinal        6.0  GSV Prox Thigh      2.8  GSV Mid Thigh       2.7  GSV Dist Thigh      2.6  GSV Knee            1.5  GSV Prox Calf       1.8  GSV Mid Calf        1.7  GSV Dist Calf       2.4   Left            AP (mm)  GSV Inguinal        4.9  GSV Prox Thigh      4.2  GSV Mid Thigh       3.9  GSV Dist Thigh      3.8  GSV Knee            2.5  GSV Prox Calf       2.5  GSV Mid Calf        1.7  GSV Dist Calf       2.2     CONCLUSION: Impression: RIGHT LOWER LIMB: The great saphenous vein is patent  from the groin to the ankle. The intraluminal diameter measurements range from 1.5 mm to 6.0 mm throughout. LEFT LOWER LIMB: The great saphenous vein is patent  from the groin to the ankle. The intraluminal diameter measurements range from 1.7 mm to 4.9 mm throughout.  SIGNATURE: Electronically Signed by: ANDREA JACKSON DO on 2024-04-19 12:35:20 PM    VAS carotid complete study    Result Date: 4/19/2024  Narrative:  [facility] REPORT [facility]INICAL: Indications: Patient presents for surgical clearance and general health evaluation secondary to future open heart surgery. Patient is asymptomatic at this time. Operative History: Heart valve replacement Risk Factors The patient has history of HTN, Hyperlipidemia and previous smoking (quit >10yrs ago). Clinical Right Pressure:  114/76 mm Hg, Left Pressure: IV.  FINDINGS:  Right        Impression  PSV  EDV (cm/s)  Direction of Flow  Ratio  Dist.  ICA                 51          22                      0.63  Mid. ICA                  49          21                      0.61  Prox. ICA    Normal       34          15                      0.42  Dist CCA                  61          16                            Mid CCA                   81          10                      1.42  Prox CCA                  57          15                            Ext Carotid               98           5                      1.21  Prox Vert                 28           5  Antegrade                 Subclavian                84           0                             Left         Impression  PSV  EDV (cm/s)  Direction of Flow  Ratio  Dist. ICA                 51          19                      0.72  Mid. ICA                  60          24                      0.85  Prox. ICA    1 - 49%      69          24                      0.97  Dist CCA                  56          10                            Mid CCA                   71          18                      0.94  Prox CCA                  76          17                            Ext Carotid               78           7                      1.11  Prox Vert                 41          13  Antegrade                 Subclavian               124           0                               CONCLUSION: Impression RIGHT: There is no evidence of arterial disease throughout the extracranial carotid system. Vertebral artery flow is antegrade. There is no significant subclavian artery disease.  LEFT: There is <50% stenosis noted in the internal carotid artery. Plaque is homogenous and smooth. Vertebral artery flow is antegrade. There is no significant subclavian artery disease.  No previous study to compare.  SIGNATURE: Electronically Signed by: ANDREA JACKSON DO on 2024-04-19 12:03:42 PM    CT chest wo contrast    Result Date: 4/19/2024  Narrative: CT CHEST WITHOUT IV CONTRAST INDICATION:   preop CABG. COMPARISON: CXR 4/17/2024. TECHNIQUE:  Chest CT without intravenous contrast.  Axial, sagittal, coronal 2D reformats and coronal MIPS from source data. Radiation dose length product (DLP):  685.87 mGy-cm . Radiation dose exposure minimized using iterative reconstruction and automated exposure control. FINDINGS: LUNGS: No acute disease. Mild bilateral lower lobe juxtapleural reticulation. No architectural distortion such as traction bronchiectasis/bronchiolectasis or honeycombing. Benign calcified granulomas. AIRWAYS: No significant filling defects. PLEURA:  Unremarkable. HEART/GREAT VESSELS: Normal heart size. Mild coronary artery calcification indicating atherosclerotic heart disease. Normal caliber ascending aorta with no calcification. No pericardial effusion or adhesion. MEDIASTINUM AND ALESSANDRA:  Unremarkable. CHEST WALL AND LOWER NECK: Unremarkable. UPPER ABDOMEN: Bilateral renal cysts. OSSEOUS STRUCTURES: Normal for age.     Impression: Normal caliber ascending aorta with no calcification. Mild bilateral lower lobe juxtapleural reticulation. It is uncertain if these interstitial lung abnormalities represent pulmonary fibrosis as there is no architectural distortion such as traction bronchiectasis/bronchiolectasis or honeycombing. Recommend  follow-up with a high-resolution chest CT in 1 year. This study was marked in Epic for immediate notification and follow-up. Workstation performed: [id number]     Echo complete w/ contrast if indicated    Result Date: 4/18/2024  Narrative:   Left Ventricle: Left ventricular cavity size is normal. Wall thickness is mildly increased. There is mild concentric hypertrophy. The left ventricular ejection fraction is 55%. Systolic function is normal. Wall motion is normal. Diastolic function is mildly abnormal, consistent with grade I (abnormal) relaxation.   Right Ventricle: Right ventricular cavity size is normal. Systolic function is normal.   Mitral Valve: There is mild regurgitation.     Cardiac  catheterization    Result Date: 4/18/2024  Narrative:   Mid LAD lesion is 85% stenosed in a distally small caliber vessel   OM3 100% stenosed with collaterals from the LAD   RCA with mild diffuse CAD   LVEDP is mildly elevated without gradient on LV-AO pullback     XR chest 2 views    Result Date: 4/17/2024  Narrative: XR CHEST PA & LATERAL DUAL ENERGY SUBTRACTION. INDICATION:  chest pain. COMPARISON: CXR 1/29/2014. FINDINGS: Clear lungs. No pneumothorax or pleural effusion. Unremarkable cardiomediastinal silhouette. Bones are unremarkable for age. Unremarkable upper abdomen.     Impression: No acute cardiopulmonary disease. Workstation performed: [id number]       EKG personally reviewed by Cali Giron MD.     Counseling / Coordination of Care  Total floor / unit time spent today 30 minutes.  Greater than 50% of total time was spent with the patient and / or family counseling and / or coordination of care.

## 2024-04-22 NOTE — NURSING NOTE
Educated Patient on the following:    Open-Heart Surgery - What to expect    How do I get ready for surgery?  The following instructions are important for you to be fully prepared for your surgery    Six days before your surgery:   the nasal ointment prescribed by your surgeon at your pharmacy    Five days before your surgery:  Apply the nasal ointment to both nostrils twice a day (morning and bedtime) until your surgery    Two nights before your surgery:  Take a shower following the Pre-Operative Showering Instructions in this booklet  Use a clean towel and washcloth. Wear clean pajamas. Sleep on clean sheets.  These precautions help prevent post-operative infections    One day before your surgery:  The hospital will call you to confirm your arrival time and location. The call is usually made between 2:00 - 8:00 PM.  If your surgery is on a Monday, the hospital will call you the Friday before.  You may pack a small overnight bag for toiletries and other small items that you may want with you during your hospital stay. Avoid bringing valuables such as money, electronics, or jewelry.    One night before your surgery:  Take a shower following the Pre-Operative Showering Instructions in this booklet  Use a clean towel and washcloth. Wear clean pajamas. Sleep on clean sheets.  These precautions help prevent post-operative infections.  DO NOT eat any junk foods  DO NOT smoke  DO NOT drink alcohol  DO NOT drink or eat ANYTHING after midnight, including water, gum, or lozenges.    Remember to take your regular medications until the day of surgery, unless your surgeon instructs you otherwise. Certain medications may be stopped prior to surgery. Your surgeon will tell you which medications to stop and when to stop them.    If you develop a cold, sore throat, cough, fever, or other illness seven days before your surgery, call the surgeons office.      What happens the day of surgery?  If you have diabetes, DO NOT take  any of your diabetes medication the morning of surgery, including insulin.  Bring a list of the medications you normally take.  Bring a list of your previous surgery and other medical history, including when you last had the pneumococcal vaccine and flu vaccine.  Bring your overnight bag.  Bring your insurance cards.  Bring a valid form of identification, such as a drivers license or passport.  Do not drive yourself to the hospital. Have someone drive you.    What happens when I arrive at the hospital?  You will be greeted and registered.  A nurse will ask you questions about your medical history.  You will bath with a special antiseptic soap and change into a clean hospital gown.  You will be taken by stretcher to the Pre-Op Holding Area. You will meet your Anesthesiologist. You will be asked to confirm the surgery you are scheduled for. Your Consent form will be reviewed. A nurse will place an IV in your arm and you will be given medication to help you relax.   You will be taken by stretcher to the Operating Room when it is time for your surgery.    What should I expect immediately after surgery?  You will wake up in the ICU on PPHP 4.  You will have a breathing tube in place until you are fully awake. Once you are able to move your arms and legs, the breathing tube will be removed.  You will have chest tubes, IV's in your arms and neck, and a catheter to drain urine.  You will have a lot of equipment around your bed to monitor your heart and vital signs.  Your nurses will give you pain medication to help with discomfort from the surgery.  Your loved ones will be able to visit you as soon as you have been settled into your room.  The nursing staff will be providing you with care, including:  Asking you about your pain level  Helping you stay comfortable in bed  Monitoring your vital signs, heart rhythm, and incisions  Assisting you with bathing, toileting, and mouth care  Obtaining lab  specimens    Surgeon/Physician Assistant/Nurse Rounds  Your surgeon, PA, and nurse will round on you together daily to discuss your progress and discuss your plan of care for the day.  They will review your weight, lab work, vital signs, incisions, and medications.  This is a good time to ask questions and be involved in your plan of care.     Transition of Care  Most patients stay in the ICU for 1 or 2 days.  You will be helped out of bed into a chair the morning after surgery. You may be moved out of the ICU to the stepdown unit later that day.  In the stepdown unit, your vital signs will still be monitored, but it will be less frequent and less invasive. This allows you to regain your independence and gives you more quiet time.  Most patients are discharged from the hospital in 3-5 days after surgery.    Pain Management  Pain medicine will be ordered to reduce your level of pain. There are different medications ordered for different levels of pain. The medications can be given through your IV or taken by mouth.   Too much pain medication at once can be dangerous. There is a limit on the amount of pain medication you can take at a time, so you might still experience some discomfort after taking the medication.   You will be given a splinting pillow to help brace your incision. This helps reduce pain caused by deep breathing or coughing.    Incentive Spirometer (IS)  Taking deep breaths after surgery can be uncomfortable, but it is important to do so. Your lungs need help recovering once the breathing tube is taken out after surgery.  You will be given a device for breathing exercises called an incentive spirometer (IS). This helps to exercise your lungs. It also helps prevent pneumonia.  You must use the IS at least 10 times every hour.   Use the splinting pillow during breathing exercises to reduce discomfort.  The instructions sheet in this booklet reviews how to use the IS  correctly.    Activity/Mobility  Short, frequent walks after surgery help you regain your strength, exercise your lungs, and move your bowels.  You will be helped out of bed and into a chair every morning. You will stay out of bed for most of the day.  You will walk with a staff member in the hallway 3 to 4 times a day.  Physical Therapists and Occupational Therapists will evaluate your functional abilities and teach you safe exercises to do in your free time.  Nurses will assist you with bathing and mouth care if needed.  You will be weighed early each morning.    Diet and Fluid Intake  A decreased appetite is normal for a couple days after surgery. It will likely return to normal after 3 days.  A diet high in protein and low in sodium, carbohydrates, and fat will speed up the healing process.  Information will be given on a Heart Healthy diet. You can ask your surgeon about specific food items.  After surgery, your body will retain fluid. This puts extra strain on your heart and causes it to work harder. Because of this, you will be given a limited amount of fluid each day. You will also be given medication that makes you urinate more frequently.    Preparing for discharge  It is normal to have questions and concerns when you are getting ready to leave the hospital.  Staff will spend every day of your hospital stay teaching what you need to know to care for yourself at home, and ways to recognize if there are complications.  On the day of discharge, your nurse will give you documents with discharge instructions. They will review it with you and you will be able to ask questions.  Teaching topics may include:  New medications, what they are for, how to take them, and when to take them. Most patients will have new heart medications after surgery.  Insulin, if prescribed for diabetes. Many patients with diabetes may temporarily need a change to their diabetic medications for a short time after surgery. If you have  not previously taken insulin, your nurse will teach you how to use it and how to check your blood sugars.  How to care for your incision and recognize signs of infection.  Heart Healthy Diet, including limiting your sodium and monitoring your fluid intake.  Physical activity, including frequent short walks, rest periods, and slowly increasing your activity level.      Care for Chest tube puncture sites and incision  These instructions will aide in your recovery. Please follow them carefully. Ask your surgeon or physician assistant if you have any questions.  On your day of discharge, your nurse will give you a special antiseptic soap called chlorhexidine (CHG) to use once you get home.  Shower daily, using the CHG soap for 5 days.  Use a clean washcloth each day. It is ok to use disposable washcloths.   During your shower, gently wash all puncture sites and incisions. Do not scrub vigorously.  Rinse thoroughly with clean water.  Use a clean towel to dry off after each shower. Pat the incision area dry. Do not rub with the towel.  Do not apply any lotions, powders, oils, ointments, or creams to the puncture sites or incision.  Leave them open to air. A small amount of drainage may come from your puncture sites. If this happens, you may apply a sterile gauze dressing to the puncture sites using tape to secure it. Remove the dressing before your next shower. Do not leave the same gauze on for more than a day. Stop using gauze once there is no more drainage.  Look at your puncture sites and incisions daily. Look for signs of infection. Call the surgeon's office immediately if you have any of the following: Increased pain, swelling, redness, chills or fever (temp over 100.5F), foul odor, or green/yellow drainage from puncture sites or incision.  Do not pick at or touch the puncture sites or incisions. Scabs will come off on their own. It is normal to have scabbing even after one month from surgery.      Follow-up with  your doctors   You will be seen in the surgeon's office one month after discharge from the hospital. At that time, they will make sure everything is healing appropriately.   At that time, you may be cleared to drive again. Do not drive before being seen by the surgeon.   You may also be cleared to attend cardiac rehab. A prescription will be given to you in the office.   You should see your PCP and Cardiologist before seeing the surgeon. The cardiologist will take over management of your heart medications.  This will likely be the last time you need to be seen by the surgeon.      PRE-OP SHOWERING INSTRUCTIONS    Before your operation, you play an important role in decreasing your risk for infection. Washing your body thoroughly in order to reduce bacteria on the skin can help prevent infections at the surgical site.    Please read the following directions THE WEEK BEFORE SURGERY so you are ready!  WEEK BEFORE SURGERY  If your surgeon instructs you to use a special antiseptic soap containing chlorhexidine gluconate (CHG) prior to surgery, you should obtain this soap at least one week before your operation.  Common brand names include: Aplicare, Endure, and Hibiclens.  CHG soap is available from some doctor's offices, [facility] or most retail pharmacies and the [facility].  If you are allergic or sensitive to CHG, please let your doctor know so another anticeptic soap can be suggested.  If you are unable to purchase soap containing CHG, use regular soap as instructed below.  Notify the nurse on arrival the day of surgery.  DAY BEFORE SURGERY  You will need to shower the night before AND the morning of your surgery. You will need to prepare your bed and clothing so they are clean and ready after your showers.  Place clean linens (sheets) on your bed; you should sleep on clean sheets after your evening shower.  Get CLEAN towel and washcloths ready - you will need enough for two showers.  Set aside  clean underwear, pajamas, and clothing to wear after your showers.    EVENING BEFORE SURGERY  The evening before your operation, take your first shower.  Shower 1:  First, shampoo your hair with regular shampoo and rinse it completely before you wash your body.  Next, wash your body from head to toe with soap and a clean washcloth.  If you were instructed by your surgeon to wash with CHG soap:  Use ½ of the bottle of soap for this shower (you will use the other ½ of the bottle for you shower in the morning).  Do not get CHG in your eyes, ears, nose, mouth or vaginal area.  If you are using regular soap, try to use a new bar if possible.  Pay special attention to the area where your incision will be; lather this area well with the CHG soap for about 2 minutes.  DO NOT use any other soap or body rinse on your skin during or after the antiseptic soap.   Rinse yourself completely with running water.  Use a clean towel to dry off.  Put on clean underwear and clothing/pajamas.  Sleep on clean bed sheets/linens.  REMINDERS:  DO NOT use lotion, powder, deodorant, or perfume/aftershave of any kind on your skin after your antiseptic shower.  DO NOT shave any body parts in the 24hours/the day before your operation.  DAY OF SURGERY  The morning of your operation take your second shower.  Shower 2:  Follow the same steps and Shower 1.  If you were instructed by your surgeon to wash with CHG soap, use the second ½ of the CHG soap.      HOW TO USE AN INCENTIVE SPIROMETER    WHAT YOU NEED TO KNOW:  What is an incentive spirometer? An incentive spirometer is a device that measures how deeply you can inhale (breathe in). It helps you take slow, deep breaths to expand and fill your lungs with air. This helps prevent lung problems, such as pneumonia. The incentive spirometer is made up of a breathing tube, an air chamber. The breathing tube is connected to the air chamber and has a mouth piece at the end. The indicator is found inside  the device.  Why do I need to use an incentive spirometer? An incentive spirometer is most commonly used after surgery. People who are at an increased risk of airway or breathing problems may also use one. These include people who smoke or have lung disease. This may also include people who are not active or cannot move well.  How do I use an incentive spirometer? Sit up as straight as possible. Do not bend your head forward or backward. Hold the incentive spirometer in an upright position. Place the target pointer to the level that you need to reach. Exhale (breathe out) normally and then do the following:  Put the mouthpiece in your mouth and close your lips tightly around it. Do not block the mouthpiece with your tongue.  Inhale slowly and deeply through the mouthpiece to raise the indicator. Try to make the indicator rise up to the level of the goal marker.  When you cannot inhale any longer, remove the mouthpiece and hold your breath for at least 3 seconds.  Exhale normally.  Repeat these steps 10-12 times every hour when you are awake, or as often as directed.  Clean the mouthpiece with soap and water after each use. Do not use a disposable mouthpiece for longer than 24 hours.  Keep a log of the highest level you are able to reach each time. This will help healthcare providers see If your lung function improves.

## 2024-04-23 ENCOUNTER — APPOINTMENT (INPATIENT)
Dept: RADIOLOGY | Facility: HOSPITAL | Age: 69
DRG: 233 | End: 2024-04-23
Payer: MEDICARE

## 2024-04-23 ENCOUNTER — APPOINTMENT (INPATIENT)
Dept: NON INVASIVE DIAGNOSTICS | Facility: HOSPITAL | Age: 69
DRG: 233 | End: 2024-04-23
Attending: THORACIC SURGERY (CARDIOTHORACIC VASCULAR SURGERY)
Payer: MEDICARE

## 2024-04-23 ENCOUNTER — ANESTHESIA (INPATIENT)
Dept: PERIOP | Facility: HOSPITAL | Age: 69
End: 2024-04-23
Payer: MEDICARE

## 2024-04-23 PROBLEM — R73.03 PREDIABETES: Status: ACTIVE | Noted: 2024-04-23

## 2024-04-23 PROBLEM — Z95.1 S/P CABG (CORONARY ARTERY BYPASS GRAFT): Status: ACTIVE | Noted: 2024-04-23

## 2024-04-23 LAB
ABO GROUP BLD BPU: NORMAL
ANION GAP SERPL CALCULATED.3IONS-SCNC: 11 MMOL/L (ref 4–13)
ANION GAP SERPL CALCULATED.3IONS-SCNC: 9 MMOL/L (ref 4–13)
APTT PPP: 34 SECONDS (ref 23–37)
APTT PPP: 35 SECONDS (ref 23–37)
ARTERIAL PATENCY WRIST A: NO
ATRIAL RATE: 83 BPM
BASE EXCESS BLDA CALC-SCNC: -1 MMOL/L (ref -2–3)
BASE EXCESS BLDA CALC-SCNC: -2 MMOL/L (ref -2–3)
BASE EXCESS BLDA CALC-SCNC: -3.1 MMOL/L
BASE EXCESS BLDA CALC-SCNC: -3.3 MMOL/L
BASE EXCESS BLDA CALC-SCNC: -4 MMOL/L (ref -2–3)
BASE EXCESS BLDA CALC-SCNC: -4.5 MMOL/L
BASE EXCESS BLDA CALC-SCNC: -5 MMOL/L (ref -2–3)
BASE EXCESS BLDA CALC-SCNC: -6 MMOL/L (ref -2–3)
BASOPHILS # BLD AUTO: 0.08 THOUSANDS/ÂΜL (ref 0–0.1)
BASOPHILS NFR BLD AUTO: 1 % (ref 0–1)
BODY TEMPERATURE: 99.7 DEGREES FEHRENHEIT
BPU ID: NORMAL
BUN SERPL-MCNC: 19 MG/DL (ref 5–25)
BUN SERPL-MCNC: 20 MG/DL (ref 5–25)
CA-I BLD-SCNC: 0.92 MMOL/L (ref 1.12–1.32)
CA-I BLD-SCNC: 0.99 MMOL/L (ref 1.12–1.32)
CA-I BLD-SCNC: 1.02 MMOL/L (ref 1.12–1.32)
CA-I BLD-SCNC: 1.15 MMOL/L (ref 1.12–1.32)
CA-I BLD-SCNC: 1.15 MMOL/L (ref 1.12–1.32)
CA-I BLD-SCNC: 1.16 MMOL/L (ref 1.12–1.32)
CA-I BLD-SCNC: 1.21 MMOL/L (ref 1.12–1.32)
CALCIUM SERPL-MCNC: 7.3 MG/DL (ref 8.4–10.2)
CALCIUM SERPL-MCNC: 9.2 MG/DL (ref 8.4–10.2)
CHLORIDE SERPL-SCNC: 103 MMOL/L (ref 96–108)
CHLORIDE SERPL-SCNC: 108 MMOL/L (ref 96–108)
CO2 SERPL-SCNC: 21 MMOL/L (ref 21–32)
CO2 SERPL-SCNC: 23 MMOL/L (ref 21–32)
CREAT SERPL-MCNC: 1.31 MG/DL (ref 0.6–1.3)
CREAT SERPL-MCNC: 1.33 MG/DL (ref 0.6–1.3)
CROSSMATCH: NORMAL
EOSINOPHIL # BLD AUTO: 0.25 THOUSAND/ÂΜL (ref 0–0.61)
EOSINOPHIL NFR BLD AUTO: 3 % (ref 0–6)
ERYTHROCYTE [DISTWIDTH] IN BLOOD BY AUTOMATED COUNT: 13.7 % (ref 11.6–15.1)
FIBRINOGEN PPP-MCNC: 301 MG/DL (ref 207–520)
FIO2 GAS DIL.REBREATH: 60 L
GFR SERPL CREATININE-BSD FRML MDRD: 54 ML/MIN/1.73SQ M
GFR SERPL CREATININE-BSD FRML MDRD: 55 ML/MIN/1.73SQ M
GLUCOSE SERPL-MCNC: 108 MG/DL (ref 65–140)
GLUCOSE SERPL-MCNC: 115 MG/DL (ref 65–140)
GLUCOSE SERPL-MCNC: 115 MG/DL (ref 65–140)
GLUCOSE SERPL-MCNC: 119 MG/DL (ref 65–140)
GLUCOSE SERPL-MCNC: 123 MG/DL (ref 65–140)
GLUCOSE SERPL-MCNC: 128 MG/DL (ref 65–140)
GLUCOSE SERPL-MCNC: 129 MG/DL (ref 65–140)
GLUCOSE SERPL-MCNC: 133 MG/DL (ref 65–140)
GLUCOSE SERPL-MCNC: 137 MG/DL (ref 65–140)
GLUCOSE SERPL-MCNC: 141 MG/DL (ref 65–140)
GLUCOSE SERPL-MCNC: 151 MG/DL (ref 65–140)
GLUCOSE SERPL-MCNC: 154 MG/DL (ref 65–140)
GLUCOSE SERPL-MCNC: 156 MG/DL (ref 65–140)
GLUCOSE SERPL-MCNC: 158 MG/DL (ref 65–140)
GLUCOSE SERPL-MCNC: 92 MG/DL (ref 65–140)
GLUCOSE SERPL-MCNC: 96 MG/DL (ref 65–140)
HCO3 BLDA-SCNC: 20.3 MMOL/L (ref 22–28)
HCO3 BLDA-SCNC: 20.8 MMOL/L (ref 22–28)
HCO3 BLDA-SCNC: 22.7 MMOL/L (ref 22–28)
HCO3 BLDA-SCNC: 23.7 MMOL/L (ref 22–28)
HCO3 BLDA-SCNC: 23.9 MMOL/L (ref 22–28)
HCO3 BLDA-SCNC: 23.9 MMOL/L (ref 22–28)
HCO3 BLDA-SCNC: 24.1 MMOL/L (ref 22–28)
HCO3 BLDA-SCNC: 24.5 MMOL/L (ref 22–28)
HCO3 BLDA-SCNC: 25 MMOL/L (ref 24–30)
HCO3 BLDA-SCNC: 25.6 MMOL/L (ref 24–30)
HCT VFR BLD AUTO: 43.9 % (ref 36.5–49.3)
HCT VFR BLD AUTO: 44.5 % (ref 36.5–49.3)
HCT VFR BLD AUTO: 50 % (ref 36.5–49.3)
HCT VFR BLD CALC: 29 % (ref 36.5–49.3)
HCT VFR BLD CALC: 32 % (ref 36.5–49.3)
HCT VFR BLD CALC: 33 % (ref 36.5–49.3)
HCT VFR BLD CALC: 33 % (ref 36.5–49.3)
HCT VFR BLD CALC: 43 % (ref 36.5–49.3)
HCT VFR BLD CALC: 45 % (ref 36.5–49.3)
HCT VFR BLD CALC: 48 % (ref 36.5–49.3)
HGB BLD-MCNC: 14.3 G/DL (ref 12–17)
HGB BLD-MCNC: 14.5 G/DL (ref 12–17)
HGB BLD-MCNC: 16.5 G/DL (ref 12–17)
HGB BLDA-MCNC: 10.9 G/DL (ref 12–17)
HGB BLDA-MCNC: 11.2 G/DL (ref 12–17)
HGB BLDA-MCNC: 11.2 G/DL (ref 12–17)
HGB BLDA-MCNC: 14.6 G/DL (ref 12–17)
HGB BLDA-MCNC: 15.3 G/DL (ref 12–17)
HGB BLDA-MCNC: 16.3 G/DL (ref 12–17)
HGB BLDA-MCNC: 9.9 G/DL (ref 12–17)
IMM GRANULOCYTES # BLD AUTO: 0.03 THOUSAND/UL (ref 0–0.2)
IMM GRANULOCYTES NFR BLD AUTO: 0 % (ref 0–2)
INR PPP: 1.37 (ref 0.84–1.19)
IPAP: 18
KCT BLD-ACNC: 143 SEC (ref 89–137)
KCT BLD-ACNC: 143 SEC (ref 89–137)
KCT BLD-ACNC: 454 SEC (ref 89–137)
KCT BLD-ACNC: 473 SEC (ref 89–137)
KCT BLD-ACNC: 485 SEC (ref 89–137)
KCT BLD-ACNC: 635 SEC (ref 89–137)
LYMPHOCYTES # BLD AUTO: 2.73 THOUSANDS/ÂΜL (ref 0.6–4.47)
LYMPHOCYTES NFR BLD AUTO: 35 % (ref 14–44)
MCH RBC QN AUTO: 30.6 PG (ref 26.8–34.3)
MCHC RBC AUTO-ENTMCNC: 33 G/DL (ref 31.4–37.4)
MCV RBC AUTO: 93 FL (ref 82–98)
MONOCYTES # BLD AUTO: 0.71 THOUSAND/ÂΜL (ref 0.17–1.22)
MONOCYTES NFR BLD AUTO: 9 % (ref 4–12)
NEUTROPHILS # BLD AUTO: 4.02 THOUSANDS/ÂΜL (ref 1.85–7.62)
NEUTS SEG NFR BLD AUTO: 52 % (ref 43–75)
NON VENT- BIPAP: ABNORMAL
NON VENT- BIPAP: ABNORMAL
NRBC BLD AUTO-RTO: 0 /100 WBCS
O2 CT BLDA-SCNC: 19.5 ML/DL (ref 16–23)
O2 CT BLDA-SCNC: 19.7 ML/DL (ref 16–23)
O2 CT BLDA-SCNC: 20.3 ML/DL (ref 16–23)
OXYHGB MFR BLDA: 90.1 % (ref 94–97)
OXYHGB MFR BLDA: 92.3 % (ref 94–97)
OXYHGB MFR BLDA: 93.1 % (ref 94–97)
P AXIS: 73 DEGREES
PCO2 BLD: 21 MMOL/L (ref 21–32)
PCO2 BLD: 22 MMOL/L (ref 21–32)
PCO2 BLD: 24 MMOL/L (ref 21–32)
PCO2 BLD: 25 MMOL/L (ref 21–32)
PCO2 BLD: 25 MMOL/L (ref 21–32)
PCO2 BLD: 26 MMOL/L (ref 21–32)
PCO2 BLD: 27 MMOL/L (ref 21–32)
PCO2 BLD: 35 MM HG (ref 36–44)
PCO2 BLD: 39.4 MM HG (ref 36–44)
PCO2 BLD: 43.5 MM HG (ref 36–44)
PCO2 BLD: 44 MM HG (ref 36–44)
PCO2 BLD: 45.8 MM HG (ref 42–50)
PCO2 BLD: 47.9 MM HG (ref 42–50)
PCO2 BLD: 48.9 MM HG (ref 36–44)
PCO2 BLDA: 51.1 MM HG (ref 36–44)
PCO2 BLDA: 54.1 MM HG (ref 36–44)
PCO2 BLDA: 56.1 MM HG (ref 36–44)
PCO2 TEMP ADJ BLDA: 55.5 MM HG (ref 36–44)
PEEP MAX SETTING VENT: 8 CM[H2O]
PH BLD: 7.27 [PH] (ref 7.35–7.45)
PH BLD: 7.28 [PH] (ref 7.35–7.45)
PH BLD: 7.29 [PH] (ref 7.35–7.45)
PH BLD: 7.34 [PH] (ref 7.3–7.4)
PH BLD: 7.35 [PH] (ref 7.35–7.45)
PH BLD: 7.35 [PH] (ref 7.3–7.4)
PH BLD: 7.37 [PH] (ref 7.35–7.45)
PH BLD: 7.39 [PH] (ref 7.35–7.45)
PH BLDA: 7.24 [PH] (ref 7.35–7.45)
PH BLDA: 7.27 [PH] (ref 7.35–7.45)
PH BLDA: 7.29 [PH] (ref 7.35–7.45)
PLATELET # BLD AUTO: 265 THOUSANDS/UL (ref 149–390)
PLATELET # BLD AUTO: 274 THOUSANDS/UL (ref 149–390)
PLATELET # BLD AUTO: 344 THOUSANDS/UL (ref 149–390)
PMV BLD AUTO: 10.3 FL (ref 8.9–12.7)
PMV BLD AUTO: 10.4 FL (ref 8.9–12.7)
PMV BLD AUTO: 10.7 FL (ref 8.9–12.7)
PO2 BLD: 212 MM HG (ref 75–129)
PO2 BLD: 295 MM HG (ref 75–129)
PO2 BLD: 38 MM HG (ref 35–45)
PO2 BLD: 49 MM HG (ref 35–45)
PO2 BLD: 81.1 MM HG (ref 75–129)
PO2 BLD: 85 MM HG (ref 75–129)
PO2 BLD: 91 MM HG (ref 75–129)
PO2 BLD: >400 MM HG (ref 75–129)
PO2 BLDA: 72.8 MM HG (ref 75–129)
PO2 BLDA: 76.2 MM HG (ref 75–129)
PO2 BLDA: 77.9 MM HG (ref 75–129)
POTASSIUM BLD-SCNC: 3.8 MMOL/L (ref 3.5–5.3)
POTASSIUM BLD-SCNC: 4.3 MMOL/L (ref 3.5–5.3)
POTASSIUM BLD-SCNC: 4.8 MMOL/L (ref 3.5–5.3)
POTASSIUM BLD-SCNC: 5.1 MMOL/L (ref 3.5–5.3)
POTASSIUM BLD-SCNC: 5.9 MMOL/L (ref 3.5–5.3)
POTASSIUM BLD-SCNC: 6.3 MMOL/L (ref 3.5–5.3)
POTASSIUM BLD-SCNC: 6.4 MMOL/L (ref 3.5–5.3)
POTASSIUM SERPL-SCNC: 3.8 MMOL/L (ref 3.5–5.3)
POTASSIUM SERPL-SCNC: 3.9 MMOL/L (ref 3.5–5.3)
POTASSIUM SERPL-SCNC: 4 MMOL/L (ref 3.5–5.3)
POTASSIUM SERPL-SCNC: 4.4 MMOL/L (ref 3.5–5.3)
PR INTERVAL: 198 MS
PROTHROMBIN TIME: 16.7 SECONDS (ref 11.6–14.5)
QRS AXIS: 71 DEGREES
QRSD INTERVAL: 88 MS
QT INTERVAL: 378 MS
QTC INTERVAL: 444 MS
RBC # BLD AUTO: 5.39 MILLION/UL (ref 3.88–5.62)
SAO2 % BLD FROM PO2: 100 % (ref 60–85)
SAO2 % BLD FROM PO2: 100 % (ref 60–85)
SAO2 % BLD FROM PO2: 67 % (ref 60–85)
SAO2 % BLD FROM PO2: 82 % (ref 60–85)
SAO2 % BLD FROM PO2: 96 % (ref 60–85)
SAO2 % BLD FROM PO2: 96 % (ref 60–85)
SODIUM BLD-SCNC: 132 MMOL/L (ref 136–145)
SODIUM BLD-SCNC: 132 MMOL/L (ref 136–145)
SODIUM BLD-SCNC: 134 MMOL/L (ref 136–145)
SODIUM BLD-SCNC: 136 MMOL/L (ref 136–145)
SODIUM BLD-SCNC: 137 MMOL/L (ref 136–145)
SODIUM BLD-SCNC: 137 MMOL/L (ref 136–145)
SODIUM BLD-SCNC: 138 MMOL/L (ref 136–145)
SODIUM SERPL-SCNC: 137 MMOL/L (ref 135–147)
SODIUM SERPL-SCNC: 138 MMOL/L (ref 135–147)
SPECIMEN SOURCE: ABNORMAL
SPECIMEN SOURCE: NORMAL
T WAVE AXIS: 106 DEGREES
UNIT DISPENSE STATUS: NORMAL
UNIT PRODUCT CODE: NORMAL
UNIT PRODUCT VOLUME: 350 ML
UNIT RH: NORMAL
VENT BIPAP FIO2: 60 %
VENTRICULAR RATE: 83 BPM
WBC # BLD AUTO: 7.82 THOUSAND/UL (ref 4.31–10.16)

## 2024-04-23 PROCEDURE — 02100A9 BYPASS CORONARY ARTERY, ONE ARTERY FROM LEFT INTERNAL MAMMARY WITH AUTOLOGOUS ARTERIAL TISSUE, OPEN APPROACH: ICD-10-PCS | Performed by: THORACIC SURGERY (CARDIOTHORACIC VASCULAR SURGERY)

## 2024-04-23 PROCEDURE — 85384 FIBRINOGEN ACTIVITY: CPT | Performed by: PHYSICIAN ASSISTANT

## 2024-04-23 PROCEDURE — 85025 COMPLETE CBC W/AUTO DIFF WBC: CPT

## 2024-04-23 PROCEDURE — 33517 CABG ARTERY-VEIN SINGLE: CPT | Performed by: THORACIC SURGERY (CARDIOTHORACIC VASCULAR SURGERY)

## 2024-04-23 PROCEDURE — 33508 ENDOSCOPIC VEIN HARVEST: CPT | Performed by: PHYSICIAN ASSISTANT

## 2024-04-23 PROCEDURE — 94002 VENT MGMT INPAT INIT DAY: CPT

## 2024-04-23 PROCEDURE — 85049 AUTOMATED PLATELET COUNT: CPT | Performed by: PHYSICIAN ASSISTANT

## 2024-04-23 PROCEDURE — 85014 HEMATOCRIT: CPT

## 2024-04-23 PROCEDURE — 94003 VENT MGMT INPAT SUBQ DAY: CPT

## 2024-04-23 PROCEDURE — 33508 ENDOSCOPIC VEIN HARVEST: CPT | Performed by: THORACIC SURGERY (CARDIOTHORACIC VASCULAR SURGERY)

## 2024-04-23 PROCEDURE — 93355 ECHO TRANSESOPHAGEAL (TEE): CPT

## 2024-04-23 PROCEDURE — 06BQ4ZZ EXCISION OF LEFT SAPHENOUS VEIN, PERCUTANEOUS ENDOSCOPIC APPROACH: ICD-10-PCS | Performed by: THORACIC SURGERY (CARDIOTHORACIC VASCULAR SURGERY)

## 2024-04-23 PROCEDURE — 85049 AUTOMATED PLATELET COUNT: CPT | Performed by: THORACIC SURGERY (CARDIOTHORACIC VASCULAR SURGERY)

## 2024-04-23 PROCEDURE — 82803 BLOOD GASES ANY COMBINATION: CPT

## 2024-04-23 PROCEDURE — 82948 REAGENT STRIP/BLOOD GLUCOSE: CPT

## 2024-04-23 PROCEDURE — 84132 ASSAY OF SERUM POTASSIUM: CPT

## 2024-04-23 PROCEDURE — 94760 N-INVAS EAR/PLS OXIMETRY 1: CPT

## 2024-04-23 PROCEDURE — A7041 WATER SEAL DRAIN CONTAINER: HCPCS | Performed by: THORACIC SURGERY (CARDIOTHORACIC VASCULAR SURGERY)

## 2024-04-23 PROCEDURE — 82330 ASSAY OF CALCIUM: CPT

## 2024-04-23 PROCEDURE — 93005 ELECTROCARDIOGRAM TRACING: CPT

## 2024-04-23 PROCEDURE — 33533 CABG ARTERIAL SINGLE: CPT | Performed by: PHYSICIAN ASSISTANT

## 2024-04-23 PROCEDURE — 85610 PROTHROMBIN TIME: CPT | Performed by: PHYSICIAN ASSISTANT

## 2024-04-23 PROCEDURE — 80048 BASIC METABOLIC PNL TOTAL CA: CPT

## 2024-04-23 PROCEDURE — 82805 BLOOD GASES W/O2 SATURATION: CPT | Performed by: STUDENT IN AN ORGANIZED HEALTH CARE EDUCATION/TRAINING PROGRAM

## 2024-04-23 PROCEDURE — 5A1221Z PERFORMANCE OF CARDIAC OUTPUT, CONTINUOUS: ICD-10-PCS | Performed by: THORACIC SURGERY (CARDIOTHORACIC VASCULAR SURGERY)

## 2024-04-23 PROCEDURE — 93010 ELECTROCARDIOGRAM REPORT: CPT | Performed by: INTERNAL MEDICINE

## 2024-04-23 PROCEDURE — 82805 BLOOD GASES W/O2 SATURATION: CPT | Performed by: NURSE PRACTITIONER

## 2024-04-23 PROCEDURE — 30233N0 TRANSFUSION OF AUTOLOGOUS RED BLOOD CELLS INTO PERIPHERAL VEIN, PERCUTANEOUS APPROACH: ICD-10-PCS | Performed by: THORACIC SURGERY (CARDIOTHORACIC VASCULAR SURGERY)

## 2024-04-23 PROCEDURE — 02HV33Z INSERTION OF INFUSION DEVICE INTO SUPERIOR VENA CAVA, PERCUTANEOUS APPROACH: ICD-10-PCS | Performed by: ANESTHESIOLOGY

## 2024-04-23 PROCEDURE — 85730 THROMBOPLASTIN TIME PARTIAL: CPT | Performed by: PHYSICIAN ASSISTANT

## 2024-04-23 PROCEDURE — 80048 BASIC METABOLIC PNL TOTAL CA: CPT | Performed by: PHYSICIAN ASSISTANT

## 2024-04-23 PROCEDURE — 85014 HEMATOCRIT: CPT | Performed by: PHYSICIAN ASSISTANT

## 2024-04-23 PROCEDURE — 33533 CABG ARTERIAL SINGLE: CPT | Performed by: THORACIC SURGERY (CARDIOTHORACIC VASCULAR SURGERY)

## 2024-04-23 PROCEDURE — 71045 X-RAY EXAM CHEST 1 VIEW: CPT

## 2024-04-23 PROCEDURE — 85347 COAGULATION TIME ACTIVATED: CPT

## 2024-04-23 PROCEDURE — 33517 CABG ARTERY-VEIN SINGLE: CPT | Performed by: PHYSICIAN ASSISTANT

## 2024-04-23 PROCEDURE — 84132 ASSAY OF SERUM POTASSIUM: CPT | Performed by: PHYSICIAN ASSISTANT

## 2024-04-23 PROCEDURE — 84295 ASSAY OF SERUM SODIUM: CPT

## 2024-04-23 PROCEDURE — 85018 HEMOGLOBIN: CPT | Performed by: PHYSICIAN ASSISTANT

## 2024-04-23 PROCEDURE — 82805 BLOOD GASES W/O2 SATURATION: CPT

## 2024-04-23 PROCEDURE — 5A1223Z PERFORMANCE OF CARDIAC PACING, CONTINUOUS: ICD-10-PCS | Performed by: THORACIC SURGERY (CARDIOTHORACIC VASCULAR SURGERY)

## 2024-04-23 PROCEDURE — 82947 ASSAY GLUCOSE BLOOD QUANT: CPT

## 2024-04-23 PROCEDURE — 021109W BYPASS CORONARY ARTERY, TWO ARTERIES FROM AORTA WITH AUTOLOGOUS VENOUS TISSUE, OPEN APPROACH: ICD-10-PCS | Performed by: THORACIC SURGERY (CARDIOTHORACIC VASCULAR SURGERY)

## 2024-04-23 PROCEDURE — 99291 CRITICAL CARE FIRST HOUR: CPT | Performed by: STUDENT IN AN ORGANIZED HEALTH CARE EDUCATION/TRAINING PROGRAM

## 2024-04-23 PROCEDURE — 85730 THROMBOPLASTIN TIME PARTIAL: CPT | Performed by: INTERNAL MEDICINE

## 2024-04-23 DEVICE — MARKER CORONARY BYPASS VOSS GRAFT: Type: IMPLANTABLE DEVICE | Site: AORTA | Status: FUNCTIONAL

## 2024-04-23 RX ORDER — HEPARIN SODIUM 1000 [USP'U]/ML
400 INJECTION, SOLUTION INTRAVENOUS; SUBCUTANEOUS ONCE
Status: COMPLETED | OUTPATIENT
Start: 2024-04-23 | End: 2024-04-23

## 2024-04-23 RX ORDER — ETOMIDATE 2 MG/ML
INJECTION INTRAVENOUS AS NEEDED
Status: DISCONTINUED | OUTPATIENT
Start: 2024-04-23 | End: 2024-04-23

## 2024-04-23 RX ORDER — FENTANYL CITRATE 50 UG/ML
50 INJECTION, SOLUTION INTRAMUSCULAR; INTRAVENOUS ONCE
Status: DISCONTINUED | OUTPATIENT
Start: 2024-04-23 | End: 2024-04-23

## 2024-04-23 RX ORDER — PANTOPRAZOLE SODIUM 40 MG/1
40 TABLET, DELAYED RELEASE ORAL
Status: DISCONTINUED | OUTPATIENT
Start: 2024-04-24 | End: 2024-04-27 | Stop reason: HOSPADM

## 2024-04-23 RX ORDER — FENTANYL CITRATE 50 UG/ML
INJECTION, SOLUTION INTRAMUSCULAR; INTRAVENOUS AS NEEDED
Status: DISCONTINUED | OUTPATIENT
Start: 2024-04-23 | End: 2024-04-23

## 2024-04-23 RX ORDER — POTASSIUM CHLORIDE 14.9 MG/ML
20 INJECTION INTRAVENOUS ONCE AS NEEDED
Status: DISCONTINUED | OUTPATIENT
Start: 2024-04-23 | End: 2024-04-24

## 2024-04-23 RX ORDER — PHENYLEPHRINE HCL IN 0.9% NACL 1 MG/10 ML
200-2000 SYRINGE (ML) INTRAVENOUS ONCE
Status: DISCONTINUED | OUTPATIENT
Start: 2024-04-23 | End: 2024-04-23 | Stop reason: HOSPADM

## 2024-04-23 RX ORDER — CALCIUM CHLORIDE 100 MG/ML
1 INJECTION INTRAVENOUS; INTRAVENTRICULAR ONCE AS NEEDED
Status: DISCONTINUED | OUTPATIENT
Start: 2024-04-23 | End: 2024-04-24

## 2024-04-23 RX ORDER — AMIODARONE HYDROCHLORIDE 200 MG/1
200 TABLET ORAL EVERY 8 HOURS SCHEDULED
Status: DISCONTINUED | OUTPATIENT
Start: 2024-04-23 | End: 2024-04-27 | Stop reason: HOSPADM

## 2024-04-23 RX ORDER — CEFAZOLIN SODIUM 2 G/50ML
2000 SOLUTION INTRAVENOUS EVERY 8 HOURS
Qty: 150 ML | Refills: 0 | Status: COMPLETED | OUTPATIENT
Start: 2024-04-23 | End: 2024-04-24

## 2024-04-23 RX ORDER — MAGNESIUM SULFATE 500 MG/ML
VIAL (ML) INJECTION AS NEEDED
Status: DISCONTINUED | OUTPATIENT
Start: 2024-04-23 | End: 2024-04-23

## 2024-04-23 RX ORDER — CEFAZOLIN SODIUM 2 G/50ML
SOLUTION INTRAVENOUS AS NEEDED
Status: DISCONTINUED | OUTPATIENT
Start: 2024-04-23 | End: 2024-04-23

## 2024-04-23 RX ORDER — OXYCODONE HYDROCHLORIDE 5 MG/1
2.5 TABLET ORAL EVERY 4 HOURS PRN
Status: DISCONTINUED | OUTPATIENT
Start: 2024-04-23 | End: 2024-04-23

## 2024-04-23 RX ORDER — PROPOFOL 10 MG/ML
INJECTION, EMULSION INTRAVENOUS CONTINUOUS PRN
Status: DISCONTINUED | OUTPATIENT
Start: 2024-04-23 | End: 2024-04-23

## 2024-04-23 RX ORDER — MAGNESIUM SULFATE HEPTAHYDRATE 40 MG/ML
2 INJECTION, SOLUTION INTRAVENOUS ONCE
Status: COMPLETED | OUTPATIENT
Start: 2024-04-23 | End: 2024-04-23

## 2024-04-23 RX ORDER — ALBUMIN, HUMAN INJ 5% 5 %
12.5 SOLUTION INTRAVENOUS ONCE
Status: COMPLETED | OUTPATIENT
Start: 2024-04-23 | End: 2024-04-23

## 2024-04-23 RX ORDER — ACETAMINOPHEN 325 MG/1
975 TABLET ORAL
Status: DISCONTINUED | OUTPATIENT
Start: 2024-04-23 | End: 2024-04-23

## 2024-04-23 RX ORDER — ACETAMINOPHEN 650 MG/1
650 SUPPOSITORY RECTAL EVERY 4 HOURS PRN
Status: DISCONTINUED | OUTPATIENT
Start: 2024-04-23 | End: 2024-04-27 | Stop reason: HOSPADM

## 2024-04-23 RX ORDER — BISACODYL 10 MG
10 SUPPOSITORY, RECTAL RECTAL DAILY PRN
Status: DISCONTINUED | OUTPATIENT
Start: 2024-04-23 | End: 2024-04-27 | Stop reason: HOSPADM

## 2024-04-23 RX ORDER — SODIUM CHLORIDE 9 MG/ML
INJECTION, SOLUTION INTRAVENOUS CONTINUOUS PRN
Status: DISCONTINUED | OUTPATIENT
Start: 2024-04-23 | End: 2024-04-23

## 2024-04-23 RX ORDER — PROTAMINE SULFATE 10 MG/ML
INJECTION, SOLUTION INTRAVENOUS AS NEEDED
Status: DISCONTINUED | OUTPATIENT
Start: 2024-04-23 | End: 2024-04-23

## 2024-04-23 RX ORDER — LIDOCAINE HYDROCHLORIDE 10 MG/ML
INJECTION, SOLUTION EPIDURAL; INFILTRATION; INTRACAUDAL; PERINEURAL
Status: COMPLETED | OUTPATIENT
Start: 2024-04-23 | End: 2024-04-23

## 2024-04-23 RX ORDER — POTASSIUM CHLORIDE 29.8 MG/ML
40 INJECTION INTRAVENOUS
Status: DISCONTINUED | OUTPATIENT
Start: 2024-04-23 | End: 2024-04-24

## 2024-04-23 RX ORDER — LIDOCAINE HCL/PF 100 MG/5ML
SYRINGE (ML) INJECTION AS NEEDED
Status: DISCONTINUED | OUTPATIENT
Start: 2024-04-23 | End: 2024-04-23

## 2024-04-23 RX ORDER — MANNITOL 250 MG/ML
INJECTION, SOLUTION INTRAVENOUS AS NEEDED
Status: DISCONTINUED | OUTPATIENT
Start: 2024-04-23 | End: 2024-04-23

## 2024-04-23 RX ORDER — CHLORHEXIDINE GLUCONATE ORAL RINSE 1.2 MG/ML
15 SOLUTION DENTAL 2 TIMES DAILY
Status: DISCONTINUED | OUTPATIENT
Start: 2024-04-23 | End: 2024-04-26

## 2024-04-23 RX ORDER — HEPARIN SODIUM 5000 [USP'U]/ML
5000 INJECTION, SOLUTION INTRAVENOUS; SUBCUTANEOUS EVERY 8 HOURS SCHEDULED
Status: DISCONTINUED | OUTPATIENT
Start: 2024-04-24 | End: 2024-04-27 | Stop reason: HOSPADM

## 2024-04-23 RX ORDER — ASPIRIN 325 MG
325 TABLET ORAL DAILY
Status: DISCONTINUED | OUTPATIENT
Start: 2024-04-23 | End: 2024-04-27 | Stop reason: HOSPADM

## 2024-04-23 RX ORDER — HEPARIN SODIUM 1000 [USP'U]/ML
INJECTION, SOLUTION INTRAVENOUS; SUBCUTANEOUS AS NEEDED
Status: DISCONTINUED | OUTPATIENT
Start: 2024-04-23 | End: 2024-04-23

## 2024-04-23 RX ORDER — MIDAZOLAM HYDROCHLORIDE 2 MG/2ML
INJECTION, SOLUTION INTRAMUSCULAR; INTRAVENOUS AS NEEDED
Status: DISCONTINUED | OUTPATIENT
Start: 2024-04-23 | End: 2024-04-23

## 2024-04-23 RX ORDER — POTASSIUM CHLORIDE 14.9 MG/ML
20 INJECTION INTRAVENOUS
Status: DISCONTINUED | OUTPATIENT
Start: 2024-04-23 | End: 2024-04-24

## 2024-04-23 RX ORDER — ATORVASTATIN CALCIUM 80 MG/1
80 TABLET, FILM COATED ORAL
Status: DISCONTINUED | OUTPATIENT
Start: 2024-04-23 | End: 2024-04-27 | Stop reason: HOSPADM

## 2024-04-23 RX ORDER — ESMOLOL HYDROCHLORIDE 10 MG/ML
INJECTION INTRAVENOUS AS NEEDED
Status: DISCONTINUED | OUTPATIENT
Start: 2024-04-23 | End: 2024-04-23

## 2024-04-23 RX ORDER — SODIUM CHLORIDE 450 MG/100ML
20 INJECTION, SOLUTION INTRAVENOUS CONTINUOUS
Status: DISCONTINUED | OUTPATIENT
Start: 2024-04-23 | End: 2024-04-24

## 2024-04-23 RX ORDER — POLYETHYLENE GLYCOL 3350 17 G/17G
17 POWDER, FOR SOLUTION ORAL DAILY
Status: DISCONTINUED | OUTPATIENT
Start: 2024-04-23 | End: 2024-04-27 | Stop reason: HOSPADM

## 2024-04-23 RX ORDER — HYDROMORPHONE HCL/PF 1 MG/ML
0.5 SYRINGE (ML) INJECTION EVERY 2 HOUR PRN
Status: DISCONTINUED | OUTPATIENT
Start: 2024-04-23 | End: 2024-04-24

## 2024-04-23 RX ORDER — METHOCARBAMOL 500 MG/1
500 TABLET, FILM COATED ORAL EVERY 6 HOURS SCHEDULED
Status: DISCONTINUED | OUTPATIENT
Start: 2024-04-23 | End: 2024-04-27 | Stop reason: HOSPADM

## 2024-04-23 RX ORDER — MAGNESIUM HYDROXIDE 1200 MG/15ML
LIQUID ORAL AS NEEDED
Status: DISCONTINUED | OUTPATIENT
Start: 2024-04-23 | End: 2024-04-23 | Stop reason: HOSPADM

## 2024-04-23 RX ORDER — LIDOCAINE 50 MG/G
2 PATCH TOPICAL DAILY
Status: DISCONTINUED | OUTPATIENT
Start: 2024-04-23 | End: 2024-04-27 | Stop reason: HOSPADM

## 2024-04-23 RX ORDER — SODIUM CHLORIDE, SODIUM LACTATE, POTASSIUM CHLORIDE, CALCIUM CHLORIDE 600; 310; 30; 20 MG/100ML; MG/100ML; MG/100ML; MG/100ML
INJECTION, SOLUTION INTRAVENOUS CONTINUOUS PRN
Status: DISCONTINUED | OUTPATIENT
Start: 2024-04-23 | End: 2024-04-23

## 2024-04-23 RX ORDER — HEPARIN SODIUM 1000 [USP'U]/ML
10000 INJECTION, SOLUTION INTRAVENOUS; SUBCUTANEOUS ONCE
Status: DISCONTINUED | OUTPATIENT
Start: 2024-04-23 | End: 2024-04-23 | Stop reason: HOSPADM

## 2024-04-23 RX ORDER — OXYCODONE HYDROCHLORIDE 5 MG/1
5 TABLET ORAL EVERY 4 HOURS PRN
Status: DISCONTINUED | OUTPATIENT
Start: 2024-04-23 | End: 2024-04-24

## 2024-04-23 RX ORDER — LIDOCAINE HYDROCHLORIDE 10 MG/ML
INJECTION, SOLUTION INFILTRATION; PERINEURAL AS NEEDED
Status: DISCONTINUED | OUTPATIENT
Start: 2024-04-23 | End: 2024-04-23

## 2024-04-23 RX ORDER — ROCURONIUM BROMIDE 10 MG/ML
INJECTION, SOLUTION INTRAVENOUS AS NEEDED
Status: DISCONTINUED | OUTPATIENT
Start: 2024-04-23 | End: 2024-04-23

## 2024-04-23 RX ORDER — ACETAMINOPHEN 325 MG/1
975 TABLET ORAL EVERY 8 HOURS SCHEDULED
Status: DISCONTINUED | OUTPATIENT
Start: 2024-04-23 | End: 2024-04-27 | Stop reason: HOSPADM

## 2024-04-23 RX ORDER — AMIODARONE HYDROCHLORIDE 150 MG/3ML
INJECTION, SOLUTION INTRAVENOUS AS NEEDED
Status: DISCONTINUED | OUTPATIENT
Start: 2024-04-23 | End: 2024-04-23

## 2024-04-23 RX ORDER — OXYCODONE HYDROCHLORIDE 10 MG/1
10 TABLET ORAL EVERY 4 HOURS PRN
Status: DISCONTINUED | OUTPATIENT
Start: 2024-04-23 | End: 2024-04-24

## 2024-04-23 RX ORDER — SODIUM CHLORIDE, SODIUM GLUCONATE, SODIUM ACETATE, POTASSIUM CHLORIDE, MAGNESIUM CHLORIDE, SODIUM PHOSPHATE, DIBASIC, AND POTASSIUM PHOSPHATE .53; .5; .37; .037; .03; .012; .00082 G/100ML; G/100ML; G/100ML; G/100ML; G/100ML; G/100ML; G/100ML
INJECTION, SOLUTION INTRAVENOUS AS NEEDED
Status: DISCONTINUED | OUTPATIENT
Start: 2024-04-23 | End: 2024-04-23

## 2024-04-23 RX ORDER — CEFAZOLIN SODIUM 2 G/50ML
2000 SOLUTION INTRAVENOUS ONCE
Status: DISCONTINUED | OUTPATIENT
Start: 2024-04-23 | End: 2024-04-23 | Stop reason: HOSPADM

## 2024-04-23 RX ORDER — FENTANYL CITRATE 50 UG/ML
50 INJECTION, SOLUTION INTRAMUSCULAR; INTRAVENOUS
Status: DISCONTINUED | OUTPATIENT
Start: 2024-04-23 | End: 2024-04-23

## 2024-04-23 RX ORDER — CALCIUM CHLORIDE 100 MG/ML
INJECTION INTRAVENOUS; INTRAVENTRICULAR AS NEEDED
Status: DISCONTINUED | OUTPATIENT
Start: 2024-04-23 | End: 2024-04-23

## 2024-04-23 RX ORDER — VANCOMYCIN HYDROCHLORIDE 1 G/20ML
INJECTION, POWDER, LYOPHILIZED, FOR SOLUTION INTRAVENOUS AS NEEDED
Status: DISCONTINUED | OUTPATIENT
Start: 2024-04-23 | End: 2024-04-23 | Stop reason: HOSPADM

## 2024-04-23 RX ORDER — ONDANSETRON 2 MG/ML
4 INJECTION INTRAMUSCULAR; INTRAVENOUS EVERY 6 HOURS PRN
Status: DISCONTINUED | OUTPATIENT
Start: 2024-04-23 | End: 2024-04-27 | Stop reason: HOSPADM

## 2024-04-23 RX ORDER — OXYCODONE HYDROCHLORIDE 5 MG/1
5 TABLET ORAL EVERY 4 HOURS PRN
Status: DISCONTINUED | OUTPATIENT
Start: 2024-04-23 | End: 2024-04-23

## 2024-04-23 RX ADMIN — ACETAMINOPHEN 975 MG: 325 TABLET, FILM COATED ORAL at 21:02

## 2024-04-23 RX ADMIN — FENTANYL CITRATE 250 MCG: 50 INJECTION, SOLUTION INTRAMUSCULAR; INTRAVENOUS at 11:05

## 2024-04-23 RX ADMIN — FENTANYL CITRATE 250 MCG: 50 INJECTION, SOLUTION INTRAMUSCULAR; INTRAVENOUS at 08:51

## 2024-04-23 RX ADMIN — SODIUM CHLORIDE, SODIUM LACTATE, POTASSIUM CHLORIDE, AND CALCIUM CHLORIDE: .6; .31; .03; .02 INJECTION, SOLUTION INTRAVENOUS at 07:50

## 2024-04-23 RX ADMIN — SODIUM CHLORIDE, SODIUM LACTATE, POTASSIUM CHLORIDE, AND CALCIUM CHLORIDE: .6; .31; .03; .02 INJECTION, SOLUTION INTRAVENOUS at 11:29

## 2024-04-23 RX ADMIN — LIDOCAINE HYDROCHLORIDE 100 MG: 20 INJECTION INTRAVENOUS at 10:54

## 2024-04-23 RX ADMIN — CHLORHEXIDINE GLUCONATE 0.12% ORAL RINSE 15 ML: 1.2 LIQUID ORAL at 05:50

## 2024-04-23 RX ADMIN — MIDAZOLAM 2 MG: 1 INJECTION INTRAMUSCULAR; INTRAVENOUS at 11:04

## 2024-04-23 RX ADMIN — HYDROMORPHONE HYDROCHLORIDE 0.5 MG: 1 INJECTION, SOLUTION INTRAMUSCULAR; INTRAVENOUS; SUBCUTANEOUS at 14:28

## 2024-04-23 RX ADMIN — EPINEPHRINE 2 MCG/MIN: 1 INJECTION INTRAMUSCULAR; INTRAVENOUS; SUBCUTANEOUS at 11:01

## 2024-04-23 RX ADMIN — SODIUM CHLORIDE, SODIUM GLUCONATE, SODIUM ACETATE, POTASSIUM CHLORIDE, MAGNESIUM CHLORIDE, SODIUM PHOSPHATE, DIBASIC, AND POTASSIUM PHOSPHATE 500 ML: .53; .5; .37; .037; .03; .012; .00082 INJECTION, SOLUTION INTRAVENOUS at 07:20

## 2024-04-23 RX ADMIN — Medication 1 EACH: at 07:20

## 2024-04-23 RX ADMIN — ROCURONIUM BROMIDE 50 MG: 10 INJECTION, SOLUTION INTRAVENOUS at 10:04

## 2024-04-23 RX ADMIN — Medication 12.5 MG: at 05:49

## 2024-04-23 RX ADMIN — ALBUMIN (HUMAN) 12.5 G: 12.5 INJECTION, SOLUTION INTRAVENOUS at 21:17

## 2024-04-23 RX ADMIN — CEFAZOLIN SODIUM 2000 MG: 2 SOLUTION INTRAVENOUS at 08:35

## 2024-04-23 RX ADMIN — SODIUM BICARBONATE 50 MEQ: 84 INJECTION, SOLUTION INTRAVENOUS at 10:15

## 2024-04-23 RX ADMIN — HYDROMORPHONE HYDROCHLORIDE 0.5 MG: 1 INJECTION, SOLUTION INTRAMUSCULAR; INTRAVENOUS; SUBCUTANEOUS at 17:57

## 2024-04-23 RX ADMIN — FENTANYL CITRATE 100 MCG: 50 INJECTION, SOLUTION INTRAMUSCULAR; INTRAVENOUS at 07:36

## 2024-04-23 RX ADMIN — MUPIROCIN 1 APPLICATION: 20 OINTMENT TOPICAL at 21:02

## 2024-04-23 RX ADMIN — ROCURONIUM BROMIDE 50 MG: 10 INJECTION, SOLUTION INTRAVENOUS at 07:55

## 2024-04-23 RX ADMIN — HEPARIN SODIUM 4000 UNITS: 1000 INJECTION INTRAVENOUS; SUBCUTANEOUS at 10:10

## 2024-04-23 RX ADMIN — ESMOLOL HYDROCHLORIDE 20 MG: 100 INJECTION, SOLUTION INTRAVENOUS at 08:01

## 2024-04-23 RX ADMIN — ONDANSETRON 4 MG: 2 INJECTION INTRAMUSCULAR; INTRAVENOUS at 21:20

## 2024-04-23 RX ADMIN — MAGNESIUM SULFATE HEPTAHYDRATE 2 G: 40 INJECTION, SOLUTION INTRAVENOUS at 12:37

## 2024-04-23 RX ADMIN — PHENYLEPHRINE HYDROCHLORIDE 50 MCG/MIN: 10 INJECTION INTRAVENOUS at 10:09

## 2024-04-23 RX ADMIN — HEPARIN SODIUM 4000 UNITS: 1000 INJECTION INTRAVENOUS; SUBCUTANEOUS at 10:45

## 2024-04-23 RX ADMIN — CEFAZOLIN SODIUM 2000 MG: 2 SOLUTION INTRAVENOUS at 15:33

## 2024-04-23 RX ADMIN — SODIUM CHLORIDE: 0.9 INJECTION, SOLUTION INTRAVENOUS at 08:23

## 2024-04-23 RX ADMIN — LIDOCAINE HYDROCHLORIDE 1 ML: 10 INJECTION, SOLUTION EPIDURAL; INFILTRATION; INTRACAUDAL; PERINEURAL at 07:42

## 2024-04-23 RX ADMIN — OXYCODONE HYDROCHLORIDE 10 MG: 10 TABLET ORAL at 21:13

## 2024-04-23 RX ADMIN — HEPARIN SODIUM 27500 UNITS: 1000 INJECTION INTRAVENOUS; SUBCUTANEOUS at 09:31

## 2024-04-23 RX ADMIN — Medication 650 ML: at 10:10

## 2024-04-23 RX ADMIN — ROCURONIUM BROMIDE 50 MG: 10 INJECTION, SOLUTION INTRAVENOUS at 08:53

## 2024-04-23 RX ADMIN — MANNITOL 25 G: 12.5 INJECTION, SOLUTION INTRAVENOUS at 10:14

## 2024-04-23 RX ADMIN — PROTAMINE SULFATE 170 MG: 10 INJECTION, SOLUTION INTRAVENOUS at 11:06

## 2024-04-23 RX ADMIN — AMIODARONE HYDROCHLORIDE 200 MG: 200 TABLET ORAL at 21:02

## 2024-04-23 RX ADMIN — OXYCODONE HYDROCHLORIDE 5 MG: 5 TABLET ORAL at 15:47

## 2024-04-23 RX ADMIN — FENTANYL CITRATE 50 MCG: 50 INJECTION INTRAMUSCULAR; INTRAVENOUS at 12:47

## 2024-04-23 RX ADMIN — METHOCARBAMOL 500 MG: 500 TABLET ORAL at 17:14

## 2024-04-23 RX ADMIN — AMIODARONE HYDROCHLORIDE 150 MG: 50 INJECTION, SOLUTION INTRAVENOUS at 10:49

## 2024-04-23 RX ADMIN — CYCLOSPORINE 1 DROP: 0.5 EMULSION OPHTHALMIC at 21:02

## 2024-04-23 RX ADMIN — FENTANYL CITRATE 250 MCG: 50 INJECTION, SOLUTION INTRAMUSCULAR; INTRAVENOUS at 09:03

## 2024-04-23 RX ADMIN — PANTOPRAZOLE SODIUM 40 MG: 40 TABLET, DELAYED RELEASE ORAL at 05:49

## 2024-04-23 RX ADMIN — CALCIUM CHLORIDE 1 G: 100 INJECTION INTRAVENOUS; INTRAVENTRICULAR at 11:00

## 2024-04-23 RX ADMIN — HEPARIN SODIUM 4000 UNITS: 1000 INJECTION INTRAVENOUS; SUBCUTANEOUS at 10:25

## 2024-04-23 RX ADMIN — HEPARIN SODIUM 5000 UNITS: 1000 INJECTION INTRAVENOUS; SUBCUTANEOUS at 09:21

## 2024-04-23 RX ADMIN — LIDOCAINE HYDROCHLORIDE 50 MG: 10 INJECTION, SOLUTION EPIDURAL; INFILTRATION; INTRACAUDAL; PERINEURAL at 07:55

## 2024-04-23 RX ADMIN — SODIUM CHLORIDE, SODIUM LACTATE, POTASSIUM CHLORIDE, AND CALCIUM CHLORIDE 500 ML: .6; .31; .03; .02 INJECTION, SOLUTION INTRAVENOUS at 14:00

## 2024-04-23 RX ADMIN — MUPIROCIN 1 APPLICATION: 20 OINTMENT TOPICAL at 05:51

## 2024-04-23 RX ADMIN — PHENYLEPHRINE HYDROCHLORIDE 250 MCG: 10 INJECTION INTRAVENOUS at 10:25

## 2024-04-23 RX ADMIN — HYDROMORPHONE HYDROCHLORIDE 0.5 MG: 1 INJECTION, SOLUTION INTRAMUSCULAR; INTRAVENOUS; SUBCUTANEOUS at 16:15

## 2024-04-23 RX ADMIN — FENTANYL CITRATE 150 MCG: 50 INJECTION, SOLUTION INTRAMUSCULAR; INTRAVENOUS at 07:55

## 2024-04-23 RX ADMIN — MIDAZOLAM 2 MG: 1 INJECTION INTRAMUSCULAR; INTRAVENOUS at 07:29

## 2024-04-23 RX ADMIN — ALBUMIN (HUMAN) 12.5 G: 12.5 INJECTION, SOLUTION INTRAVENOUS at 17:06

## 2024-04-23 RX ADMIN — PROPOFOL 50 MCG/KG/MIN: 10 INJECTION, EMULSION INTRAVENOUS at 09:17

## 2024-04-23 RX ADMIN — AMINOCAPROIC ACID 5 G: 250 INJECTION, SOLUTION INTRAVENOUS at 08:29

## 2024-04-23 RX ADMIN — SODIUM BICARBONATE 50 MEQ: 84 INJECTION INTRAVENOUS at 12:47

## 2024-04-23 RX ADMIN — LIDOCAINE 5% 2 PATCH: 700 PATCH TOPICAL at 15:32

## 2024-04-23 RX ADMIN — ETOMIDATE 18 MG: 20 INJECTION, SOLUTION INTRAVENOUS at 07:55

## 2024-04-23 RX ADMIN — HYDROMORPHONE HYDROCHLORIDE 0.5 MG: 1 INJECTION, SOLUTION INTRAMUSCULAR; INTRAVENOUS; SUBCUTANEOUS at 19:51

## 2024-04-23 RX ADMIN — MAGNESIUM SULFATE HEPTAHYDRATE 2 G: 500 INJECTION, SOLUTION INTRAMUSCULAR; INTRAVENOUS at 10:37

## 2024-04-23 NOTE — ANESTHESIA POSTPROCEDURE EVALUATION
Post-Op Assessment Note    CV Status:  Stable  Pain scale: intubated, sedated.    Pain control: intubated sedated.       Post-procedure mental status: intubated sedated.   Hydration Status:  Stable   PONV status: intubated sedated.   Airway patency: intubated.  Intubated: intubated.     Post Op Vitals Reviewed: Yes    No anethesia notable event occurred.    Staff: Anesthesiologist   Comments: transferred to CICU on epi and phenylephrine gtts. VSS              BP   105/56   Temp   97.2F   Pulse  AV paced at 80 bpm   Resp   14   SpO2   99%

## 2024-04-23 NOTE — INTERVAL H&P NOTE
H&P reviewed. After examining the patient I find no changes in the patients condition since the H&P had been written.    Anticipated Length of Stay:  Patient will be admitted on an Inpatient basis with an anticipated length of stay of  greater than 2 midnights.       Justification for Hospital Stay:  Post surgical recovery following open heart surgery.      Vitals:    04/23/24 0548   BP: (!) 136/101   Pulse: 74   Resp: 16   Temp: 97.6 °F (36.4 °C)   SpO2:

## 2024-04-23 NOTE — ANESTHESIA PROCEDURE NOTES
Procedure Performed: OBDULIO Anesthesia  Start Time:  4/23/2024 8:52 AM        Preanesthesia Checklist    Patient identified, IV assessed, risks and benefits discussed, monitors and equipment assessed, procedure being performed at surgeon's request and anesthesia consent obtained.      Procedure    Diagnostic Indications for OBDULIO:  assessment of ascending aorta, assessment of surgical repair and hemodynamic monitoring. Type of OBDULIO: interventional OBDULIO with real time guidance of intracardiac procedure. Images Saved: ultrasound permanent image saved. Physician Requesting Echo: Balwinder Sky DO.  Location performed: OR. Intubated. Bite block not placed.   Heart visualized. Insertion of OBDULIO Probe:  Atraumatic. Probe Type:  Multiplane. Modalities:  3D, color flow mapping, continuous wave Doppler and pulse wave Doppler.      Echocardiographic and Doppler Measurements    PREPROCEDURE    LEFT VENTRICLE:  Systolic Function: normal. Ejection Fraction: 55%. Cavity size: normal.   Regional Wall Motion Abnormalities: none.    RIGHT VENTRICLE:  Systolic Function: normal.  Cavity size normal. No hypertrophy              AORTIC VALVE:  Leaflets: normal and trileaflet. Leaflet motions normal and normal. Stenosis: none. Mean Gradient: 2 mmHg.    Regurgitation: none.      MITRAL VALVE:  Leaflets: myxomatous. Leaflet Motions: normal. Regurgitation: trace.   Stenosis: none.       TRICUSPID VALVE:  Leaflets: normal. Leaflet Motions: normal. Stenosis: none. Regurgitation: trace.      PULMONIC VALVE:  Leaflets: normal. Regurgitation: trace. Stenosis: none.        ASCENDING AORTA:  Size:  normal.  Dissection not present.      AORTIC ARCH:  Size:  normal.  dissection not present. Grade 3: atheroma protruding < 0.5 cm into lumen.    DESCENDING AORTA:  Size: normal.  Dissection not present. Grade 3: atheroma protruding < 0.5 cm into lumen.        RIGHT ATRIUM:  Size:  normal. No spontaneous echo contrast.    LEFT ATRIUM:  Size: normal. No  spontaneous echo contrast.    LEFT ATRIAL APPENDAGE:  Size: normal. No spontaneous echo contrast         ATRIAL SEPTUM:  Intra-atrial septal morphology: normal.          VENTRICULAR SEPTUM:  Intra-ventricular septum morphology: normal.         EPIAORTIC:  Plaque Thickness: 0-5 mm.    OTHER FINDINGS:  Pericardium:  normal. Pleural Effusion:  none.        POSTPROCEDURE    LEFT VENTRICLE: Unchanged .  Systolic Function: normal. Ejection Fraction: 55 %. Cavity Size: normal. Regional Wall Motion Abnormalities: none seen.    RIGHT VENTRICLE:   Systolic Function: normal. Cavity Size: normal.        AORTIC VALVE: Unchanged .  Leaflets: native. Stenosis: none.  Regurgitation: none.      MITRAL VALVE: Unchanged .   Regurgitation: trace. Stenosis: none.     TRICUSPID VALVE: Unchanged .  Leaflets: native. Regurgitation: trace. Stenosis: none.    PULMONIC VALVE: Unchanged   Leaflets: native. Regurgitation: trace. Stenosis: none.        ATRIA: Unchanged .  Left Atrial Appendage Ligate: No. Residual Flow in Left Atrial Appendage by Color Flow Doppler: No.       AORTA: Unchanged .  Dissection: Dissection not present.      REMOVAL:  Probe Removal: atraumatic.

## 2024-04-23 NOTE — OP NOTE
OPERATIVE REPORT  PATIENT NAME: David S Cogan    :  1955  MRN: [id number]  Pt Location:  OR ROOM 16    SURGERY DATE: 2024    SURGEON: Balwinder Sky DO    ASSISTANT: Paty Gonzalez PA-C    PREOPERATIVE DIAGNOSIS:  Multivessel coronary artery disease, s/p Acute NSTEMI    POSTOPERATIVE DIAGNOSIS:  Multivessel coronary artery disease, s/p Acute NSTEMI    NYHA Class: 2    CCS Class: 3    PROCEDURE: Coronary artery bypass grafting x 2 with left internal mammary artery to left anterior descending, saphenous vein graft to obtuse marginal 3    ANESTHESIA: General endotracheal anesthesia with transesophageal echocardiogram guidance, Dr. Yoana Moser     CARDIOPULMONARY BYPASS TIME: 56 minutes.     CROSSCLAMP TIME: 49 minutes.    PACKS/TUBES/DRAINS: Chest tubes x 3.     MATERIALS: Pacing wires: A x1. V x1.     TRANSFUSION: None.     SPECIMENS: None.     ESTIMATED BLOOD LOSS: 250 mL    OPERATIVE TECHNIQUE:    The patient was taken to the operating room and placed supine on the operating table. Following the satisfactory induction of general anesthesia and placement of monitoring lines, the patient was prepped and draped in the usual sterile fashion. A time-out procedure was performed.    The patient underwent median sternotomy, LIMA harvest, endoscopic left greater saphenous vein harvest, systemic heparinization and conduit preparation. The patient underwent pericardiotomy and epiaortic ultrasound was used to evaluate the ascending aorta, which was found to be free of significant atheromatous disease. The patient underwent aortic and right atrial cannulation and was initiated on bypass. The ascending aorta was crossclamped. Antegrade [place] Nido cardioplegia was delivered with an excellent arrest.    The saphenous vein was anastomosed to the obtuse marginal 3 in end-to-side fashion using running 7-0 Prolene suture. The flow was good at 80ml/min. The left internal mammary artery was anastomosed to the left  anterior descending in end-to-side fashion using running 7-0 Prolene suture. The flow was excellent. One proximal anastomosis wascompleted on the ascending aorta in end-to-side fashion using running 6-0 Prolene suture. The heart was de-aired and the crossclamp was removed. Atrial and ventricular pacing wires were placed. Following a period of reperfusion, the patient was weaned from cardiopulmonary bypass and decannulated. Protamine was administered with normalization of the ACT. Hemostasis was confirmed in all fields. Thoracostomy tubes were placed. The sternum was closed with stainless steel wires. The fascia, subdermis and skin were closed with multiple layers of running absorbable suture.    As the attending surgeon, I was present and scrubbed for all critical portions of this procedure. Sponge, needle and instrument counts were reported as correct by the nursing staff. There is no cardiac residency program at this facility and for complex procedures such as this, it is vital to have a physician assistant experienced in cardiac surgery.  The assistance of Paty Gonzalez PA-C was necessary for endoscopic saphenous vein harvesting, retraction of the heart and coronary conduit with proper tissue handling techniques, and following of the suture with correct tension during construction of the proximal and distal coronary anastomoses.    Final transesophageal echocardiogram demonstrated normal biventricular function.        SIGNATURE: Balwinder Sky DO  DATE: April 23, 2024  TIME: 11:33 AM

## 2024-04-23 NOTE — ANESTHESIA PROCEDURE NOTES
Introducer/[place]    Performed by: Yoana Moser MD  Authorized by: Yoana Moser MD    Date/Time: 4/23/2024 8:23 AM  Consent: Written consent obtained.  Risks and benefits: risks, benefits and alternatives were discussed  Consent given by: patient  Patient understanding: patient states understanding of the procedure being performed  Required items: required blood products, implants, devices, and special equipment available  Patient identity confirmed: arm band  Indications: vascular access and central pressure monitoring  Location details: right internal jugular  Catheter size: 7 Fr  Patient position: Trendelenburg  Anesthesia method: under GA.    Sedation:  Patient sedated: under GA.    Assessment: blood return through all ports and free fluid flow  Preparation: skin prepped with ChloraPrep  Skin prep agent dried: skin prep agent completely dried prior to procedure  Sterile barriers: all five maximum sterile barriers used - cap, mask, sterile gown, sterile gloves, and large sterile sheet  Hand hygiene: hand hygiene performed prior to central venous catheter insertion  Ultrasound guidance: yes  ultrasound permanent image saved  Pre-procedure: landmarks identified  Number of attempts: 1  Successful placement: yes  Post-procedure: dressing applied and line sutured  Patient tolerance: patient tolerated the procedure well with no immediate complications  Comments: Ultrasound guidance  Ultrasound pic in MEDIA section of Epic  Performed postinduction to anesthesia [id number]

## 2024-04-23 NOTE — RESPIRATORY THERAPY NOTE
Resp care   04/23/24 1410   Respiratory Assessment   Resp Comments Pt extubated to 8 lpm MFNC   Vent Information   Ventilator End Yes   Daily Screen   Patient safety screen outcome: Passed   Spont breathing trial % for 30 min Yes   Spont breathing trial outcome: Passed   Preparing to extubate/ Notify Nurse Yes   Extubation order obtained Yes   Consider Cuff Test Yes   Patient extubated Yes

## 2024-04-23 NOTE — ANESTHESIA PROCEDURE NOTES
Central Line Insertion    Performed by: Yoana Moser MD  Authorized by: Yoana Moser MD    Date/Time: 4/23/2024 8:23 AM  Catheter Type:  triple lumen  Consent: Written consent obtained.  Risks and benefits: risks, benefits and alternatives were discussed  Consent given by: patient  Patient understanding: patient states understanding of the procedure being performed  Required items: required blood products, implants, devices, and special equipment available  Patient identity confirmed: arm band  Indications: vascular access and central pressure monitoring  Catheter size: 7 Fr  Patient position: Trendelenburg  Anesthesia method: under GA.    Sedation:  Patient sedated: under GA.    Assessment: blood return through all ports and free fluid flow  Skin prep agent dried: skin prep agent completely dried prior to procedure  Sterile barriers: all five maximum sterile barriers used - cap, mask, sterile gown, sterile gloves, and large sterile sheet  Hand hygiene: hand hygiene performed prior to central venous catheter insertion  sterile gel and probe cover used in ultrasound-guided central venous catheter insertionultrasound permanent image saved  Pre-procedure: landmarks identified  Number of attempts: 1  Successful placement: yes  Post-procedure: dressing applied and line sutured  Patient tolerance: patient tolerated the procedure well with no immediate complications  Comments: Ultrasound guidance  Ultrasound pic in MEDIA section of Epic  Performed postinduction to anesthesia [id number]

## 2024-04-23 NOTE — CONSULTS
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   ATC tylenol, PRN oxycodone for pain  Trend neuro exam  Delirium precautions    CV:   Goals:   Cardiac Surgery Hemodynamic Monitoring goals: MAP goal >65 CI >2.2 SBP < 130 Continue pulmonary artery catheter for hemodynamic monitoring  Continue central line due to vasoactive medications Continue arterial line for blood pressure monitoring and/or frequent blood gas draws  Volume resuscitation as needed.   Epicardial pacing wire plan : Epicardial pacing wires in place. Will pace as needed for bradycardia or cardiac output   Monitor rhythm on telemetry.        Lung:   Check STAT post-op ABG and CXR  Wean vent with spontaneous breathing trial with goal to extubate today    GI:   GI prophylaxis with PPI  Bowel regimen  Zofran PRN for nausea.     FEN:   NPO Replenish K >4.0, mag >2.0 and calcium >7.0.     :   Check STAT post-op BMP  Guzmán in place.   Monitor UOP with goal >0.5cc/kg/hour.  Lasix versus volume resuscitate as needed depending on hemodynamics and volume status.    ID:   Prophylactic post-op abx. Maintain normothermia. Trend temps.     Heme:   Check STAT post-op H/H and platelets.  Monitor incision site, invasive lines, and chest tube outputs for bleeding. Send coag panel if needed.    Endo:   Insulin gtt for blood sugar control.     Disposition: ICU Care        Subjective     HPI: David S Cogan is a 67 y/o male with hypertension, hyperlipidemia, GERD, and asthma who began experiencing exertional angina and dyspnea with exertion several weeks ago. He swims for exercise and reported progressive worsening of these symptoms associated with transient nausea and  radiation into his shoulders and arms. The week prior to presentation, these symptoms woke him from sleep, and he went to his PCP who ordered routine testing and cardiology consultation the following day. At his cardiologist's office, he was noted to have possible septal ischemic changes on EKG and it was recommended he present to the ED. On 4/17/24, he went to [facility] ED and was loaded with Brilinta and started on a heparin drip. LHC on 4/18/24 revealed MVCAD and CT surgery was consulted. He presents today, 4/23/24 s/p CABG x2.     Intra-op OBDULIO LVEF 55%    Post-op medications: Critical Care Infusions : Neosynephrine 50 mcg/min and Epinephrine 4 mcg/min    ROS: ROS unable to be obtained due to patient being intubated and sedated.     History obtained from chart review due to patient being intubated and sedated.        Objective          Vitals: Invasive Monitoring      BP (!) 136/101   Pulse 85   Resp 14   O2 Sat 99 %   O2 Device Ventilator   Temp (!) 97.2 °F (36.2 °C)    Arterial Line  [place] /56  Arterial Line BP  Min: 105/56  Max: 105/56   MAP 74 mmHg  Arterial Line MAP (mmHg)  Min: 74 mmHg  Max: 74 mmHg     PA Catheter   Most Recent  Min/Max in 24hrs    PAP 34/16 PAP  Min: 34/16  Max: 34/16   CVP 10 mmHg CVP (mean)  Min: 10 mmHg  Max: 10 mmHg   CI 2.9 L/min/m2  CI (L/min/m2)  Min: 2.9 L/min/m2  Max: 2.9 L/min/m2    (dyne*sec)/cm5 SVR (dyne*sec)/cm5  Min: 986 (dyne*sec)/cm5  Max: 986 (dyne*sec)/cm5          Physical Exam   Physical Exam  Vitals and nursing note reviewed.   Eyes:      Conjunctiva/sclera: Conjunctivae normal.      Pupils: Pupils are equal, round, and reactive to light.   Skin:     General: Skin is warm and dry.      Capillary Refill: Capillary refill takes less than 2 seconds.   HENT:      Head: Normocephalic and atraumatic.      Mouth/Throat:      Mouth: Mucous membranes are dry.   Neck:      Vascular: Central line present.   Cardiovascular:      Rate and Rhythm: Normal rate and regular  rhythm.      Pulses:           Radial pulses are 2+ on the right side and 2+ on the left side.        Dorsalis pedis pulses are 2+ on the right side and 2+ on the left side.      Heart sounds: Normal heart sounds.   Musculoskeletal:      Right lower leg: No edema.      Left lower leg: No edema.   Abdominal: General: Abdomen is protuberant. There is no distension.      Palpations: Abdomen is soft.      Tenderness: There is no abdominal tenderness.   Constitutional:       General: He is not in acute distress.     Appearance: He is well-developed and well-nourished.      Interventions: He is sedated, intubated and restrained.   Pulmonary:      Effort: He is intubated.      Breath sounds: Examination of the right-lower field reveals decreased breath sounds. Examination of the left-lower field reveals decreased breath sounds. Decreased breath sounds present.   Chest:      Comments: CT with serosang output     Neurological:      General: No focal deficit present.      Mental Status: He is easily aroused.      GCS: GCS eye subscore is 3. GCS verbal subscore is 1. GCS motor subscore is 5.   Genitourinary/Anorectal:  Guzmán present.        Diagnostic Studies      04/23/24 CXR: No obvious pneumothorax. Lines and tubes in appropriate positioning.   This was personally reviewed by myself in PACS.  04/23/24 EKG: NSR.   This was personally reviewed by myself.       Medications:  Scheduled PRN   acetaminophen, 975 mg, Q6H While awake  amiodarone, 200 mg, Q8H SARITA  Artificial Tears, 2 drop, Q12H SARITA  aspirin, 325 mg, Daily  atorvastatin, 80 mg, Daily With Dinner  budesonide-formoterol, 2 puff, BID  cefazolin, 2,000 mg, Q8H  chlorhexidine, 15 mL, BID  cycloSPORINE, 1 drop, BID  fentaNYL, 50 mcg, Once  fluticasone, 2 spray, Daily  folic acid, 1 mg, Daily  [START ON 4/24/2024] heparin (porcine), 5,000 Units, Q8H SARITA  magnesium sulfate, 2 g, Once  mupirocin, 1 Application, Q12H SARITA  [START ON 4/24/2024] pantoprazole, 40 mg, Early  Morning  polyethylene glycol, 17 g, Daily  sodium bicarbonate, 50 mEq, Once  thiamine, 100 mg, Daily      acetaminophen, 650 mg, Q4H PRN  albuterol, 1 puff, Q6H PRN  bisacodyl, 10 mg, Daily PRN  calcium chloride, 1 g, Once PRN  fentaNYL, 50 mcg, Q1H PRN  HYDROmorphone, 0.5 mg, Q2H PRN  lactated ringers, 500 mL, Once PRN  ondansetron, 4 mg, Q6H PRN  oxyCODONE, 2.5 mg, Q4H PRN   Or  oxyCODONE, 5 mg, Q4H PRN  potassium chloride, 20 mEq, Once PRN  potassium chloride, 20 mEq, Q30 Min PRN  potassium chloride, 40 mEq, Q1H PRN       Continuous    EPINEPHrine 5,000 mcg (STANDARD CONCENTRATION) IV in sodium chloride 0.9% 250 mL, 1-10 mcg/min, Last Rate: 3 mcg/min (04/23/24 1230)  insulin regular (HumuLIN R,NovoLIN R) 1 Units/mL in sodium chloride 0.9 % 100 mL infusion, 0.3-21 Units/hr  insulin regular (HumuLIN R,NovoLIN R) 1 Units/mL in sodium chloride 0.9 % 100 mL infusion, 0.3-21 Units/hr  niCARdipine (CARDENE) 25 mg (STANDARD CONCENTRATION) in sodium chloride 0.9% 250 mL, 1-15 mg/hr  niCARdipine, 2.5-15 mg/hr  phenylephrine (ZULEYMA-SYNEPHRINE) 50 mg (STANDARD CONCENTRATION) in sodium chloride 0.9% 250 mL,  mcg/min, Last Rate: 120 mcg/min (04/23/24 1129)  phenylephine,  mcg/min, Last Rate: 40 mcg/min (04/23/24 1235)  sodium chloride, 20 mL/hr, Last Rate: 20 mL/hr (04/23/24 1229)         Labs:  CBC    Recent Labs     04/22/24  0508 04/23/24  0544 04/23/24  0816 04/23/24  1054 04/23/24  1119 04/23/24  1230   WBC 7.24 7.82  --   --   --   --    HGB 16.3 16.5   < >  --  10.9* 14.6   HCT 49.2 50.0*   < >  --  32* 43    344  --  274  --   --     < > = values in this interval not displayed.     BMP    Recent Labs     04/22/24  0508 04/23/24  0544 04/23/24  0816 04/23/24  1119 04/23/24  1230   SODIUM 137 137  --   --   --    K 3.9 3.8  --   --   --     103  --   --   --    CO2 23 23   < > 21 22   AGAP 9 11  --   --   --    BUN 16 20  --   --   --    CREATININE 1.16 1.31*  --   --   --    CALCIUM 9.2 9.2  --    --   --     < > = values in this interval not displayed.       Coags    Recent Labs     04/22/24  0508 04/23/24  0544   PTT 85* 35        Additional Electrolytes  Recent Labs     04/23/24  1119 04/23/24  1230   CAIONIZED 1.15 1.15          Blood Gas    No recent results  No recent results LFTs  No recent results    Infectious    No recent results     No recent results Glucose  Recent Labs     04/22/24  0508 04/23/24  0544   GLUC 100 92          Invasive lines and devices:  Invasive Devices       Central Venous Catheter Line  Duration             CVC Central Lines 04/23/24 <1 day    Introducer 04/23/24 <1 day              Peripheral Intravenous Line  Duration             Peripheral IV 04/21/24 Right Hand 2 days    Peripheral IV 04/23/24 Left Hand <1 day              Arterial Line  Duration             Arterial Line 04/23/24 Right Radial <1 day              Line  Duration             Pacer Wires <1 day    Pacer Wires <1 day              Drain  Duration             Chest Tube 1 Left Pleural 32 Fr. <1 day    Chest Tube 2 Mediastinal;Posterior 24 Fr. <1 day    Chest Tube 3 Mediastinal;Anterior 32 Fr. <1 day    Urethral Catheter Latex 16 Fr. 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  No date: Colonic polyp      Comment:  Last Assessed:10/16/17  No date: Eczema  No date: GERD (gastroesophageal reflux disease)      Comment:  Last Assessed:10/16/17  No date: High cholesterol      Comment:  Last Assessed:10/16/17  No date: Hypertension      Comment:  Last Assessed:10/16/17  No date: Non-neoplastic nevus      Comment:  Last Assessed:11/16/17 Past Surgical History:  4/18/2024: CARDIAC CATHETERIZATION; Left      Comment:  Procedure: Cardiac Left Heart Cath;  Surgeon: Milagros Prather DO;  Location: [facility];  Service:                Cardiology  No date: COLONOSCOPY      Comment:  10/2013- egd/colon  No date: LYMPHADENECTOMY      Comment:  Axillary Lymph node removed-benign  No date: TONSILLECTOMY      Comment:  Last Assessed:10/16/17  No date: VALVE REPLACEMENT      Comment:  Heart Valve Replacement; Last Assessed:10/16/17   Current Outpatient Medications   Medication Instructions    albuterol (PROVENTIL HFA,VENTOLIN HFA) 90 mcg, Inhalation, 2 times daily PRN    aspirin (ECOTRIN LOW STRENGTH) 81 mg EC tablet 1 tablet, Oral, Daily, As needed    cycloSPORINE (RESTASIS) 0.05 % ophthalmic emulsion 0.05 application., Ophthalmic, 2 times daily    diltiazem (DILT-XR) 120 mg, Oral, Daily    esomeprazole (NEXIUM) 40 mg, Oral, Daily (early morning)    mometasone (NASONEX) 50 mcg/act nasal spray 2 sprays, Nasal, Daily    Multiple Vitamins-Minerals (CENTRUM SILVER 50+MEN) TABS 1 tablet, Oral, Daily    rosuvastatin (CRESTOR) 5 mg, Oral, Daily    SYMBICORT 160-4.5 MCG/ACT inhaler 2 puffs, Oral, 2 times daily    thiamine 50 mg, Oral, Daily    TURMERIC PO Oral, Daily    Allergies   Allergen Reactions    Amoxicillin      Other reaction(s): Diarrhea    Amoxicillin-Pot Clavulanate      Other reaction(s): Allergy Date : 2015   GI    Metoprolol      Annotation - 2017: Aggravtes asthma    Penicillins      Annotation - 2017: Diarrhea      Social History     Tobacco Use    Smoking status: Former     Current packs/day: 0.00     Average packs/day: 1 pack/day for 14.0 years (14.0 ttl pk-yrs)     Types: Cigarettes     Start date: 1974     Quit date:      Years since quittin.3    Smokeless tobacco: Former     Types: Chew     Quit date:    Vaping Use    Vaping status: Never Used   Substance Use Topics    Alcohol use: Yes     Alcohol/week: 14.0 standard drinks of alcohol     Types: 14 Cans of beer per week    Drug use: No    Family History   Problem Relation Age of Onset    Cancer Father         groin, bone     Diabetes Paternal Grandfather             SIGNATURE: KATINA Soliman

## 2024-04-23 NOTE — ANESTHESIA PROCEDURE NOTES
Arterial Line Insertion    Performed by: Yoana Moser MD  Authorized by: Yoana Moser MD  Consent: Written consent obtained.  Risks and benefits: risks, benefits and alternatives were discussed  Consent given by: patient  Patient understanding: patient states understanding of the procedure being performed  Required items: required blood products, implants, devices, and special equipment available  Patient identity confirmed: arm band  Preparation: Patient was prepped and draped in the usual sterile fashion.  Indications: multiple ABGs and hemodynamic monitoring  Orientation:  Right  Location: radial arterylidocaine (PF) (XYLOCAINE-MPF) 1 % - Infiltration   1 mL - 4/23/2024 7:42:00 AM  Sedation:  Patient sedated: yes  Sedatives: midazolam and fentanyl    Procedure Details:  Needle gauge: 20  Seldinger technique: Seldinger technique used  Number of attempts: 1    Post-procedure:  Post-procedure: dressing applied  Waveform: good waveform and waveform confirmed  Post-procedure CNS: normal  Patient tolerance: patient tolerated the procedure well with no immediate complications  Comments: One attempt  Performed predinduction to anesthesia [id number]

## 2024-04-24 ENCOUNTER — APPOINTMENT (INPATIENT)
Dept: RADIOLOGY | Facility: HOSPITAL | Age: 69
DRG: 233 | End: 2024-04-24
Payer: MEDICARE

## 2024-04-24 LAB
ANION GAP SERPL CALCULATED.3IONS-SCNC: 8 MMOL/L (ref 4–13)
ANION GAP SERPL CALCULATED.3IONS-SCNC: 9 MMOL/L (ref 4–13)
ARTERIAL PATENCY WRIST A: YES
BASE EXCESS BLDA CALC-SCNC: -2.6 MMOL/L
BASE EXCESS BLDA CALC-SCNC: -3.5 MMOL/L
BASE EXCESS BLDA CALC-SCNC: -4.7 MMOL/L
BUN SERPL-MCNC: 18 MG/DL (ref 5–25)
BUN SERPL-MCNC: 19 MG/DL (ref 5–25)
CALCIUM SERPL-MCNC: 7.7 MG/DL (ref 8.4–10.2)
CALCIUM SERPL-MCNC: 8.2 MG/DL (ref 8.4–10.2)
CHLORIDE SERPL-SCNC: 104 MMOL/L (ref 96–108)
CHLORIDE SERPL-SCNC: 107 MMOL/L (ref 96–108)
CO2 SERPL-SCNC: 24 MMOL/L (ref 21–32)
CO2 SERPL-SCNC: 24 MMOL/L (ref 21–32)
CREAT SERPL-MCNC: 1.4 MG/DL (ref 0.6–1.3)
CREAT SERPL-MCNC: 1.5 MG/DL (ref 0.6–1.3)
ERYTHROCYTE [DISTWIDTH] IN BLOOD BY AUTOMATED COUNT: 14.1 % (ref 11.6–15.1)
GFR SERPL CREATININE-BSD FRML MDRD: 47 ML/MIN/1.73SQ M
GFR SERPL CREATININE-BSD FRML MDRD: 51 ML/MIN/1.73SQ M
GLUCOSE SERPL-MCNC: 104 MG/DL (ref 65–140)
GLUCOSE SERPL-MCNC: 124 MG/DL (ref 65–140)
GLUCOSE SERPL-MCNC: 128 MG/DL (ref 65–140)
GLUCOSE SERPL-MCNC: 132 MG/DL (ref 65–140)
GLUCOSE SERPL-MCNC: 144 MG/DL (ref 65–140)
GLUCOSE SERPL-MCNC: 165 MG/DL (ref 65–140)
GLUCOSE SERPL-MCNC: 187 MG/DL (ref 65–140)
GLUCOSE SERPL-MCNC: 212 MG/DL (ref 65–140)
GLUCOSE SERPL-MCNC: 97 MG/DL (ref 65–140)
HCO3 BLDA-SCNC: 21 MMOL/L (ref 22–28)
HCO3 BLDA-SCNC: 23.8 MMOL/L (ref 22–28)
HCO3 BLDA-SCNC: 24 MMOL/L (ref 22–28)
HCT VFR BLD AUTO: 42.1 % (ref 36.5–49.3)
HGB BLD-MCNC: 13.6 G/DL (ref 12–17)
IPAP: 20
MAGNESIUM SERPL-MCNC: 2.3 MG/DL (ref 1.9–2.7)
MCH RBC QN AUTO: 30.9 PG (ref 26.8–34.3)
MCHC RBC AUTO-ENTMCNC: 32.3 G/DL (ref 31.4–37.4)
MCV RBC AUTO: 96 FL (ref 82–98)
NASAL CANNULA: 15
NASAL CANNULA: 15
NON VENT- BIPAP: ABNORMAL
O2 CT BLDA-SCNC: 18 ML/DL (ref 16–23)
O2 CT BLDA-SCNC: 18.6 ML/DL (ref 16–23)
O2 CT BLDA-SCNC: 19.7 ML/DL (ref 16–23)
OXYHGB MFR BLDA: 92.7 % (ref 94–97)
OXYHGB MFR BLDA: 93.6 % (ref 94–97)
OXYHGB MFR BLDA: 94.7 % (ref 94–97)
PCO2 BLDA: 40.9 MM HG (ref 36–44)
PCO2 BLDA: 48.6 MM HG (ref 36–44)
PCO2 BLDA: 51.6 MM HG (ref 36–44)
PEEP MAX SETTING VENT: 8 CM[H2O]
PH BLDA: 7.28 [PH] (ref 7.35–7.45)
PH BLDA: 7.31 [PH] (ref 7.35–7.45)
PH BLDA: 7.33 [PH] (ref 7.35–7.45)
PLATELET # BLD AUTO: 206 THOUSANDS/UL (ref 149–390)
PMV BLD AUTO: 10.7 FL (ref 8.9–12.7)
PO2 BLDA: 72.9 MM HG (ref 75–129)
PO2 BLDA: 77.3 MM HG (ref 75–129)
PO2 BLDA: 79.1 MM HG (ref 75–129)
POTASSIUM SERPL-SCNC: 4.3 MMOL/L (ref 3.5–5.3)
POTASSIUM SERPL-SCNC: 4.5 MMOL/L (ref 3.5–5.3)
RBC # BLD AUTO: 4.4 MILLION/UL (ref 3.88–5.62)
SODIUM SERPL-SCNC: 137 MMOL/L (ref 135–147)
SODIUM SERPL-SCNC: 139 MMOL/L (ref 135–147)
SPECIMEN SOURCE: ABNORMAL
VENT BIPAP FIO2: 60 %
WBC # BLD AUTO: 11.93 THOUSAND/UL (ref 4.31–10.16)

## 2024-04-24 PROCEDURE — 94640 AIRWAY INHALATION TREATMENT: CPT

## 2024-04-24 PROCEDURE — 99291 CRITICAL CARE FIRST HOUR: CPT | Performed by: STUDENT IN AN ORGANIZED HEALTH CARE EDUCATION/TRAINING PROGRAM

## 2024-04-24 PROCEDURE — 99024 POSTOP FOLLOW-UP VISIT: CPT | Performed by: THORACIC SURGERY (CARDIOTHORACIC VASCULAR SURGERY)

## 2024-04-24 PROCEDURE — 82805 BLOOD GASES W/O2 SATURATION: CPT | Performed by: PHYSICIAN ASSISTANT

## 2024-04-24 PROCEDURE — 82805 BLOOD GASES W/O2 SATURATION: CPT | Performed by: NURSE PRACTITIONER

## 2024-04-24 PROCEDURE — 80048 BASIC METABOLIC PNL TOTAL CA: CPT | Performed by: PHYSICIAN ASSISTANT

## 2024-04-24 PROCEDURE — 97164 PT RE-EVAL EST PLAN CARE: CPT

## 2024-04-24 PROCEDURE — 82948 REAGENT STRIP/BLOOD GLUCOSE: CPT

## 2024-04-24 PROCEDURE — 36600 WITHDRAWAL OF ARTERIAL BLOOD: CPT

## 2024-04-24 PROCEDURE — 80048 BASIC METABOLIC PNL TOTAL CA: CPT | Performed by: NURSE PRACTITIONER

## 2024-04-24 PROCEDURE — 83735 ASSAY OF MAGNESIUM: CPT | Performed by: PHYSICIAN ASSISTANT

## 2024-04-24 PROCEDURE — 85027 COMPLETE CBC AUTOMATED: CPT | Performed by: PHYSICIAN ASSISTANT

## 2024-04-24 PROCEDURE — 97535 SELF CARE MNGMENT TRAINING: CPT

## 2024-04-24 PROCEDURE — 97168 OT RE-EVAL EST PLAN CARE: CPT

## 2024-04-24 PROCEDURE — 94664 DEMO&/EVAL PT USE INHALER: CPT

## 2024-04-24 PROCEDURE — 94760 N-INVAS EAR/PLS OXIMETRY 1: CPT

## 2024-04-24 PROCEDURE — 93005 ELECTROCARDIOGRAM TRACING: CPT

## 2024-04-24 PROCEDURE — 71045 X-RAY EXAM CHEST 1 VIEW: CPT

## 2024-04-24 PROCEDURE — 82805 BLOOD GASES W/O2 SATURATION: CPT

## 2024-04-24 RX ORDER — TEMAZEPAM 15 MG/1
15 CAPSULE ORAL
Status: DISCONTINUED | OUTPATIENT
Start: 2024-04-24 | End: 2024-04-27 | Stop reason: HOSPADM

## 2024-04-24 RX ORDER — DOCUSATE SODIUM 100 MG/1
100 CAPSULE, LIQUID FILLED ORAL 2 TIMES DAILY
Status: DISCONTINUED | OUTPATIENT
Start: 2024-04-24 | End: 2024-04-24

## 2024-04-24 RX ORDER — POTASSIUM CHLORIDE 20 MEQ/1
20 TABLET, EXTENDED RELEASE ORAL 2 TIMES DAILY
Status: DISCONTINUED | OUTPATIENT
Start: 2024-04-24 | End: 2024-04-24

## 2024-04-24 RX ORDER — POTASSIUM CHLORIDE 20 MEQ/1
20 TABLET, EXTENDED RELEASE ORAL DAILY
Status: DISCONTINUED | OUTPATIENT
Start: 2024-04-24 | End: 2024-04-25

## 2024-04-24 RX ORDER — FUROSEMIDE 10 MG/ML
40 INJECTION INTRAMUSCULAR; INTRAVENOUS DAILY
Status: DISCONTINUED | OUTPATIENT
Start: 2024-04-24 | End: 2024-04-25

## 2024-04-24 RX ORDER — INSULIN LISPRO 100 [IU]/ML
1-5 INJECTION, SOLUTION INTRAVENOUS; SUBCUTANEOUS
Status: DISCONTINUED | OUTPATIENT
Start: 2024-04-24 | End: 2024-04-27 | Stop reason: HOSPADM

## 2024-04-24 RX ORDER — DOCUSATE SODIUM 100 MG/1
100 CAPSULE, LIQUID FILLED ORAL 2 TIMES DAILY
Status: DISCONTINUED | OUTPATIENT
Start: 2024-04-24 | End: 2024-04-25

## 2024-04-24 RX ORDER — FONDAPARINUX SODIUM 2.5 MG/.5ML
2.5 INJECTION SUBCUTANEOUS EVERY 24 HOURS
Status: DISCONTINUED | OUTPATIENT
Start: 2024-04-24 | End: 2024-04-24

## 2024-04-24 RX ORDER — OXYCODONE HYDROCHLORIDE 5 MG/1
5 TABLET ORAL EVERY 4 HOURS PRN
Status: DISCONTINUED | OUTPATIENT
Start: 2024-04-24 | End: 2024-04-24

## 2024-04-24 RX ORDER — HYDROMORPHONE HYDROCHLORIDE 2 MG/1
1 TABLET ORAL EVERY 4 HOURS PRN
Status: DISCONTINUED | OUTPATIENT
Start: 2024-04-24 | End: 2024-04-25

## 2024-04-24 RX ORDER — HYDROMORPHONE HYDROCHLORIDE 2 MG/1
1 TABLET ORAL ONCE
Status: COMPLETED | OUTPATIENT
Start: 2024-04-24 | End: 2024-04-24

## 2024-04-24 RX ORDER — ONDANSETRON 2 MG/ML
4 INJECTION INTRAMUSCULAR; INTRAVENOUS ONCE
Status: COMPLETED | OUTPATIENT
Start: 2024-04-24 | End: 2024-04-24

## 2024-04-24 RX ORDER — INSULIN LISPRO 100 [IU]/ML
1-6 INJECTION, SOLUTION INTRAVENOUS; SUBCUTANEOUS
Status: DISCONTINUED | OUTPATIENT
Start: 2024-04-24 | End: 2024-04-27 | Stop reason: HOSPADM

## 2024-04-24 RX ORDER — LEVALBUTEROL INHALATION SOLUTION 1.25 MG/3ML
1.25 SOLUTION RESPIRATORY (INHALATION)
Status: DISCONTINUED | OUTPATIENT
Start: 2024-04-24 | End: 2024-04-27 | Stop reason: HOSPADM

## 2024-04-24 RX ADMIN — ASPIRIN 325 MG ORAL TABLET 325 MG: 325 PILL ORAL at 08:46

## 2024-04-24 RX ADMIN — LEVALBUTEROL HYDROCHLORIDE 1.25 MG: 1.25 SOLUTION RESPIRATORY (INHALATION) at 14:15

## 2024-04-24 RX ADMIN — CYCLOSPORINE 1 DROP: 0.5 EMULSION OPHTHALMIC at 08:57

## 2024-04-24 RX ADMIN — INSULIN LISPRO 2 UNITS: 100 INJECTION, SOLUTION INTRAVENOUS; SUBCUTANEOUS at 10:38

## 2024-04-24 RX ADMIN — ACETAMINOPHEN 975 MG: 325 TABLET, FILM COATED ORAL at 14:05

## 2024-04-24 RX ADMIN — CEFAZOLIN SODIUM 2000 MG: 2 SOLUTION INTRAVENOUS at 00:13

## 2024-04-24 RX ADMIN — ONDANSETRON 4 MG: 2 INJECTION INTRAMUSCULAR; INTRAVENOUS at 14:05

## 2024-04-24 RX ADMIN — LEVALBUTEROL HYDROCHLORIDE 1.25 MG: 1.25 SOLUTION RESPIRATORY (INHALATION) at 07:12

## 2024-04-24 RX ADMIN — METHOCARBAMOL 500 MG: 500 TABLET ORAL at 17:33

## 2024-04-24 RX ADMIN — METHOCARBAMOL 500 MG: 500 TABLET ORAL at 11:50

## 2024-04-24 RX ADMIN — METHOCARBAMOL 500 MG: 500 TABLET ORAL at 00:13

## 2024-04-24 RX ADMIN — IPRATROPIUM BROMIDE 0.5 MG: 0.5 SOLUTION RESPIRATORY (INHALATION) at 14:15

## 2024-04-24 RX ADMIN — AMIODARONE HYDROCHLORIDE 200 MG: 200 TABLET ORAL at 21:24

## 2024-04-24 RX ADMIN — HEPARIN SODIUM 5000 UNITS: 5000 INJECTION INTRAVENOUS; SUBCUTANEOUS at 05:40

## 2024-04-24 RX ADMIN — OXYCODONE HYDROCHLORIDE 10 MG: 10 TABLET ORAL at 05:40

## 2024-04-24 RX ADMIN — ONDANSETRON 4 MG: 2 INJECTION INTRAMUSCULAR; INTRAVENOUS at 09:11

## 2024-04-24 RX ADMIN — DOCUSATE SODIUM 100 MG: 100 CAPSULE, LIQUID FILLED ORAL at 17:33

## 2024-04-24 RX ADMIN — HEPARIN SODIUM 5000 UNITS: 5000 INJECTION INTRAVENOUS; SUBCUTANEOUS at 21:22

## 2024-04-24 RX ADMIN — Medication 2.5 MG: at 14:06

## 2024-04-24 RX ADMIN — DOCUSATE SODIUM 100 MG: 100 CAPSULE, LIQUID FILLED ORAL at 08:46

## 2024-04-24 RX ADMIN — HYDROMORPHONE HYDROCHLORIDE 0.5 MG: 1 INJECTION, SOLUTION INTRAMUSCULAR; INTRAVENOUS; SUBCUTANEOUS at 00:18

## 2024-04-24 RX ADMIN — HEPARIN SODIUM 5000 UNITS: 5000 INJECTION INTRAVENOUS; SUBCUTANEOUS at 14:06

## 2024-04-24 RX ADMIN — ATORVASTATIN CALCIUM 80 MG: 80 TABLET, FILM COATED ORAL at 15:45

## 2024-04-24 RX ADMIN — CHLORHEXIDINE GLUCONATE 0.12% ORAL RINSE 15 ML: 1.2 LIQUID ORAL at 17:33

## 2024-04-24 RX ADMIN — INSULIN LISPRO 1 UNITS: 100 INJECTION, SOLUTION INTRAVENOUS; SUBCUTANEOUS at 21:22

## 2024-04-24 RX ADMIN — ACETAMINOPHEN 975 MG: 325 TABLET, FILM COATED ORAL at 05:40

## 2024-04-24 RX ADMIN — DEXTRAN 70, GLYCERIN, HYPROMELLOSE 2 DROP: 1; 2; 3 SOLUTION/ DROPS OPHTHALMIC at 21:26

## 2024-04-24 RX ADMIN — NICARDIPINE HYDROCHLORIDE 2.5 MG/HR: 2.5 INJECTION, SOLUTION INTRAVENOUS at 01:32

## 2024-04-24 RX ADMIN — Medication 12.5 MG: at 21:24

## 2024-04-24 RX ADMIN — BUDESONIDE AND FORMOTEROL FUMARATE DIHYDRATE 2 PUFF: 160; 4.5 AEROSOL RESPIRATORY (INHALATION) at 08:47

## 2024-04-24 RX ADMIN — LIDOCAINE 5% 2 PATCH: 700 PATCH TOPICAL at 08:47

## 2024-04-24 RX ADMIN — AMIODARONE HYDROCHLORIDE 200 MG: 200 TABLET ORAL at 05:40

## 2024-04-24 RX ADMIN — IPRATROPIUM BROMIDE 0.5 MG: 0.5 SOLUTION RESPIRATORY (INHALATION) at 19:15

## 2024-04-24 RX ADMIN — THIAMINE HCL TAB 100 MG 100 MG: 100 TAB at 08:46

## 2024-04-24 RX ADMIN — DEXTRAN 70, GLYCERIN, HYPROMELLOSE 2 DROP: 1; 2; 3 SOLUTION/ DROPS OPHTHALMIC at 08:46

## 2024-04-24 RX ADMIN — CHLORHEXIDINE GLUCONATE 0.12% ORAL RINSE 15 ML: 1.2 LIQUID ORAL at 08:46

## 2024-04-24 RX ADMIN — HYDROMORPHONE HYDROCHLORIDE 1 MG: 2 TABLET ORAL at 16:30

## 2024-04-24 RX ADMIN — MUPIROCIN 1 APPLICATION: 20 OINTMENT TOPICAL at 21:25

## 2024-04-24 RX ADMIN — INSULIN LISPRO 1 UNITS: 100 INJECTION, SOLUTION INTRAVENOUS; SUBCUTANEOUS at 15:45

## 2024-04-24 RX ADMIN — ACETAMINOPHEN 975 MG: 325 TABLET, FILM COATED ORAL at 21:23

## 2024-04-24 RX ADMIN — POLYETHYLENE GLYCOL 3350 17 G: 17 POWDER, FOR SOLUTION ORAL at 08:47

## 2024-04-24 RX ADMIN — IPRATROPIUM BROMIDE 0.5 MG: 0.5 SOLUTION RESPIRATORY (INHALATION) at 07:12

## 2024-04-24 RX ADMIN — MUPIROCIN 1 APPLICATION: 20 OINTMENT TOPICAL at 08:46

## 2024-04-24 RX ADMIN — METHOCARBAMOL 500 MG: 500 TABLET ORAL at 05:40

## 2024-04-24 RX ADMIN — FUROSEMIDE 40 MG: 10 INJECTION, SOLUTION INTRAMUSCULAR; INTRAVENOUS at 08:46

## 2024-04-24 RX ADMIN — FOLIC ACID 1 MG: 1 TABLET ORAL at 08:46

## 2024-04-24 RX ADMIN — CYCLOSPORINE 1 DROP: 0.5 EMULSION OPHTHALMIC at 21:26

## 2024-04-24 RX ADMIN — Medication 12.5 MG: at 08:46

## 2024-04-24 RX ADMIN — POTASSIUM CHLORIDE 20 MEQ: 1500 TABLET, EXTENDED RELEASE ORAL at 08:46

## 2024-04-24 RX ADMIN — PANTOPRAZOLE SODIUM 40 MG: 40 TABLET, DELAYED RELEASE ORAL at 05:40

## 2024-04-24 RX ADMIN — LEVALBUTEROL HYDROCHLORIDE 1.25 MG: 1.25 SOLUTION RESPIRATORY (INHALATION) at 19:15

## 2024-04-24 RX ADMIN — CEFAZOLIN SODIUM 2000 MG: 2 SOLUTION INTRAVENOUS at 08:47

## 2024-04-24 RX ADMIN — FLUTICASONE PROPIONATE 2 SPRAY: 50 SPRAY, METERED NASAL at 11:50

## 2024-04-24 RX ADMIN — HYDROMORPHONE HYDROCHLORIDE 1 MG: 2 TABLET ORAL at 21:25

## 2024-04-24 RX ADMIN — HYDROMORPHONE HYDROCHLORIDE 0.5 MG: 1 INJECTION, SOLUTION INTRAMUSCULAR; INTRAVENOUS; SUBCUTANEOUS at 03:37

## 2024-04-24 RX ADMIN — BUDESONIDE AND FORMOTEROL FUMARATE DIHYDRATE 2 PUFF: 160; 4.5 AEROSOL RESPIRATORY (INHALATION) at 17:32

## 2024-04-24 RX ADMIN — OXYCODONE HYDROCHLORIDE 5 MG: 5 TABLET ORAL at 09:42

## 2024-04-24 RX ADMIN — TEMAZEPAM 15 MG: 15 CAPSULE ORAL at 21:24

## 2024-04-24 RX ADMIN — AMIODARONE HYDROCHLORIDE 200 MG: 200 TABLET ORAL at 14:06

## 2024-04-24 NOTE — PLAN OF CARE
Problem: SAFETY ADULT  Goal: Patient will remain free of falls  Description: INTERVENTIONS:  - Educate patient/family on patient safety including physical limitations  - Instruct patient to call for assistance with activity   - Consult OT/PT to assist with strengthening/mobility   - Keep Call bell within reach  - Keep bed low and locked with side rails adjusted as appropriate  - Keep care items and personal belongings within reach  - Initiate and maintain comfort rounds  - Make Fall Risk Sign visible to staff  - Offer Toileting every Hours, in advance of need  - Initiate/Maintain alarm  - Obtain necessary fall risk management equipment:  - Apply yellow socks and bracelet for high fall risk patients  - Consider moving patient to room near nurses station  Outcome: Not Progressing  Goal: Maintain or return to baseline ADL function  Description: INTERVENTIONS:  -  Assess patient's ability to carry out ADLs; assess patient's baseline for ADL function and identify physical deficits which impact ability to perform ADLs (bathing, care of mouth/teeth, toileting, grooming, dressing, etc.)  - Assess/evaluate cause of self-care deficits   - Assess range of motion  - Assess patient's mobility; develop plan if impaired  - Assess patient's need for assistive devices and provide as appropriate  - Encourage maximum independence but intervene and supervise when necessary  - Involve family in performance of ADLs  - Assess for home care needs following discharge   - Consider OT consult to assist with ADL evaluation and planning for discharge  - Provide patient education as appropriate  Outcome: Not Progressing  Goal: Maintains/Returns to pre admission functional level  Description: INTERVENTIONS:  - Perform AM-PAC 6 Click Basic Mobility/ Daily Activity assessment daily.  - Set and communicate daily mobility goal to care team and patient/family/caregiver.   - Collaborate with rehabilitation services on mobility goals if consulted  -  Perform Range of Motion  times a day.  - Reposition patient every  hours.  - Dangle patient  times a day  - Stand patient  times a day  - Ambulate patient  times a day  - Out of bed to chair  times a day   - Out of bed for meals  times a day  - Out of bed for toileting  - Record patient progress and toleration of activity level   Outcome: Not Progressing

## 2024-04-24 NOTE — PLAN OF CARE
Problem: OCCUPATIONAL THERAPY ADULT  Goal: Performs self-care activities at highest level of function for planned discharge setting.  See evaluation for individualized goals.  Description: Treatment Interventions: Patient/family training, Cardiac education          See flowsheet documentation for full assessment, interventions and recommendations.   Outcome: Completed  Note: Limitation: Decreased ADL status, Decreased self-care trans, Decreased high-level ADLs  Prognosis: Good  Assessment: Pt is a 68 y.o. male who was admitted to [facility] on 4/17/2024 with NSTEMI (non-ST elevated myocardial infarction) (HCC), s/p CABG x 2. Pt seen for an OT evaluation per active OT orders, CT and MFNC in place.  Pt  has a past medical history of Asthma, Fontanez's esophagus, Colonic polyp, Eczema, GERD (gastroesophageal reflux disease), High cholesterol, Hypertension, and Non-neoplastic nevus. Please see IE for full details on home setup and PLOF.  Currently, pt is Min Ax1 for UB ADL, Min Ax1 for LB ADL, and completed transfers/FM w Min Ax1 with RW. Pt currently presents with impairments in the following categories -steps to enter environment and difficulty performing ADLS activity tolerance and endurance. These impairments, as well as pt's fatigue, SOB, CHILDS, pain, and cardiac/sternal precautions  limit pt's ability to safely engage in all baseline areas of occupation, includingbathing, dressing, toileting, and functional mobility/transfers.    The patient's raw score on the AM-PAC Daily Activity Inpatient Short Form is 19. A raw score of greater than or equal to 19 suggests the patient may benefit from discharge to home. Please refer to the recommendation of the Occupational Therapist for safe discharge planning. Pt would benefit from continued OT for one follow-up treatment session focused on cardiac education. From OT standpoint, recommend home with increased social support, no further OT needs upon D/C. Pt was left  seated in bedside chair with SO present and all needs within reach.     Rehab Resource Intensity Level, OT: No post-acute rehabilitation needs

## 2024-04-24 NOTE — PHYSICAL THERAPY NOTE
Physical Therapy Re-Evaluation     Patient's Name: David S Cogan    Admitting Diagnosis  Chest pain [R07.9]  NSTEMI (non-ST elevated myocardial infarction) (HCC) [I21.4]    Problem List  Patient Active Problem List   Diagnosis    Acquired keratoderma    Benign hypertension    Benign prostatic hyperplasia    Vesicular palmoplantar eczema    Dyslipidemia    Eczema of both hands    Hyperlipidemia    Hypertrophic condition of skin    Subclinical hypothyroidism    Thyroid nodule    Right flank pain    Abrasion of flank    Overweight (BMI 25.0-29.9)    Essential hypertension    Alcohol use, unspecified with other alcohol-induced disorder (HCC)    NSTEMI (non-ST elevated myocardial infarction) (HCC)    Asthma    S/P CABG (coronary artery bypass graft)    Prediabetes       Past Medical History  Past Medical History:   Diagnosis Date    Asthma     Last Assessed:10/16/17    Fontanez's esophagus     Colonic polyp     Last Assessed:10/16/17    Eczema     GERD (gastroesophageal reflux disease)     Last Assessed:10/16/17    High cholesterol     Last Assessed:10/16/17    Hypertension     Last Assessed:10/16/17    Non-neoplastic nevus     Last Assessed:11/16/17       Past Surgical History  Past Surgical History:   Procedure Laterality Date    CARDIAC CATHETERIZATION Left 4/18/2024    Procedure: Cardiac Left Heart Cath;  Surgeon: Milagros Prather DO;  Location: [facility];  Service: Cardiology    COLONOSCOPY      10/2013- egd/colon    LYMPHADENECTOMY      Axillary Lymph node removed-benign    IN CORONARY ARTERY BYP W/VEIN & ARTERY GRAFT 3 VEIN N/A 4/23/2024    Procedure: CORONARY ARTERY BYPASS GRAFT X2 VESSELS UTILIZING LIMA TO LAD, SVG TO OM3;  Surgeon: Balwinder Sky DO;  Location: [facility];  Service: Cardiac Surgery    TONSILLECTOMY      Last Assessed:10/16/17    VALVE REPLACEMENT      Heart Valve Replacement; Last Assessed:10/16/17          04/24/24 1009   PT Last Visit   PT Visit Date 04/24/24   Note Type   Note  type Re-Evaluation   Pain Assessment   Pain Assessment Tool FLACC   Pain Location/Orientation Orientation: Mid;Location: Chest;Location: Incision   Pain Onset/Description Onset: Ongoing;Frequency: Intermittent;Descriptor: Aching;Descriptor: Discomfort   Effect of Pain on Daily Activities guarding   Patient's Stated Pain Goal No pain   Hospital Pain Intervention(s) Repositioned;Ambulation/increased activity;Emotional support   Pain Rating: FLACC (Rest) - Face 0   Pain Rating: FLACC (Rest) - Legs 0   Pain Rating: FLACC (Rest) - Activity 0   Pain Rating: FLACC (Rest) - Cry 1   Pain Rating: FLACC (Rest) - Consolability 0   Score: FLACC (Rest) 1   Pain Rating: FLACC (Activity) - Face 1   Pain Rating: FLACC (Activity) - Legs 0   Pain Rating: FLACC (Activity) - Activity 0   Pain Rating: FLACC (Activity) - Cry 1   Pain Rating: FLACC (Activity) - Consolability 1   Score: FLACC (Activity) 3   Restrictions/Precautions   Braces or Orthoses   (denies)   Other Precautions Cardiac/sternal;Multiple lines;Telemetry;O2  (CT; MFNC)   Home Living   Type of Home House   Home Layout Two level  (4 [place])   Home Equipment Walker   Prior Function   Level of [place] Independent with functional mobility  (amb w/o AD)   Lives With Significant other   Comments very active   General   Additional Pertinent History cleared for assessment by nsg   Family/Caregiver Present Yes   Cognition   Overall Cognitive Status WFL   Arousal/Participation Alert   Attention Within functional limits   Orientation Level Oriented to person;Oriented to place;Oriented to situation   Memory Within functional limits   Following Commands Follows one step commands without difficulty   Subjective   Subjective Alert; in the chair; agreeable to mobilize   RUE Assessment   RUE Assessment WFL  (AROM)   LUE Assessment   LUE Assessment WFL  (AROM)   RLE Assessment   RLE Assessment WFL  (AROM)   Strength RLE   RLE Overall Strength   (good -)   LLE Assessment   LLE Assessment  WFL  (AROM)   Strength LLE   LLE Overall Strength   (good -)   Transfers   Sit to Stand 4  Minimal assistance   Additional items Assist x 1;Verbal cues   Stand to Sit 4  Minimal assistance   Additional items Assist x 1;Verbal cues   Ambulation/Elevation   Gait pattern Excessively slow;Short stride;Inconsistent renu   Gait Assistance 4  Minimal assist   Additional items Assist x 1;Verbal cues;Tactile cues   Assistive Device Rolling walker   Distance 150 ft   Balance   Static Sitting Fair   Dynamic Sitting Fair -   Static Standing Poor +   Dynamic Standing Poor +   Ambulatory Poor +   Activity Tolerance   Activity Tolerance Patient limited by fatigue;Patient limited by pain   Medical Staff Made Aware Co-reeval performed w/ OTR due to complexity of medical status   Nurse Made Aware spoke to CARRI Bedolla   Assessment   Prognosis Good   Problem List Decreased strength;Decreased endurance;Impaired balance;Decreased mobility;Pain   Assessment Functional mobility re-assessment performed; pt was initially admitted w/ c/o CP and Dx of NSTEMI; pt was seen for PT eval on 4/18/2024 and D/C from services; during the course, pt underwent Coronary artery bypass grafting x 2 with left internal mammary artery to left anterior descending, saphenous vein graft to obtuse marginal 3 (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 Pt will amb 300 ft w/ least restrictive assistive device PRN, mod (I) in order to facilitate safe return to premorbid environment and community amb status. Pt will negotiate  4 -->12 steps w/ hand rail and SPC PRN,  mod (I) in order to navigate in and out of the house and between levels of home environment safely. Pt will achieve mod (I) level w/ bed mob in order to facilitate safety with OOB and back to bed transitions in own living environment. Pt will perform transfers w/ mod (I) to assure (I) and safety w/ functional mobility/transitions w/ all aspects of mobility/locomotion.  Pt will participate in LE therex and balance activities to max progression w/ mobility skills.   PT Treatment Day 0   Plan   Treatment/Interventions Functional transfer training;LE strengthening/ROM;Elevations;Therapeutic exercise;Endurance training;Equipment eval/education;Bed mobility;Gait training;Spoke to nursing;OT;Family   PT Frequency 4-6x/wk   Discharge Recommendation   Rehab Resource Intensity Level, PT No post-acute rehabilitation needs   Equipment Recommended Walker  (at this time)   AM-PAC Basic Mobility Inpatient   Turning in Flat Bed Without Bedrails 3   Lying on Back to Sitting on Edge of Flat Bed Without Bedrails 2   Moving Bed to Chair 3   Standing Up From Chair Using Arms 3   Walk in Room 3   Climb 3-5 Stairs With Railing 2   Basic Mobility Inpatient Raw Score 16   Basic Mobility Standardized Score 38.32   [facility] Highest Level Of Mobility   -HLM Goal 5: Stand one or more mins   JH-HLM Achieved 7: Walk 25 feet or more   Modified Clay Scale   Modified Mandan Scale 4   End of Consult   Patient Position at End of Consult Bedside chair;All needs within reach         Papo Foss, PT

## 2024-04-24 NOTE — PROGRESS NOTES
Progress Note - Cardiothoracic Surgery   David S Cogan 68 y.o. male MRN: [id number]  Unit/Bed#: [facility] 413-01 Encounter: [id number]      POD # 1 s/p CABG x2    Pt seen/examined.  Interval history and data reviewed with critical care team.  Pt doing well.  No specific complaints.    Low dose bethanie  HFNC        Medications:   Scheduled Meds:  Current Facility-Administered Medications   Medication Dose Route Frequency Provider Last Rate    acetaminophen  650 mg Rectal Q4H PRN Paty Lisa, PA-C      acetaminophen  975 mg Oral Q8H [facility] KATINA Soliman      albuterol  1 puff Inhalation Q6H PRN Paty Lisa, PA-C      amiodarone  200 mg Oral Q8H [facility] Paty Lisa, PA-C      Artificial Tears  2 drop Both Eyes Q12H [facility] Paty Lisa, PA-C      aspirin  325 mg Oral Daily Paty Lisa, PA-C      atorvastatin  80 mg Oral Daily With Dinner Paty Lisa, PA-C      bisacodyl  10 mg Rectal Daily PRN Paty Lisa, PA-C      budesonide-formoterol  2 puff Inhalation BID Paty Lisa, PA-C      cefazolin  2,000 mg Intravenous Q8H Paty Lisa, PA-C 2,000 mg (04/24/24 0013)    chlorhexidine  15 mL Mouth/Throat BID Paty Lisa, PA-C      cycloSPORINE  1 drop Both Eyes BID Paty Lisa, PA-C      docusate sodium  100 mg Oral BID KATINA Soliman      fluticasone  2 spray Each Nare Daily Paty Lisa, PA-C      folic acid  1 mg Oral Daily Paty Lisa, PA-C      furosemide  40 mg Intravenous Daily KATINA Soliman      heparin (porcine)  5,000 Units Subcutaneous Q8H [facility] Paty LisaFRANCES steiner      insulin lispro  1-5 Units Subcutaneous HS KATINA Soliman      insulin lispro  1-6 Units Subcutaneous TID AC KATINA Soliman      ipratropium  0.5 mg Nebulization TID KATINA Soliman      levalbuterol  1.25 mg Nebulization TID KATINA Soliman      lidocaine  2 patch Topical Daily KATINA Soliman      methocarbamol  500 mg Oral Q6H [facility] KATINA Soliman       "metoprolol tartrate  12.5 mg Oral Q12H [facility] KATINA Soliman      mupirocin  1 Application Nasal Q12H [facility]ITA Patybrandy Gonzalez PA-C      ondansetron  4 mg Intravenous Q6H PRN Paty LisaFRANCES      oxyCODONE  2.5 mg Oral Q4H PRN KATINA Soliman      Or    oxyCODONE  5 mg Oral Q4H PRN KATINA Soliman      pantoprazole  40 mg Oral Early Morning Paty LisaFRANCES steiner      polyethylene glycol  17 g Oral Daily Paty Lisa, PA-C      potassium chloride  20 mEq Oral Daily KATINA Soliman      temazepam  15 mg Oral HS PRN KATINA Soliman      thiamine  100 mg Oral Daily Patybrandy Gonzalez PA-C       Continuous Infusions:   PRN Meds:.  acetaminophen    albuterol    bisacodyl    ondansetron    oxyCODONE **OR** oxyCODONE    temazepam    Vitals: Blood pressure 132/77, pulse 73, temperature 99.9 °F (37.7 °C), temperature source Core, resp. rate 15, height 5' 7\" (1.702 m), weight 86.1 kg (189 lb 13.1 oz), SpO2 100%.,Body mass index is 29.73 kg/m².  I/O last 24 hours:  In: 5342.6 [I.V.:3222.6; IV Piggyback:1120]  Out: 3725 [Urine:2185; Blood:1000; Chest Tube:540]  Invasive Devices       Central Venous Catheter Line  Duration             CVC Central Lines 04/23/24 <1 day              Peripheral Intravenous Line  Duration             Peripheral IV 04/21/24 Right Hand 3 days    Peripheral IV 04/23/24 Left Hand 1 day              Line  Duration             Pacer Wires <1 day    Pacer Wires <1 day              Drain  Duration             Chest Tube 1 Left Pleural 32 Fr. <1 day    Chest Tube 2 Mediastinal;Posterior 24 Fr. <1 day    Chest Tube 3 Mediastinal;Anterior 32 Fr. <1 day    Urethral Catheter Latex 16 Fr. <1 day                      Lab, Imaging and other studies:   Results from last 7 days   Lab Units 04/24/24  0358 04/23/24  1957 04/23/24  1230 04/23/24  1228 04/23/24  1119 04/23/24  1054 04/23/24  0816 04/23/24  0544 04/22/24  0508   WBC Thousand/uL 11.93*  --   --   --   --   --   --  " 7.82 7.24   HEMOGLOBIN g/dL 13.6 14.3  --  14.5  --   --   --  16.5 16.3   I STAT HEMOGLOBIN g/dl  --   --  14.6  --    < >  --    < >  --   --    HEMATOCRIT % 42.1 44.5  --  43.9  --   --   --  50.0* 49.2   HEMATOCRIT, ISTAT %  --   --  43  --    < >  --    < >  --   --    PLATELETS Thousands/uL 206  --   --  265  --  274  --  344 316    < > = values in this interval not displayed.     Results from last 7 days   Lab Units 04/24/24  0358 04/23/24  1957 04/23/24  1555 04/23/24  1230 04/23/24  1228 04/23/24  0816 04/23/24  0544   POTASSIUM mmol/L 4.5 4.0 4.4  --  3.9  --  3.8   CHLORIDE mmol/L 107  --   --   --  108  --  103   CO2 mmol/L 24  --   --   --  21  --  23   CO2, I-STAT mmol/L  --   --   --  22  --    < >  --    BUN mg/dL 18  --   --   --  19  --  20   CREATININE mg/dL 1.40*  --   --   --  1.33*  --  1.31*   GLUCOSE, ISTAT mg/dl  --   --   --  151*  --    < >  --    CALCIUM mg/dL 7.7*  --   --   --  7.3*  --  9.2    < > = values in this interval not displayed.     Results from last 7 days   Lab Units 04/23/24  1228 04/23/24  0544 04/22/24  0508 04/17/24 2004 04/17/24  1614   INR  1.37*  --   --   --  1.06   PTT seconds 34 35 85*   < > 30    < > = values in this interval not displayed.     Recent Labs     04/24/24  0359   PHART 7.281*   [id number] 23.8   PO2ART 77.3   [id number] 51.6*   BEART -3.5           Plan:    DC [place]/Cordis/Heather/Guzmán.  Continue chest tubes, pacing wires.  Transfer to floor.  Ambulate.  Incentive spirometry, wean HFNC as tolerated  Diuresis.  PO ASA/Statin/B blocker.        SIGNATURE: Balwinder Sky DO  DATE: April 24, 2024  TIME: 8:14 AM

## 2024-04-24 NOTE — PROGRESS NOTES
[facility]  Progress Note: Critical Care   Date of Service: 4/24/2024  Hospital Day: 7  Name: David S Cogan I  MRN: [id number]  [facility]      Assessment/Plan     Impressions:  MVCAD s/p CABG x2 (LIMA > LAD, SVG > OM3)   Acute hypoxic hypercapnic respiratory failure  Hypertension  Hyperlipidemia   Asthma   GERD    Neuro:   Analgesia:     Tylenol 975mg q8h  Oxycodone 5 -10mg q4h PRN  Robaxin 500mg q6h   Lidoderm Patch  Delirium precautions  Regulate sleep/wake cycle  Restoril 15mg qHS PRN  CAM-ICU daily  Routine neuro checks       CV:   Cardiac Surgery Hemodynamic Monitoring Goals:  MAP > 65   SBP < 130  Beta Blocker Plan:    Start metoprolol tartrate 12.5 mg BID  Epicardial Pacing Wire Plan:    Epicardial pacing wire plan : Epicardial pacing wires in place. Will pace as needed for bradycardia or cardiac output   Post-op cardiac surgery/GDMT medications:     mg daily  Atorvastatin 80 mg qHS  Amiodarone 200 mg PO q8 hours   Continue close telemetry monitoring     Lung:   Start xopenex/atrovent TID  Wean supplemental oxygen as needed to maintain SpO2 > 90%    Chest tube output remains persistently high; Continue chest tubes to suction today  Continue incentive spirometry and encourage coughing and deep breathing   CXR as needed   ABG as needed     GI:   Continue PPI for stress ulcer prophylaxis  Bowel regimen:  Colace BID  Miralax daily   Dulcolax daily PRN  Monitor abdominal exam and bowel function    FEN:   Diuresis Plan: Lasix 40mg IV daily  Nutrition plan: Cardiac diet, 1800mL fluid restriction   Replenish electrolytes with goals: K >4.0, Mag >2.0, and Phos >3.0    :   Strict I/O monitoring   Discontinue Guzmán catheter   Continue to trend regnal function tests      ID:   Monitor WBC and fever curve off antibiotics       Heme:   CBC daily to monitor hemoglobin and platelets   VTE prophylaxis: SCD's as able based on vein harvest sites, SQH 7500 units TID          Endo:   Transition to sliding scale regimen   Adjust insulin regimen as needed to maintain -180    MSK/Skin:  Early mobility and skin protection:   PT/OT consult as medically appropriate   Frequent turning and pressure off-loading  Local wound care as needed    Disposition:  Med Surg with Telemetry    ICU Core Measures     A: Assess, Prevent, and Manage Pain Has pain been assessed? Yes  Need for changes to pain regimen? No   B: Both SAT/SAT  N/A   C: Choice of Sedation RASS Goal: 0 Alert and Calm or N/A patient not on sedation  Need for changes to sedation or analgesia regimen? No   D: Delirium CAM-ICU: Negative   E: Early Mobility  Plan for early mobility? Yes   F: Family Engagement Plan for family engagement today? Yes       Antibiotic Review: Post op requirements     Review of Invasive Devices:    Guzmán Plan: Guzmán to be removed. Order has been placed  Central access plan: Hemodynamic monitoring      Prophylaxis:  VTE VTE covered by:  heparin (porcine), Subcutaneous, 5,000 Units at 04/24/24 0540       Stress Ulcer  covered byesomeprazole (NexIUM) 40 MG capsule [[id number]] (Long-Term Med), pantoprazole (PROTONIX) EC tablet 40 mg [[id number]]          Significant 24hr Events      24 Hour Events/HPI: POD # 1 s/p MVCAD s/p CABG x2 (LIMA > LAD, SVG > OM3) . Given 500mL LR + 500mL 5% for low CI and low filling pressure. Patient arrived post-op on low-dose deng and epi at 4 mcg/min, which was weaned off within the first few hours. Deng was weaned off and cardene was later initiated. CI remained normal overnight with last CI 2.6. PA catheter discontinued. Cardene off around 0300 and arterial line discontinued. After extubation, patient was hypercapnic and hypoxic, andn started on BiPAP, which he wore until 0200 at which time he was transitioned to HFNC.        Subjective   Review of Systems   Respiratory:  Negative for shortness of breath.    Musculoskeletal:  Positive for myalgias (Chest wall).    Neurological:  Negative for dizziness and light-headedness.        Objective     Medications:  Scheduled Continuous   acetaminophen, 975 mg, Q8H SARITA  amiodarone, 200 mg, Q8H SARITA  Artificial Tears, 2 drop, Q12H SARITA  aspirin, 325 mg, Daily  atorvastatin, 80 mg, Daily With Dinner  budesonide-formoterol, 2 puff, BID  cefazolin, 2,000 mg, Q8H  chlorhexidine, 15 mL, BID  cycloSPORINE, 1 drop, BID  fluticasone, 2 spray, Daily  folic acid, 1 mg, Daily  heparin (porcine), 5,000 Units, Q8H SARITA  lidocaine, 2 patch, Daily  methocarbamol, 500 mg, Q6H SARITA  mupirocin, 1 Application, Q12H SARITA  pantoprazole, 40 mg, Early Morning  polyethylene glycol, 17 g, Daily  thiamine, 100 mg, Daily      insulin regular (HumuLIN R,NovoLIN R) 1 Units/mL in sodium chloride 0.9 % 100 mL infusion, 0.3-21 Units/hr, Last Rate: 0.5 Units/hr (04/24/24 0359)  niCARdipine, 2.5-15 mg/hr, Last Rate: Stopped (04/24/24 0309)  phenylephine,  mcg/min, Last Rate: Stopped (04/23/24 1328)  sodium chloride, 20 mL/hr, Last Rate: 20 mL/hr (04/23/24 1229)         Vitals: Invasive Monitoring       Most Recent Min/Max in 24hrs   Temp 99.9 °F (37.7 °C) Temp  Min: 97.2 °F (36.2 °C)  Max: 100.2 °F (37.9 °C)   Pulse 73 Pulse  Min: 73  Max: 107   Resp 15 Resp  Min: 13  Max: 37   /77 BP  Min: 106/65  Max: 138/82   O2 Sat 100 % SpO2  Min: 90 %  Max: 100 %   O2 Device: High flow nasal cannula  Nasal Cannula O2 Flow Rate (L/min): 15 L/min    I/O Chest tube output (if present)     Intake/Output Summary (Last 24 hours) at 4/24/2024 0629  Last data filed at 4/24/2024 0600  Gross per 24 hour   Intake 5342.59 ml   Output 3725 ml   Net 1617.59 ml     UOP: 100-50 ml/hour    BM: 0 in the last 24 hours  Weight change: 5.2 kg (11 lb 7.4 oz)     Mediastinal tubes:  90 mL/8hr  440 mL/24hr   Pleural tubes: 35 mL/8hr  100 mL/24hr        Physical Exam   Physical Exam  Vitals and nursing note reviewed.   Eyes:      Conjunctiva/sclera: Conjunctivae normal.      Pupils: Pupils are  equal, round, and reactive to light.   Skin:     General: Skin is warm and dry.      Capillary Refill: Capillary refill takes less than 2 seconds.          HENT:      Head: Normocephalic and atraumatic.      Mouth/Throat:      Mouth: Mucous membranes are dry.   Neck:      Vascular: Central line present.   Cardiovascular:      Rate and Rhythm: Normal rate and regular rhythm.      Pulses:           Radial pulses are 2+ on the right side and 2+ on the left side.        Dorsalis pedis pulses are 1+ on the right side and 1+ on the left side.      Heart sounds: Normal heart sounds.   Musculoskeletal:      Right lower leg: No edema.      Left lower leg: No edema.   Abdominal: General: There is no distension.      Palpations: Abdomen is soft.      Tenderness: There is no abdominal tenderness.   Constitutional:       General: He is awake. He is not in acute distress.     Appearance: He is well-developed and well-nourished.      Interventions: Nasal cannula in place.   Pulmonary:      Effort: Tachypnea present.      Breath sounds: Examination of the right-lower field reveals decreased breath sounds. Examination of the left-lower field reveals decreased breath sounds. Decreased breath sounds present. No wheezing or rhonchi.   Psychiatric:         Behavior: Behavior is cooperative.   Neurological:      General: No focal deficit present.      Mental Status: He is alert.      GCS: GCS eye subscore is 4. GCS verbal subscore is 5. GCS motor subscore is 6.   Genitourinary/Anorectal:  Guzmán present.        Diagnostic Studies      04/24/24 CXR: No obvious pneumothorax. No marked pulmonary congestion or effusion. Chest tubes in place  This was personally reviewed by myself in PACS.  04/24/24 EKG: NSR.   This was personally reviewed by myself.         Labs:  CBC    Recent Labs     04/23/24  0544 04/23/24  0816 04/23/24  1228 04/23/24  1230 04/23/24  1957 04/24/24  0358   WBC 7.82  --   --   --   --  11.93*   HGB 16.5   < > 14.5   < >  14.3 13.6   HCT 50.0*   < > 43.9   < > 44.5 42.1      < > 265  --   --  206    < > = values in this interval not displayed.     BMP    Recent Labs     04/23/24  1228 04/23/24  1230 04/23/24  1555 04/23/24  1957 04/24/24  0358   SODIUM 138  --   --   --  139   K 3.9  --    < > 4.0 4.5     --   --   --  107   CO2 21 22  --   --  24   AGAP 9  --   --   --  8   BUN 19  --   --   --  18   CREATININE 1.33*  --   --   --  1.40*   CALCIUM 7.3*  --   --   --  7.7*    < > = values in this interval not displayed.        Baseline Creat: 1.2   Coags    Recent Labs     04/23/24  0544 04/23/24  1228   INR  --  1.37*   PTT 35 34              Additional Electrolytes  Recent Labs     04/23/24  1119 04/23/24  1230 04/24/24  0358   MG  --   --  2.3   CAIONIZED 1.15 1.15  --           Blood Gas    Recent Labs     04/24/24  0359   PHART 7.281*   [id number] 51.6*   PO2ART 77.3   HKN3GBL 23.8   BEART -3.5   SOURCE Line, Arterial     Recent Labs     04/24/24  0359   SOURCE Line, Arterial    LFTs  No recent results    Infectious    No recent results     No recent results Glucose  Recent Labs     04/23/24  0544 04/23/24  1228 04/24/24  0358   GLUC 92 154* 124        Collaborative bedside rounds performed with cardiac surgery attending, critical care attending and bedside RN.     KATINA Soliman

## 2024-04-24 NOTE — OCCUPATIONAL THERAPY NOTE
Occupational Therapy Re-Evaluation and Treatment     Patient Name: David S Cogan  Today's Date: 4/24/2024  Problem List  Principal Problem:    NSTEMI (non-ST elevated myocardial infarction) ([facility])  Active Problems:    Benign hypertension    Benign prostatic hyperplasia    Hyperlipidemia    Overweight (BMI 25.0-29.9)    Alcohol use, unspecified with other alcohol-induced disorder (HCC)    Asthma    S/P CABG (coronary artery bypass graft)    Prediabetes    Past Medical History  Past Medical History:   Diagnosis Date    Asthma     Last Assessed:10/16/17    Fontanez's esophagus     Colonic polyp     Last Assessed:10/16/17    Eczema     GERD (gastroesophageal reflux disease)     Last Assessed:10/16/17    High cholesterol     Last Assessed:10/16/17    Hypertension     Last Assessed:10/16/17    Non-neoplastic nevus     Last Assessed:11/16/17     Past Surgical History  Past Surgical History:   Procedure Laterality Date    CARDIAC CATHETERIZATION Left 4/18/2024    Procedure: Cardiac Left Heart Cath;  Surgeon: Milagros Prather DO;  Location: [facility];  Service: Cardiology    COLONOSCOPY      10/2013- egd/colon    LYMPHADENECTOMY      Axillary Lymph node removed-benign    [place] CORONARY ARTERY BYP W/VEIN & ARTERY GRAFT 3 VEIN N/A 4/23/2024    Procedure: CORONARY ARTERY BYPASS GRAFT X2 VESSELS UTILIZING LIMA TO LAD, SVG TO OM3;  Surgeon: Balwinder Sky DO;  Location: [facility] MAIN OR;  Service: Cardiac Surgery    TONSILLECTOMY      Last Assessed:10/16/17    VALVE REPLACEMENT      Heart Valve Replacement; Last Assessed:10/16/17             04/24/24 1008   OT Last Visit   OT Visit Date 04/24/24   Note Type   Note type Re-Evaluation   Pain Assessment   Pain Assessment Tool 0-10   Pain Score 9   Pain Location/Orientation Orientation: Mid;Location: Chest;Location: Incision   Patient's Stated Pain Goal No pain   Hospital Pain Intervention(s) Repositioned;Ambulation/increased activity   Restrictions/Precautions   Other  "Precautions Cardiac/sternal;Multiple lines;Telemetry;O2;Pain  ([facility], CT)   Home Living   Type of Home House   Home Layout Two level;Stairs to enter with rails  (4 RLYAND)   Bathroom Shower/Tub Tub/shower unit   Bathroom Toilet Standard   Home Equipment Walker   Additional Comments Please see IE for full deatils on home setup and PLOF   Prior Function   Level of [place] Independent with ADLs;Independent with IADLS   Lives With Significant other   Comments Please see IE for full deatils on home setup and PLOF   Lifestyle   Autonomy PTA, pt reports being independent with ADLs, IADLs, fnxl mobility, (+)    Reciprocal Relationships S/O   Service to Others Retired   Intrinsic Gratification Traveling, going to the [place]   General   Family/Caregiver Present Yes  (significant other)   Subjective   Subjective \"It is just the pain\"   ADL   Where Assessed Chair   Eating Assistance 5  Supervision/Setup   Grooming Assistance 4  Minimal Assistance   UB Bathing Assistance 4  Minimal Assistance   LB Bathing Assistance 4  Minimal Assistance   UB Dressing Assistance 4  Minimal Assistance   LB Dressing Assistance 4  Minimal Assistance   Toileting Assistance  4  Minimal Assistance   Functional Assistance 4  Minimal Assistance   Additional Comments Pt is expected to progress well and has adequate assist from SO at home.   Bed Mobility   Additional Comments Pt greeted OOB in chair, left in chair with SO present and all needs within reach   Transfers   Sit to Stand 4  Minimal assistance   Additional items Assist x 1;Verbal cues   Stand to Sit 4  Minimal assistance   Additional items Assist x 1;Verbal cues   Additional Comments with RW   Functional Mobility   Functional Mobility 4  Minimal assistance   Additional Comments Pt performs short household distance mobility with MIN A x 1 with RW, extra assist for lines   Additional items Rolling walker   Balance   Static Sitting Fair +   Dynamic Sitting Fair   Static Standing Fair - "   Dynamic Standing Poor +   Ambulatory Poor +   Activity Tolerance   Activity Tolerance Patient limited by fatigue;Patient limited by pain   Medical Staff Made Aware Co-reeval with DPT due to medical complexity, assist from restorative Marleen   Nurse Made Aware RN cleared for therapy   RUE Assessment   RUE Assessment WFL   LUE Assessment   LUE Assessment WFL   Hand Function   Gross Motor Coordination Functional   Fine Motor Coordination Functional   Sensation   Light Touch No apparent deficits   Vision-Basic Assessment   Current Vision Wears glasses all the time   Psychosocial   Psychosocial (WDL) WDL   Cognition   Overall Cognitive Status WFL   Arousal/Participation Alert;Cooperative   Attention Within functional limits   Orientation Level Oriented X4   Memory Within functional limits   Following Commands Follows one step commands without difficulty   Comments Pt cooperative to therapy, with good understanding of cardiac pxns.   Assessment   Limitation Decreased ADL status;Decreased self-care trans;Decreased high-level ADLs   Prognosis Good   Assessment Pt is a 68 y.o. male who was admitted to [facility] on 4/17/[facility]24 with NSTEMI (non-ST elevated myocardial infarction) (HCC), s/p CABG x 2. Pt seen for an OT re-evaluation per active OT orders, CT and [facility] in place.  Pt  has a past medical history of Asthma, Fontanez's esophagus, Colonic polyp, Eczema, GERD (gastroesophageal reflux disease), High cholesterol, Hypertension, and Non-neoplastic nevus. Please see IE for full details on home setup and PLOF.  Currently, pt is Min Ax1 for UB ADL, Min Ax1 for LB ADL, and completed transfers/FM w Min Ax1 with RW. Pt currently presents with impairments in the following categories -steps to enter environment and difficulty performing ADLS activity tolerance and endurance. These impairments, as well as pt's fatigue, SOB, CHILDS, pain, and cardiac/sternal precautions  limit pt's ability to safely engage in all baseline areas  of occupation, includingbathing, dressing, toileting, and functional mobility/transfers.    The patient's raw score on the AM-PAC Daily Activity Inpatient Short Form is 19. A raw score of greater than or equal to 19 suggests the patient may benefit from discharge to home. Please refer to the recommendation of the Occupational Therapist for safe discharge planning. Pt would benefit from continued OT for one follow-up treatment session focused on cardiac education. From OT standpoint, recommend home with increased social support, no further OT needs upon D/C. Pt was left seated in bedside chair with SO present and all needs within reach.   Goals   Patient Goals to feel better   Plan   Treatment Interventions Patient/family training;Cardiac education   Goal Expiration Date 05/08/24   OT Frequency 1-2x/wk   Discharge Recommendation   Rehab Resource Intensity Level, OT No post-acute rehabilitation needs   -PAC Daily Activity Inpatient   Lower Body Dressing 3   Bathing 3   Toileting 3   Upper Body Dressing 3   Grooming 3   Eating 4   Daily Activity Raw Score 19   Daily Activity Standardized Score (Calc for Raw Score >=11) 40.22   AM-PAC Applied Cognition Inpatient   Following a Speech/Presentation 4   Understanding Ordinary Conversation 4   Taking Medications 4   Remembering Where Things Are Placed or Put Away 4   Remembering List of 4-5 Errands 4   Taking Care of Complicated Tasks 4   Applied Cognition Raw Score 24   Applied Cognition Standardized Score 62.21   Additional Treatment Session   Start Time 1122   End Time 1132   Treatment Assessment Pt and SO participated in additional OT session focusing on cardiac education. Provided pt with Recovering After Cardiac Surgery packet and educated pt regarding; sternal precautions, cardiac precautions, lifiting restrictions, safe activity engagement, energy conservation, lifestyle modifications, stress management and cardiac rehabilitation programs. Pt's questions were  addressed after discussion of the packet. Provided pt with contact information for OT department if questions arrise. Pt is limited by decreased endurance and decreased ability to complete higher level ADLs, however his SO will be home and able to assist as needed upon d/c. Rec pt cont participation in self care after s/u w/ nrsg staff and cont mobility w/ non OT staff while in the hospital. Pt denies any questions or concerns from an OT standpoint at this time. Rec pt return home w/ increased family support upon d/c. D/C OT.   Additional Treatment Day 1   End of Consult   Education Provided Yes;Family or social support of family present for education by provider   Patient Position at End of Consult Bedside chair;All needs within reach   Nurse Communication Nurse aware of consult       OT Goals    - Pt will recall all cardiac precautions during OT sessions to promote safety following hospital stay.    - Pt will verbalize and demonstrate understanding to cardiac packet following education in order to promote safety following hospital stay.      Nancy Roque, GEN, OTR/L

## 2024-04-24 NOTE — PLAN OF CARE
Problem: PHYSICAL THERAPY ADULT  Goal: Performs mobility at highest level of function for planned discharge setting.  See evaluation for individualized goals.  Description: Treatment/Interventions: Functional transfer training, LE strengthening/ROM, Elevations, Therapeutic exercise, Endurance training, Equipment eval/education, Bed mobility, Gait training, Spoke to nursing, OT, Family  Equipment Recommended: Walker (at this time)       See flowsheet documentation for full assessment, interventions and recommendations.  Note: Prognosis: Good  Problem List: Decreased strength, Decreased endurance, Impaired balance, Decreased mobility, Pain  Assessment: Functional mobility re-assessment performed; pt was initially admitted w/ c/o CP and Dx of NSTEMI; pt was seen for PT eval on 4/18/2024 and D/C from services; during the course, pt underwent Coronary artery bypass grafting x 2 with left internal mammary artery to left anterior descending, saphenous vein graft to obtuse marginal 3 (Dx of MV CAD) on 4/23/2024; PT is reconsulted; pt currently exhibits postop discomfort and guarding, overall min weakness and decreased functional endurance; min (A) was needed w/ all aspects of mobility to assure safety; will cont to follow pt in PT for graded mobilization to max functional (I); D/C recommendations are listed below; will follow  Barriers to Discharge: None     Rehab Resource Intensity Level, PT: No post-acute rehabilitation needs    See flowsheet documentation for full assessment.

## 2024-04-25 LAB
ANION GAP SERPL CALCULATED.3IONS-SCNC: 9 MMOL/L (ref 4–13)
ATRIAL RATE: 75 BPM
BUN SERPL-MCNC: 15 MG/DL (ref 5–25)
CALCIUM SERPL-MCNC: 8.5 MG/DL (ref 8.4–10.2)
CHLORIDE SERPL-SCNC: 101 MMOL/L (ref 96–108)
CO2 SERPL-SCNC: 28 MMOL/L (ref 21–32)
CREAT SERPL-MCNC: 1.19 MG/DL (ref 0.6–1.3)
ERYTHROCYTE [DISTWIDTH] IN BLOOD BY AUTOMATED COUNT: 13.9 % (ref 11.6–15.1)
GFR SERPL CREATININE-BSD FRML MDRD: 62 ML/MIN/1.73SQ M
GLUCOSE SERPL-MCNC: 116 MG/DL (ref 65–140)
GLUCOSE SERPL-MCNC: 135 MG/DL (ref 65–140)
GLUCOSE SERPL-MCNC: 150 MG/DL (ref 65–140)
GLUCOSE SERPL-MCNC: 150 MG/DL (ref 65–140)
GLUCOSE SERPL-MCNC: 163 MG/DL (ref 65–140)
HCT VFR BLD AUTO: 39.7 % (ref 36.5–49.3)
HGB BLD-MCNC: 12.4 G/DL (ref 12–17)
MAGNESIUM SERPL-MCNC: 2.5 MG/DL (ref 1.9–2.7)
MCH RBC QN AUTO: 30.9 PG (ref 26.8–34.3)
MCHC RBC AUTO-ENTMCNC: 31.2 G/DL (ref 31.4–37.4)
MCV RBC AUTO: 99 FL (ref 82–98)
P AXIS: 58 DEGREES
PLATELET # BLD AUTO: 189 THOUSANDS/UL (ref 149–390)
PMV BLD AUTO: 11.8 FL (ref 8.9–12.7)
POTASSIUM SERPL-SCNC: 4.3 MMOL/L (ref 3.5–5.3)
PR INTERVAL: 146 MS
QRS AXIS: 39 DEGREES
QRSD INTERVAL: 78 MS
QT INTERVAL: 370 MS
QTC INTERVAL: 413 MS
RBC # BLD AUTO: 4.01 MILLION/UL (ref 3.88–5.62)
SODIUM SERPL-SCNC: 138 MMOL/L (ref 135–147)
T WAVE AXIS: 127 DEGREES
VENTRICULAR RATE: 75 BPM
WBC # BLD AUTO: 10.29 THOUSAND/UL (ref 4.31–10.16)

## 2024-04-25 PROCEDURE — 80048 BASIC METABOLIC PNL TOTAL CA: CPT

## 2024-04-25 PROCEDURE — 93010 ELECTROCARDIOGRAM REPORT: CPT | Performed by: INTERNAL MEDICINE

## 2024-04-25 PROCEDURE — 85027 COMPLETE CBC AUTOMATED: CPT

## 2024-04-25 PROCEDURE — 99232 SBSQ HOSP IP/OBS MODERATE 35: CPT | Performed by: INTERNAL MEDICINE

## 2024-04-25 PROCEDURE — 94664 DEMO&/EVAL PT USE INHALER: CPT

## 2024-04-25 PROCEDURE — 82948 REAGENT STRIP/BLOOD GLUCOSE: CPT

## 2024-04-25 PROCEDURE — 94640 AIRWAY INHALATION TREATMENT: CPT

## 2024-04-25 PROCEDURE — 94760 N-INVAS EAR/PLS OXIMETRY 1: CPT

## 2024-04-25 PROCEDURE — 83735 ASSAY OF MAGNESIUM: CPT

## 2024-04-25 PROCEDURE — 97116 GAIT TRAINING THERAPY: CPT

## 2024-04-25 PROCEDURE — 97530 THERAPEUTIC ACTIVITIES: CPT

## 2024-04-25 PROCEDURE — 99024 POSTOP FOLLOW-UP VISIT: CPT | Performed by: PHYSICIAN ASSISTANT

## 2024-04-25 RX ORDER — HYDROMORPHONE HCL/PF 1 MG/ML
0.5 SYRINGE (ML) INJECTION ONCE
Status: COMPLETED | OUTPATIENT
Start: 2024-04-25 | End: 2024-04-25

## 2024-04-25 RX ORDER — OXYCODONE HYDROCHLORIDE 5 MG/1
5 TABLET ORAL EVERY 6 HOURS PRN
Status: DISCONTINUED | OUTPATIENT
Start: 2024-04-25 | End: 2024-04-26

## 2024-04-25 RX ORDER — AMOXICILLIN 250 MG
1 CAPSULE ORAL 2 TIMES DAILY
Status: DISCONTINUED | OUTPATIENT
Start: 2024-04-25 | End: 2024-04-27 | Stop reason: HOSPADM

## 2024-04-25 RX ORDER — LANOLIN ALCOHOL/MO/W.PET/CERES
6 CREAM (GRAM) TOPICAL
Status: DISCONTINUED | OUTPATIENT
Start: 2024-04-25 | End: 2024-04-27 | Stop reason: HOSPADM

## 2024-04-25 RX ORDER — FUROSEMIDE 10 MG/ML
40 INJECTION INTRAMUSCULAR; INTRAVENOUS
Status: DISCONTINUED | OUTPATIENT
Start: 2024-04-25 | End: 2024-04-27

## 2024-04-25 RX ORDER — POTASSIUM CHLORIDE 20 MEQ/1
20 TABLET, EXTENDED RELEASE ORAL 2 TIMES DAILY
Status: DISCONTINUED | OUTPATIENT
Start: 2024-04-25 | End: 2024-04-27 | Stop reason: HOSPADM

## 2024-04-25 RX ORDER — LORAZEPAM 2 MG/ML
0.5 INJECTION INTRAMUSCULAR EVERY 8 HOURS PRN
Status: DISCONTINUED | OUTPATIENT
Start: 2024-04-25 | End: 2024-04-26

## 2024-04-25 RX ADMIN — SENNOSIDES AND DOCUSATE SODIUM 1 TABLET: 50; 8.6 TABLET ORAL at 08:21

## 2024-04-25 RX ADMIN — BUDESONIDE AND FORMOTEROL FUMARATE DIHYDRATE 2 PUFF: 160; 4.5 AEROSOL RESPIRATORY (INHALATION) at 08:24

## 2024-04-25 RX ADMIN — MUPIROCIN 1 APPLICATION: 20 OINTMENT TOPICAL at 08:20

## 2024-04-25 RX ADMIN — SENNOSIDES AND DOCUSATE SODIUM 1 TABLET: 50; 8.6 TABLET ORAL at 17:24

## 2024-04-25 RX ADMIN — POTASSIUM CHLORIDE 20 MEQ: 1500 TABLET, EXTENDED RELEASE ORAL at 17:23

## 2024-04-25 RX ADMIN — ATORVASTATIN CALCIUM 80 MG: 80 TABLET, FILM COATED ORAL at 17:23

## 2024-04-25 RX ADMIN — METOPROLOL TARTRATE 25 MG: 25 TABLET, FILM COATED ORAL at 20:41

## 2024-04-25 RX ADMIN — HEPARIN SODIUM 5000 UNITS: 5000 INJECTION INTRAVENOUS; SUBCUTANEOUS at 22:17

## 2024-04-25 RX ADMIN — LEVALBUTEROL HYDROCHLORIDE 1.25 MG: 1.25 SOLUTION RESPIRATORY (INHALATION) at 07:46

## 2024-04-25 RX ADMIN — FUROSEMIDE 40 MG: 10 INJECTION, SOLUTION INTRAMUSCULAR; INTRAVENOUS at 17:24

## 2024-04-25 RX ADMIN — METHOCARBAMOL 500 MG: 500 TABLET ORAL at 05:08

## 2024-04-25 RX ADMIN — HEPARIN SODIUM 5000 UNITS: 5000 INJECTION INTRAVENOUS; SUBCUTANEOUS at 13:34

## 2024-04-25 RX ADMIN — LEVALBUTEROL HYDROCHLORIDE 1.25 MG: 1.25 SOLUTION RESPIRATORY (INHALATION) at 13:57

## 2024-04-25 RX ADMIN — METHOCARBAMOL 500 MG: 500 TABLET ORAL at 00:25

## 2024-04-25 RX ADMIN — POTASSIUM CHLORIDE 20 MEQ: 1500 TABLET, EXTENDED RELEASE ORAL at 08:21

## 2024-04-25 RX ADMIN — CHLORHEXIDINE GLUCONATE 0.12% ORAL RINSE 15 ML: 1.2 LIQUID ORAL at 17:28

## 2024-04-25 RX ADMIN — PANTOPRAZOLE SODIUM 40 MG: 40 TABLET, DELAYED RELEASE ORAL at 05:09

## 2024-04-25 RX ADMIN — ASPIRIN 325 MG ORAL TABLET 325 MG: 325 PILL ORAL at 08:20

## 2024-04-25 RX ADMIN — Medication 6 MG: at 22:14

## 2024-04-25 RX ADMIN — THIAMINE HCL TAB 100 MG 100 MG: 100 TAB at 08:21

## 2024-04-25 RX ADMIN — LIDOCAINE 5% 2 PATCH: 700 PATCH TOPICAL at 08:21

## 2024-04-25 RX ADMIN — INSULIN LISPRO 1 UNITS: 100 INJECTION, SOLUTION INTRAVENOUS; SUBCUTANEOUS at 11:05

## 2024-04-25 RX ADMIN — INSULIN LISPRO 1 UNITS: 100 INJECTION, SOLUTION INTRAVENOUS; SUBCUTANEOUS at 22:18

## 2024-04-25 RX ADMIN — METHOCARBAMOL 500 MG: 500 TABLET ORAL at 17:23

## 2024-04-25 RX ADMIN — BUDESONIDE AND FORMOTEROL FUMARATE DIHYDRATE 2 PUFF: 160; 4.5 AEROSOL RESPIRATORY (INHALATION) at 17:27

## 2024-04-25 RX ADMIN — METOPROLOL TARTRATE 25 MG: 25 TABLET, FILM COATED ORAL at 08:21

## 2024-04-25 RX ADMIN — ACETAMINOPHEN 975 MG: 325 TABLET, FILM COATED ORAL at 22:14

## 2024-04-25 RX ADMIN — FLUTICASONE PROPIONATE 2 SPRAY: 50 SPRAY, METERED NASAL at 08:24

## 2024-04-25 RX ADMIN — LORAZEPAM 0.5 MG: 2 INJECTION INTRAMUSCULAR; INTRAVENOUS at 09:25

## 2024-04-25 RX ADMIN — IPRATROPIUM BROMIDE 0.5 MG: 0.5 SOLUTION RESPIRATORY (INHALATION) at 19:13

## 2024-04-25 RX ADMIN — ACETAMINOPHEN 975 MG: 325 TABLET, FILM COATED ORAL at 13:34

## 2024-04-25 RX ADMIN — CHLORHEXIDINE GLUCONATE 0.12% ORAL RINSE 15 ML: 1.2 LIQUID ORAL at 08:21

## 2024-04-25 RX ADMIN — ACETAMINOPHEN 975 MG: 325 TABLET, FILM COATED ORAL at 05:08

## 2024-04-25 RX ADMIN — HYDROMORPHONE HYDROCHLORIDE 0.5 MG: 1 INJECTION, SOLUTION INTRAMUSCULAR; INTRAVENOUS; SUBCUTANEOUS at 00:40

## 2024-04-25 RX ADMIN — FOLIC ACID 1 MG: 1 TABLET ORAL at 08:20

## 2024-04-25 RX ADMIN — HEPARIN SODIUM 5000 UNITS: 5000 INJECTION INTRAVENOUS; SUBCUTANEOUS at 05:08

## 2024-04-25 RX ADMIN — CYCLOSPORINE 1 DROP: 0.5 EMULSION OPHTHALMIC at 08:24

## 2024-04-25 RX ADMIN — MUPIROCIN 1 APPLICATION: 20 OINTMENT TOPICAL at 20:43

## 2024-04-25 RX ADMIN — LEVALBUTEROL HYDROCHLORIDE 1.25 MG: 1.25 SOLUTION RESPIRATORY (INHALATION) at 19:13

## 2024-04-25 RX ADMIN — INSULIN LISPRO 1 UNITS: 100 INJECTION, SOLUTION INTRAVENOUS; SUBCUTANEOUS at 06:11

## 2024-04-25 RX ADMIN — AMIODARONE HYDROCHLORIDE 200 MG: 200 TABLET ORAL at 22:16

## 2024-04-25 RX ADMIN — HYDROMORPHONE HYDROCHLORIDE 1 MG: 2 TABLET ORAL at 04:06

## 2024-04-25 RX ADMIN — METHOCARBAMOL 500 MG: 500 TABLET ORAL at 12:01

## 2024-04-25 RX ADMIN — CYCLOSPORINE 1 DROP: 0.5 EMULSION OPHTHALMIC at 20:41

## 2024-04-25 RX ADMIN — AMIODARONE HYDROCHLORIDE 200 MG: 200 TABLET ORAL at 13:34

## 2024-04-25 RX ADMIN — FUROSEMIDE 40 MG: 10 INJECTION, SOLUTION INTRAMUSCULAR; INTRAVENOUS at 08:21

## 2024-04-25 RX ADMIN — IPRATROPIUM BROMIDE 0.5 MG: 0.5 SOLUTION RESPIRATORY (INHALATION) at 07:46

## 2024-04-25 RX ADMIN — POLYETHYLENE GLYCOL 3350 17 G: 17 POWDER, FOR SOLUTION ORAL at 08:20

## 2024-04-25 RX ADMIN — AMIODARONE HYDROCHLORIDE 200 MG: 200 TABLET ORAL at 05:09

## 2024-04-25 RX ADMIN — IPRATROPIUM BROMIDE 0.5 MG: 0.5 SOLUTION RESPIRATORY (INHALATION) at 13:57

## 2024-04-25 NOTE — PROGRESS NOTES
Progress Note - Cardiothoracic Surgery   David S Cogan 68 y.o. male MRN: [id number]  Unit/Bed#: [facility] 421-01 Encounter: [id number]    Coronary artery disease. S/P coronary artery bypass grafting; POD # 2    24 Hour Events: Pain with mobility.  Ambulating with assistance.   Tolerating diet.     Medications:   Scheduled Meds:  Current Facility-Administered Medications   Medication Dose Route Frequency Provider Last Rate    acetaminophen  650 mg Rectal Q4H PRN Victor Hugo Kwan PA-C      acetaminophen  975 mg Oral Q8H [facility] Victor Hugo Kwan PA-C      albuterol  1 puff Inhalation Q6H PRN Victor Hugo Kwan PA-C      amiodarone  200 mg Oral Q8H [facility] Victor Hugo Kwan PA-C      Artificial Tears  2 drop Both Eyes Q12H [facility] Victor Hugo Kwan PA-C      aspirin  325 mg Oral Daily Victor Hugo Kwan PA-C      atorvastatin  80 mg Oral Daily With Dinner Victor Hugo Kwan PA-C      bisacodyl  10 mg Rectal Daily PRN Victor Hugo Kwan PA-C      budesonide-formoterol  2 puff Inhalation BID Victor Hugo Kwan PA-C      chlorhexidine  15 mL Mouth/Throat BID Victor Hugo Kwan PA-C      cycloSPORINE  1 drop Both Eyes BID Victor Hugo Kwan PA-C      docusate sodium  100 mg Oral BID Victor Hugo Kwan PA-C      fluticasone  2 spray Each Nare Daily Victor Hugo Kwan PA-C      folic acid  1 mg Oral Daily Victor Hugo Kwan PA-C      furosemide  40 mg Intravenous Daily Victor Hugo Kwan PA-C      heparin (porcine)  5,000 Units Subcutaneous Q8H [facility] Victor Hugo Kwan PA-C      HYDROmorphone  1 mg Oral Q4H PRN Victor Hugo Kwan PA-C      insulin lispro  1-5 Units Subcutaneous HS Victor Hugo Kwan PA-C      insulin lispro  1-6 Units Subcutaneous TID AC Victor Hugo Kwan PA-C      ipratropium  0.5 mg Nebulization TID Victor Hugo Kwan PA-C      levalbuterol  1.25 mg Nebulization TID Victor Hugo E McGonigal, PA-C      lidocaine  2 patch Topical Daily Victor Hugo Kwan PA-C      methocarbamol  500 mg Oral Q6H SARITA Gay  CHERISE Kwan PA-C      metoprolol tartrate  12.5 mg Oral Q12H [facility] Victor Hugo Kwan PA-C      mupirocin  1 Application Nasal Q12H SARITA Victor Hugo Kwan PA-C      ondansetron  4 mg Intravenous Q6H PRN Victor Hugo Kwan PA-C      pantoprazole  40 mg Oral Early Morning Victor Hugo Kwan PA-C      polyethylene glycol  17 g Oral Daily Victor Hugo Kwan PA-C      potassium chloride  20 mEq Oral Daily Victor Hugo Kwan PA-C      temazepam  15 mg Oral HS PRN Victor Hugo Kwan PA-C      thiamine  100 mg Oral Daily Victor Hugo Kwan PA-C       Continuous Infusions:   PRN Meds:.  acetaminophen    albuterol    bisacodyl    HYDROmorphone    ondansetron    temazepam    Vitals:   Vitals:    04/25/24 0306 04/25/24 0308 04/25/24 0430 04/25/24 0725   BP: 119/69 119/69  138/82   BP Location:       Pulse: 92 95  97   Resp:  20  19   Temp: 98.2 °F (36.8 °C) 98.2 °F (36.8 °C)  98.7 °F (37.1 °C)   TempSrc:  Oral  Oral   SpO2: 94% 94%  95%   Weight:   84.5 kg (186 lb 4.6 oz)    Height:           Telemetry: NSR; Heart Rate: 88    Respiratory:   SpO2: SpO2: 95 %; Room Air    Intake/Output:     Intake/Output Summary (Last 24 hours) at 4/25/2024 0749  Last data filed at 4/25/2024 0509  Gross per 24 hour   Intake 301.09 ml   Output 2135 ml   Net -1833.91 ml        Chest tube Output:    Mediastinal tubes: 30 mL/8 hours  160 mL/24 hours   Pleural tubes: 70 mL/8 hours  150 mL/24 hours     Weights:   Weight (last 2 days)       Date/Time Weight    04/25/24 0430 84.5 (186.29)    04/24/24 0538 86.1 (189.82)    04/23/24 0515 80.9 (178.35)              Results:   Results from last 7 days   Lab Units 04/25/24  0429 04/24/24  0358 04/23/24  1957 04/23/24  1230 04/23/24  1228 04/23/24  0816 04/23/24  0544   WBC Thousand/uL 10.29* 11.93*  --   --   --   --  7.82   HEMOGLOBIN g/dL 12.4 13.6 14.3  --  14.5  --  16.5   I STAT HEMOGLOBIN   --   --   --    < >  --    < >  --    HEMATOCRIT % 39.7 42.1 44.5  --  43.9  --  50.0*   HEMATOCRIT, ISTAT    --   --   --    < >  --    < >  --    PLATELETS Thousands/uL 189 206  --   --  265   < > 344    < > = values in this interval not displayed.     Results from last 7 days   Lab Units 04/25/24  0429 04/24/24  1410 04/24/24  0358 04/23/24  1555 04/23/24  1230   SODIUM mmol/L 138 137 139  --   --    POTASSIUM mmol/L 4.3 4.3 4.5   < >  --    CHLORIDE mmol/L 101 104 107  --   --    CO2 mmol/L 28 24 24  --   --    CO2, I-STAT mmol/L  --   --   --   --  22   BUN mg/dL 15 19 18  --   --    CREATININE mg/dL 1.19 1.50* 1.40*  --   --    GLUCOSE, ISTAT mg/dl  --   --   --   --  151*   CALCIUM mg/dL 8.5 8.2* 7.7*  --   --     < > = values in this interval not displayed.     Results from last 7 days   Lab Units 04/23/24  1228 04/23/24  0544 04/22/24  0508   INR  1.37*  --   --    PTT seconds 34 35 85*     Point of care glucose: 128-212      Invasive Lines/Tubes:  Invasive Devices       Central Venous Catheter Line  Duration             CVC Central Lines 04/23/24 Right Internal jugular 1 day              Peripheral Intravenous Line  Duration             Peripheral IV 04/21/24 Right Hand 4 days    Peripheral IV 04/23/24 Left Hand 1 day              Line  Duration             Pacer Wires 1 day    Pacer Wires 1 day              Drain  Duration             Chest Tube 1 Left Pleural 32 Fr. 1 day    Chest Tube 2 Mediastinal;Posterior 24 Fr. 1 day    Chest Tube 3 Mediastinal;Anterior 32 Fr. 1 day    Urethral Catheter Latex 16 Fr. 1 day                    Physical Exam:    General: No acute distress, Alert, and Normal appearance  HEENT/NECK:  Normocephalic. Atraumatic.  No jugular venous distention.    Cardiac: Regular rate and rhythm  Pulmonary:  Breath sounds clear bilaterally  Abdomen:  Non-tender, Non-distended, and Normal bowel sounds  Incisions: Sternum is stable.  Incision dressed with Acticoat.  No erythema or drainage and Saphenectomy incision dressed with Acticoat.  No erythema or drainage  Extremities: Extremities warm/dry and  1+ edema B/L  Neuro: Alert and oriented X 3  Skin: Warm/Dry, without rashes or lesions.    Assessment:  Principal Problem:    S/P CABG (coronary artery bypass graft)  Active Problems:    Benign hypertension    Benign prostatic hyperplasia    Hyperlipidemia    Overweight (BMI 25.0-29.9)    Alcohol use, unspecified with other alcohol-induced disorder (HCC)    NSTEMI (non-ST elevated myocardial infarction) (HCC)    Asthma    Prediabetes       Coronary artery disease. S/P coronary artery bypass grafting; POD # 2    Plan:    Cardiac:   Normal ventricular systolic function, EF 55%    NSR; HR well-controlled  BP well-controlled    Should tolerate  Lopressor, 25mg PO BID    Continue prophylactic Amiodarone, 200 mg PO TID    Continue ASA and Statin therapy    Epicardial pacing wires no longer required.  Remove today    Maintain central IV access today for medications requiring central IV access    Continue Arixtra for DVT prophylaxis    Pulmonary:     Acute post-op pulmonary insufficiency; Requiring high flow via nasal cannula, secondary to atelectasis and poor inspiratory effort secondary to pain. Continue incentive spirometry/coughing/deep breathing exercises.  Wean supplemental oxygen as tolerated for saturation > 90%    Chest tube drainage diminished; D/C today    Renal:     Post op Creatinine stable; Follow up labs prn     Intake/Output net: -1833 mL/24 hours    Diuretic Regimen:  Increase to IV Lasix, 40 mg BID  Increase to Potassium Chloride 20 mEq PO BID    Neuro: D/C po dilaudid, try oxycodone   Pain should improve with chest tubes removed    GI:    Cardiac diet, with 1800 mL fluid restriction    Tolerating diet without complaint    Continue stool softeners and prn suppository    Continue GI prophylaxis    Endo:     Pre-Op Hgb A1C: 5.9    SSI coverage prn     7    Hematology:     Post-operative blood count acceptable; Trend prn    8.   Disposition:        Anticipated discharge date: most likely 48 hours pending  progress       VTE Pharmacologic Prophylaxis: Fondaparinux (Arixtra)  VTE Mechanical Prophylaxis: sequential compression device    Collaborative rounds completed with supervising physician  Plan of care discussed with bedside nurse    SIGNATURE: Andria Rivers PA-C  DATE: April 25, 2024  TIME: 7:49 AM

## 2024-04-25 NOTE — PLAN OF CARE
Problem: SAFETY ADULT  Goal: Patient will remain free of falls  Description: INTERVENTIONS:  - Educate patient/family on patient safety including physical limitations  - Instruct patient to call for assistance with activity   - Consult OT/PT to assist with strengthening/mobility   - Keep Call bell within reach  - Keep bed low and locked with side rails adjusted as appropriate  - Keep care items and personal belongings within reach  - Initiate and maintain comfort rounds  - Make Fall Risk Sign visible to staff  - Offer Toileting every 2 Hours, in advance of need  - Initiate/Maintain chair  alarm  - Obtain necessary fall risk management equipment:   - Apply yellow socks and bracelet for high fall risk patients  - Consider moving patient to room near nurses station  Outcome: Progressing  Goal: Maintain or return to baseline ADL function  Description: INTERVENTIONS:  -  Assess patient's ability to carry out ADLs; assess patient's baseline for ADL function and identify physical deficits which impact ability to perform ADLs (bathing, care of mouth/teeth, toileting, grooming, dressing, etc.)  - Assess/evaluate cause of self-care deficits   - Assess range of motion  - Assess patient's mobility; develop plan if impaired  - Assess patient's need for assistive devices and provide as appropriate  - Encourage maximum independence but intervene and supervise when necessary  - Involve family in performance of ADLs  - Assess for home care needs following discharge   - Consider OT consult to assist with ADL evaluation and planning for discharge  - Provide patient education as appropriate  Outcome: Progressing  Goal: Maintains/Returns to pre admission functional level  Description: INTERVENTIONS:  - Perform AM-PAC 6 Click Basic Mobility/ Daily Activity assessment daily.  - Set and communicate daily mobility goal to care team and patient/family/caregiver.   - Collaborate with rehabilitation services on mobility goals if consulted  -  Perform Range of Motion 4 times a day.  - Reposition patient every 2 hours.  - Dangle patient 3 times a day  - Stand patient 3 times a day  - Ambulate patient 3 times a day  - Out of bed to chair 3 times a day   - Out of bed for meals 3 times a day  - Out of bed for toileting  - Record patient progress and toleration of activity level   Outcome: Progressing

## 2024-04-25 NOTE — PLAN OF CARE
Problem: PHYSICAL THERAPY ADULT  Goal: Performs mobility at highest level of function for planned discharge setting.  See evaluation for individualized goals.  Description: Treatment/Interventions: Functional transfer training, LE strengthening/ROM, Elevations, Therapeutic exercise, Endurance training, Equipment eval/education, Bed mobility, Gait training, Spoke to nursing, OT, Family  Equipment Recommended: Walker (at this time)       See flowsheet documentation for full assessment, interventions and recommendations.  Outcome: Progressing  Note: Prognosis: Good  Problem List: Decreased strength, Decreased endurance, Impaired balance, Decreased mobility, Pain  Assessment: Patient received in bedside chair. Patient agreeable to therapy. Patient performs functional transfers with supervision using rolling walker. Patient performs ambulation with minimal assistance x1 using rolling walker, 150'x2 with seated rest period needed between trials. Patient on 8LO2 throughout ambulation, with lowest noted SPO2 following gait 86%. The patient quickly recovers back to baseline once seated. Following ambulation, patient returns to chair. The patient is provided with education on seated LE strengthening activities to help increased strength, endurance, and stability. Patient left in bedside chair with all needs met and call bell/personal items within reach. The patient's AM-PAC Basic Mobility Inpatient Short Form Raw Score is 20 showing further need for skilled PT services in order to improve functional mobility, decrease need for assistance, and return to PLOF. PT is recommending no post acute rehab needs on d/c from hospital.  Will continue to follow as able.  Barriers to Discharge: None     Rehab Resource Intensity Level, PT: No post-acute rehabilitation needs    See flowsheet documentation for full assessment.

## 2024-04-25 NOTE — PHYSICAL THERAPY NOTE
PHYSICAL THERAPY NOTE          Patient Name: David S Cogan  Today's Date: 4/25/2024 04/25/24 1124   PT Last Visit   PT Visit Date 04/25/24   Note Type   Note Type Treatment   Pain Assessment   Pain Assessment Tool 0-10   Pain Score 2   Pain Location/Orientation Location: Chest;Location: Incision   Pain Onset/Description Onset: Ongoing;Frequency: Constant/Continuous;Descriptor: Discomfort   Effect of Pain on Daily Activities limits comfort and mobility   Patient's Stated Pain Goal No pain   Hospital Pain Intervention(s) Repositioned;Ambulation/increased activity;Emotional support   Restrictions/Precautions   Weight Bearing Precautions Per Order No   Other Precautions Cardiac/sternal;Multiple lines;Telemetry;O2;Pain  (8LO2)   General   Chart Reviewed Yes   Family/Caregiver Present Yes  (initally no, but spouse present at end of session)   Cognition   Overall Cognitive Status WFL   Arousal/Participation Alert;Cooperative   Attention Within functional limits   Orientation Level Oriented X4   Memory Within functional limits   Following Commands Follows one step commands without difficulty   Comments Patient is pleasant and cooperative   Subjective   Subjective Patient agreeable to PT tx   Bed Mobility   Additional Comments OOB in chair on arrival   Transfers   Sit to Stand 5  Supervision   Additional items Increased time required;Verbal cues   Stand to Sit 5  Supervision   Additional items Increased time required;Verbal cues   Additional Comments RW   Ambulation/Elevation   Gait pattern Excessively slow;Short stride;Inconsistent renu   Gait Assistance 4  Minimal assist   Additional items Assist x 1;Verbal cues   Assistive Device Rolling walker   Distance 150'x2  (+seated rest period between trials)   Balance   Static Sitting Fair +   Dynamic Sitting Fair   Static Standing Fair -   Dynamic Standing Poor +   Ambulatory Poor +    Endurance Deficit   Endurance Deficit Yes   Endurance Deficit Description fatigue, weakness, SOB   Activity Tolerance   Activity Tolerance Patient tolerated treatment well;Patient limited by fatigue   Nurse Made Aware RN cleared   Assessment   Prognosis Good   Problem List Decreased strength;Decreased endurance;Impaired balance;Decreased mobility;Pain   Assessment Patient received in bedside chair. Patient agreeable to therapy. Patient performs functional transfers with supervision using rolling walker. Patient performs ambulation with minimal assistance x1 using rolling walker, 150'x2 with seated rest period needed between trials. Patient on 8LO2 throughout ambulation, with lowest noted SPO2 following gait 86%. The patient quickly recovers back to baseline once seated. Following ambulation, patient returns to chair. The patient is provided with education on seated LE strengthening activities to help increased strength, endurance, and stability. Patient left in bedside chair with all needs met and call bell/personal items within reach. The patient's AM-[facility] Basic Mobility Inpatient Short Form Raw Score is 20 showing further need for skilled PT services in order to improve functional mobility, decrease need for assistance, and return to PLOF. PT is recommending no post acute rehab needs on d/c from hospital.  Will continue to follow as able.   Goals   Patient Goals to keep improving   PT Treatment Day 1   Plan   Treatment/Interventions Functional transfer training;LE strengthening/ROM;Elevations;Therapeutic exercise;Endurance training;Equipment eval/education;Bed mobility;Gait training;Spoke to nursing;OT;Family   Progress Progressing toward goals   PT Frequency 4-6x/wk   Discharge Recommendation   Rehab Resource Intensity Level, PT No post-acute rehabilitation needs   Equipment Recommended Walker   Walker Package Recommended Wheeled walker   AM-PAC Basic Mobility Inpatient   Turning in Flat Bed Without Bedrails 4    Lying on Back to Sitting on Edge of Flat Bed Without Bedrails 4   Moving Bed to Chair 3   Standing Up From Chair Using Arms 3   Walk in Room 3   Climb 3-5 Stairs With Railing 3   Basic Mobility Inpatient Raw Score 20   Basic Mobility Standardized Score 43.99   [facility] Highest Level Of Mobility   -[facility] Goal 6: Walk 10 steps or more   -HLM Achieved 7: Walk 25 feet or more   End of Consult   Patient Position at End of Consult All needs within reach;Bedside chair     Laura Mercado, PT, DPT

## 2024-04-25 NOTE — PROGRESS NOTES
Patient:    MRN:  [id number]    Maude Request ID:  [id number]    Level of care reserved:  Home Health Agency    Partner Reserved:  [facility] Nurse, [place] [phone]    Clinical needs requested:    Geography searched:  [id number]    Start of Service:    Request sent:  10:09am EDT on 4/19/2024 by Yumi Lewis    Partner reserved:  3:50pm EDT on 4/25/2024 by Yumi Lewis    Choice list shared:  1:48pm EDT on 4/24/2024 by Jeannette Lawton

## 2024-04-25 NOTE — CASE MANAGEMENT
Case Management Discharge Planning Note    Patient name David S Cogan  Location [facility]-01 MRN [id number]  : 1955 Date 2024       Current Admission Date: 2024  Current Admission Diagnosis:S/P CABG (coronary artery bypass graft)   Patient Active Problem List    Diagnosis Date Noted    S/P CABG (coronary artery bypass graft) 2024    Prediabetes 2024    NSTEMI (non-ST elevated myocardial infarction) (HCC) 2024    Asthma 2024    Alcohol use, unspecified with other alcohol-induced disorder (HCC) 2021    Essential hypertension 2020    Right flank pain 2019    Abrasion of flank 2019    Overweight (BMI 25.0-29.9) 2019    Acquired keratoderma 2017    Benign prostatic hyperplasia 2017    Dyslipidemia 2017    Hyperlipidemia 2017    Hypertrophic condition of skin 2017    Subclinical hypothyroidism 2017    Thyroid nodule 2017    Benign hypertension 10/16/2017    Vesicular palmoplantar eczema 10/16/2017    Eczema of both hands 10/16/2017      LOS (days): 8  Geometric Mean LOS (GMLOS) (days): 8.2  Days to GMLOS:0.2     OBJECTIVE:  Risk of Unplanned Readmission Score: 14.49         Current admission status: Inpatient   Preferred Pharmacy:   [facility] - [place] [address] [address]  Phone: [phone] Fax: [phone]    [facility] - [place], [place] - [address]  [address]  [facility] [address]  [facility] [address]  Phone: [phone] Fax: [phone]    Primary Care Provider: Maday Chu PA-C    Primary Insurance: MEDICARE  Secondary Insurance: BLUE CROSS    DISCHARGE DETAILS:    Discharge planning discussed with:: pt and pt SO at bedside  Freedom of Choice: Yes  Comments - Freedom of Choice: [facility] reserved for Skilled Nursing per pt choice. CM will continue to follow as  needed.  CM contacted family/caregiver?: Yes  Were Treatment Team discharge recommendations reviewed with patient/caregiver?: Yes  Did patient/caregiver verbalize understanding of patient care needs?: Yes  Were patient/caregiver advised of the risks associated with not following Treatment Team discharge recommendations?: Yes    Contacts  Patient Contacts: Patient's girlfriendPaul  Relationship to Patient:: Family  Contact Method: In Person, Phone  Phone Number: [phone]  Reason/Outcome: Emergency Contact, Continuity of Care, Discharge Planning    Requested Home Health Care         Is the patient interested in HHC at discharge?: Yes  Home Health Discipline requested:: Nursing  Home Health Agency Name:: [facility]  HHA External Referral Reason (only applicable if external HHA name selected): Services not provided in network or near patient location  Home Health Follow-Up Provider:: [facility]  Home Health Services Needed:: Post-Op Care and Assessment  Homebound Criteria Met:: Requires the Assistance of Another Person for Safe Ambulation or to Leave the Home  Supporting Clincal Findings:: Fatigues Easliy in Short Distances, Limited Endurance    DME Referral Provided  Referral made for DME?: No    Other Referral/Resources/Interventions Provided:  Interventions: HHC  Referral Comments: [facility] reserved for Skilled nursing    Would you like to participate in our [facility] service program?  : No - Declined    Treatment Team Recommendation: Home with Home Health Care  Discharge Destination Plan:: Home with Home Health Care  Transport at Discharge : Family                                      Additional Comments: [facility] reserved for Skilled Nursing per pt choice. CM will continue to follow as needed.

## 2024-04-25 NOTE — PROGRESS NOTES
"Cardiology   David S Cogan 68 y.o. male MRN: [id number]  [facility]-01 Encounter: [id number]      Assessment/Plan:    >> NSTEMI status post CABG-LIMA to LAD and SVG to OM 3  >> Hypertension  >> Dyslipidemia  >> Alcohol abuse  >> Asthma    Plan:    -Monitor on telemetry  -Beta-blocker, amiodarone, diuretic per CT surgery  -Continue aspirin  -Keep K greater than 2, mag greater than 4  -Continue statin  -Outpatient follow-up with cardiology and CT surgery      4/25/2024: Has had a lot of nausea and vomiting overnight.  Right now is sleepy and has no active complaints.  Has been ambulating.  No events on telemetry        Physical exam  Objective   Vitals: Blood pressure 111/74, pulse 91, temperature 98.7 °F (37.1 °C), temperature source Oral, resp. rate 16, height 5' 7\" (1.702 m), weight 84.5 kg (186 lb 4.6 oz), SpO2 95%.  Orthostatic Blood Pressures      Flowsheet Row Most Recent Value   Blood Pressure 111/74 filed at 04/25/2024 0945   Patient Position - Orthostatic VS Sitting filed at 04/24/2024 0800          General:  AO x3, no acute distress  Cardiac:  S1-S2 normal, sternal dressing noted JVP: normal  Lungs:  Clear to auscultation bilaterally, no wheezing or crackles.  Abdomen:  Soft, nontender, nondistended.  Extremities:  Warm, well perfused, pulses palpable, no ulcers or rashes. Edema:absent  Neuro: Grossly nonfocal        ======================================================  TREADMILL STRESS  No results found for this or any previous visit.     ----------------------------------------------------------------------------------------------  NUCLEAR STRESS TEST: No results found for this or any previous visit.    No results found for this or any previous visit.      --------------------------------------------------------------------------------  CATH:  No results found for this or any previous visit.    --------------------------------------------------------------------------------  ECHO:   No results found for " this or any previous visit.    No results found for this or any previous visit.    --------------------------------------------------------------------------------  HOLTER  No results found for this or any previous visit.    --------------------------------------------------------------------------------  CAROTIDS  Results for orders placed during the hospital encounter of 04/17/24    VAS carotid complete study    Narrative  [facility] REPORT  CLINICAL:  Indications:  Patient presents for surgical clearance and general health evaluation secondary  to future open heart surgery. Patient is asymptomatic at this time.  Operative History:  Heart valve replacement  Risk Factors  The patient has history of HTN, Hyperlipidemia and previous smoking (quit  >10yrs ago).  Clinical  Right Pressure:  114/76 mm Hg, Left Pressure: IV.    FINDINGS:    Right        Impression  PSV  EDV (cm/s)  Direction of Flow  Ratio  Dist. ICA                 51          22                      0.63  Mid. ICA                  49          21                      0.61  Prox. ICA    Normal       34          15                      0.42  Dist CCA                  61          16  Mid CCA                   81          10                      1.42  Prox CCA                  57          15  Ext Carotid               98           5                      1.21  Prox Vert                 28           5  Antegrade  Subclavian                84           0    Left         Impression  PSV  EDV (cm/s)  Direction of Flow  Ratio  Dist. ICA                 51          19                      0.72  Mid. ICA                  60          24                      0.85  Prox. ICA    1 - 49%      69          24                      0.97  Dist CCA                  56          10  Mid CCA                   71          18                      0.94  Prox CCA                  76          17  Ext Carotid               78           7                      1.11  Prox Vert                  41          13  Antegrade  Subclavian               124           0        CONCLUSION:  Impression  RIGHT:  There is no evidence of arterial disease throughout the extracranial carotid  system.  Vertebral artery flow is antegrade.  There is no significant subclavian artery disease.    LEFT:  There is <50% stenosis noted in the internal carotid artery. Plaque is  homogenous and smooth.  Vertebral artery flow is antegrade.  There is no significant subclavian artery disease.    No previous study to compare.    SIGNATURE:  Electronically Signed by: ANDREA JACKSON DO on 2024-04-19 12:03:42 PM       [unfilled]   ======================================================          Review of Systems  ROS as noted above, otherwise 12 point review of systems was performed and is negative.     Historical Information   Past Medical History:   Diagnosis Date   • Asthma     Last Assessed:10/16/17   • Fontanez's esophagus    • Colonic polyp     Last Assessed:10/16/17   • Eczema    • GERD (gastroesophageal reflux disease)     Last Assessed:10/16/17   • High cholesterol     Last Assessed:10/16/17   • Hypertension     Last Assessed:10/16/17   • Non-neoplastic nevus     Last Assessed:11/16/17     Past Surgical History:   Procedure Laterality Date   • CARDIAC CATHETERIZATION Left 4/18/2024    Procedure: Cardiac Left Heart Cath;  Surgeon: Milagros Prather DO;  Location: [facility];  Service: Cardiology   • COLONOSCOPY      10/2013- egd/colon   • LYMPHADENECTOMY      Axillary Lymph node removed-benign   • MO CORONARY ARTERY BYP W/VEIN & ARTERY GRAFT 3 VEIN N/A 4/23/2024    Procedure: CORONARY ARTERY BYPASS GRAFT X2 VESSELS UTILIZING LIMA TO LAD, SVG TO OM3;  Surgeon: Balwinder Sky DO;  Location: [facility] MAIN OR;  Service: Cardiac Surgery   • TONSILLECTOMY      Last Assessed:10/16/17   • VALVE REPLACEMENT      Heart Valve Replacement; Last Assessed:10/16/17     Social History     Substance and Sexual Activity   Alcohol  Use Yes   • Alcohol/week: 14.0 standard drinks of alcohol   • Types: 14 Cans of beer per week     Social History     Substance and Sexual Activity   Drug Use No     Social History     Tobacco Use   Smoking Status Former   • Current packs/day: 0.00   • Average packs/day: 1 pack/day for 14.0 years (14.0 ttl pk-yrs)   • Types: Cigarettes   • Start date: 1974   • Quit date:    • Years since quittin.3   Smokeless Tobacco Former   • Types: Chew   • Quit date:      Family History   Problem Relation Age of Onset   • Cancer Father         groin, bone   • Diabetes Paternal Grandfather        Meds/Allergies   Hospital Medications:   Current Facility-Administered Medications   Medication Dose Route Frequency   • acetaminophen (TYLENOL) rectal suppository 650 mg  650 mg Rectal Q4H PRN   • acetaminophen (TYLENOL) tablet 975 mg  975 mg Oral Q8H SARITA   • albuterol (PROVENTIL HFA,VENTOLIN HFA) inhaler 1 puff  1 puff Inhalation Q6H PRN   • amiodarone tablet 200 mg  200 mg Oral Q8H SARITA   • Artificial Tears ophthalmic solution 2 drop  2 drop Both Eyes Q12H SARITA   • aspirin tablet 325 mg  325 mg Oral Daily   • atorvastatin (LIPITOR) tablet 80 mg  80 mg Oral Daily With Dinner   • bisacodyl (DULCOLAX) rectal suppository 10 mg  10 mg Rectal Daily PRN   • budesonide-formoterol (SYMBICORT) 160-4.5 mcg/act inhaler 2 puff  2 puff Inhalation BID   • chlorhexidine (PERIDEX) 0.12 % oral rinse 15 mL  15 mL Mouth/Throat BID   • cycloSPORINE (RESTASIS) 0.05 % ophthalmic emulsion 1 drop  1 drop Both Eyes BID   • fluticasone (FLONASE) 50 mcg/act nasal spray 2 spray  2 spray Each Nare Daily   • folic acid (FOLVITE) tablet 1 mg  1 mg Oral Daily   • furosemide (LASIX) injection 40 mg  40 mg Intravenous BID (diuretic)   • heparin (porcine) subcutaneous injection 5,000 Units  5,000 Units Subcutaneous Q8H SARITA   • insulin lispro (HumALOG/ADMELOG) 100 units/mL subcutaneous injection 1-5 Units  1-5 Units Subcutaneous HS   • insulin lispro  (HumALOG/ADMELOG) 100 units/mL subcutaneous injection 1-6 Units  1-6 Units Subcutaneous TID AC   • ipratropium (ATROVENT) 0.02 % inhalation solution 0.5 mg  0.5 mg Nebulization TID   • levalbuterol (XOPENEX) inhalation solution 1.25 mg  1.25 mg Nebulization TID   • lidocaine (LIDODERM) 5 % patch 2 patch  2 patch Topical Daily   • LORazepam (ATIVAN) injection 0.5 mg  0.5 mg Intravenous Q8H PRN   • melatonin tablet 6 mg  6 mg Oral HS   • methocarbamol (ROBAXIN) tablet 500 mg  500 mg Oral Q6H SARITA   • metoprolol tartrate (LOPRESSOR) tablet 25 mg  25 mg Oral Q12H SARITA   • mupirocin (BACTROBAN) 2 % nasal ointment 1 Application  1 Application Nasal Q12H SARITA   • ondansetron (ZOFRAN) injection 4 mg  4 mg Intravenous Q6H PRN   • oxyCODONE (ROXICODONE) IR tablet 5 mg  5 mg Oral Q6H PRN   • oxyCODONE (ROXICODONE) split tablet 2.5 mg  2.5 mg Oral Q4H PRN   • pantoprazole (PROTONIX) EC tablet 40 mg  40 mg Oral Early Morning   • polyethylene glycol (MIRALAX) packet 17 g  17 g Oral Daily   • potassium chloride (Klor-Con M20) CR tablet 20 mEq  20 mEq Oral BID   • senna-docusate sodium (SENOKOT S) 8.6-50 mg per tablet 1 tablet  1 tablet Oral BID   • temazepam (RESTORIL) capsule 15 mg  15 mg Oral HS PRN   • thiamine tablet 100 mg  100 mg Oral Daily     Home Medications:   Medications Prior to Admission   Medication   • albuterol (PROVENTIL HFA,VENTOLIN HFA) 90 mcg/act inhaler   • aspirin (ECOTRIN LOW STRENGTH) 81 mg EC tablet   • cycloSPORINE (RESTASIS) 0.05 % ophthalmic emulsion   • diltiazem (Dilt-XR) 120 MG 24 hr capsule   • esomeprazole (NexIUM) 40 MG capsule   • mometasone (NASONEX) 50 mcg/act nasal spray   • Multiple Vitamins-Minerals (CENTRUM SILVER 50+MEN) TABS   • rosuvastatin (CRESTOR) 5 mg tablet   • SYMBICORT 160-4.5 MCG/ACT inhaler   • thiamine 50 MG tablet   • TURMERIC PO       Allergies   Allergen Reactions   • Amoxicillin      Other reaction(s): Diarrhea   • Amoxicillin-Pot Clavulanate      Other reaction(s): Allergy  "Date : 07/27/2015   GI   • Metoprolol      Annotation - 06Nov2017: Aggravtes asthma   • Penicillins      Annotation - 06Nov2017: Diarrhea         Portions of the record may have been created with voice recognition software.  Occasional wrong words or \"sound a like\" substitutions may have occurred due to the inherent limitations of voice recognition software.  Read the chart carefully and recognize, using context, where substitutions have occurred.                 "

## 2024-04-25 NOTE — PROCEDURES
04/25/24    Procedure: Epicardial Pacing Wire removal    David S Cogan was returned to bed and informed of mandatory one hour post-procedure bed rest.  The assigned nurse was notified.  Epicardial pacing wires removed in routine fashion, without incident.  The patient tolerated the procedure well.  Vital signs ordered  q 15 minutes for one hour, as per protocol.    SIGNATURE: Andria Rivers PA-C  DATE: April 25, 2024  TIME: 8:42 AM    04/25/24    Procedure: Chest tube removal    Chest tubes removed in routine fashion without incident.  Insertion site dressed with Acticoat.  David S Cogan tolerated the procedure well.  Nurse notified.    SIGNATURE: Andria Rivers PA-C  DATE: April 25, 2024  TIME: 8:42 AM    Noticed agitation before chest tube removal and overall pain with movement.  History of 6 beers a day.  Started ativan post removal prophylactic.

## 2024-04-25 NOTE — RESTORATIVE TECHNICIAN NOTE
Restorative Technician Note      Patient Name: David S Cogan     Restorative Tech Visit Date: 04/25/24  Note Type: Mobility  Patient Position Upon Consult: Bedside chair  Activity Performed: Ambulated  Assistive Device: Roller walker  Patient Position at End of Consult: All needs within reach; Supine

## 2024-04-26 LAB
ANION GAP SERPL CALCULATED.3IONS-SCNC: 8 MMOL/L (ref 4–13)
BUN SERPL-MCNC: 20 MG/DL (ref 5–25)
CALCIUM SERPL-MCNC: 8 MG/DL (ref 8.4–10.2)
CHLORIDE SERPL-SCNC: 98 MMOL/L (ref 96–108)
CO2 SERPL-SCNC: 29 MMOL/L (ref 21–32)
CREAT SERPL-MCNC: 1.16 MG/DL (ref 0.6–1.3)
ERYTHROCYTE [DISTWIDTH] IN BLOOD BY AUTOMATED COUNT: 13.8 % (ref 11.6–15.1)
GFR SERPL CREATININE-BSD FRML MDRD: 64 ML/MIN/1.73SQ M
GLUCOSE SERPL-MCNC: 106 MG/DL (ref 65–140)
GLUCOSE SERPL-MCNC: 107 MG/DL (ref 65–140)
GLUCOSE SERPL-MCNC: 111 MG/DL (ref 65–140)
GLUCOSE SERPL-MCNC: 120 MG/DL (ref 65–140)
GLUCOSE SERPL-MCNC: 125 MG/DL (ref 65–140)
GLUCOSE SERPL-MCNC: 156 MG/DL (ref 65–140)
HCT VFR BLD AUTO: 34.8 % (ref 36.5–49.3)
HGB BLD-MCNC: 11.4 G/DL (ref 12–17)
MAGNESIUM SERPL-MCNC: 2.2 MG/DL (ref 1.9–2.7)
MCH RBC QN AUTO: 30.8 PG (ref 26.8–34.3)
MCHC RBC AUTO-ENTMCNC: 32.8 G/DL (ref 31.4–37.4)
MCV RBC AUTO: 94 FL (ref 82–98)
PLATELET # BLD AUTO: 191 THOUSANDS/UL (ref 149–390)
PMV BLD AUTO: 11.2 FL (ref 8.9–12.7)
POTASSIUM SERPL-SCNC: 4 MMOL/L (ref 3.5–5.3)
RBC # BLD AUTO: 3.7 MILLION/UL (ref 3.88–5.62)
SODIUM SERPL-SCNC: 135 MMOL/L (ref 135–147)
WBC # BLD AUTO: 7.54 THOUSAND/UL (ref 4.31–10.16)

## 2024-04-26 PROCEDURE — 99024 POSTOP FOLLOW-UP VISIT: CPT | Performed by: PHYSICIAN ASSISTANT

## 2024-04-26 PROCEDURE — 94640 AIRWAY INHALATION TREATMENT: CPT

## 2024-04-26 PROCEDURE — 97116 GAIT TRAINING THERAPY: CPT

## 2024-04-26 PROCEDURE — 94664 DEMO&/EVAL PT USE INHALER: CPT

## 2024-04-26 PROCEDURE — 82948 REAGENT STRIP/BLOOD GLUCOSE: CPT

## 2024-04-26 PROCEDURE — 97110 THERAPEUTIC EXERCISES: CPT

## 2024-04-26 PROCEDURE — 85027 COMPLETE CBC AUTOMATED: CPT | Performed by: PHYSICIAN ASSISTANT

## 2024-04-26 PROCEDURE — 94760 N-INVAS EAR/PLS OXIMETRY 1: CPT

## 2024-04-26 PROCEDURE — 80048 BASIC METABOLIC PNL TOTAL CA: CPT | Performed by: PHYSICIAN ASSISTANT

## 2024-04-26 PROCEDURE — 83735 ASSAY OF MAGNESIUM: CPT | Performed by: PHYSICIAN ASSISTANT

## 2024-04-26 RX ORDER — LORAZEPAM 2 MG/ML
0.25 INJECTION INTRAMUSCULAR EVERY 8 HOURS PRN
Status: DISCONTINUED | OUTPATIENT
Start: 2024-04-26 | End: 2024-04-27 | Stop reason: HOSPADM

## 2024-04-26 RX ORDER — BISACODYL 5 MG/1
10 TABLET, DELAYED RELEASE ORAL ONCE
Status: COMPLETED | OUTPATIENT
Start: 2024-04-26 | End: 2024-04-26

## 2024-04-26 RX ORDER — CHLORHEXIDINE GLUCONATE ORAL RINSE 1.2 MG/ML
15 SOLUTION DENTAL 2 TIMES DAILY
Status: DISCONTINUED | OUTPATIENT
Start: 2024-04-26 | End: 2024-04-27 | Stop reason: HOSPADM

## 2024-04-26 RX ADMIN — THIAMINE HCL TAB 100 MG 100 MG: 100 TAB at 08:50

## 2024-04-26 RX ADMIN — IPRATROPIUM BROMIDE 0.5 MG: 0.5 SOLUTION RESPIRATORY (INHALATION) at 21:19

## 2024-04-26 RX ADMIN — HEPARIN SODIUM 5000 UNITS: 5000 INJECTION INTRAVENOUS; SUBCUTANEOUS at 22:14

## 2024-04-26 RX ADMIN — LEVALBUTEROL HYDROCHLORIDE 1.25 MG: 1.25 SOLUTION RESPIRATORY (INHALATION) at 07:12

## 2024-04-26 RX ADMIN — IPRATROPIUM BROMIDE 0.5 MG: 0.5 SOLUTION RESPIRATORY (INHALATION) at 07:12

## 2024-04-26 RX ADMIN — METHOCARBAMOL 500 MG: 500 TABLET ORAL at 17:26

## 2024-04-26 RX ADMIN — FLUTICASONE PROPIONATE 2 SPRAY: 50 SPRAY, METERED NASAL at 08:52

## 2024-04-26 RX ADMIN — ATORVASTATIN CALCIUM 80 MG: 80 TABLET, FILM COATED ORAL at 17:26

## 2024-04-26 RX ADMIN — ACETAMINOPHEN 975 MG: 325 TABLET, FILM COATED ORAL at 22:13

## 2024-04-26 RX ADMIN — POTASSIUM CHLORIDE 20 MEQ: 1500 TABLET, EXTENDED RELEASE ORAL at 17:26

## 2024-04-26 RX ADMIN — MUPIROCIN 1 APPLICATION: 20 OINTMENT TOPICAL at 08:51

## 2024-04-26 RX ADMIN — METHOCARBAMOL 500 MG: 500 TABLET ORAL at 05:19

## 2024-04-26 RX ADMIN — AMIODARONE HYDROCHLORIDE 200 MG: 200 TABLET ORAL at 13:12

## 2024-04-26 RX ADMIN — ASPIRIN 325 MG ORAL TABLET 325 MG: 325 PILL ORAL at 08:51

## 2024-04-26 RX ADMIN — LEVALBUTEROL HYDROCHLORIDE 1.25 MG: 1.25 SOLUTION RESPIRATORY (INHALATION) at 21:19

## 2024-04-26 RX ADMIN — POTASSIUM CHLORIDE 20 MEQ: 1500 TABLET, EXTENDED RELEASE ORAL at 08:51

## 2024-04-26 RX ADMIN — LEVALBUTEROL HYDROCHLORIDE 1.25 MG: 1.25 SOLUTION RESPIRATORY (INHALATION) at 13:54

## 2024-04-26 RX ADMIN — FUROSEMIDE 40 MG: 10 INJECTION, SOLUTION INTRAMUSCULAR; INTRAVENOUS at 17:26

## 2024-04-26 RX ADMIN — METHOCARBAMOL 500 MG: 500 TABLET ORAL at 00:13

## 2024-04-26 RX ADMIN — BUDESONIDE AND FORMOTEROL FUMARATE DIHYDRATE 2 PUFF: 160; 4.5 AEROSOL RESPIRATORY (INHALATION) at 08:52

## 2024-04-26 RX ADMIN — AMIODARONE HYDROCHLORIDE 200 MG: 200 TABLET ORAL at 22:14

## 2024-04-26 RX ADMIN — ACETAMINOPHEN 975 MG: 325 TABLET, FILM COATED ORAL at 13:11

## 2024-04-26 RX ADMIN — METHOCARBAMOL 500 MG: 500 TABLET ORAL at 23:14

## 2024-04-26 RX ADMIN — HEPARIN SODIUM 5000 UNITS: 5000 INJECTION INTRAVENOUS; SUBCUTANEOUS at 05:25

## 2024-04-26 RX ADMIN — CYCLOSPORINE 1 DROP: 0.5 EMULSION OPHTHALMIC at 22:18

## 2024-04-26 RX ADMIN — IPRATROPIUM BROMIDE 0.5 MG: 0.5 SOLUTION RESPIRATORY (INHALATION) at 13:54

## 2024-04-26 RX ADMIN — SENNOSIDES AND DOCUSATE SODIUM 1 TABLET: 50; 8.6 TABLET ORAL at 08:50

## 2024-04-26 RX ADMIN — HEPARIN SODIUM 5000 UNITS: 5000 INJECTION INTRAVENOUS; SUBCUTANEOUS at 13:11

## 2024-04-26 RX ADMIN — CYCLOSPORINE 1 DROP: 0.5 EMULSION OPHTHALMIC at 08:52

## 2024-04-26 RX ADMIN — Medication 6 MG: at 22:14

## 2024-04-26 RX ADMIN — ACETAMINOPHEN 975 MG: 325 TABLET, FILM COATED ORAL at 05:19

## 2024-04-26 RX ADMIN — FUROSEMIDE 40 MG: 10 INJECTION, SOLUTION INTRAMUSCULAR; INTRAVENOUS at 08:50

## 2024-04-26 RX ADMIN — SENNOSIDES AND DOCUSATE SODIUM 1 TABLET: 50; 8.6 TABLET ORAL at 17:26

## 2024-04-26 RX ADMIN — CHLORHEXIDINE GLUCONATE 0.12% ORAL RINSE 15 ML: 1.2 LIQUID ORAL at 22:13

## 2024-04-26 RX ADMIN — FOLIC ACID 1 MG: 1 TABLET ORAL at 08:51

## 2024-04-26 RX ADMIN — CHLORHEXIDINE GLUCONATE 0.12% ORAL RINSE 15 ML: 1.2 LIQUID ORAL at 08:51

## 2024-04-26 RX ADMIN — DEXTRAN 70, GLYCERIN, HYPROMELLOSE 2 DROP: 1; 2; 3 SOLUTION/ DROPS OPHTHALMIC at 22:18

## 2024-04-26 RX ADMIN — BISACODYL 10 MG: 5 TABLET, COATED ORAL at 17:26

## 2024-04-26 RX ADMIN — PANTOPRAZOLE SODIUM 40 MG: 40 TABLET, DELAYED RELEASE ORAL at 05:19

## 2024-04-26 RX ADMIN — BUDESONIDE AND FORMOTEROL FUMARATE DIHYDRATE 2 PUFF: 160; 4.5 AEROSOL RESPIRATORY (INHALATION) at 17:26

## 2024-04-26 RX ADMIN — DEXTRAN 70, GLYCERIN, HYPROMELLOSE 2 DROP: 1; 2; 3 SOLUTION/ DROPS OPHTHALMIC at 08:52

## 2024-04-26 RX ADMIN — MUPIROCIN 1 APPLICATION: 20 OINTMENT TOPICAL at 22:14

## 2024-04-26 RX ADMIN — METOPROLOL TARTRATE 25 MG: 25 TABLET, FILM COATED ORAL at 08:51

## 2024-04-26 RX ADMIN — AMIODARONE HYDROCHLORIDE 200 MG: 200 TABLET ORAL at 05:19

## 2024-04-26 RX ADMIN — POLYETHYLENE GLYCOL 3350 17 G: 17 POWDER, FOR SOLUTION ORAL at 08:50

## 2024-04-26 NOTE — RESPIRATORY THERAPY NOTE
04/25/24 2001   Respiratory Assessment   Resp Comments pt refused bipap for the second night in a row - bipap order will be dc at this time.

## 2024-04-26 NOTE — PHYSICAL THERAPY NOTE
Physical Therapy Progress Note      04/26/24 1256   PT Last Visit   PT Visit Date 04/26/24   Note Type   Note Type Treatment   Pain Assessment   Pain Assessment Tool 0-10   Pain Score 2   Pain Location/Orientation Location: Chest   Hospital Pain Intervention(s) Ambulation/increased activity;Repositioned   Restrictions/Precautions   Weight Bearing Precautions Per Order No   Other Precautions Cardiac/sternal;Telemetry;Fall Risk;Pain   General   Family/Caregiver Present Yes   Cognition   Overall Cognitive Status WFL   Arousal/Participation Alert;Cooperative   Comments Patient is pleasant and cooperative throughout session.   Transfers   Sit to Stand 5  Supervision   Additional items Increased time required   Stand to Sit 5  Supervision   Additional items Increased time required   Ambulation/Elevation   Gait pattern Excessively slow;Short stride;Inconsistent renu   Gait Assistance 4  Minimal assist   Additional items Assist x 1;Verbal cues   Assistive Device Rolling walker   Distance 400 feet with two seated rest breaks   Stair Management Assistance 4  Minimal assist   Additional items Assist x 1   Stair Management Technique One rail R;Step to pattern   Number of Stairs 14   Balance   Static Sitting Good   Dynamic Sitting Fair   Static Standing Fair -   Dynamic Standing Poor +   Ambulatory Poor +   Endurance Deficit   Endurance Deficit Yes   Endurance Deficit Description Fatigue   Activity Tolerance   Activity Tolerance Patient tolerated treatment well   Nurse Made Aware Appropriate to see per RN   Exercises   Hip Flexion Sitting;20 reps;Bilateral   Hip Abduction Sitting;20 reps;Bilateral   Knee AROM Long Arc Quad Sitting;20 reps;Bilateral   Assessment   Prognosis Good   Problem List Decreased strength;Decreased endurance;Impaired balance;Pain   Assessment Patient seated in bedside recliner agreeable to participate in therapy. He was able to stand with S and ambulate with min A for verbal cues with use of RW. Patient  does require two seated rest breaks throughout ambulation. He negotiated 14 steps with step to gait and use of one rail. He was able to perform seated TE with good tolerance. Patient O2 stayed above 91 at all times throughout session. He is progressing well towards goals.   Barriers to Discharge None   Goals   Patient Goals To go home   STG Expiration Date 05/04/23   PT Treatment Day 2   Plan   Treatment/Interventions Functional transfer training;LE strengthening/ROM;Therapeutic exercise;Endurance training;Gait training;Spoke to nursing   Progress Progressing toward goals   PT Frequency 4-6x/wk   Discharge Recommendation   Rehab Resource Intensity Level, PT No post-acute rehabilitation needs   Equipment Recommended Walker   Walker Package Recommended Wheeled walker   AM-PAC Basic Mobility Inpatient   Turning in Flat Bed Without Bedrails 4   Lying on Back to Sitting on Edge of Flat Bed Without Bedrails 4   Moving Bed to Chair 3   Standing Up From Chair Using Arms 3   Walk in Room 3   Climb 3-5 Stairs With Railing 3   Basic Mobility Inpatient Raw Score 20   Basic Mobility Standardized Score 43.99   [facility] Highest Level Of Mobility   JH-HLM Goal 6: Walk 10 steps or more   JH-HLM Achieved 8: Walk 250 feet ot more         Milena Alejo, PTA

## 2024-04-26 NOTE — PROGRESS NOTES
Progress Note - Cardiothoracic Surgery   David S Cogan 68 y.o. male MRN: [id number]  Unit/Bed#: [facility] 421-01 Encounter: [id number]    Coronary artery disease. S/P coronary artery bypass grafting; POD # 3    24 Hour Events: No events overnight.  Weaned oxygen to 2L this AM.   Pain controlled.  Tolerating diet.  Passing some flatus, no BM yet.     Medications:   Scheduled Meds:  Current Facility-Administered Medications   Medication Dose Route Frequency Provider Last Rate    acetaminophen  650 mg Rectal Q4H PRN Victor Hugo Kwan PA-C      acetaminophen  975 mg Oral Q8H [facility] Victor Hugo Kwan PA-C      albuterol  1 puff Inhalation Q6H PRN Victor Hugo Kwan PA-C      amiodarone  200 mg Oral Q8H [facility] Victor Hugo Kwan PA-C      Artificial Tears  2 drop Both Eyes Q12H [facility] Victor Hugo Kwan PA-C      aspirin  325 mg Oral Daily Victor Hugo Kwan PA-C      atorvastatin  80 mg Oral Daily With Dinner Victor Hugo Kwan PA-C      bisacodyl  10 mg Rectal Daily PRN Victor Hugo Kwan PA-C      budesonide-formoterol  2 puff Inhalation BID Victor Hugo Kwan PA-C      chlorhexidine  15 mL Mouth/Throat BID Victor Hugo Kwan PA-C      cycloSPORINE  1 drop Both Eyes BID Victor Hugo Kwan PA-C      fluticasone  2 spray Each Nare Daily Victor Hugo Kwan PA-C      folic acid  1 mg Oral Daily Victor Hugo Kwan PA-C      furosemide  40 mg Intravenous BID (diuretic) Andria Rivers PA-C      heparin (porcine)  5,000 Units Subcutaneous Q8H [facility] Victor Hugo Kwan PA-C      insulin lispro  1-5 Units Subcutaneous HS Victor Hugo Kwan PA-C      insulin lispro  1-6 Units Subcutaneous TID AC Victor Hugo Kwan PA-C      ipratropium  0.5 mg Nebulization TID Victor Hugo Kwan PA-C      levalbuterol  1.25 mg Nebulization TID Victor Hugo Kwan PA-C      lidocaine  2 patch Topical Daily Victor Hugo Kwan PA-C      LORazepam  0.5 mg Intravenous Q8H PRN Andria Rivers PA-C      melatonin  6 mg Oral HS Andria Shelton  FRANCES Rivers      methocarbamol  500 mg Oral Q6H [facility] Victor Hugo Kwan PA-C      metoprolol tartrate  25 mg Oral Q12H [facility] Andria Rivers PA-C      mupirocin  1 Application Nasal Q12H [facility] Victor Hugo Kwan PA-C      ondansetron  4 mg Intravenous Q6H PRN Victor Hugo Kwan PA-C      oxyCODONE  5 mg Oral Q6H PRN Andria Rivers PA-C      oxyCODONE  2.5 mg Oral Q4H PRN Andria Rivers PA-C      pantoprazole  40 mg Oral Early Morning Victor Hugo Kwan PA-C      polyethylene glycol  17 g Oral Daily Victor Hugo Kwan PA-C      potassium chloride  20 mEq Oral BID Andria Rivers PA-C      senna-docusate sodium  1 tablet Oral BID Andria Rivers PA-C      temazepam  15 mg Oral HS PRN Victor Hugo Kwan PA-C      thiamine  100 mg Oral Daily Victor Hugo Kwan PA-C       Continuous Infusions:   PRN Meds:.  acetaminophen    albuterol    bisacodyl    LORazepam    ondansetron    oxyCODONE    oxyCODONE    temazepam    Vitals:   Vitals:    04/26/24 0255 04/26/24 0525 04/26/24 0600 04/26/24 0720   BP: 115/61   116/70   Pulse: 86   79   Resp: 17   20   Temp: 98 °F (36.7 °C)      TempSrc:       SpO2: 92%   96%   Weight:  84.6 kg (186 lb 8.2 oz) 84.6 kg (186 lb 8.2 oz)    Height:           Telemetry: NSR; Heart Rate: 80    Respiratory:   SpO2: SpO2: 96 %; 4 LPM    Intake/Output:     Intake/Output Summary (Last 24 hours) at 4/26/2024 0735  Last data filed at 4/26/2024 0653  Gross per 24 hour   Intake --   Output 2375 ml   Net -2375 ml      Weights:   Weight (last 2 days)       Date/Time Weight    04/26/24 0600 84.6 (186.51)    04/26/24 0525 84.6 (186.51)    04/25/24 0430 84.5 (186.29)    04/24/24 0538 86.1 (189.82)              Results:   Results from last 7 days   Lab Units 04/26/24  0514 04/25/24  0429 04/24/24  0358   WBC Thousand/uL 7.54 10.29* 11.93*   HEMOGLOBIN g/dL 11.4* 12.4 13.6   HEMATOCRIT % 34.8* 39.7 42.1   PLATELETS Thousands/uL 191 189 206     Results from last 7 days   Lab Units  04/26/24  0514 04/25/24  0429 04/24/24  1410 04/23/24  1555 04/23/24  1230   SODIUM mmol/L 135 138 137   < >  --    POTASSIUM mmol/L 4.0 4.3 4.3   < >  --    CHLORIDE mmol/L 98 101 104   < >  --    CO2 mmol/L 29 28 24   < >  --    CO2, I-STAT mmol/L  --   --   --   --  22   BUN mg/dL 20 15 19   < >  --    CREATININE mg/dL 1.16 1.19 1.50*   < >  --    GLUCOSE, ISTAT mg/dl  --   --   --   --  151*   CALCIUM mg/dL 8.0* 8.5 8.2*   < >  --     < > = values in this interval not displayed.     Results from last 7 days   Lab Units 04/23/24  1228 04/23/24  0544 04/22/24  0508   INR  1.37*  --   --    PTT seconds 34 35 85*     Point of care glucose: 120-163    Invasive Lines/Tubes:  Invasive Devices       Central Venous Catheter Line  Duration             CVC Central Lines 04/23/24 Right Internal jugular 2 days                    Physical Exam:    General: No acute distress, Alert, and Normal appearance  HEENT/NECK:  Normocephalic. Atraumatic.  No jugular venous distention.    Cardiac: Regular rate and rhythm  Pulmonary:  Breath sounds clear bilaterally  Abdomen:  Non-tender, Non-distended, and Normal bowel sounds  Incisions: Sternum is stable.  Incision dressed with Acticoat.  No erythema or drainage and Saphenectomy incision dressed with Acticoat.  No erythema or drainage  Extremities: Extremities warm/dry and 1+ edema B/L  Neuro: Alert and oriented X 3  Skin: Warm/Dry, without rashes or lesions.    Assessment:  Principal Problem:    S/P CABG (coronary artery bypass graft)  Active Problems:    Benign hypertension    Benign prostatic hyperplasia    Hyperlipidemia    Overweight (BMI 25.0-29.9)    Alcohol use, unspecified with other alcohol-induced disorder (HCC)    NSTEMI (non-ST elevated myocardial infarction) (HCC)    Asthma    Prediabetes       Coronary artery disease. S/P coronary artery bypass grafting; POD # 3    Plan:    Cardiac:   Normal ventricular systolic function, EF 55%    NSR; HR well-controlled  BP  well-controlled    Continue Lopressor, 25mg PO BID    Hold ACE inhibitor/ARB secondary to hypotension    Continue prophylactic Amiodarone, 200 mg PO TID    Continue ASA and Statin therapy    Epicardial pacing wires have been removed    Maintain central IV access today for medications requiring central IV access    Continue Subcutaneous Heparin for DVT prophylaxis    Pulmonary:     Acute post-op pulmonary insufficiency; Requiring 4 liters via nasal cannla, secondary to atelectasis and poor inspiratory effort secondary to pain. Continue incentive spirometry/coughing/deep breathing exercises.  Wean supplemental oxygen as tolerated for saturation > 90%    Chest tubes have been discontinued    Renal:     Post op Creatinine stable; Follow up labs prn     Intake/Output net: -2375 mL/24 hours    Diuretic Regimen:  Continue IV Lasix, 40 mg BID  Continue Potassium Chloride 20 mEq PO BID    Neuro:    Neurologically intact; No active issues     Incisional pain well controlled    GI:    Controlled carbohydrate diet level two/Cardiac diet, with 1800 mL fluid restriction    Tolerating diet without complaint    Continue stool softeners and prn suppository    Continue GI prophylaxis    Endo: SSI coverage     7    Hematology:     Post-operative blood count acceptable; Trend prn    8.   Disposition:        Anticipated discharge date: potentially tomorrow pending progress and oxygen requirements        VTE Pharmacologic Prophylaxis: Heparin  VTE Mechanical Prophylaxis: sequential compression device    Collaborative rounds completed with supervising physician  Plan of care discussed with bedside nurse    SIGNATURE: Andria Rivers PA-C  DATE: April 26, 2024  TIME: 7:35 AM

## 2024-04-26 NOTE — INCIDENTAL FINDINGS
The following findings require follow up:  Radiographic finding   Finding: Mild bilateral lower lobe juxtapleural reticulation. It is uncertain if these interstitial lung abnormalities represent pulmonary fibrosis as there is no architectural distortion such as traction bronchiectasis/bronchiolectasis or honeycombing. Recommend, follow-up with a high-resolution chest CT in 1 year    Follow up required: yes   Follow up should be done within 1 year follow up with CT scan    Please notify the following clinician to assist with the follow up: your primary care provider

## 2024-04-26 NOTE — PLAN OF CARE
Problem: PHYSICAL THERAPY ADULT  Goal: Performs mobility at highest level of function for planned discharge setting.  See evaluation for individualized goals.  Description: Treatment/Interventions: Functional transfer training, LE strengthening/ROM, Elevations, Therapeutic exercise, Endurance training, Equipment eval/education, Bed mobility, Gait training, Spoke to nursing, OT, Family  Equipment Recommended: Walker (at this time)       See flowsheet documentation for full assessment, interventions and recommendations.  Outcome: Progressing  Note: Prognosis: Good  Problem List: Decreased strength, Decreased endurance, Impaired balance, Pain  Assessment: Patient seated in bedside recliner agreeable to participate in therapy. He was able to stand with S and ambulate with min A for verbal cues with use of RW. Patient does require two seated rest breaks throughout ambulation. He negotiated 14 steps with step to gait and use of one rail. He was able to perform seated TE with good tolerance. Patient O2 stayed above 91 at all times throughout session. He is progressing well towards goals.  Barriers to Discharge: None     Rehab Resource Intensity Level, PT: No post-acute rehabilitation needs    See flowsheet documentation for full assessment.

## 2024-04-26 NOTE — RESTORATIVE TECHNICIAN NOTE
Restorative Technician Note      Patient Name: David S Cogan     Restorative Tech Visit Date: 04/26/24  Note Type: Mobility  Patient Position Upon Consult: Bedside chair  Activity Performed: Ambulated  Assistive Device: Roller walker  Patient Position at End of Consult: All needs within reach; Bedside chair

## 2024-04-27 VITALS
HEIGHT: 67 IN | OXYGEN SATURATION: 95 % | WEIGHT: 181.22 LBS | HEART RATE: 94 BPM | RESPIRATION RATE: 20 BRPM | SYSTOLIC BLOOD PRESSURE: 134 MMHG | DIASTOLIC BLOOD PRESSURE: 85 MMHG | TEMPERATURE: 98.2 F | BODY MASS INDEX: 28.44 KG/M2

## 2024-04-27 LAB
ANION GAP SERPL CALCULATED.3IONS-SCNC: 11 MMOL/L (ref 4–13)
BUN SERPL-MCNC: 19 MG/DL (ref 5–25)
CALCIUM SERPL-MCNC: 8.8 MG/DL (ref 8.4–10.2)
CHLORIDE SERPL-SCNC: 98 MMOL/L (ref 96–108)
CO2 SERPL-SCNC: 28 MMOL/L (ref 21–32)
CREAT SERPL-MCNC: 1.08 MG/DL (ref 0.6–1.3)
GFR SERPL CREATININE-BSD FRML MDRD: 70 ML/MIN/1.73SQ M
GLUCOSE SERPL-MCNC: 104 MG/DL (ref 65–140)
GLUCOSE SERPL-MCNC: 106 MG/DL (ref 65–140)
POTASSIUM SERPL-SCNC: 3.9 MMOL/L (ref 3.5–5.3)
SODIUM SERPL-SCNC: 137 MMOL/L (ref 135–147)

## 2024-04-27 PROCEDURE — 82948 REAGENT STRIP/BLOOD GLUCOSE: CPT

## 2024-04-27 PROCEDURE — 94664 DEMO&/EVAL PT USE INHALER: CPT

## 2024-04-27 PROCEDURE — 94760 N-INVAS EAR/PLS OXIMETRY 1: CPT

## 2024-04-27 PROCEDURE — 94640 AIRWAY INHALATION TREATMENT: CPT

## 2024-04-27 PROCEDURE — 99024 POSTOP FOLLOW-UP VISIT: CPT | Performed by: PHYSICIAN ASSISTANT

## 2024-04-27 PROCEDURE — 80048 BASIC METABOLIC PNL TOTAL CA: CPT | Performed by: PHYSICIAN ASSISTANT

## 2024-04-27 RX ORDER — OXYCODONE HYDROCHLORIDE 5 MG/1
2.5 TABLET ORAL EVERY 6 HOURS PRN
Qty: 20 TABLET | Refills: 0 | Status: SHIPPED | OUTPATIENT
Start: 2024-04-27 | End: 2024-05-01

## 2024-04-27 RX ORDER — POTASSIUM CHLORIDE 20 MEQ/1
20 TABLET, EXTENDED RELEASE ORAL ONCE
Status: COMPLETED | OUTPATIENT
Start: 2024-04-27 | End: 2024-04-27

## 2024-04-27 RX ORDER — FUROSEMIDE 10 MG/ML
40 INJECTION INTRAMUSCULAR; INTRAVENOUS DAILY
Status: DISCONTINUED | OUTPATIENT
Start: 2024-04-28 | End: 2024-04-27 | Stop reason: HOSPADM

## 2024-04-27 RX ORDER — LANOLIN ALCOHOL/MO/W.PET/CERES
100 CREAM (GRAM) TOPICAL DAILY
Qty: 30 TABLET | Refills: 1 | Status: SHIPPED | OUTPATIENT
Start: 2024-04-28

## 2024-04-27 RX ORDER — FUROSEMIDE 10 MG/ML
40 INJECTION INTRAMUSCULAR; INTRAVENOUS
Status: DISCONTINUED | OUTPATIENT
Start: 2024-04-27 | End: 2024-04-27

## 2024-04-27 RX ORDER — POLYETHYLENE GLYCOL 3350 17 G/17G
17 POWDER, FOR SOLUTION ORAL DAILY
Qty: 510 G | Refills: 0 | Status: SHIPPED | OUTPATIENT
Start: 2024-04-28 | End: 2024-05-01

## 2024-04-27 RX ORDER — TORSEMIDE 20 MG/1
20 TABLET ORAL DAILY
Qty: 7 TABLET | Refills: 1 | Status: SHIPPED | OUTPATIENT
Start: 2024-04-27 | End: 2024-05-06

## 2024-04-27 RX ORDER — ACETAMINOPHEN 325 MG/1
650 TABLET ORAL EVERY 4 HOURS PRN
COMMUNITY
Start: 2024-04-27

## 2024-04-27 RX ORDER — POTASSIUM CHLORIDE 20 MEQ/1
20 TABLET, EXTENDED RELEASE ORAL DAILY
Qty: 7 TABLET | Refills: 1 | Status: SHIPPED | OUTPATIENT
Start: 2024-04-27 | End: 2024-05-06

## 2024-04-27 RX ORDER — AMOXICILLIN 250 MG
1 CAPSULE ORAL 2 TIMES DAILY
COMMUNITY
Start: 2024-04-27 | End: 2024-05-01

## 2024-04-27 RX ORDER — FOLIC ACID 1 MG/1
1 TABLET ORAL DAILY
Qty: 30 TABLET | Refills: 1 | Status: SHIPPED | OUTPATIENT
Start: 2024-04-28

## 2024-04-27 RX ORDER — LANOLIN ALCOHOL/MO/W.PET/CERES
6 CREAM (GRAM) TOPICAL
Qty: 30 TABLET | Refills: 1 | Status: SHIPPED | OUTPATIENT
Start: 2024-04-27

## 2024-04-27 RX ORDER — ASPIRIN 325 MG
325 TABLET ORAL DAILY
Qty: 30 TABLET | Refills: 2 | Status: SHIPPED | OUTPATIENT
Start: 2024-04-28

## 2024-04-27 RX ORDER — ROSUVASTATIN CALCIUM 40 MG/1
40 TABLET, COATED ORAL DAILY
Qty: 30 TABLET | Refills: 2 | Status: SHIPPED | OUTPATIENT
Start: 2024-04-27

## 2024-04-27 RX ADMIN — ASPIRIN 325 MG ORAL TABLET 325 MG: 325 PILL ORAL at 09:15

## 2024-04-27 RX ADMIN — DEXTRAN 70, GLYCERIN, HYPROMELLOSE 2 DROP: 1; 2; 3 SOLUTION/ DROPS OPHTHALMIC at 09:14

## 2024-04-27 RX ADMIN — METOPROLOL TARTRATE 37.5 MG: 25 TABLET, FILM COATED ORAL at 09:14

## 2024-04-27 RX ADMIN — ACETAMINOPHEN 975 MG: 325 TABLET, FILM COATED ORAL at 06:07

## 2024-04-27 RX ADMIN — HEPARIN SODIUM 5000 UNITS: 5000 INJECTION INTRAVENOUS; SUBCUTANEOUS at 06:07

## 2024-04-27 RX ADMIN — AMIODARONE HYDROCHLORIDE 200 MG: 200 TABLET ORAL at 06:07

## 2024-04-27 RX ADMIN — FOLIC ACID 1 MG: 1 TABLET ORAL at 09:14

## 2024-04-27 RX ADMIN — FUROSEMIDE 40 MG: 10 INJECTION, SOLUTION INTRAMUSCULAR; INTRAVENOUS at 09:14

## 2024-04-27 RX ADMIN — BUDESONIDE AND FORMOTEROL FUMARATE DIHYDRATE 2 PUFF: 160; 4.5 AEROSOL RESPIRATORY (INHALATION) at 09:14

## 2024-04-27 RX ADMIN — POTASSIUM CHLORIDE 20 MEQ: 1500 TABLET, EXTENDED RELEASE ORAL at 09:14

## 2024-04-27 RX ADMIN — FLUTICASONE PROPIONATE 2 SPRAY: 50 SPRAY, METERED NASAL at 09:14

## 2024-04-27 RX ADMIN — CYCLOSPORINE 1 DROP: 0.5 EMULSION OPHTHALMIC at 09:15

## 2024-04-27 RX ADMIN — CHLORHEXIDINE GLUCONATE 0.12% ORAL RINSE 15 ML: 1.2 LIQUID ORAL at 09:15

## 2024-04-27 RX ADMIN — THIAMINE HCL TAB 100 MG 100 MG: 100 TAB at 09:14

## 2024-04-27 RX ADMIN — PANTOPRAZOLE SODIUM 40 MG: 40 TABLET, DELAYED RELEASE ORAL at 06:07

## 2024-04-27 RX ADMIN — POTASSIUM CHLORIDE 20 MEQ: 1500 TABLET, EXTENDED RELEASE ORAL at 09:15

## 2024-04-27 RX ADMIN — LEVALBUTEROL HYDROCHLORIDE 1.25 MG: 1.25 SOLUTION RESPIRATORY (INHALATION) at 08:43

## 2024-04-27 RX ADMIN — METHOCARBAMOL 500 MG: 500 TABLET ORAL at 06:07

## 2024-04-27 RX ADMIN — IPRATROPIUM BROMIDE 0.5 MG: 0.5 SOLUTION RESPIRATORY (INHALATION) at 08:43

## 2024-04-27 RX ADMIN — MUPIROCIN 1 APPLICATION: 20 OINTMENT TOPICAL at 09:15

## 2024-04-27 NOTE — CASE MANAGEMENT
Case Management Discharge Planning Note    Patient name David S Cogan  Location [facility] 421/[facility] 421-01 MRN [id number]  : 1955 Date 2024       Current Admission Date: 2024  Current Admission Diagnosis:S/P CABG (coronary artery bypass graft)   Patient Active Problem List    Diagnosis Date Noted    S/P CABG (coronary artery bypass graft) 2024    Prediabetes 2024    NSTEMI (non-ST elevated myocardial infarction) (HCC) 2024    Asthma 2024    Alcohol use, unspecified with other alcohol-induced disorder (HCC) 2021    Essential hypertension 2020    Right flank pain 2019    Abrasion of flank 2019    Overweight (BMI 25.0-29.9) 2019    Acquired keratoderma 2017    Benign prostatic hyperplasia 2017    Dyslipidemia 2017    Hyperlipidemia 2017    Hypertrophic condition of skin 2017    Subclinical hypothyroidism 2017    Thyroid nodule 2017    Benign hypertension 10/16/2017    Vesicular palmoplantar eczema 10/16/2017    Eczema of both hands 10/16/2017      LOS (days): 10  Geometric Mean LOS (GMLOS) (days): 8.2  Days to GMLOS:-1.6     OBJECTIVE:  Risk of Unplanned Readmission Score: 14.41         Current admission status: Inpatient   Preferred Pharmacy:   [facility] - [place] [id number] [address]  Phone: [phone] Fax: [phone]    [facility] - [place], [place] - [address]  [address]  [address]  [facility] [address]  Phone: [phone] Fax: [phone]    [facility] - [place], [place] - [address]  [address]  [place] [address]  Phone: [phone] Fax: [phone]    Primary Care Provider: Maday [place], PA-C    Primary Insurance: MEDICARE  Secondary Insurance: BLUE CROSS    DISCHARGE DETAILS:                               Additional  Comments: DEREJE LVM for Patty Patty Behre # [phone]  from [facility] stating pt will DC home today. CM also sent a message via [employer] faxed. CM will continue to follow as needed.

## 2024-04-27 NOTE — DISCHARGE SUMMARY
Discharge Summary - Cardiothoracic Surgery   David S Cogan 68 y.o. male MRN: [id number]  Unit/Bed#: [facility] 421-01 Encounter: [id number]    Admission Date: 4/17/2024     Discharge Date: 04/27/24    Admitting Diagnosis: Chest pain [R07.9]  NSTEMI (non-ST elevated myocardial infarction) (HCC) [I21.4]    Primary Discharge Diagnosis:   Coronary artery disease. S/P coronary artery bypass grafting;    Secondary Discharge Diagnosis:   GERD, hypertension, hyperlipidemia, regular alcohol use, asthma, Fontanez’s esophagus, preDM2 (A1c 5.9)    Attending: Balwinder Sky D.O.    Consulting Physician(s):   Cardiology  Medical critical care    Procedures Performed:   Procedure(s):  CORONARY ARTERY BYPASS GRAFT X2 VESSELS UTILIZING LIMA TO LAD, SVG TO OM3     Hospital Course:   4/18: 68 y.o. male without a longstanding cardiovascular past medical history.  This certainly he did see a cardiologist at [facility] when he previously experienced angina.  By report he underwent cardiac catheterization approximately 10 years ago.  He tells me that the results of the study did not identify any significant obstructive coronary disease.  Since that time he did not complete routine cardiology follow-up. His current clinical course began several weeks ago.  At that time he began to note exertional angina and dyspnea.  Symptoms began with activity as he swam regularly for exercise.  Symptoms began to increase in intensity and duration.  Ultimately he was referred to the emergency department at [facility].  Upon evaluation NSTEMI was identified.  He was loaded with Brilinta 180 mg and transferred to [place] for cardiac catheterization.  The study was completed today.  Severe two-vessel coronary artery disease was identified.  Cardiovascular surgery consultation was obtained at this time. Preop work up initiated. CABG recommended.      4/19: Consent obtained, echo, carotids, and VM reviewed without significant findings. CT chest  and MRSA pending. CT chest reported, normal ascending, mild b/l lower lob abnormalities, incidental finding needing long term follow up. No events or complaints, remains on heparin gtt. Losartan d/c’d. Cr trending up 1.22. Planning for CABG Tues 4/23. Continue brilinta washout.      4/20: Vitals stable. No CP/SOB. Cont IV heparin.      4/21: labs and vitals stable. Cont IV heparin. No events.      4/22: Palmar arches demonstrate incomplete arch on left, complete on right. For RUQ US this afternoon. Preop orders placed. Ready for OR tomorrow. PM: RUQ with mod. Heterogenous hepatic statosis.      4/23: CABG x 2.   Intraoperative blood products include: none.  No significant postoperative bleeding.  Transferred to ICU without inotropic or pressor support/supported with epi at 4.  Wean towards extubation. PM: Extubated to BiPAP. Given 500cc volume post-op. Weaned off epi & bethanie. HTN, started on cardene, now at 2.5.     4/24: Delined. Weaned off cardene, a line discontinued, start Lopressor 12.5mg BID. Transitioned from BiPAP to HFNC this AM (80%, 50L), respiratory acidosis on ABG, recheck this AM, wean O2 as able, pain regimen increased QHS but wean back down this AM to allow for more breathing, add TID nebs. Cr 1.4 from baseline 1.2, Lasix 40mg IV x1, discontinue oden in PM if UOP adequate from AM dose. Transfer to stepdown. PM: Cr 1.5 from 1.4, great UO after lasix this am     4/25: Jittery this AM, added ativan prn (hx of 6-7 beers/day per patient).  D/C CT and EPW.  D/C po dilaudid, oxycodone prn.  D/C oden at [place]. PM: Weaned HFNC to 2L NC. Doing well, no further s/sx of withdrawl.     4/26: NSR on 2LNC. Diuresing well. Increase bowel regimen. Ativan decreased to 0.25mg IV prn per patient request. Home potentially tomorrow pending progress.     4/27: NSR on RA.  Doing well.  +BM.  Pain contolled. STable for d/c to home.     Condition at Discharge:   good     Discharge Physical Exam:    Please see the documented  physical exam from this morning's progress note for details.    Discharge Data:  Results from last 7 days   Lab Units 04/26/24  0514 04/25/24  0429 04/24/24  0358   WBC Thousand/uL 7.54 10.29* 11.93*   HEMOGLOBIN g/dL 11.4* 12.4 13.6   HEMATOCRIT % 34.8* 39.7 42.1   PLATELETS Thousands/uL 191 189 206     Results from last 7 days   Lab Units 04/27/24  0620 04/26/24  0514 04/25/24  0429 04/23/24  1555 04/23/24  1230   POTASSIUM mmol/L 3.9 4.0 4.3   < >  --    CHLORIDE mmol/L 98 98 101   < >  --    CO2 mmol/L 28 29 28   < >  --    CO2, I-STAT mmol/L  --   --   --   --  22   BUN mg/dL 19 20 15   < >  --    CREATININE mg/dL 1.08 1.16 1.19   < >  --    GLUCOSE, ISTAT mg/dl  --   --   --   --  151*   CALCIUM mg/dL 8.8 8.0* 8.5   < >  --     < > = values in this interval not displayed.     Results from last 7 days   Lab Units 04/23/24  1228 04/23/24  0544 04/22/24  0508   INR  1.37*  --   --    PTT seconds 34 35 85*       Discharge instructions/Information to patient and family:   See after visit summary for information provided to patient and family.      David S Cogan was educated on restrictions regarding driving and lifting, and techniques of proper incisional care.  They were specifically counselled on signs and symptoms of an incisional infection, and advised to contact our service immediately should they develop fevers, sweats, chill, redness or drainage at the site of any incisions.    Provisions for Follow-Up Care:  See after visit summary for information related to follow-up care and any pertinent home health orders.      Disposition:  Home    Planned Readmission:   No    Discharge Medications:  See after visit summary for reconciled discharge medications provided to patient and family.      David S Cogan was provided contact information and scheduled a follow up appointment with Balwinder Sky D.O.  Additionally, follow up appointments have been scheduled for their primary care physician and primary cardiologist.   Contact information was provided.    David S Cogan was counseled on the importance of avoiding tobacco products.  As with all patients whom have undergone open heart surgery, tobacco cessation medication was contraindicated at the time of discharge.     ACE/ARB was Contraindicated secondary to renal dysfunction    Beta Blocker was Prescribed at discharge    Aspirin was Prescribed at discharge    Statin was Prescribed at discharge      The patient was discharged on ongoing diuretic therapy with Torsemide, 20 mg, PO QD and Potassium Chloride 20 mEq, PO QD.  They were advised to continue these medications for 7 days, unless otherwise directed.     Narcotic pain medication was prescribed in the form of oxycodone.  Prior to prescribing, their prescription profile was reviewed on the [facility] of health prescription drug monitoring program.    The patient was informed that following their postoperative surgical evaluation, they will be referred to outpatient cardiac rehabilitation.  They were counseled that this program is run by specialists who will help them safely strengthen their heart and prevent more heart disease.  Cardiac rehabilitation will include exercise, relaxation, stress management, and heart-healthy nutrition.  Caregivers will also check to make sure their medication regimen is working.    During this admission, the patient was questioned on their use of tobacco, alcohol, and illicit/non-prescription drug use in the  previous 24 months. During this time frame they admit to using unhealthy alcohol use. As such they have been counseled on the importance of cessation and abstinence.     I spent   30 minutes discharging the patient. This time was spent on the day of discharge. I had direct contact with the patient on the day of discharge. Additional documentation is required if more than 30 minutes were spent on discharge.     SIGNATURE: Andria Rivers PA-C  DATE: April 27, 2024  TIME: 10:09  AM

## 2024-04-27 NOTE — PROGRESS NOTES
Progress Note - Cardiothoracic Surgery   David S Cogan 68 y.o. male MRN: [id number]  Unit/Bed#: [facility] 421-01 Encounter: [id number]    Coronary artery disease. S/P coronary artery bypass grafting; POD # 4    24 Hour Events: looks great today.  Off oxygen since yesterday morning.  He is self sufficient doing on his own.  Tolerating diet.  Pain controlled.     Medications:   Scheduled Meds:  Current Facility-Administered Medications   Medication Dose Route Frequency Provider Last Rate    acetaminophen  650 mg Rectal Q4H PRN Victor Hugo Kwan PA-C      acetaminophen  975 mg Oral Q8H [facility] Victor Hugo Kwan PA-C      albuterol  1 puff Inhalation Q6H PRN Victor Hugo Kwan PA-C      amiodarone  200 mg Oral Q8H [facility] Victor Hugo Kwan PA-C      Artificial Tears  2 drop Both Eyes Q12H [facility] Victor Hugo Kwan PA-C      aspirin  325 mg Oral Daily Victor Hugo Kwan PA-C      atorvastatin  80 mg Oral Daily With Dinner Victor Hugo Kwan PA-C      bisacodyl  10 mg Rectal Daily PRN Victor Hugo Kwan PA-C      budesonide-formoterol  2 puff Inhalation BID Victor Hugo Kwan PA-C      chlorhexidine  15 mL Mouth/Throat BID Andria Rivers PA-C      cycloSPORINE  1 drop Both Eyes BID Victor Hugo Kwan PA-C      fluticasone  2 spray Each Nare Daily Victor Hugo Kwan PA-C      folic acid  1 mg Oral Daily Victor Hugo Kwan PA-C      furosemide  40 mg Intravenous BID (diuretic) Andria Rivers PA-C      heparin (porcine)  5,000 Units Subcutaneous Q8H [facility] Victor Hugo Kwan PA-C      insulin lispro  1-5 Units Subcutaneous HS Victor Hugo Kwan PA-C      insulin lispro  1-6 Units Subcutaneous TID AC Victor Hugo Kwan PA-C      ipratropium  0.5 mg Nebulization TID Victor Hugo Kwan PA-C      levalbuterol  1.25 mg Nebulization TID Victor Hugo Kwan PA-C      lidocaine  2 patch Topical Daily Victor Hugo Kwan PA-C      LORazepam  0.25 mg Intravenous Q8H PRN Andria Rivers PA-C      melatonin  6 mg Oral HS  Andria Rivers PA-C      methocarbamol  500 mg Oral Q6H [facility] Victor Hugo Kwan PA-C      metoprolol tartrate  25 mg Oral Q12H [facility] Andria Rivers PA-C      mupirocin  1 Application Nasal Q12H [facility] Victor Hugo Kwan PA-C      ondansetron  4 mg Intravenous Q6H PRN Victor Hugo Kwan PA-C      oxyCODONE  2.5 mg Oral Q4H PRN Andria Rivers PA-C      pantoprazole  40 mg Oral Early Morning Victor Hugo Kwan PA-C      polyethylene glycol  17 g Oral Daily Victor Hugo Kwan PA-C      potassium chloride  20 mEq Oral BID Andria Rivers PA-C      senna-docusate sodium  1 tablet Oral BID Andria Rivers PA-C      temazepam  15 mg Oral HS PRN Victor Hugo Kwan PA-C      thiamine  100 mg Oral Daily Victor Hugo Kwan PA-C       Continuous Infusions:   PRN Meds:.  acetaminophen    albuterol    bisacodyl    LORazepam    ondansetron    oxyCODONE    temazepam    Vitals:   Vitals:    04/26/24 2216 04/27/24 0252 04/27/24 0600 04/27/24 0738   BP: 108/74 130/78  134/85   Pulse: 95 88  94   Resp: 16 16  20   Temp:  97.8 °F (36.6 °C)  98.2 °F (36.8 °C)   TempSrc:       SpO2: 90% 90%  95%   Weight:   82.2 kg (181 lb 3.5 oz)    Height:           Telemetry: NSR; Heart Rate: 89    Respiratory:   SpO2: SpO2: 95 %; Room Air    Intake/Output:     Intake/Output Summary (Last 24 hours) at 4/27/2024 0748  Last data filed at 4/27/2024 0601  Gross per 24 hour   Intake 660 ml   Output 1550 ml   Net -890 ml      Weights:   Weight (last 2 days)       Date/Time Weight    04/27/24 0600 82.2 (181.22)    04/26/24 0600 84.6 (186.51)    04/26/24 0525 84.6 (186.51)    04/25/24 0430 84.5 (186.29)              Results:   Results from last 7 days   Lab Units 04/26/24  0514 04/25/24  0429 04/24/24  0358   WBC Thousand/uL 7.54 10.29* 11.93*   HEMOGLOBIN g/dL 11.4* 12.4 13.6   HEMATOCRIT % 34.8* 39.7 42.1   PLATELETS Thousands/uL 191 189 206     Results from last 7 days   Lab Units 04/27/24  0620 04/26/24  0514 04/25/24  0429  04/23/24  1555 04/23/24  1230   SODIUM mmol/L 137 135 138   < >  --    POTASSIUM mmol/L 3.9 4.0 4.3   < >  --    CHLORIDE mmol/L 98 98 101   < >  --    CO2 mmol/L 28 29 28   < >  --    CO2, I-STAT mmol/L  --   --   --   --  22   BUN mg/dL 19 20 15   < >  --    CREATININE mg/dL 1.08 1.16 1.19   < >  --    GLUCOSE, ISTAT mg/dl  --   --   --   --  151*   CALCIUM mg/dL 8.8 8.0* 8.5   < >  --     < > = values in this interval not displayed.     Results from last 7 days   Lab Units 04/23/24  1228 04/23/24  0544 04/22/24  0508   INR  1.37*  --   --    PTT seconds 34 35 85*     Point of care glucose: 106-156    Invasive Lines/Tubes:  Invasive Devices       Central Venous Catheter Line  Duration             CVC Central Lines 04/23/24 Right Internal jugular 3 days                    Physical Exam:    General: No acute distress, Alert, and Normal appearance  HEENT/NECK:  Normocephalic. Atraumatic.  No jugular venous distention.    Cardiac: Regular rate and rhythm  Pulmonary:  Breath sounds clear bilaterally  Abdomen:  Non-tender, Non-distended, and Normal bowel sounds +BM  Incisions: Sternum is stable.  Incision is clean, dry, and intact.  and Saphenectomy incison is clean, dry, and intact.   Extremities: Extremities warm/dry and Trace edema B/L  Neuro: Alert and oriented X 3  Skin: Warm/Dry, without rashes or lesions.    Assessment:  Principal Problem:    S/P CABG (coronary artery bypass graft)  Active Problems:    Benign hypertension    Benign prostatic hyperplasia    Hyperlipidemia    Overweight (BMI 25.0-29.9)    Alcohol use, unspecified with other alcohol-induced disorder (HCC)    NSTEMI (non-ST elevated myocardial infarction) (HCC)    Asthma    Prediabetes       Coronary artery disease. S/P coronary artery bypass grafting; POD # 4    Plan:    Cardiac:     Normal ventricular systolic function, EF 55%    NSR; HR well-controlled  BP well-controlled  Has some room for better HR controlled, increase lopressor to 37.5 mg bid  (has a component of asthma so trial small increases)    Continue prophylactic Amiodarone, 200 mg PO TID  Continue ASA and Statin therapy    Epicardial pacing wires have been removed    Maintain central IV access today for medications requiring central IV access    Continue Subcutaneous Heparin for DVT prophylaxis    Pulmonary: Follow up pa lateral CXR today for ongoing hypoxia and oxygen requirements for any new findings     Acute post-op pulmonary insufficiency; Requiring 4 liters via nasal cannla, secondary to atelectasis and body habitus/obesity. Continue incentive spirometry/coughing/deep breathing exercises.  Wean supplemental oxygen as tolerated for saturation > 90%    Chest tubes have been discontinued    Renal:     Post op Creatinine stable; Follow up labs prn     Intake/Output net: -890 mL/24 hours    Diuretic Regimen:  Increase to IV Lasix, 40 mg TID  Increase to Potassium Chloride 20 mEq PO TID    Neuro:    Neurologically intact; No active issues     GI:    Cardiac diet, with 1800 mL fluid restriction    Tolerating diet without complaint    Continue stool softeners and prn suppository    Continue GI prophylaxis    Endo:     Pre-Op Hgb A1C: 5.9    SSI coverage prn with diabetes diet modifier     7    Hematology:     Post-operative blood count acceptable; Trend prn    8.   Disposition:        Anticipated discharge date: when oxygen requirements less        VTE Pharmacologic Prophylaxis: Heparin  VTE Mechanical Prophylaxis: sequential compression device    Collaborative rounds completed with supervising physician  Plan of care discussed with bedside nurse    SIGNATURE: Andria Rivers PA-C  DATE: April 27, 2024  TIME: 7:48 AM

## 2024-04-27 NOTE — PLAN OF CARE
Problem: SAFETY ADULT  Goal: Patient will remain free of falls  Description: INTERVENTIONS:  - Educate patient/family on patient safety including physical limitations  - Instruct patient to call for assistance with activity   - Consult OT/PT to assist with strengthening/mobility   - Keep Call bell within reach  - Keep bed low and locked with side rails adjusted as appropriate  - Keep care items and personal belongings within reach  - Initiate and maintain comfort rounds  - Make Fall Risk Sign visible to staff  - Obtain necessary fall risk management equipment: rw  - Apply yellow socks and bracelet for high fall risk patients  - Consider moving patient to room near nurses station  Outcome: Progressing       Problem: CARDIOVASCULAR - ADULT  Goal: Maintains optimal cardiac output and hemodynamic stability  Description: INTERVENTIONS:  - Monitor I/O, vital signs and rhythm  - Monitor for S/S and trends of decreased cardiac output  - Administer and titrate ordered vasoactive medications to optimize hemodynamic stability  - Assess quality of pulses, skin color and temperature  - Assess for signs of decreased coronary artery perfusion  - Instruct patient to report change in severity of symptoms  Outcome: Progressing

## 2024-04-28 ENCOUNTER — TRANSITIONAL CARE MANAGEMENT (OUTPATIENT)
Dept: INTERNAL MEDICINE CLINIC | Facility: OTHER | Age: 69
End: 2024-04-28

## 2024-04-29 ENCOUNTER — TELEPHONE (OUTPATIENT)
Dept: INTERNAL MEDICINE CLINIC | Age: 69
End: 2024-04-29

## 2024-04-29 ENCOUNTER — TELEPHONE (OUTPATIENT)
Age: 69
End: 2024-04-29

## 2024-04-29 NOTE — TELEPHONE ENCOUNTER
LMOM for pt to call me at [facility] office    Need to finish TCM note.     Please send me TEAMS message to see if I am available to take the call.

## 2024-05-01 ENCOUNTER — OFFICE VISIT (OUTPATIENT)
Dept: INTERNAL MEDICINE CLINIC | Age: 69
End: 2024-05-01
Payer: MEDICARE

## 2024-05-01 VITALS
WEIGHT: 179 LBS | TEMPERATURE: 97.5 F | OXYGEN SATURATION: 98 % | HEIGHT: 67 IN | DIASTOLIC BLOOD PRESSURE: 60 MMHG | HEART RATE: 86 BPM | BODY MASS INDEX: 28.09 KG/M2 | SYSTOLIC BLOOD PRESSURE: 124 MMHG

## 2024-05-01 DIAGNOSIS — I10 BENIGN HYPERTENSION: ICD-10-CM

## 2024-05-01 DIAGNOSIS — Z95.1 S/P CABG (CORONARY ARTERY BYPASS GRAFT): ICD-10-CM

## 2024-05-01 DIAGNOSIS — E78.5 DYSLIPIDEMIA: ICD-10-CM

## 2024-05-01 DIAGNOSIS — I25.810 CORONARY ARTERY DISEASE INVOLVING CORONARY BYPASS GRAFT OF NATIVE HEART WITHOUT ANGINA PECTORIS: Primary | ICD-10-CM

## 2024-05-01 DIAGNOSIS — K21.00 GASTROESOPHAGEAL REFLUX DISEASE WITH ESOPHAGITIS WITHOUT HEMORRHAGE: ICD-10-CM

## 2024-05-01 DIAGNOSIS — K21.9 GASTROESOPHAGEAL REFLUX DISEASE WITHOUT ESOPHAGITIS: ICD-10-CM

## 2024-05-01 DIAGNOSIS — E78.2 MIXED HYPERLIPIDEMIA: ICD-10-CM

## 2024-05-01 DIAGNOSIS — R93.89 ABNORMAL CT OF THE CHEST: ICD-10-CM

## 2024-05-01 PROCEDURE — 99496 TRANSJ CARE MGMT HIGH F2F 7D: CPT | Performed by: PHYSICIAN ASSISTANT

## 2024-05-01 RX ORDER — ESOMEPRAZOLE MAGNESIUM 40 MG/1
40 CAPSULE, DELAYED RELEASE ORAL
Qty: 30 CAPSULE | Refills: 1 | Status: SHIPPED | OUTPATIENT
Start: 2024-05-01 | End: 2024-10-28

## 2024-05-01 NOTE — PROGRESS NOTES
Assessment/Plan:         Diagnoses and all orders for this visit:    Coronary artery disease involving coronary bypass graft of native heart without angina pectoris  Comments:  continue asa 325mg daily, metoprolol 25mg bid  will complete 7 day course torsemide and kcl 5/3  no chest pain  Orders:  -     Comprehensive metabolic panel; Future  -     CBC and differential; Future  -     Comprehensive metabolic panel  -     CBC and differential    Abnormal CT of the chest  Comments:  repeat high res CT in 1 year  Orders:  -     CT chest high resolution; Future    Benign hypertension  Comments:  well controlled  off arb currently due to creatinine  Orders:  -     Comprehensive metabolic panel; Future  -     CBC and differential; Future  -     Comprehensive metabolic panel  -     CBC and differential    Dyslipidemia  -     Comprehensive metabolic panel; Future  -     CBC and differential; Future  -     Comprehensive metabolic panel  -     CBC and differential  -     metoprolol tartrate (LOPRESSOR) 25 mg tablet; Take 1 tablet (25 mg total) by mouth every 12 (twelve) hours    Mixed hyperlipidemia  -     Comprehensive metabolic panel; Future  -     CBC and differential; Future  -     Comprehensive metabolic panel  -     CBC and differential    S/P CABG (coronary artery bypass graft)  Comments:  f/u scheduled with cardio and cardiosx  Orders:  -     metoprolol tartrate (LOPRESSOR) 25 mg tablet; Take 1 tablet (25 mg total) by mouth every 12 (twelve) hours    Dyslipidemia  Comments:  continue rosuvustatin  Orders:  -     Comprehensive metabolic panel; Future  -     CBC and differential; Future  -     Comprehensive metabolic panel  -     CBC and differential  -     metoprolol tartrate (LOPRESSOR) 25 mg tablet; Take 1 tablet (25 mg total) by mouth every 12 (twelve) hours    Gastroesophageal reflux disease with esophagitis without hemorrhage  -     esomeprazole (NexIUM) 40 MG capsule; Take 1 capsule (40 mg total) by mouth daily in  "the early morning    Gastroesophageal reflux disease without esophagitis  Comments:  may continue nexium at this time  Orders:  -     esomeprazole (NexIUM) 40 MG capsule; Take 1 capsule (40 mg total) by mouth daily in the early morning          Subjective:      Patient ID: David S Cogan is a 68 y.o. male.    67 y/o male with hx of htn, hld, asthma, presents for TCM visit - admitted to [facility] 4/17-4/27    67 y/o male presented to ED on 4/17/24 for CP and abnl EKG and dx with NSTEMI, He was loaded with Brilinta 180 mg and started on heparin drip and transferred to [place] for cardiac catheterization. Severe two-vessel coronary artery disease was identified.  Cardiovascular surgery consulted. Preop work up initiated.      CT chest reported, normal ascending, mild b/l lower lob abnormalities, incidental finding needing long term follow up.  Losartan d/c'd due to Cr trending up 1.22.     US RUQ with mod. Heterogenous hepatic steatosis.       On 4/23 pt underwent CABG x 2.        Pt was d/c'd home on fluid restriction 1800 per cardiology   Pt states his mouth gets dry at night due to this       Pt using incentive spirometer at home - up to 1600  Pt reports BM daily, not constipated     Pt had CT chest - incidental finding     IMPRESSION:     \"Normal caliber ascending aorta with no calcification.     Mild bilateral lower lobe juxtapleural reticulation. It is uncertain if these interstitial lung abnormalities represent pulmonary fibrosis as there is no architectural distortion such as traction bronchiectasis/bronchiolectasis or honeycombing. Recommend   follow-up with a high-resolution chest CT in 1 year\"    D/w pt, will order CT IN 1 year      Pt reports he is sleeping better since being home but some nights are more difficult   He has been taking melatonin since the hospital       TCM Call     Date and time call was made  4/28/2024  4:33 PM    Hospital care reviewed  Records reviewed    Patient was hospitial[facility] at  [facility]    Date of Admission  04/17/24    Date of discharge  04/27/24    Diagnosis  s/p CABG    Disposition  Home    Current Symptoms  Dizziness; Incisional pain    Dizziness severity  Mild    Quality Character  Lightheadedness    Episode pattern  Intermittent      TCM Call     Post hospital issues  None    Scheduled for follow up?  Yes    Did you obtain your prescribed medications  Yes    Do you need help managing your prescriptions or medications  No    Is transportation to your appointment needed  No    I have advised the patient to call PCP with any new or worsening symptoms    Porter Minor CMA                The following portions of the patient's history were reviewed and updated as appropriate: allergies, current medications, past family history, past medical history, past social history, past surgical history, and problem list.    Review of Systems   Constitutional:  Negative for activity change, appetite change, chills, diaphoresis, fatigue and fever.   HENT:  Negative for congestion and sore throat.    Eyes:  Negative for pain and redness.   Respiratory:  Negative for cough, shortness of breath and wheezing.    Cardiovascular:  Negative for chest pain and leg swelling.   Gastrointestinal:  Negative for abdominal pain, constipation, diarrhea and nausea.   Genitourinary:  Negative for dysuria and frequency.   Musculoskeletal:  Negative for arthralgias, back pain and gait problem.   Skin:  Positive for wound (incision). Negative for rash.   Allergic/Immunologic: Negative for environmental allergies.   Neurological:  Negative for dizziness, light-headedness and headaches.   Psychiatric/Behavioral:  Positive for sleep disturbance. Negative for dysphoric mood. The patient is not nervous/anxious.          Past Medical History:   Diagnosis Date    Asthma     Last Assessed:10/16/17    Fontanez's esophagus     Colonic polyp     Last Assessed:10/16/17    Eczema     GERD (gastroesophageal reflux disease)      Last Assessed:10/16/17    High cholesterol     Last Assessed:10/16/17    Hypertension     Last Assessed:10/16/17    Non-neoplastic nevus     Last Assessed:11/16/17         Current Outpatient Medications:     acetaminophen (TYLENOL) 325 mg tablet, Take 2 tablets (650 mg total) by mouth every 4 (four) hours as needed for mild pain, Disp: , Rfl:     albuterol (PROVENTIL HFA,VENTOLIN HFA) 90 mcg/act inhaler, Inhale 90 mcg 2 (two) times a day as needed, Disp: , Rfl:     aspirin 325 mg tablet, Take 1 tablet (325 mg total) by mouth daily, Disp: 30 tablet, Rfl: 2    cycloSPORINE (RESTASIS) 0.05 % ophthalmic emulsion, Apply 0.05 application to eye 2 (two) times a day, Disp: , Rfl:     esomeprazole (NexIUM) 40 MG capsule, Take 1 capsule (40 mg total) by mouth daily in the early morning, Disp: 30 capsule, Rfl: 1    folic acid (FOLVITE) 1 mg tablet, Take 1 tablet (1 mg total) by mouth daily, Disp: 30 tablet, Rfl: 1    melatonin 3 mg, Take 2 tablets (6 mg total) by mouth daily at bedtime, Disp: 30 tablet, Rfl: 1    metoprolol tartrate (LOPRESSOR) 25 mg tablet, Take 1 tablet (25 mg total) by mouth every 12 (twelve) hours, Disp: 60 tablet, Rfl: 1    mometasone (NASONEX) 50 mcg/act nasal spray, 2 sprays into each nostril daily, Disp: , Rfl:     Multiple Vitamins-Minerals (CENTRUM SILVER 50+MEN) TABS, Take 1 tablet by mouth daily, Disp: , Rfl:     potassium chloride (Klor-Con M20) 20 mEq tablet, Take 1 tablet (20 mEq total) by mouth daily for 7 days, Disp: 7 tablet, Rfl: 1    rosuvastatin (CRESTOR) 40 MG tablet, Take 1 tablet (40 mg total) by mouth daily, Disp: 30 tablet, Rfl: 2    SYMBICORT 160-4.5 MCG/ACT inhaler, Take 2 puffs by mouth 2 (two) times a day, Disp: , Rfl:     thiamine 100 MG tablet, Take 1 tablet (100 mg total) by mouth daily, Disp: 30 tablet, Rfl: 1    torsemide (DEMADEX) 20 mg tablet, Take 1 tablet (20 mg total) by mouth daily for 7 days, Disp: 7 tablet, Rfl: 1    Allergies   Allergen Reactions    Amoxicillin   "    Other reaction(s): Diarrhea    Amoxicillin-Pot Clavulanate      Other reaction(s): Allergy Date : 07/27/2015   GI    Penicillins      Annotation - [id number]: Diarrhea       Social History   Past Surgical History:   Procedure Laterality Date    CARDIAC CATHETERIZATION Left 4/18/2024    Procedure: Cardiac Left Heart Cath;  Surgeon: Milagros Prather DO;  Location: [facility];  Service: Cardiology    COLONOSCOPY      10/2013- egd/colon    LYMPHADENECTOMY      Axillary Lymph node removed-benign    PA CORONARY ARTERY BYP W/VEIN & ARTERY GRAFT 3 VEIN N/A 4/23/2024    Procedure: CORONARY ARTERY BYPASS GRAFT X2 VESSELS UTILIZING LIMA TO LAD, SVG TO OM3;  Surgeon: Balwinder Sky DO;  Location: [facility]IN [facility];  Service: Cardiac Surgery    TONSILLECTOMY      Last Assessed:10/16/17    VALVE REPLACEMENT      Heart Valve Replacement; Last Assessed:10/16/17     Family History   Problem Relation Age of Onset    Cancer Father         groin, bone    Diabetes Paternal Grandfather        Objective:  /60 (BP Location: Left arm, Patient Position: Sitting, Cuff Size: Standard)   Pulse 86   Temp 97.5 °F (36.4 °C) (Temporal)   Ht 5' 7\" (1.702 m)   Wt 81.2 kg (179 lb)   SpO2 98%   BMI 28.04 kg/m²        Physical Exam  Vitals reviewed.   Constitutional:       General: He is not in acute distress.  HENT:      Head: Normocephalic and atraumatic.      Right Ear: Tympanic membrane, ear canal and external ear normal.      Left Ear: Tympanic membrane, ear canal and external ear normal.      Nose: Nose normal.      Mouth/Throat:      Mouth: Mucous membranes are moist.   Eyes:      General: No scleral icterus.        Right eye: No discharge.         Left eye: No discharge.      Conjunctiva/sclera: Conjunctivae normal.   Cardiovascular:      Rate and Rhythm: Normal rate and regular rhythm.   Pulmonary:      Effort: Pulmonary effort is normal. No respiratory distress.      Breath sounds: Normal breath sounds. No wheezing or " rales.   Abdominal:      General: Bowel sounds are normal. There is no distension.   Musculoskeletal:      Cervical back: Normal range of motion.      Right lower leg: No edema.      Left lower leg: No edema.   Skin:     General: Skin is warm.      Findings: Lesion (LLE incision healing well - chest midline incision healing well, no drainage) present.   Neurological:      General: No focal deficit present.      Mental Status: He is alert and oriented to person, place, and time.   Psychiatric:         Mood and Affect: Mood normal.         Behavior: Behavior normal.

## 2024-05-03 ENCOUNTER — TELEPHONE (OUTPATIENT)
Dept: CARDIAC SURGERY | Facility: CLINIC | Age: 69
End: 2024-05-03

## 2024-05-03 NOTE — TELEPHONE ENCOUNTER
Admitted to [facility]: 4/18/24  Surgery - CABG x2: 4/23   Preop weight: 178 lbs  Discharged: 4/27   Postop weight: 181 lbs    PCP office visit: 5/1    Called patient today for routine postop check in  Weight: 176 lbs    He reports that he is feeling well. Occasional lightheadedness, limited to standing up while getting out of the car.   He is walking around his neighborhood daily.   Cleaning incisions daily. No incisional concerns.   Using tylenol only for pain.   No LE edema.   Still has two more days of Torsemide (given 20 mg x7 days)    Answered questions and reviewed upcoming appointments.

## 2024-05-03 NOTE — PROGRESS NOTES
Progress Note - Cardiology Office  [facility]    David S Cogan 68 y.o. male MRN: [id number]  : 1955  Encounter: [id number]      Assessment:     Coronary artery disease.  Essential hypertension.   Dyslipidemia.  Asthma.  Alcohol abuse.    Discussion Summary and Plan:    Coronary artery disease.  - s/p CABG x 2 (LIMA to LAD, SVG to OM3) on 24.   - Patient was last seen in outpatient cardiology office on 24 as a consultation for this pain.  Patient was instructed to go to the nearest ED for evaluation for chest pain and abnormal EKG.  Patient was admitted to [facility] from 24-24 for NSTEMI and CABG.  Patient presented on 2024 to [facility] for evaluation for 1 week of intermittent chest pain.  Initial troponin noted to be 1217.  Patient underwent cardiac catheterization on 24 which noted: Mid LAD lesion is 85% stenosed in a distally small caliber vessel, OM3 100% stenosed with collaterals from the LAD, RCA with mild diffuse CAD.  Patient was evaluated by cardiothoracic surgery who deemed patient appropriate for CABG.  Patient underwent CABG x 2 (LIMA to LAD, SVG to OM3) 24.  Patient was discharged on 24.   - 24 cardiac catheterization:  Mid LAD lesion is 85% stenosed in a distally small caliber vessel. OM3 100% stenosed with collaterals from the LAD. RCA with mild diffuse CAD.  - Continue aspirin 325 mg daily.  - Continue Crestor 40 mg daily.  - Continue Lopressor 25 mg twice daily.  - Referral to cardiac rehab.  - Scheduled for outpatient follow-up with [facility] surgery on 24.     Essential hypertension.   - BP during today's office visit, 130/70.   - Continue Lopressor 25 mg twice daily.  - 24 TTE: LVEF 55%.  Diastolic function is mildly abnormal, consistent with grade 1 (abnormal) relaxation.  Mild mitral valve regurgitation.    Dyslipidemia.  - Currently on Crestor 40 mg daily.  - 24  lipid panel: Cholesterol 182, triglycerides 80, HDL 48, .    Asthma.  - Care per PCP.     Alcohol abuse.  - Reportedly has a history of drinking 6-8 alcoholic beverages per day. Patient states since hospitalization from 4/17/24-4/27/24 stop drinking.      Patient / Caretaker was advised and educated to call our office  immediately if  patient has any new symptoms of chest pain/shortness of breath, near-syncope, syncope, light headedness sustained palpitations  or any other cardiovascular symptoms before their scheduled follow-up appointment.  Office number was provided #[phone].  Please call [phone] if any questions.  Counseling :  A description of the counseling.  Goals and Barriers.  Patient's ability to self care: Yes  Medication side effect reviewed with patient in detail and all their questions answered to their satisfaction.    HPI :     David S Cogan is a 68 y.o. male with PMHx of CAD s/p CABG x 2 (LIMA to LAD, SVG to OM3 ) on 4/22/24, essential hypertension, dyslipidemia, asthma, alcohol abuse, who presents for recent hospital discharge follow up s/p CABG x 2.     Patient was last seen in outpatient cardiology office on 4/17/24 as a consultation for this pain.  Patient was instructed to go to the nearest ED for evaluation for chest pain and abnormal EKG.  Patient was admitted to [facility] from 4/17/24-4/27/24 for NSTEMI and CABG.  Patient presented on 4/17/2024 to [facility] for evaluation for 1 week of intermittent chest pain.  Initial troponin noted to be 1217.  Patient underwent cardiac catheterization on 4/18/24 which noted: Mid LAD lesion is 85% stenosed in a distally small caliber vessel, OM3 100% stenosed with collaterals from the LAD, RCA with mild diffuse CAD.  Patient was evaluated by cardiothoracic surgery who deemed patient appropriate for CABG.  Patient underwent CABG x 2 (LIMA to LAD, SVG to OM3) 4/22/24. Patient was discharged on 4/27/24.        Patient reports since hospital discharge he has overall been doing well.  He is accompanied by his girlfriend today who states that she feels he is also doing well.  Patient reports mild incision tenderness from time to time.  Patient denies experiencing chest pain, palpitations, shortness of breath at rest or with exertion, lower extremity edema, orthopnea, lightheadedness, dizziness, headache, nausea, vomiting.    Review of Systems   Constitutional:  Negative for activity change, appetite change, chills, diaphoresis, fatigue, fever and unexpected weight change.   Respiratory:  Negative for cough, chest tightness, shortness of breath and wheezing.    Cardiovascular:  Negative for chest pain, palpitations and leg swelling.   Gastrointestinal:  Negative for abdominal distention, abdominal pain, constipation, diarrhea, nausea and vomiting.   Skin: Negative.    Neurological:  Negative for dizziness, tremors, seizures, syncope, weakness, light-headedness, numbness and headaches.       Historical Information   Past Medical History:   Diagnosis Date    Asthma     Last Assessed:10/16/17    Fontanez's esophagus     Colonic polyp     Last Assessed:10/16/17    Eczema     GERD (gastroesophageal reflux disease)     Last Assessed:10/16/17    High cholesterol     Last Assessed:10/16/17    Hypertension     Last Assessed:10/16/17    Non-neoplastic nevus     Last Assessed:11/16/17     Past Surgical History:   Procedure Laterality Date    CARDIAC CATHETERIZATION Left 4/18/2024    Procedure: Cardiac Left Heart Cath;  Surgeon: Milagros Prather DO;  Location: [facility];  Service: Cardiology    COLONOSCOPY      10/2013- egd/colon    LYMPHADENECTOMY      Axillary Lymph node removed-benign    WA CORONARY ARTERY BYP W/VEIN & ARTERY GRAFT 3 VEIN N/A 4/23/2024    Procedure: CORONARY ARTERY BYPASS GRAFT X2 VESSELS UTILIZING LIMA TO LAD, SVG TO OM3;  Surgeon: Balwinder Sky DO;  Location: [facility] MAIN OR;  Service: Cardiac Surgery     TONSILLECTOMY      Last Assessed:10/16/17    VALVE REPLACEMENT      Heart Valve Replacement; Last Assessed:10/16/17     Social History     Substance and Sexual Activity   Alcohol Use Not Currently     Social History     Substance and Sexual Activity   Drug Use No     Social History     Tobacco Use   Smoking Status Former    Current packs/day: 0.00    Average packs/day: 1 pack/day for 14.0 years (14.0 ttl pk-yrs)    Types: Cigarettes    Start date: 1974    Quit date:     Years since quittin.3   Smokeless Tobacco Former    Types: Chew    Quit date:      Family History:   Family History   Problem Relation Age of Onset    Cancer Father         groin, bone    Diabetes Paternal Grandfather        Meds/Allergies     Allergies   Allergen Reactions    Amoxicillin      Other reaction(s): Diarrhea    Amoxicillin-Pot Clavulanate      Other reaction(s): Allergy Date : 2015   GI    Penicillins      Annotation - [id number]: Diarrhea       Current Outpatient Medications:     acetaminophen (TYLENOL) 325 mg tablet, Take 2 tablets (650 mg total) by mouth every 4 (four) hours as needed for mild pain, Disp: , Rfl:     albuterol (PROVENTIL HFA,VENTOLIN HFA) 90 mcg/act inhaler, Inhale 90 mcg 2 (two) times a day as needed, Disp: , Rfl:     aspirin 325 mg tablet, Take 1 tablet (325 mg total) by mouth daily, Disp: 30 tablet, Rfl: 2    cycloSPORINE (RESTASIS) 0.05 % ophthalmic emulsion, Apply 0.05 application to eye 2 (two) times a day, Disp: , Rfl:     esomeprazole (NexIUM) 40 MG capsule, Take 1 capsule (40 mg total) by mouth daily in the early morning, Disp: 30 capsule, Rfl: 1    folic acid (FOLVITE) 1 mg tablet, Take 1 tablet (1 mg total) by mouth daily, Disp: 30 tablet, Rfl: 1    melatonin 3 mg, Take 2 tablets (6 mg total) by mouth daily at bedtime, Disp: 30 tablet, Rfl: 1    metoprolol tartrate (LOPRESSOR) 25 mg tablet, Take 1 tablet (25 mg total) by mouth every 12 (twelve) hours, Disp: 60 tablet, Rfl: 1     "mometasone (NASONEX) 50 mcg/act nasal spray, 2 sprays into each nostril daily, Disp: , Rfl:     Multiple Vitamins-Minerals (CENTRUM SILVER 50+MEN) TABS, Take 1 tablet by mouth daily, Disp: , Rfl:     rosuvastatin (CRESTOR) 40 MG tablet, Take 1 tablet (40 mg total) by mouth daily, Disp: 30 tablet, Rfl: 2    SYMBICORT 160-4.5 MCG/ACT inhaler, Take 2 puffs by mouth 2 (two) times a day, Disp: , Rfl:     thiamine 100 MG tablet, Take 1 tablet (100 mg total) by mouth daily, Disp: 30 tablet, Rfl: 1    Vitals: Blood pressure 130/70, pulse 75, height 5' 7\" (1.702 m), weight 82.1 kg (181 lb), SpO2 99%.    Body mass index is 28.35 kg/m².  Wt Readings from Last 3 Encounters:   24 82.1 kg (181 lb)   24 81.2 kg (179 lb)   24 82.2 kg (181 lb 3.5 oz)     Vitals:    24 1047   Weight: 82.1 kg (181 lb)     BP Readings from Last 3 Encounters:   24 130/70   24 124/60   24 134/85       Physical Exam:  Physical Exam  Vitals reviewed.   Constitutional:       General: He is not in acute distress.  Cardiovascular:      Rate and Rhythm: Normal rate and regular rhythm.      Pulses: Normal pulses.      Heart sounds: Murmur heard.   Pulmonary:      Effort: Pulmonary effort is normal. No respiratory distress.      Breath sounds: Normal breath sounds.   Chest:      Comments: Midline chest incision healing well, no surrounding erythema.  No bleeding or discharge noted.  Abdominal:      General: Abdomen is flat. There is no distension.      Palpations: Abdomen is soft.      Tenderness: There is no abdominal tenderness.   Musculoskeletal:      Right lower leg: No edema.      Left lower leg: No edema.   Skin:     General: Skin is warm and dry.   Neurological:      Mental Status: He is alert and oriented to person, place, and time.         Diagnostic Studies Review Cardio:      EK/06/24 EKG: Sinus rhythm, 75 bpm.  T wave abnormality anterolateral leads    Cardiac testing:     OBDULIO Anesthesia    Result " Date: 4/23/2024  Narrative: Yoana Moser MD     4/23/2024 12:01 PM Procedure Performed: OBDULOI Anesthesia Start Time:  4/23/2024 8:52 AM Preanesthesia Checklist Patient identified, IV assessed, risks and benefits discussed, monitors and equipment assessed, procedure being performed at surgeon's request and anesthesia consent obtained. Procedure Diagnostic Indications for OBDULIO:  assessment of ascending aorta, assessment of surgical repair and hemodynamic monitoring. Type of OBDULIO: interventional OBDULIO with real time guidance of intracardiac procedure. Images Saved: ultrasound permanent image saved. Physician Requesting Echo: Balwinder Sky DO.  Location performed: OR. Intubated. Bite block not placed.  Heart visualized. Insertion of OBDULIO Probe:  Atraumatic. Probe Type:  Multiplane. Modalities:  3D, color flow mapping, continuous wave Doppler and pulse wave Doppler. Echocardiographic and Doppler Measurements PREPROCEDURE LEFT VENTRICLE: Systolic Function: normal. Ejection Fraction: 55%. Cavity size: normal.   Regional Wall Motion Abnormalities: none. RIGHT VENTRICLE: Systolic Function: normal.  Cavity size normal. No hypertrophy  AORTIC VALVE: Leaflets: normal and trileaflet. Leaflet motions normal and normal. Stenosis: none. Mean Gradient: 2 mmHg.    Regurgitation: none.  MITRAL VALVE: Leaflets: myxomatous. Leaflet Motions: normal. Regurgitation: trace.   Stenosis: none.   TRICUSPID VALVE: Leaflets: normal. Leaflet Motions: normal. Stenosis: none. Regurgitation: trace. PULMONIC VALVE: Leaflets: normal. Regurgitation: trace. Stenosis: none. ASCENDING AORTA: Size:  normal.  Dissection not present.  AORTIC ARCH: Size:  normal.  dissection not present. Grade 3: atheroma protruding < 0.5 cm into lumen. DESCENDING AORTA: Size: normal.  Dissection not present. Grade 3: atheroma protruding < 0.5 cm into lumen. RIGHT ATRIUM: Size:  normal. No spontaneous echo contrast. LEFT ATRIUM: Size: normal. No spontaneous echo contrast.  LEFT ATRIAL APPENDAGE: Size: normal. No spontaneous echo contrast ATRIAL SEPTUM: Intra-atrial septal morphology: normal.  VENTRICULAR SEPTUM: Intra-ventricular septum morphology: normal. EPIAORTIC: Plaque Thickness: 0-5 mm. OTHER FINDINGS: Pericardium:  normal. Pleural Effusion:  none. POSTPROCEDURE LEFT VENTRICLE: Unchanged . Systolic Function: normal. Ejection Fraction: 55 %. Cavity Size: normal. Regional Wall Motion Abnormalities: none seen. RIGHT VENTRICLE: Systolic Function: normal. Cavity Size: normal. AORTIC VALVE: Unchanged . Leaflets: native. Stenosis: none.  Regurgitation: none.  MITRAL VALVE: Unchanged .  Regurgitation: trace. Stenosis: none. TRICUSPID VALVE: Unchanged . Leaflets: native. Regurgitation: trace. Stenosis: none. PULMONIC VALVE: Unchanged Leaflets: native. Regurgitation: trace. Stenosis: none. ATRIA: Unchanged . Left Atrial Appendage Ligate: No. Residual Flow in Left Atrial Appendage by Color Flow Doppler: No.  AORTA: Unchanged . Dissection: Dissection not present. REMOVAL: Probe Removal: atraumatic.      Echo complete w/ contrast if indicated    Result Date: 4/18/2024  Narrative:   Left Ventricle: Left ventricular cavity size is normal. Wall thickness is mildly increased. There is mild concentric hypertrophy. The left ventricular ejection fraction is 55%. Systolic function is normal. Wall motion is normal. Diastolic function is mildly abnormal, consistent with grade I (abnormal) relaxation.   Right Ventricle: Right ventricular cavity size is normal. Systolic function is normal.   Mitral Valve: There is mild regurgitation.     Cardiac catheterization    Result Date: 4/18/2024  Narrative:   Mid LAD lesion is 85% stenosed in a distally small caliber vessel   OM3 100% stenosed with collaterals from the LAD   RCA with mild diffuse CAD   LVEDP is mildly elevated without gradient on LV-AO pullback     Lab Review   Lab Results   Component Value Date    WBC 7.54 04/26/2024    HGB 11.4 (L) 04/26/2024  "   HCT 34.8 (L) 04/26/2024    MCV 94 04/26/2024    RDW 13.8 04/26/2024     04/26/2024     BMP:  Lab Results   Component Value Date    SODIUM 137 04/27/2024    K 3.9 04/27/2024    CL 98 04/27/2024    CO2 28 04/27/2024    BUN 19 04/27/2024    CREATININE 1.08 04/27/2024    GLUC 104 04/27/2024    CALCIUM 8.8 04/27/2024    EGFR 70 04/27/2024    MG 2.2 04/26/2024     LFT:  Lab Results   Component Value Date    AST 26 04/17/2024    ALT 18 04/17/2024    ALKPHOS 62 04/17/2024    TP 7.3 04/17/2024    ALB 4.1 04/17/2024      No components found for: \"TSH3\"  No results found for: \"[id number]\"  Lab Results   Component Value Date    HGBA1C 5.9 (H) 04/18/2024     Lipid Profile:   Lab Results   Component Value Date    CHOLESTEROL 182 04/18/2024    HDL 48 04/18/2024    LDLCALC 118 (H) 04/18/2024    TRIG 80 04/18/2024     Lab Results   Component Value Date    CHOLESTEROL 182 04/18/2024    CHOLESTEROL 298 (H) 10/30/2023       Jeny Segura PA-C  "

## 2024-05-06 ENCOUNTER — OFFICE VISIT (OUTPATIENT)
Dept: CARDIOLOGY CLINIC | Facility: CLINIC | Age: 69
End: 2024-05-06
Payer: MEDICARE

## 2024-05-06 VITALS
SYSTOLIC BLOOD PRESSURE: 130 MMHG | HEART RATE: 75 BPM | BODY MASS INDEX: 28.41 KG/M2 | OXYGEN SATURATION: 99 % | WEIGHT: 181 LBS | DIASTOLIC BLOOD PRESSURE: 70 MMHG | HEIGHT: 67 IN

## 2024-05-06 DIAGNOSIS — I21.4 NSTEMI (NON-ST ELEVATED MYOCARDIAL INFARCTION) (HCC): ICD-10-CM

## 2024-05-06 DIAGNOSIS — N40.0 BENIGN PROSTATIC HYPERPLASIA, UNSPECIFIED WHETHER LOWER URINARY TRACT SYMPTOMS PRESENT: ICD-10-CM

## 2024-05-06 DIAGNOSIS — E78.5 HYPERLIPIDEMIA, UNSPECIFIED HYPERLIPIDEMIA TYPE: ICD-10-CM

## 2024-05-06 DIAGNOSIS — Z95.1 S/P CABG (CORONARY ARTERY BYPASS GRAFT): Primary | ICD-10-CM

## 2024-05-06 DIAGNOSIS — I10 ESSENTIAL HYPERTENSION: ICD-10-CM

## 2024-05-06 DIAGNOSIS — I10 BENIGN HYPERTENSION: ICD-10-CM

## 2024-05-06 DIAGNOSIS — E78.5 DYSLIPIDEMIA: ICD-10-CM

## 2024-05-06 PROCEDURE — 99214 OFFICE O/P EST MOD 30 MIN: CPT | Performed by: PHYSICIAN ASSISTANT

## 2024-05-06 PROCEDURE — 93000 ELECTROCARDIOGRAM COMPLETE: CPT | Performed by: PHYSICIAN ASSISTANT

## 2024-05-13 ENCOUNTER — OFFICE VISIT (OUTPATIENT)
Dept: CARDIAC SURGERY | Facility: CLINIC | Age: 69
End: 2024-05-13

## 2024-05-13 VITALS
DIASTOLIC BLOOD PRESSURE: 76 MMHG | HEART RATE: 94 BPM | SYSTOLIC BLOOD PRESSURE: 116 MMHG | HEIGHT: 67 IN | BODY MASS INDEX: 27.56 KG/M2 | WEIGHT: 175.6 LBS | OXYGEN SATURATION: 97 %

## 2024-05-13 DIAGNOSIS — Z95.1 S/P CABG (CORONARY ARTERY BYPASS GRAFT): Primary | ICD-10-CM

## 2024-05-13 PROCEDURE — 99024 POSTOP FOLLOW-UP VISIT: CPT | Performed by: THORACIC SURGERY (CARDIOTHORACIC VASCULAR SURGERY)

## 2024-05-13 NOTE — PROGRESS NOTES
"Procedure: S/P CABG x 2 (LIMA to LAD, SVG to OM3, LLE EVH) , performed on 4/23/24 by Dr. Sky.     History: David S Cogan is a 68 year old male who presents to our office today for routine post-surgical follow up care.      He was admitted on 4/17 with an acute NSTEMI.  Echo showed EF 55%, no significant valve disease.  Cardiac cath showed severe two-vessel CAD.  Cardiac surgery was consulted for CABG. He was seen by Dr. Sky and CABG was recommended.  Underwent CABG x 2 on 4/23.  Postoperative course was relatively uneventful, did require high flow nasal cannula on day 1, was quickly weaned to nasal cannula on day 2.  He was also briefly treated for concern of alcohol withdrawal but did well. The rest of his postoperative course was normal, and he was discharged on postop day #4 on 4/27.     Since discharge she has progressed well.  He saw PCP on 5/1 and cardiology on 5/6, correspondence reviewed, no medications were changed.  He is eating and drinking well.  Passing urine and bowels normally.  Sleeping okay.  Pain is controlled.  He is ambulating well and walking several blocks a day.  Reports his incisions are healing well without any drainage. Denies any recent fevers chills.  Denies nausea vomiting.  Denies abdominal pain .  Denies any chest pain or shortness of breath. Offers no significant complaints.     Vital Signs:   Vitals:    05/13/24 1533 05/13/24 1535   BP: 121/81 116/76   BP Location: Left arm Right arm   Patient Position: Sitting Sitting   Cuff Size: Standard Standard   Pulse: 94    SpO2: 97%    Weight: 79.7 kg (175 lb 9.6 oz)    Height: 5' 7\" (1.702 m)        Home Medications:   Prior to Admission medications    Medication Sig Start Date End Date Taking? Authorizing Provider   acetaminophen (TYLENOL) 325 mg tablet Take 2 tablets (650 mg total) by mouth every 4 (four) hours as needed for mild pain 4/27/24   Andria Rivers PA-C   albuterol (PROVENTIL HFA,VENTOLIN HFA) 90 mcg/act inhaler Inhale " 90 mcg 2 (two) times a day as needed    Historical Provider, MD   aspirin 325 mg tablet Take 1 tablet (325 mg total) by mouth daily 4/28/24   Andria Rivers PA-C   cycloSPORINE (RESTASIS) 0.05 % ophthalmic emulsion Apply 0.05 application to eye 2 (two) times a day    Historical Provider, MD   esomeprazole (NexIUM) 40 MG capsule Take 1 capsule (40 mg total) by mouth daily in the early morning 5/1/24 10/28/24  Maday Chu PA-C   folic acid (FOLVITE) 1 mg tablet Take 1 tablet (1 mg total) by mouth daily 4/28/24   Andria Rivers PA-C   melatonin 3 mg Take 2 tablets (6 mg total) by mouth daily at bedtime 4/27/24   Andria Rivers PA-C   metoprolol tartrate (LOPRESSOR) 25 mg tablet Take 1 tablet (25 mg total) by mouth every 12 (twelve) hours 5/1/24 8/29/24  Maday Chu PA-C   mometasone (NASONEX) 50 mcg/act nasal spray 2 sprays into each nostril daily    Historical Provider, MD   Multiple Vitamins-Minerals (CENTRUM SILVER 50+MEN) TABS Take 1 tablet by mouth daily    Historical Provider, MD   rosuvastatin (CRESTOR) 40 MG tablet Take 1 tablet (40 mg total) by mouth daily 4/27/24   Andria Rivers PA-C   SYMBICORT 160-4.5 MCG/ACT inhaler Take 2 puffs by mouth 2 (two) times a day 4/9/18   Historical Provider, MD   thiamine 100 MG tablet Take 1 tablet (100 mg total) by mouth daily 4/28/24   Andria Rivers PA-C       Physical Exam:    HEENT/NECK:  Normocephalic. Atraumatic.  No jugular venous distention.    Cardiac: Regular rate and rhythm  Pulmonary:  Breath sounds clear bilaterally  Abdomen:  Non-tender, Non-distended, and Normal bowel sounds  Incisions: Sternum is stable.  Incision is clean, dry, and intact.  and LLE EVH saphenectomy incison is clean, dry, and intact.   Extremities: Extremities warm/dry and No edema B/L  Neuro: Alert and oriented X 3.  Sensation is grossly intact.  No focal deficits.  Skin: Warm/Dry, without rashes or lesions.      Assessment:   NSTEMI and severe 2 VCAD S/P  coronary artery bypass grafting    Plan:     David S Cogan continues to recover well following surgery. His incisions are healing well. Vital signs are stable and weight is baseline    To date they have made progressive improvements with their physical rehabilitation. At this point I have cleared them to begin outpatient cardiac rehabilitation and have encouraged them to to do so.     David S Cogan has also been cleared to resume driving. I asked that them do so cautiously, in progressive increments. I did remind them of their ongoing lifting restrictions of 25 pounds for an additional 2 months.    He was counseled importance of staying on aspirin, beta-blocker therapy, and high-dose statin lifelong.     David S Cogan has already been evaluated by their primary care physician cardiologist for ongoing medical care. At this point we will not schedule David S Cogan  for routine followup care with our office. Future medical management will be directed by their outpatient cardiologist.. I have advised them to call with any new concerns that may arise. David S Cogan was comfortable with our recommendations and their questions were answered to their satisfaction.    The patient recently had a screening colonoscopy in 2020.  Therefore GI referral is not indicated at this time.     Jason Bolaños PA-C  05/13/24    * This note was completed in part utilizing [employer] direct voice recognition software.   Grammatical errors, random word insertion, spelling mistakes, and incomplete sentences may be an occasional consequence of the system secondary to software limitations, ambient noise and hardware issues. At the time of dictation, efforts were made to edit, clarify and /or correct errors. Please read the chart carefully and recognize, using context, where substitutions have occurred.  If you have any questions or concerns about the context, text or information contained within the body of this dictation, please contact  myself, the provider, for further clarification.

## 2024-05-28 ENCOUNTER — APPOINTMENT (EMERGENCY)
Dept: RADIOLOGY | Facility: HOSPITAL | Age: 69
DRG: 862 | End: 2024-05-28
Payer: MEDICARE

## 2024-05-28 ENCOUNTER — TELEPHONE (OUTPATIENT)
Age: 69
End: 2024-05-28

## 2024-05-28 ENCOUNTER — HOSPITAL ENCOUNTER (INPATIENT)
Facility: HOSPITAL | Age: 69
DRG: 862 | End: 2024-05-28
Attending: EMERGENCY MEDICINE | Admitting: INTERNAL MEDICINE
Payer: MEDICARE

## 2024-05-28 DIAGNOSIS — S21.101A STERNAL WOUND INFECTION: ICD-10-CM

## 2024-05-28 DIAGNOSIS — M86.9 STERNAL OSTEOMYELITIS (HCC): ICD-10-CM

## 2024-05-28 DIAGNOSIS — T81.40XA POSTOPERATIVE INFECTION: Primary | ICD-10-CM

## 2024-05-28 DIAGNOSIS — Z95.1 S/P CABG (CORONARY ARTERY BYPASS GRAFT): ICD-10-CM

## 2024-05-28 DIAGNOSIS — J98.51 MEDIASTINITIS: ICD-10-CM

## 2024-05-28 DIAGNOSIS — L08.9 STERNAL WOUND INFECTION: ICD-10-CM

## 2024-05-28 DIAGNOSIS — N17.9 AKI (ACUTE KIDNEY INJURY) (HCC): ICD-10-CM

## 2024-05-28 DIAGNOSIS — I25.810 CORONARY ARTERY DISEASE INVOLVING CORONARY BYPASS GRAFT OF NATIVE HEART WITHOUT ANGINA PECTORIS: ICD-10-CM

## 2024-05-28 LAB
2HR DELTA HS TROPONIN: 0 NG/L
ALBUMIN SERPL BCP-MCNC: 4.1 G/DL (ref 3.5–5)
ALP SERPL-CCNC: 90 U/L (ref 34–104)
ALT SERPL W P-5'-P-CCNC: 27 U/L (ref 7–52)
ANION GAP SERPL CALCULATED.3IONS-SCNC: 8 MMOL/L (ref 4–13)
AST SERPL W P-5'-P-CCNC: 22 U/L (ref 13–39)
BASOPHILS # BLD AUTO: 0.09 THOUSANDS/ÂΜL (ref 0–0.1)
BASOPHILS NFR BLD AUTO: 1 % (ref 0–1)
BILIRUB SERPL-MCNC: 0.28 MG/DL (ref 0.2–1)
BUN SERPL-MCNC: 14 MG/DL (ref 5–25)
CALCIUM SERPL-MCNC: 9.1 MG/DL (ref 8.4–10.2)
CARDIAC TROPONIN I PNL SERPL HS: 10 NG/L
CARDIAC TROPONIN I PNL SERPL HS: 10 NG/L
CHLORIDE SERPL-SCNC: 106 MMOL/L (ref 96–108)
CO2 SERPL-SCNC: 27 MMOL/L (ref 21–32)
CREAT SERPL-MCNC: 1.12 MG/DL (ref 0.6–1.3)
EOSINOPHIL # BLD AUTO: 0.59 THOUSAND/ÂΜL (ref 0–0.61)
EOSINOPHIL NFR BLD AUTO: 7 % (ref 0–6)
ERYTHROCYTE [DISTWIDTH] IN BLOOD BY AUTOMATED COUNT: 13.3 % (ref 11.6–15.1)
GFR SERPL CREATININE-BSD FRML MDRD: 67 ML/MIN/1.73SQ M
GLUCOSE SERPL-MCNC: 88 MG/DL (ref 65–140)
HCT VFR BLD AUTO: 42.3 % (ref 36.5–49.3)
HGB BLD-MCNC: 13.8 G/DL (ref 12–17)
IMM GRANULOCYTES # BLD AUTO: 0.02 THOUSAND/UL (ref 0–0.2)
IMM GRANULOCYTES NFR BLD AUTO: 0 % (ref 0–2)
LYMPHOCYTES # BLD AUTO: 2.79 THOUSANDS/ÂΜL (ref 0.6–4.47)
LYMPHOCYTES NFR BLD AUTO: 33 % (ref 14–44)
MCH RBC QN AUTO: 30.1 PG (ref 26.8–34.3)
MCHC RBC AUTO-ENTMCNC: 32.6 G/DL (ref 31.4–37.4)
MCV RBC AUTO: 92 FL (ref 82–98)
MONOCYTES # BLD AUTO: 0.59 THOUSAND/ÂΜL (ref 0.17–1.22)
MONOCYTES NFR BLD AUTO: 7 % (ref 4–12)
NEUTROPHILS # BLD AUTO: 4.32 THOUSANDS/ÂΜL (ref 1.85–7.62)
NEUTS SEG NFR BLD AUTO: 52 % (ref 43–75)
NRBC BLD AUTO-RTO: 0 /100 WBCS
PLATELET # BLD AUTO: 316 THOUSANDS/UL (ref 149–390)
PMV BLD AUTO: 9.8 FL (ref 8.9–12.7)
POTASSIUM SERPL-SCNC: 4.1 MMOL/L (ref 3.5–5.3)
PROT SERPL-MCNC: 7 G/DL (ref 6.4–8.4)
RBC # BLD AUTO: 4.59 MILLION/UL (ref 3.88–5.62)
SODIUM SERPL-SCNC: 141 MMOL/L (ref 135–147)
WBC # BLD AUTO: 8.4 THOUSAND/UL (ref 4.31–10.16)

## 2024-05-28 PROCEDURE — 84484 ASSAY OF TROPONIN QUANT: CPT

## 2024-05-28 PROCEDURE — 71250 CT THORAX DX C-: CPT

## 2024-05-28 PROCEDURE — 99285 EMERGENCY DEPT VISIT HI MDM: CPT

## 2024-05-28 PROCEDURE — 80053 COMPREHEN METABOLIC PANEL: CPT

## 2024-05-28 PROCEDURE — 85025 COMPLETE CBC W/AUTO DIFF WBC: CPT

## 2024-05-28 PROCEDURE — 96365 THER/PROPH/DIAG IV INF INIT: CPT

## 2024-05-28 PROCEDURE — 93005 ELECTROCARDIOGRAM TRACING: CPT

## 2024-05-28 PROCEDURE — 87040 BLOOD CULTURE FOR BACTERIA: CPT

## 2024-05-28 PROCEDURE — 99285 EMERGENCY DEPT VISIT HI MDM: CPT | Performed by: EMERGENCY MEDICINE

## 2024-05-28 PROCEDURE — 36415 COLL VENOUS BLD VENIPUNCTURE: CPT

## 2024-05-28 RX ORDER — SENNOSIDES 8.6 MG
1 TABLET ORAL DAILY
Status: DISCONTINUED | OUTPATIENT
Start: 2024-05-29 | End: 2024-06-03 | Stop reason: HOSPADM

## 2024-05-28 RX ORDER — LANOLIN ALCOHOL/MO/W.PET/CERES
100 CREAM (GRAM) TOPICAL DAILY
Status: DISCONTINUED | OUTPATIENT
Start: 2024-05-29 | End: 2024-06-03 | Stop reason: HOSPADM

## 2024-05-28 RX ORDER — BUDESONIDE AND FORMOTEROL FUMARATE DIHYDRATE 160; 4.5 UG/1; UG/1
2 AEROSOL RESPIRATORY (INHALATION) 2 TIMES DAILY
Status: DISCONTINUED | OUTPATIENT
Start: 2024-05-28 | End: 2024-06-03 | Stop reason: HOSPADM

## 2024-05-28 RX ORDER — ALBUTEROL SULFATE 90 UG/1
1 AEROSOL, METERED RESPIRATORY (INHALATION) 2 TIMES DAILY PRN
Status: DISCONTINUED | OUTPATIENT
Start: 2024-05-28 | End: 2024-06-03 | Stop reason: HOSPADM

## 2024-05-28 RX ORDER — PANTOPRAZOLE SODIUM 40 MG/1
40 TABLET, DELAYED RELEASE ORAL
Status: DISCONTINUED | OUTPATIENT
Start: 2024-05-29 | End: 2024-06-03 | Stop reason: HOSPADM

## 2024-05-28 RX ORDER — FLUTICASONE PROPIONATE 50 MCG
2 SPRAY, SUSPENSION (ML) NASAL DAILY
Status: DISCONTINUED | OUTPATIENT
Start: 2024-05-29 | End: 2024-06-03 | Stop reason: HOSPADM

## 2024-05-28 RX ORDER — POLYETHYLENE GLYCOL 3350 17 G/17G
17 POWDER, FOR SOLUTION ORAL DAILY PRN
Status: DISCONTINUED | OUTPATIENT
Start: 2024-05-28 | End: 2024-05-29

## 2024-05-28 RX ORDER — FOLIC ACID 1 MG/1
1 TABLET ORAL DAILY
Status: DISCONTINUED | OUTPATIENT
Start: 2024-05-29 | End: 2024-06-03 | Stop reason: HOSPADM

## 2024-05-28 RX ORDER — LANOLIN ALCOHOL/MO/W.PET/CERES
6 CREAM (GRAM) TOPICAL
Status: DISCONTINUED | OUTPATIENT
Start: 2024-05-28 | End: 2024-06-03 | Stop reason: HOSPADM

## 2024-05-28 RX ORDER — ASPIRIN 325 MG
325 TABLET ORAL DAILY
Status: DISCONTINUED | OUTPATIENT
Start: 2024-05-29 | End: 2024-06-03 | Stop reason: HOSPADM

## 2024-05-28 RX ORDER — ATORVASTATIN CALCIUM 80 MG/1
80 TABLET, FILM COATED ORAL
Status: DISCONTINUED | OUTPATIENT
Start: 2024-05-28 | End: 2024-06-03 | Stop reason: HOSPADM

## 2024-05-28 RX ORDER — HEPARIN SODIUM 5000 [USP'U]/ML
5000 INJECTION, SOLUTION INTRAVENOUS; SUBCUTANEOUS EVERY 8 HOURS SCHEDULED
Status: DISCONTINUED | OUTPATIENT
Start: 2024-05-28 | End: 2024-06-03 | Stop reason: HOSPADM

## 2024-05-28 RX ADMIN — HEPARIN SODIUM 5000 UNITS: 5000 INJECTION INTRAVENOUS; SUBCUTANEOUS at 22:45

## 2024-05-28 RX ADMIN — Medication 6 MG: at 22:45

## 2024-05-28 RX ADMIN — METOPROLOL TARTRATE 25 MG: 25 TABLET, FILM COATED ORAL at 22:45

## 2024-05-28 RX ADMIN — VANCOMYCIN HYDROCHLORIDE 2000 MG: 1 INJECTION, POWDER, LYOPHILIZED, FOR SOLUTION INTRAVENOUS at 20:15

## 2024-05-28 RX ADMIN — BUDESONIDE AND FORMOTEROL FUMARATE DIHYDRATE 2 PUFF: 160; 4.5 AEROSOL RESPIRATORY (INHALATION) at 23:57

## 2024-05-28 RX ADMIN — ATORVASTATIN CALCIUM 80 MG: 80 TABLET, FILM COATED ORAL at 22:45

## 2024-05-28 NOTE — ED ATTENDING ATTESTATION
"I, Kirk Alves MD, saw and evaluated the patient. I have discussed the patient with the resident and agree with the resident's findings, Plan of Care, and MDM as documented in the resident's note, except where noted. All available labs and Radiology studies were reviewed.  I was present for key portions of any procedure(s) performed by the resident and I was immediately available to provide assistance.    At this point I agree with the current assessment done in the Emergency Department.  I have conducted an independent evaluation of this patient a history and physical is as follows:    67 yo male with a history of asthma, high cholesterol, GERD, HTN, eczema, and CAD s/p CABG x 2 on 4/23/24 presents to the ED for a wound check. The patient noticed some oozing and redness at the site of his recent sternotomy a few days ago \"and it just keeps getting worse\". Earlier today a lesion \"burst open\", draining a large amount of purulent fluid. The surgical site is tender. No fevers or chills. He denies chest pain, shortness of breath, nausea, vomiting, and diaphoresis. No other specific complaints.    ROS: per resident physician note    Gen: NAD, AA&Ox3  HEENT: PERRL, EOMI  Neck: supple  CV: RRR  Chest: (+) large healing incision to central chest, (+) erythema/warmth, (+) ttp, (+) purulent drainage from inferior incision  Lungs: CTA B/L  Abdomen: soft, NT/ND  Ext: no swelling or deformity  Neuro: 5/5 strength all extremities, sensation grossly intact  Skin: no rash    ED Course  The patient is comfortable appearing with stable vital signs. Inferior chest incision is erythematous, warm, and tender. (+) Purulent drainage from 2 areas. Surgical incision appears grossly infected. Case discussed with CT Surgery. Will check EKG, basic labs, troponin, and CT chest. Will continue to monitor in the ED. Disposition per workup and reassessment. The patient will likely require admission to IV antibiotics +/- OR " washout.      Critical Care Time  Procedures

## 2024-05-28 NOTE — ED PROVIDER NOTES
History  Chief Complaint   Patient presents with    Post-op Problem     Had open heart surgery end of April and noticed surgical site red with oozing pustule. Reports mild chest discomfort.      68-year-old male with a history of CAD s/p CABG x 2 on 4/23/2024, hypertension, and hyperlipidemia who presents with 5 days of pain, erythema, and swelling at the lower portion of his sternotomy incision and purulent drainage from the area that began today.  The patient reports that he had been doing well since his surgery.  The patient was out of town over the last 5 days when he began having pain, swelling, and erythema at the site of his surgical incision.  The patient denies fever, chills, chest pain, shortness of breath, nausea, vomiting, diarrhea, abdominal pain, lower extremity edema.        Prior to Admission Medications   Prescriptions Last Dose Informant Patient Reported? Taking?   Multiple Vitamins-Minerals (CENTRUM SILVER 50+MEN) TABS  Self Yes No   Sig: Take 1 tablet by mouth daily   SYMBICORT 160-4.5 MCG/ACT inhaler  Self Yes No   Sig: Take 2 puffs by mouth 2 (two) times a day   acetaminophen (TYLENOL) 325 mg tablet  Self No No   Sig: Take 2 tablets (650 mg total) by mouth every 4 (four) hours as needed for mild pain   Patient taking differently: Take 650 mg by mouth every 4 (four) hours as needed for mild pain As needed   albuterol (PROVENTIL HFA,VENTOLIN HFA) 90 mcg/act inhaler  Self Yes No   Sig: Inhale 90 mcg 2 (two) times a day as needed   aspirin 325 mg tablet  Self No No   Sig: Take 1 tablet (325 mg total) by mouth daily   cycloSPORINE (RESTASIS) 0.05 % ophthalmic emulsion  Self Yes No   Sig: Apply 0.05 application to eye 2 (two) times a day   esomeprazole (NexIUM) 40 MG capsule  Self No No   Sig: Take 1 capsule (40 mg total) by mouth daily in the early morning   folic acid (FOLVITE) 1 mg tablet  Self No No   Sig: Take 1 tablet (1 mg total) by mouth daily   melatonin 3 mg  Self No No   Sig: Take 2  tablets (6 mg total) by mouth daily at bedtime   metoprolol tartrate (LOPRESSOR) 25 mg tablet  Self No No   Sig: Take 1 tablet (25 mg total) by mouth every 12 (twelve) hours   mometasone (NASONEX) 50 mcg/act nasal spray  Self Yes No   Si sprays into each nostril daily   rosuvastatin (CRESTOR) 40 MG tablet  Self No No   Sig: Take 1 tablet (40 mg total) by mouth daily   thiamine 100 MG tablet  Self No No   Sig: Take 1 tablet (100 mg total) by mouth daily      Facility-Administered Medications: None       Past Medical History:   Diagnosis Date    Asthma     Last Assessed:10/16/17    Fontanez's esophagus     Colonic polyp     Last Assessed:10/16/17    Eczema     GERD (gastroesophageal reflux disease)     Last Assessed:10/16/17    High cholesterol     Last Assessed:10/16/17    Hypertension     Last Assessed:10/16/17    Non-neoplastic nevus     Last Assessed:17       Past Surgical History:   Procedure Laterality Date    CARDIAC CATHETERIZATION Left 2024    Procedure: Cardiac Left Heart Cath;  Surgeon: Milagros Prather DO;  Location: [facility];  Service: Cardiology    COLONOSCOPY      10/2013- egd/colon    LYMPHADENECTOMY      Axillary Lymph node removed-benign    OH CORONARY ARTERY BYP W/VEIN & ARTERY GRAFT 3 VEIN N/A 2024    Procedure: CORONARY ARTERY BYPASS GRAFT X2 VESSELS UTILIZING LIMA TO LAD, SVG TO OM3;  Surgeon: Balwinder Sky DO;  Location: [facility];  Service: Cardiac Surgery    TONSILLECTOMY      Last Assessed:10/16/17    VALVE REPLACEMENT      Heart Valve Replacement; Last Assessed:10/16/17       Family History   Problem Relation Age of Onset    Cancer Father         groin, bone    Diabetes Paternal Grandfather      I have reviewed and agree with the history as documented.    E-Cigarette/Vaping    E-Cigarette Use Never User      E-Cigarette/Vaping Substances    Nicotine No     THC No     CBD No     Flavoring No     Other No     Unknown No      Social History     Tobacco Use     Smoking status: Former     Current packs/day: 0.00     Average packs/day: 1 pack/day for 14.0 years (14.0 ttl pk-yrs)     Types: Cigarettes     Start date: 1974     Quit date:      Years since quittin.4    Smokeless tobacco: Former     Types: Chew     Quit date:    Vaping Use    Vaping status: Never Used   Substance Use Topics    Alcohol use: Not Currently    Drug use: No        Review of Systems   Constitutional:  Negative for chills and fever.   HENT:  Negative for congestion and sore throat.    Eyes:  Negative for pain and redness.   Respiratory:  Negative for cough and shortness of breath.    Cardiovascular:  Negative for chest pain and palpitations.   Gastrointestinal:  Negative for abdominal pain, diarrhea, nausea and vomiting.   Genitourinary:  Negative for dysuria and hematuria.   Musculoskeletal:  Negative for arthralgias and myalgias.   Skin:  Positive for color change and wound. Negative for pallor and rash.   Neurological:  Negative for syncope, weakness, light-headedness, numbness and headaches.   All other systems reviewed and are negative.      Physical Exam  ED Triage Vitals [24 1705]   Temperature Pulse Respirations Blood Pressure SpO2   (!) 97.1 °F (36.2 °C) 101 19 (!) 154/109 96 %      Temp src Heart Rate Source Patient Position - Orthostatic VS BP Location FiO2 (%)   -- -- Lying Right arm --      Pain Score       --             Orthostatic Vital Signs  Vitals:    24 1705 24 1830   BP: (!) 154/109 140/88   Pulse: 101 88   Patient Position - Orthostatic VS: Lying        Physical Exam  Constitutional:       General: He is not in acute distress.     Appearance: Normal appearance. He is not ill-appearing, toxic-appearing or diaphoretic.   HENT:      Head: Normocephalic and atraumatic.      Nose: Nose normal.      Mouth/Throat:      Mouth: Mucous membranes are moist.      Pharynx: Oropharynx is clear.   Eyes:      Conjunctiva/sclera: Conjunctivae normal.      Pupils:  Pupils are equal, round, and reactive to light.   Cardiovascular:      Rate and Rhythm: Normal rate and regular rhythm.      Pulses: Normal pulses.      Heart sounds: Normal heart sounds. No murmur heard.     No friction rub. No gallop.   Pulmonary:      Effort: Pulmonary effort is normal.      Breath sounds: Normal breath sounds. No wheezing, rhonchi or rales.   Chest:      Chest wall: Tenderness present.   Abdominal:      General: Abdomen is flat.      Palpations: Abdomen is soft.      Tenderness: There is no abdominal tenderness. There is no guarding or rebound.   Musculoskeletal:         General: No swelling or tenderness. Normal range of motion.      Cervical back: Normal range of motion and neck supple. No rigidity or tenderness.      Right lower leg: No edema.      Left lower leg: No edema.   Lymphadenopathy:      Cervical: No cervical adenopathy.   Skin:     General: Skin is warm and dry.      Capillary Refill: Capillary refill takes less than 2 seconds.      Coloration: Skin is not jaundiced or pale.      Findings: Erythema and lesion present. No bruising or rash.      Comments: Midline sternotomy incision with area of surrounding erythema inferiorly with purulent drainage.   Neurological:      General: No focal deficit present.      Mental Status: He is alert and oriented to person, place, and time.      Sensory: No sensory deficit.      Motor: No weakness.         ED Medications  Medications   vancomycin (VANCOCIN) 2,000 mg in sodium chloride 0.9 % 500 mL IVPB (has no administration in time range)       Diagnostic Studies  Results Reviewed       Procedure Component Value Units Date/Time    HS Troponin I 2hr [[id number]]  (Normal) Collected: 05/28/24 1935    Lab Status: Final result Specimen: Blood from Arm, Left Updated: 05/28/24 2015     hs TnI 2hr 10 ng/L      Delta 2hr hsTnI 0 ng/L     HS Troponin 0hr (reflex protocol) [[id number]]  (Normal) Collected: 05/28/24 1735    Lab Status: Final result Specimen:  Blood from Arm, Left Updated: 05/28/24 1858     hs TnI 0hr 10 ng/L     Comprehensive metabolic panel [[id number]] Collected: 05/28/24 1735    Lab Status: Final result Specimen: Blood from Arm, Left Updated: 05/28/24 1805     Sodium 141 mmol/L      Potassium 4.1 mmol/L      Chloride 106 mmol/L      CO2 27 mmol/L      ANION GAP 8 mmol/L      BUN 14 mg/dL      Creatinine 1.12 mg/dL      Glucose 88 mg/dL      Calcium 9.1 mg/dL      AST 22 U/L      ALT 27 U/L      Alkaline Phosphatase 90 U/L      Total Protein 7.0 g/dL      Albumin 4.1 g/dL      Total Bilirubin 0.28 mg/dL      eGFR 67 ml/min/1.73sq m     Narrative:      National Kidney Disease Foundation guidelines for Chronic Kidney Disease (CKD):     Stage 1 with normal or high GFR (GFR > 90 mL/min/1.73 square meters)    Stage 2 Mild CKD (GFR = 60-89 mL/min/1.73 square meters)    Stage 3A Moderate CKD (GFR = 45-59 mL/min/1.73 square meters)    Stage 3B Moderate CKD (GFR = 30-44 mL/min/1.73 square meters)    Stage 4 Severe CKD (GFR = 15-29 mL/min/1.73 square meters)    Stage 5 End Stage CKD (GFR <15 mL/min/1.73 square meters)  Note: GFR calculation is accurate only with a steady state creatinine    CBC and differential [[id number]]  (Abnormal) Collected: 05/28/24 1735    Lab Status: Final result Specimen: Blood from Arm, Left Updated: 05/28/24 1748     WBC 8.40 Thousand/uL      RBC 4.59 Million/uL      Hemoglobin 13.8 g/dL      Hematocrit 42.3 %      MCV 92 fL      MCH 30.1 pg      MCHC 32.6 g/dL      RDW 13.3 %      MPV 9.8 fL      Platelets 316 Thousands/uL      nRBC 0 /100 WBCs      Segmented % 52 %      Immature Grans % 0 %      Lymphocytes % 33 %      Monocytes % 7 %      Eosinophils Relative 7 %      Basophils Relative 1 %      Absolute Neutrophils 4.32 Thousands/µL      Absolute Immature Grans 0.02 Thousand/uL      Absolute Lymphocytes 2.79 Thousands/µL      Absolute Monocytes 0.59 Thousand/µL      Eosinophils Absolute 0.59 Thousand/µL      Basophils Absolute 0.09  Thousands/µL                    CT chest without contrast   Final Result by Jason Avila MD (05/28 1925)      Sternotomy with mild osseous resorption and sclerosis with surrounding fat stranding concerning for sternal osteomyelitis and mediastinitis. Trace pericardial fluid and trace left pleural effusion. No discrete mediastinal collection.      The study was marked in EPIC for immediate notification.      Workstation performed: [id number]               Procedures  Procedures      ED Course                             SBIRT 22yo+      Flowsheet Row Most Recent Value   Initial Alcohol Screen: US AUDIT-C     1. How often do you have a drink containing alcohol? 0 Filed at: 05/28/2024 1707   2. How many drinks containing alcohol do you have on a typical day you are drinking?  0 Filed at: 05/28/2024 1707   3a. Male UNDER 65: How often do you have five or more drinks on one occasion? 0 Filed at: 05/28/2024 1707   3b. FEMALE Any Age, or MALE 65+: How often do you have 4 or more drinks on one occassion? 0 Filed at: 05/28/2024 1707   Audit-C Score 0 Filed at: 05/28/2024 1707   MIKA: How many times in the past year have you...    Used an illegal drug or used a prescription medication for non-medical reasons? Never Filed at: 05/28/2024 1707                  Medical Decision Making  68-year-old male with a history of CAD s/p CABG x 2 on 4/23/2024, hypertension, and hyperlipidemia who presents with 5 days of pain, erythema, and swelling at the lower portion of his sternotomy incision and purulent drainage from the area that began today.  Patient is mildly tachycardic and hypertensive.  The remainder the patient's vitals are within the normal limits.  Exam is significant for area of erythema surrounding inferior portion of midline sternotomy scar with purulent drainage.  The patient is discussed with cardiothoracic surgery who recommends CT chest to assess for depth of infection.  Will additionally obtain CBC and CMP.   EKG obtained in triage shows rate 95, sinus rhythm, right axis deviation, normal intervals, minimal ST elevation in lead III, no ST depression.  Given abnormal EKG will obtain troponin.    Troponin is negative x 2.  The remainder the patient's lab work is reviewed without actionable derangements.  CT chest shows osseous resorption and sclerosis with surrounding fat stranding concerning for sternal osteomyelitis and mediastinitis.  Cardiothoracic surgery is made aware.  Vancomycin is ordered.  The patient is discussed with SOD and admitted for postoperative infection with possible sternal osteomyelitis.    Amount and/or Complexity of Data Reviewed  Labs: ordered.  Radiology: ordered.    Risk  Decision regarding hospitalization.          Disposition  Final diagnoses:   Postoperative infection   Sternal osteomyelitis (HCC)     Time reflects when diagnosis was documented in both MDM as applicable and the Disposition within this note       Time User Action Codes Description Comment    5/28/2024  8:35 PM Valentín Reid [T81.40XA] Postoperative infection     5/28/2024  8:35 PM Valentín Reid [M86.9] Sternal osteomyelitis (HCC)           ED Disposition       ED Disposition   Admit    Condition   Stable    Date/Time   Tue May 28, 2024 2036    Comment   Case was discussed with SOD and the patient's admission status was agreed to be Admission Status: inpatient status to the service of Dr. Hickman.               Follow-up Information    None         Patient's Medications   Discharge Prescriptions    No medications on file     No discharge procedures on file.    PDMP Review         Value Time User    PDMP Reviewed  Yes 4/27/2024 10:02 AM Andria Rivers PA-C             ED Provider  Attending physically available and evaluated David S Cogan. I managed the patient along with the ED Attending.    Electronically Signed by           Valentín Reid DO  05/28/24 9472

## 2024-05-28 NOTE — TELEPHONE ENCOUNTER
Patient had open heart surgery 4/23 and has pain underneath where the insicion was - or on the insicion.  It is a bright red patch and looks like a red bump there and is getting harder.  He thougt it was an abscess from an  ingrown hair  absess.  He has had it for about a week size and is the size of a dime and is getting harder.  He states he has mild pain and is taking Tylenol twice a day.  Please advise.

## 2024-05-29 ENCOUNTER — PREP FOR PROCEDURE (OUTPATIENT)
Dept: CARDIAC SURGERY | Facility: CLINIC | Age: 69
End: 2024-05-29

## 2024-05-29 DIAGNOSIS — J98.51 MEDIASTINITIS: Primary | ICD-10-CM

## 2024-05-29 PROBLEM — I25.810 CORONARY ARTERY DISEASE INVOLVING CORONARY BYPASS GRAFT OF NATIVE HEART WITHOUT ANGINA PECTORIS: Status: ACTIVE | Noted: 2024-05-29

## 2024-05-29 LAB
ABO GROUP BLD: NORMAL
ANION GAP SERPL CALCULATED.3IONS-SCNC: 11 MMOL/L (ref 4–13)
APTT PPP: 32 SECONDS (ref 23–37)
ATRIAL RATE: 95 BPM
BASOPHILS # BLD AUTO: 0.1 THOUSANDS/ÂΜL (ref 0–0.1)
BASOPHILS NFR BLD AUTO: 1 % (ref 0–1)
BLD GP AB SCN SERPL QL: NEGATIVE
BUN SERPL-MCNC: 13 MG/DL (ref 5–25)
CALCIUM SERPL-MCNC: 8.5 MG/DL (ref 8.4–10.2)
CHLORIDE SERPL-SCNC: 108 MMOL/L (ref 96–108)
CO2 SERPL-SCNC: 24 MMOL/L (ref 21–32)
CREAT SERPL-MCNC: 1.1 MG/DL (ref 0.6–1.3)
EOSINOPHIL # BLD AUTO: 0.58 THOUSAND/ÂΜL (ref 0–0.61)
EOSINOPHIL NFR BLD AUTO: 8 % (ref 0–6)
ERYTHROCYTE [DISTWIDTH] IN BLOOD BY AUTOMATED COUNT: 13.2 % (ref 11.6–15.1)
GFR SERPL CREATININE-BSD FRML MDRD: 68 ML/MIN/1.73SQ M
GLUCOSE SERPL-MCNC: 94 MG/DL (ref 65–140)
HCT VFR BLD AUTO: 40.2 % (ref 36.5–49.3)
HGB BLD-MCNC: 13.4 G/DL (ref 12–17)
IMM GRANULOCYTES # BLD AUTO: 0.04 THOUSAND/UL (ref 0–0.2)
IMM GRANULOCYTES NFR BLD AUTO: 1 % (ref 0–2)
INR PPP: 1.16 (ref 0.84–1.19)
LYMPHOCYTES # BLD AUTO: 2.24 THOUSANDS/ÂΜL (ref 0.6–4.47)
LYMPHOCYTES NFR BLD AUTO: 29 % (ref 14–44)
MCH RBC QN AUTO: 30.3 PG (ref 26.8–34.3)
MCHC RBC AUTO-ENTMCNC: 33.3 G/DL (ref 31.4–37.4)
MCV RBC AUTO: 91 FL (ref 82–98)
MONOCYTES # BLD AUTO: 0.61 THOUSAND/ÂΜL (ref 0.17–1.22)
MONOCYTES NFR BLD AUTO: 8 % (ref 4–12)
NEUTROPHILS # BLD AUTO: 4.12 THOUSANDS/ÂΜL (ref 1.85–7.62)
NEUTS SEG NFR BLD AUTO: 53 % (ref 43–75)
NRBC BLD AUTO-RTO: 0 /100 WBCS
P AXIS: 71 DEGREES
PLATELET # BLD AUTO: 321 THOUSANDS/UL (ref 149–390)
PMV BLD AUTO: 10.2 FL (ref 8.9–12.7)
POTASSIUM SERPL-SCNC: 4 MMOL/L (ref 3.5–5.3)
PR INTERVAL: 176 MS
PROTHROMBIN TIME: 14.7 SECONDS (ref 11.6–14.5)
QRS AXIS: 109 DEGREES
QRSD INTERVAL: 74 MS
QT INTERVAL: 368 MS
QTC INTERVAL: 462 MS
RBC # BLD AUTO: 4.42 MILLION/UL (ref 3.88–5.62)
RH BLD: NEGATIVE
SODIUM SERPL-SCNC: 143 MMOL/L (ref 135–147)
SPECIMEN EXPIRATION DATE: NORMAL
T WAVE AXIS: 119 DEGREES
VENTRICULAR RATE: 95 BPM
WBC # BLD AUTO: 7.69 THOUSAND/UL (ref 4.31–10.16)

## 2024-05-29 PROCEDURE — 93010 ELECTROCARDIOGRAM REPORT: CPT | Performed by: INTERNAL MEDICINE

## 2024-05-29 PROCEDURE — 85025 COMPLETE CBC W/AUTO DIFF WBC: CPT

## 2024-05-29 PROCEDURE — 97163 PT EVAL HIGH COMPLEX 45 MIN: CPT

## 2024-05-29 PROCEDURE — 86850 RBC ANTIBODY SCREEN: CPT | Performed by: PHYSICIAN ASSISTANT

## 2024-05-29 PROCEDURE — NC001 PR NO CHARGE: Performed by: THORACIC SURGERY (CARDIOTHORACIC VASCULAR SURGERY)

## 2024-05-29 PROCEDURE — 97166 OT EVAL MOD COMPLEX 45 MIN: CPT

## 2024-05-29 PROCEDURE — 86901 BLOOD TYPING SEROLOGIC RH(D): CPT | Performed by: PHYSICIAN ASSISTANT

## 2024-05-29 PROCEDURE — 99223 1ST HOSP IP/OBS HIGH 75: CPT | Performed by: INTERNAL MEDICINE

## 2024-05-29 PROCEDURE — 86900 BLOOD TYPING SEROLOGIC ABO: CPT | Performed by: PHYSICIAN ASSISTANT

## 2024-05-29 PROCEDURE — 80048 BASIC METABOLIC PNL TOTAL CA: CPT

## 2024-05-29 PROCEDURE — 99024 POSTOP FOLLOW-UP VISIT: CPT | Performed by: THORACIC SURGERY (CARDIOTHORACIC VASCULAR SURGERY)

## 2024-05-29 PROCEDURE — 85730 THROMBOPLASTIN TIME PARTIAL: CPT

## 2024-05-29 PROCEDURE — 85610 PROTHROMBIN TIME: CPT

## 2024-05-29 RX ORDER — CYCLOSPORINE 0.5 MG/ML
1 EMULSION OPHTHALMIC 2 TIMES DAILY
Status: DISCONTINUED | OUTPATIENT
Start: 2024-05-29 | End: 2024-06-03 | Stop reason: HOSPADM

## 2024-05-29 RX ORDER — CHLORHEXIDINE GLUCONATE ORAL RINSE 1.2 MG/ML
15 SOLUTION DENTAL EVERY 12 HOURS SCHEDULED
Status: DISCONTINUED | OUTPATIENT
Start: 2024-05-29 | End: 2024-05-30 | Stop reason: HOSPADM

## 2024-05-29 RX ORDER — OXYCODONE HYDROCHLORIDE 5 MG/1
5 TABLET ORAL EVERY 4 HOURS PRN
Status: DISCONTINUED | OUTPATIENT
Start: 2024-05-29 | End: 2024-06-03 | Stop reason: HOSPADM

## 2024-05-29 RX ORDER — POLYETHYLENE GLYCOL 3350 17 G/17G
17 POWDER, FOR SOLUTION ORAL DAILY
Status: DISCONTINUED | OUTPATIENT
Start: 2024-05-29 | End: 2024-06-03 | Stop reason: HOSPADM

## 2024-05-29 RX ORDER — ACETAMINOPHEN 325 MG/1
650 TABLET ORAL EVERY 4 HOURS PRN
Status: DISCONTINUED | OUTPATIENT
Start: 2024-05-29 | End: 2024-06-03 | Stop reason: HOSPADM

## 2024-05-29 RX ORDER — HYDROMORPHONE HCL IN WATER/PF 6 MG/30 ML
0.2 PATIENT CONTROLLED ANALGESIA SYRINGE INTRAVENOUS EVERY 2 HOUR PRN
Status: DISCONTINUED | OUTPATIENT
Start: 2024-05-29 | End: 2024-05-29

## 2024-05-29 RX ADMIN — HEPARIN SODIUM 5000 UNITS: 5000 INJECTION INTRAVENOUS; SUBCUTANEOUS at 13:23

## 2024-05-29 RX ADMIN — FOLIC ACID 1 MG: 1 TABLET ORAL at 09:07

## 2024-05-29 RX ADMIN — DEXTRAN 70, GLYCERIN, HYPROMELLOSE 1 DROP: 1; 2; 3 SOLUTION/ DROPS OPHTHALMIC at 17:46

## 2024-05-29 RX ADMIN — Medication 6 MG: at 21:24

## 2024-05-29 RX ADMIN — ATORVASTATIN CALCIUM 80 MG: 80 TABLET, FILM COATED ORAL at 15:41

## 2024-05-29 RX ADMIN — BUDESONIDE AND FORMOTEROL FUMARATE DIHYDRATE 2 PUFF: 160; 4.5 AEROSOL RESPIRATORY (INHALATION) at 17:46

## 2024-05-29 RX ADMIN — METOPROLOL TARTRATE 25 MG: 25 TABLET, FILM COATED ORAL at 09:08

## 2024-05-29 RX ADMIN — PANTOPRAZOLE SODIUM 40 MG: 40 TABLET, DELAYED RELEASE ORAL at 05:14

## 2024-05-29 RX ADMIN — OXYCODONE HYDROCHLORIDE 5 MG: 5 TABLET ORAL at 03:32

## 2024-05-29 RX ADMIN — FLUTICASONE PROPIONATE 2 SPRAY: 50 SPRAY, METERED NASAL at 09:09

## 2024-05-29 RX ADMIN — B-COMPLEX W/ C & FOLIC ACID TAB 1 TABLET: TAB at 09:08

## 2024-05-29 RX ADMIN — VANCOMYCIN HYDROCHLORIDE 1500 MG: 1 INJECTION, POWDER, LYOPHILIZED, FOR SOLUTION INTRAVENOUS at 17:45

## 2024-05-29 RX ADMIN — CHLORHEXIDINE GLUCONATE 15 ML: 1.2 SOLUTION ORAL at 21:24

## 2024-05-29 RX ADMIN — THIAMINE HCL TAB 100 MG 100 MG: 100 TAB at 09:09

## 2024-05-29 RX ADMIN — HEPARIN SODIUM 5000 UNITS: 5000 INJECTION INTRAVENOUS; SUBCUTANEOUS at 21:24

## 2024-05-29 RX ADMIN — METOPROLOL TARTRATE 25 MG: 25 TABLET, FILM COATED ORAL at 21:24

## 2024-05-29 RX ADMIN — HEPARIN SODIUM 5000 UNITS: 5000 INJECTION INTRAVENOUS; SUBCUTANEOUS at 05:14

## 2024-05-29 RX ADMIN — BUDESONIDE AND FORMOTEROL FUMARATE DIHYDRATE 2 PUFF: 160; 4.5 AEROSOL RESPIRATORY (INHALATION) at 09:09

## 2024-05-29 RX ADMIN — OXYCODONE HYDROCHLORIDE 5 MG: 5 TABLET ORAL at 21:22

## 2024-05-29 RX ADMIN — DEXTRAN 70, GLYCERIN, HYPROMELLOSE 1 DROP: 1; 2; 3 SOLUTION/ DROPS OPHTHALMIC at 09:09

## 2024-05-29 RX ADMIN — CYCLOSPORINE 1 DROP: 0.5 EMULSION OPHTHALMIC at 21:24

## 2024-05-29 RX ADMIN — ASPIRIN 325 MG ORAL TABLET 325 MG: 325 PILL ORAL at 09:08

## 2024-05-29 RX ADMIN — CHLORHEXIDINE GLUCONATE 15 ML: 1.2 SOLUTION ORAL at 09:08

## 2024-05-29 NOTE — PLAN OF CARE
Problem: PAIN - ADULT  Goal: Verbalizes/displays adequate comfort level or baseline comfort level  Description: Interventions:  - Encourage patient to monitor pain and request assistance  - Assess pain using appropriate pain scale  - Administer analgesics based on type and severity of pain and evaluate response  - Implement non-pharmacological measures as appropriate and evaluate response  - Consider cultural and social influences on pain and pain management  - Notify physician/advanced practitioner if interventions unsuccessful or patient reports new pain  Outcome: Progressing     Problem: INFECTION - ADULT  Goal: Absence or prevention of progression during hospitalization  Description: INTERVENTIONS:  - Assess and monitor for signs and symptoms of infection  - Monitor lab/diagnostic results  - Monitor all insertion sites, i.e. indwelling lines, tubes, and drains  - Monitor endotracheal if appropriate and nasal secretions for changes in amount and color  - Faxon appropriate cooling/warming therapies per order  - Administer medications as ordered  - Instruct and encourage patient and family to use good hand hygiene technique  - Identify and instruct in appropriate isolation precautions for identified infection/condition  Outcome: Progressing  Goal: Absence of fever/infection during neutropenic period  Description: INTERVENTIONS:  - Monitor WBC    Outcome: Progressing     Problem: SAFETY ADULT  Goal: Patient will remain free of falls  Description: INTERVENTIONS:  - Educate patient/family on patient safety including physical limitations  - Instruct patient to call for assistance with activity   - Consult OT/PT to assist with strengthening/mobility   - Keep Call bell within reach  - Keep bed low and locked with side rails adjusted as appropriate  - Keep care items and personal belongings within reach  - Initiate and maintain comfort rounds  - Make Fall Risk Sign visible to staff  - Offer Toileting every 2 Hours,  in advance of need  - Initiate/Maintain alarm  - Obtain necessary fall risk management equipment:   - Apply yellow socks and bracelet for high fall risk patients  - Consider moving patient to room near nurses station  Outcome: Progressing  Goal: Maintain or return to baseline ADL function  Description: INTERVENTIONS:  -  Assess patient's ability to carry out ADLs; assess patient's baseline for ADL function and identify physical deficits which impact ability to perform ADLs (bathing, care of mouth/teeth, toileting, grooming, dressing, etc.)  - Assess/evaluate cause of self-care deficits   - Assess range of motion  - Assess patient's mobility; develop plan if impaired  - Assess patient's need for assistive devices and provide as appropriate  - Encourage maximum independence but intervene and supervise when necessary  - Involve family in performance of ADLs  - Assess for home care needs following discharge   - Consider OT consult to assist with ADL evaluation and planning for discharge  - Provide patient education as appropriate  Outcome: Progressing  Goal: Maintains/Returns to pre admission functional level  Description: INTERVENTIONS:  - Perform AM-PAC 6 Click Basic Mobility/ Daily Activity assessment daily.  - Set and communicate daily mobility goal to care team and patient/family/caregiver.   - Collaborate with rehabilitation services on mobility goals if consulted  - Perform Range of Motion 4 times a day.  - Reposition patient every 2 hours.  - Dangle patient 4 times a day  - Stand patient 4 times a day  - Ambulate patient 4 times a day  - Out of bed to chair 3 times a day   - Out of bed for meals 3 times a day  - Out of bed for toileting  - Record patient progress and toleration of activity level   Outcome: Progressing     Problem: DISCHARGE PLANNING  Goal: Discharge to home or other facility with appropriate resources  Description: INTERVENTIONS:  - Identify barriers to discharge w/patient and caregiver  -  Arrange for needed discharge resources and transportation as appropriate  - Identify discharge learning needs (meds, wound care, etc.)  - Arrange for interpretive services to assist at discharge as needed  - Refer to Case Management Department for coordinating discharge planning if the patient needs post-hospital services based on physician/advanced practitioner order or complex needs related to functional status, cognitive ability, or social support system  Outcome: Progressing     Problem: Knowledge Deficit  Goal: Patient/family/caregiver demonstrates understanding of disease process, treatment plan, medications, and discharge instructions  Description: Complete learning assessment and assess knowledge base.  Interventions:  - Provide teaching at level of understanding  - Provide teaching via preferred learning methods  Outcome: Progressing

## 2024-05-29 NOTE — PROGRESS NOTES
05/29/24 1600   Provider Notification   Reason for Communication Other (Comment)  (pt requesting Restasis eye drops be ordered for him since he takes them 2 times a day at home)   Provider Name Beckie Amanda   Provider Role Resident  (SOD C)   Method of Communication Other (Comment)  (tigertext)   Response No new orders   Notification Time 1600

## 2024-05-29 NOTE — H&P
INTERNAL MEDICINE RESIDENCY ADMISSION H&P     Name: David S Cogan   Age & Sex: 68 y.o. male   MRN: [id number]  Unit/Bed#: [facility] 402-01   Encounter: [id number]  Primary Care Provider: Maday Chu PA-C    Code Status: Level 1 - Full Code  Admission Status: INPATIENT   Disposition: Patient requires Med/Surg    Admit to team: SOD Team C     ASSESSMENT/PLAN     Principal Problem:    Mediastinitis  Active Problems:    Hyperlipidemia    Asthma    Prediabetes    Coronary artery disease involving coronary bypass graft of native heart without angina pectoris      Coronary artery disease involving coronary bypass graft of native heart without angina pectoris  Assessment & Plan  Admitted 4/17 with NSTEMI, found to have severe two-vessel CAD on cardiac catheterization including left main (85% mid LAD, 100% distal left circumflex).  Ultimately underwent CABG x 2 on 4/23 (LIMA to LAD, SVG to OM 3, LLE EVH)    S/P CABG x 2 (LIMA to LAD, SVG to OM3 with LLE endoscopic vein harvesting), performed on 4/23/24.  Required HFNC postop day 1, but subsequently weaned off and discharged 4/27/2024 with prescriptions for full dose aspirin, high intensity statin and Lopressor 25 mg every 12 hours.    Followed up with cardiology on 5/6 and CT surgery 5/13, referred to cardiac rehab.    Plan:  Continue aspirin 325 mg daily  Continue high intensity statin (Lipitor 80 mg daily in place of Crestor 40 mg daily due to the latter being nonformulary)  Continue Lopressor 25 mg every 12 hours  Had issues with lower extremity edema during last admission, improved with 1.8 L fluid restriction and diuretics    Prediabetes  Assessment & Plan  Most recent A1c 5.9% in April, consistent with prediabetes.  Not on any medications at home.    Plan:  Currently n.p.o., will tentatively order cardiac diet with 1.8 L fluid restriction once able to resume (consider CHO restricted diet as well)    Asthma  Assessment & Plan  Patient with history of asthma (no  PFT's on file); treated as an outpatient with Symbicort and albuterol as needed.    Plan:  Continue twice daily Symbicort with as needed albuterol ordered    Hyperlipidemia  Assessment & Plan  Continue high intensity statin.    * Mediastinitis  Assessment & Plan  Presenting with 5 to 10 days of worsening pain, erythema, swelling and purulent drainage from sternotomy incision.  Initially noticed very small amount of purulence below the inferior margin of his incision 10 days ago, subsequently noticed worsening separate area of erythema, fluctuance and tenderness above this 5 days ago.  No associated fevers, chills, nausea/vomiting, chest pain, shortness of breath or leg swelling.    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  No discrete mediastinal collection noted.    Afebrile, not tachycardic, normotensive on presentation.  CT surgery consulted, requested admission under medical service.  Started on vancomycin in the ED.    Plan:  Cardiothoracic surgery consult  N.p.o. after midnight for possible debridement/washout tomorrow  Vancomycin with pharmacy consult        VTE Pharmacologic Prophylaxis: Heparin  VTE Mechanical Prophylaxis: sequential compression device and foot pump applied    CHIEF COMPLAINT     Chief Complaint   Patient presents with    Post-op Problem     Had open heart surgery end of April and noticed surgical site red with oozing pustule. Reports mild chest discomfort.       HISTORY OF PRESENT ILLNESS     Patient is a 68-year-old male with history of CAD s/p CABG x 2 on 4/23/2024, additionally has history of HTN and HLD, presented to the ED with 5 to 10 days of worsening pain, erythema, swelling and purulent drainage from his sternotomy incision.  He notes he had been doing quite well since his surgery until approximately 10 days ago, when he noticed a very small  amount of purulence at the lower portion of his incision, which resolved on its own with basic wound care.  He then traveled to [place] to watch the [employer] 5 days ago - since then he noticed increasing redness, swelling and tenderness superior to the previous site, progressively worsening until today when it spontaneously drained on its own, prompting his visit to the ED.     He denies during this time having any fevers, chills, chest pain, difficulty breathing, nausea/vomiting, diarrhea or worsening leg swelling.    ED discussed case with CT surgery, who recommended CT chest without contrast for further evaluation.  Imaging was notable for mild osseous resorption and sclerosis of the sternotomy with surrounding fat stranding, concerning for sternal osteomyelitis and mediastinitis.  No discrete mediastinal collection noted. Other pertinent lab work included 2 negative troponins.  In light of this, patient was started on vancomycin and admitted to SOD for further management of postoperative infection.    REVIEW OF SYSTEMS     Review of Systems   Constitutional:  Negative for fever.   HENT:  Negative for congestion.    Eyes:  Negative for visual disturbance.   Respiratory:  Negative for cough.    Cardiovascular:  Negative for leg swelling.   Gastrointestinal:  Negative for abdominal pain.   Endocrine: Negative for polyuria.   Genitourinary:  Negative for hematuria.   Musculoskeletal:  Negative for myalgias.   Skin:  Positive for wound.   Neurological:  Negative for headaches.   Psychiatric/Behavioral:  Negative for hallucinations.      OBJECTIVE     Vitals:    24 1830 24 2136 24 2228 24 2330   BP: 140/88 139/92 134/90    BP Location:       Pulse: 88 90 89    Resp: 18 18 18    Temp:   97.7 °F (36.5 °C)    SpO2: 96% 95% 97% 94%      Temperature:   Temp (24hrs), Av.4 °F (36.3 °C), Min:97.1 °F (36.2 °C), Max:97.7 °F (36.5 °C)    Temperature: 97.7 °F (36.5 °C)  Intake & Output:  I/O          05/27 0701  05/28 0700 05/28 0701  05/29 0700    IV Piggyback  500    Total Intake  500    Urine  350    Total Output  350    Net  +150                Weights:        There is no height or weight on file to calculate BMI.  Weight (last 2 days)       None          Physical Exam  Vitals reviewed.   Constitutional:       General: He is not in acute distress.     Appearance: He is not toxic-appearing.   HENT:      Head: Normocephalic and atraumatic.      Right Ear: External ear normal.      Left Ear: External ear normal.      Nose: Nose normal.      Mouth/Throat:      Mouth: Mucous membranes are moist.      Pharynx: Oropharynx is clear.   Eyes:      Extraocular Movements: Extraocular movements intact.      Pupils: Pupils are equal, round, and reactive to light.   Cardiovascular:      Rate and Rhythm: Normal rate and regular rhythm.      Pulses: Normal pulses.   Pulmonary:      Effort: Pulmonary effort is normal. No respiratory distress.      Breath sounds: Normal breath sounds.   Abdominal:      General: There is no distension.      Palpations: Abdomen is soft.      Tenderness: There is no abdominal tenderness.   Musculoskeletal:      Cervical back: Normal range of motion.      Right lower leg: No edema.      Left lower leg: No edema.   Skin:     General: Skin is warm and dry.      Comments: Area of erythema, fluctuance and tenderness over the inferior portion of the sternotomy incision with small amount of purulent drainage expressed on palpation; smaller less erythematous scabbed area noted inferior to this, no fluctuance or drainage noted.  No clear wound dehiscence.   Neurological:      General: No focal deficit present.      Mental Status: He is alert and oriented to person, place, and time. Mental status is at baseline.   Psychiatric:         Mood and Affect: Mood normal.       PAST MEDICAL HISTORY     Past Medical History:   Diagnosis Date    Asthma     Last Assessed:10/16/17    Fontanez's esophagus     Colonic  polyp     Last Assessed:10/16/17    Eczema     GERD (gastroesophageal reflux disease)     Last Assessed:10/16/17    High cholesterol     Last Assessed:10/16/17    Hypertension     Last Assessed:10/16/17    Non-neoplastic nevus     Last Assessed:17     PAST SURGICAL HISTORY     Past Surgical History:   Procedure Laterality Date    CARDIAC CATHETERIZATION Left 2024    Procedure: Cardiac Left Heart Cath;  Surgeon: Milagros Prather DO;  Location: [facility];  Service: Cardiology    COLONOSCOPY      10/2013- egd/colon    LYMPHADENECTOMY      Axillary Lymph node removed-benign    ID CORONARY ARTERY BYP W/VEIN & ARTERY GRAFT 3 VEIN N/A 2024    Procedure: CORONARY ARTERY BYPASS GRAFT X2 VESSELS UTILIZING LIMA TO LAD, SVG TO OM3;  Surgeon: Balwinder Sky DO;  Location: [facility];  Service: Cardiac Surgery    TONSILLECTOMY      Last Assessed:10/16/17    VALVE REPLACEMENT      Heart Valve Replacement; Last Assessed:10/16/17     SOCIAL & FAMILY HISTORY     Social History     Substance and Sexual Activity   Alcohol Use Not Currently       Social History     Substance and Sexual Activity   Drug Use No     Social History     Tobacco Use   Smoking Status Former    Current packs/day: 0.00    Average packs/day: 1 pack/day for 14.0 years (14.0 ttl pk-yrs)    Types: Cigarettes    Start date: 1974    Quit date:     Years since quittin.4   Smokeless Tobacco Former    Types: Chew    Quit date:      Family History   Problem Relation Age of Onset    Cancer Father         groin, bone    Diabetes Paternal Grandfather      LABORATORY DATA     Labs: I have personally reviewed pertinent reports.    Results from last 7 days   Lab Units 24  1735   WBC Thousand/uL 8.40   HEMOGLOBIN g/dL 13.8   HEMATOCRIT % 42.3   PLATELETS Thousands/uL 316   SEGS PCT % 52   MONO PCT % 7   EOS PCT % 7*      Results from last 7 days   Lab Units 24  1735   POTASSIUM mmol/L 4.1   CHLORIDE mmol/L 106   CO2 mmol/L  "27   BUN mg/dL 14   CREATININE mg/dL 1.12   CALCIUM mg/dL 9.1   ALK PHOS U/L 90   ALT U/L 27   AST U/L 22                          Micro:  No results found for: \"BLOODCX\", \"URINECX\", \"WOUNDCULT\", \"SPUTUMCULTUR\"  IMAGING & DIAGNOSTIC TESTS     Imaging: I have personally reviewed pertinent reports.    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 Trace pericardial fluid and trace left pleural effusion. No discrete mediastinal collection. The study was marked in EPIC for immediate notification. Workstation performed: [id number]     EKG, Pathology, and Other Studies: I have personally reviewed pertinent reports.     ALLERGIES     Allergies   Allergen Reactions    Amoxicillin      Other reaction(s): Diarrhea    Amoxicillin-Pot Clavulanate      Other reaction(s): Allergy Date : 07/27/2015   GI    Penicillins      Annotation - [id number]: Diarrhea     MEDICATIONS PRIOR TO ARRIVAL     Prior to Admission medications    Medication Sig Start Date End Date Taking? Authorizing Provider   albuterol (PROVENTIL HFA,VENTOLIN HFA) 90 mcg/act inhaler Inhale 90 mcg 2 (two) times a day as needed   Yes Historical Provider, MD   aspirin 325 mg tablet Take 1 tablet (325 mg total) by mouth daily 4/28/24  Yes Andria Rivers PA-C   cycloSPORINE (RESTASIS) 0.05 % ophthalmic emulsion Apply 0.05 application to eye 2 (two) times a day   Yes Historical Provider, MD   esomeprazole (NexIUM) 40 MG capsule Take 1 capsule (40 mg total) by mouth daily in the early morning 5/1/24 10/28/24 Yes Maday Chu PA-C   folic acid (FOLVITE) 1 mg tablet Take 1 tablet (1 mg total) by mouth daily 4/28/24  Yes Andria Rviers PA-C   melatonin 3 mg Take 2 tablets (6 mg total) by mouth daily at bedtime 4/27/24  Yes Andria Rivers PA-C   metoprolol tartrate (LOPRESSOR) 25 mg tablet Take 1 " tablet (25 mg total) by mouth every 12 (twelve) hours 5/1/24 8/29/24 Yes Maday Chu PA-C   mometasone (NASONEX) 50 mcg/act nasal spray 2 sprays into each nostril daily   Yes Historical Provider, MD   Multiple Vitamins-Minerals (CENTRUM SILVER 50+MEN) TABS Take 1 tablet by mouth daily   Yes Historical Provider, MD   rosuvastatin (CRESTOR) 40 MG tablet Take 1 tablet (40 mg total) by mouth daily 4/27/24  Yes Andria Rivers PA-C   SYMBICORT 160-4.5 MCG/ACT inhaler Take 2 puffs by mouth 2 (two) times a day 4/9/18  Yes Historical Provider, MD   thiamine 100 MG tablet Take 1 tablet (100 mg total) by mouth daily 4/28/24  Yes Andria Rivers PA-C     MEDICATIONS ADMINISTERED IN LAST 24 HOURS     Medication Administration - last 24 hours from 05/28/2024 0239 to 05/29/2024 0239         Date/Time Order Dose Route Action Action by     05/28/2024 2305 EDT vancomycin (VANCOCIN) 2,000 mg in sodium chloride 0.9 % 500 mL IVPB 0 mg Intravenous Stopped Christy Manzano RN     05/28/2024 2015 EDT vancomycin (VANCOCIN) 2,000 mg in sodium chloride 0.9 % 500 mL IVPB 2,000 mg Intravenous New Bag Sherlyn Wyatt RN     05/28/2024 2245 EDT heparin (porcine) subcutaneous injection 5,000 Units 5,000 Units Subcutaneous Given Christy Manzano RN     05/28/2024 2357 EDT Artificial Tears ophthalmic solution 1 drop 1 drop Both Eyes Not Given Ester Pope RN     05/28/2024 2245 EDT melatonin tablet 6 mg 6 mg Oral Given Christy Manzano RN     05/28/2024 2245 EDT metoprolol tartrate (LOPRESSOR) tablet 25 mg 25 mg Oral Given Christy Manzano RN     05/28/2024 2245 EDT atorvastatin (LIPITOR) tablet 80 mg 80 mg Oral Given Christy Manzano RN     05/28/2024 2357 EDT budesonide-formoterol (SYMBICORT) 160-4.5 mcg/act inhaler 2 puff 2 puff Inhalation Given Ester Pope RN          CURRENT MEDICATIONS     Current Facility-Administered Medications   Medication Dose Route Frequency Provider Last Rate    albuterol  1 puff Inhalation BID PRN  "Emilie Soyeon Kim, MD      Artificial Tears  1 drop Both Eyes BID Emilie Soyeon Kim, MD      aspirin  325 mg Oral Daily Emilie Soyeon Kim, MD      atorvastatin  80 mg Oral Daily With Dinner Emilie Soyeon Kim, MD      budesonide-formoterol  2 puff Inhalation BID Emilie Soyeon Kim, MD      fluticasone  2 spray Each Nare Daily Emilie Soyeon Kim, MD      folic acid  1 mg Oral Daily Emilie Soyeon Kim, MD      heparin (porcine)  5,000 Units Subcutaneous Q8H SARITA Emilie Soyeon Kim, MD      melatonin  6 mg Oral HS Emilie Soyeon Kim, MD      metoprolol tartrate  25 mg Oral Q12H SARITA Emilie Soyeon Kim, MD      multivitamin stress formula  1 tablet Oral Daily Emilie Soyeon Kim, MD      pantoprazole  40 mg Oral Early Morning Emilie Soyeon Kim, MD      polyethylene glycol  17 g Oral Daily PRN Emilie Soyeon Kim, MD      senna  1 tablet Oral Daily Emilie Soyeon Kim, MD      thiamine  100 mg Oral Daily Emilie Soyeon Kim, MD      vancomycin  17.5 mg/kg Intravenous Q24H Emilie Soyeon Kim, MD          albuterol, 1 puff, BID PRN  polyethylene glycol, 17 g, Daily PRN        Admission Time  I spent 30 minutes admitting the patient.  This involved direct patient contact where I performed a full history and physical, reviewing previous records, and reviewing laboratory and other diagnostic studies.    Portions of the record may have been created with voice recognition software.  Occasional wrong word or \"sound a like\" substitutions may have occurred due to the inherent limitations of voice recognition software.  Read the chart carefully and recognize, using context, where substitutions have occurred.    ==  Emilie Soyeon Kim, MD  [facility]  Internal Medicine Residency PGY-3    "

## 2024-05-29 NOTE — PLAN OF CARE
Problem: PAIN - ADULT  Goal: Verbalizes/displays adequate comfort level or baseline comfort level  Description: Interventions:  - Encourage patient to monitor pain and request assistance  - Assess pain using appropriate pain scale  - Administer analgesics based on type and severity of pain and evaluate response  - Implement non-pharmacological measures as appropriate and evaluate response  - Consider cultural and social influences on pain and pain management  - Notify physician/advanced practitioner if interventions unsuccessful or patient reports new pain  Outcome: Progressing     Problem: INFECTION - ADULT  Goal: Absence or prevention of progression during hospitalization  Description: INTERVENTIONS:  - Assess and monitor for signs and symptoms of infection  - Monitor lab/diagnostic results  - Monitor all insertion sites, i.e. indwelling lines, tubes, and drains  - Monitor endotracheal if appropriate and nasal secretions for changes in amount and color  - Clark appropriate cooling/warming therapies per order  - Administer medications as ordered  - Instruct and encourage patient and family to use good hand hygiene technique  - Identify and instruct in appropriate isolation precautions for identified infection/condition  Outcome: Progressing  Goal: Absence of fever/infection during neutropenic period  Description: INTERVENTIONS:  - Monitor WBC    Outcome: Progressing     Problem: SAFETY ADULT  Goal: Patient will remain free of falls  Description: INTERVENTIONS:  - Educate patient/family on patient safety including physical limitations  - Instruct patient to call for assistance with activity   - Consult OT/PT to assist with strengthening/mobility   - Keep Call bell within reach  - Keep bed low and locked with side rails adjusted as appropriate  - Keep care items and personal belongings within reach  - Initiate and maintain comfort rounds  - Make Fall Risk Sign visible to staff  - Offer Toileting every 2 Hours,  in advance of need  - Apply yellow socks and bracelet for high fall risk patients  - Consider moving patient to room near nurses station  Outcome: Progressing  Goal: Maintain or return to baseline ADL function  Description: INTERVENTIONS:  -  Assess patient's ability to carry out ADLs; assess patient's baseline for ADL function and identify physical deficits which impact ability to perform ADLs (bathing, care of mouth/teeth, toileting, grooming, dressing, etc.)  - Assess/evaluate cause of self-care deficits   - Assess range of motion  - Assess patient's mobility; develop plan if impaired  - Assess patient's need for assistive devices and provide as appropriate  - Encourage maximum independence but intervene and supervise when necessary  - Involve family in performance of ADLs  - Assess for home care needs following discharge   - Consider OT consult to assist with ADL evaluation and planning for discharge  - Provide patient education as appropriate  Outcome: Progressing  Goal: Maintains/Returns to pre admission functional level  Description: INTERVENTIONS:  - Perform AM-PAC 6 Click Basic Mobility/ Daily Activity assessment daily.  - Set and communicate daily mobility goal to care team and patient/family/caregiver.   - Collaborate with rehabilitation services on mobility goals if consulted  - Perform Range of Motion 3 times a day.  - Reposition patient every 2 hours.  - Dangle patient 3 times a day  - Stand patient 3 times a day  - Ambulate patient 3 times a day  - Out of bed to chair 3 times a day   - Out of bed for meals 3 times a day  - Out of bed for toileting  - Record patient progress and toleration of activity level   Outcome: Progressing     Problem: DISCHARGE PLANNING  Goal: Discharge to home or other facility with appropriate resources  Description: INTERVENTIONS:  - Identify barriers to discharge w/patient and caregiver  - Arrange for needed discharge resources and transportation as appropriate  - Identify  discharge learning needs (meds, wound care, etc.)  - Arrange for interpretive services to assist at discharge as needed  - Refer to Case Management Department for coordinating discharge planning if the patient needs post-hospital services based on physician/advanced practitioner order or complex needs related to functional status, cognitive ability, or social support system  Outcome: Progressing     Problem: Knowledge Deficit  Goal: Patient/family/caregiver demonstrates understanding of disease process, treatment plan, medications, and discharge instructions  Description: Complete learning assessment and assess knowledge base.  Interventions:  - Provide teaching at level of understanding  - Provide teaching via preferred learning methods  Outcome: Progressing

## 2024-05-29 NOTE — CASE MANAGEMENT
Case Management Assessment & Discharge Planning Note    Patient name David S Cogan  Location [facility]-01 MRN [id number]  : 1955 Date 2024       Current Admission Date: 2024  Current Admission Diagnosis:Mediastinitis   Patient Active Problem List    Diagnosis Date Noted Date Diagnosed    Mediastinitis 2024     Coronary artery disease involving coronary bypass graft of native heart without angina pectoris 2024     S/P CABG (coronary artery bypass graft) 2024     Prediabetes 2024     NSTEMI (non-ST elevated myocardial infarction) ([facility]) 2024     Asthma 2024     Alcohol use, unspecified with other alcohol-induced disorder (HCC) 2021     Essential hypertension 2020     Right flank pain 2019     Abrasion of flank 2019     Overweight (BMI 25.0-29.9) 2019     Acquired keratoderma 2017     Benign prostatic hyperplasia 2017     Dyslipidemia 2017     Hyperlipidemia 2017     Hypertrophic condition of skin 2017     Subclinical hypothyroidism 2017     Thyroid nodule 2017     Benign hypertension 10/16/2017     Vesicular palmoplantar eczema 10/16/2017     Eczema of both hands 10/16/2017       LOS (days): 1  Geometric Mean LOS (GMLOS) (days):   Days to GMLOS:     OBJECTIVE:    Risk of Unplanned Readmission Score: 11.95         Current admission status: Inpatient       Preferred Pharmacy:   [address] [address]  Phone: [phone] Fax: [phone]    Primary Care Provider: Maday Chu PA-C    Primary Insurance: MEDICARE  Secondary Insurance: BLUE CROSS    ASSESSMENT:  Active Health Care Proxies    There are no active Health Care Proxies on file.       Advance Directives  Does patient have a Health Care POA?: No  Was patient offered paperwork?: Yes (declined)  Does patient currently have a Health  Care decision maker?: Yes, please see Health Care Proxy section  Does patient have Advance Directives?: No  Was patient offered paperwork?: Yes (declined)  Primary Contact: sig. other Paul Wileymezainab         Readmission Root Cause  30 Day Readmission: No    Patient Information  Admitted from:: Home  Mental Status: Alert  During Assessment patient was accompanied by: Spouse  Assessment information provided by:: Patient  Primary Caregiver: Self  Support Systems: Spouse/significant other, Daughter, Family members  County of Residence: [place]  What city do you live in?: [facility]  Type of Current Residence: 3 story home  Upon entering residence, is there a bedroom on the main floor (no further steps)?: No  A bedroom is located on the following floor levels of residence (select all that apply):: 2nd Floor  Upon entering residence, is there a bathroom on the main floor (no further steps)?: No  Indicate which floors of current residence have a bathroom (select all the apply):: 2nd Floor  Number of steps to 2nd floor from main floor: One Flight  Living Arrangements: Lives w/ Spouse/significant other  Is patient a ?: No    Activities of Daily Living Prior to Admission  Functional Status: Independent  Completes ADLs independently?: Yes  Ambulates independently?: Yes  Does patient use assisted devices?: No  Does patient currently own DME?: No  Does patient have a history of Outpatient Therapy (PT/OT)?: No  Does the patient have a history of Short-Term Rehab?: No  Does patient have a history of HHC?: Yes ([facility])  Does patient currently have HHC?: No         Patient Information Continued  Income Source: Pension/intermediate  Does patient have prescription coverage?: Yes  Does patient receive dialysis treatments?: No  Does patient have a history of substance abuse?: Yes  Historical substance use preference: Alcohol/ETOH  Does patient have a history of Mental Health Diagnosis?: No         Means of  Transportation  Means of Transport to [facility]:: Drives Self      Social Determinants of Health (SDOH)      Flowsheet Row Most Recent Value   Housing Stability    In the last 12 months, was there a time when you were not able to pay the mortgage or rent on time? N   In the past 12 months, how many times have you moved where you were living? 1   At any time in the past 12 months, were you homeless or living in a shelter (including now)? N   Transportation Needs    In the past 12 months, has lack of transportation kept you from medical appointments or from getting medications? no   In the past 12 months, has lack of transportation kept you from meetings, work, or from getting things needed for daily living? No   Food Insecurity    Within the past 12 months, you worried that your food would run out before you got the money to buy more. Never true   Within the past 12 months, the food you bought just didn't last and you didn't have money to get more. Never true   Utilities    In the past 12 months has the electric, gas, oil, or water company threatened to shut off services in your home? No            DISCHARGE DETAILS:    Discharge planning discussed with:: pt--would use [facility] again if needed, anticipate may need IV antibiotics  Freedom of Choice: Yes     CM contacted family/caregiver?: Yes (sig. other here)             Contacts  Patient Contacts: sig. other Paul Mcrae  Relationship to Patient:: Family  Contact Method: Phone  Reason/Outcome: Continuity of Care, Emergency Contact, Discharge Planning    Requested Home Health Care         Is the patient interested in HHC at discharge?: Yes  Home Health Discipline requested:: Nursing  HHA External Referral Reason (only applicable if external HHA name selected): Patient has established relationship with provider  Home Health Follow-Up Provider:: PCP  Homebound Criteria Met:: Requires the Assistance of Another Person for Safe Ambulation or to Leave the  Home  Supporting Clincal Findings:: Limited Endurance, Fatigues Easliy in Short Distances    DME Referral Provided  Referral made for DME?: No    Other Referral/Resources/Interventions Provided:  Interventions: [facility]  Referral Comments: Referral to [facility] nsg as requested    Would you like to participate in our [facility] service program?  : No - Declined    Treatment Team Recommendation: Home with Home Health Care  Discharge Destination Plan:: Home with Home Health Care  Transport at Discharge : Family                                   Family notified:: sig. other here  Additional Comments: 69yo male had CABGx2 on 4/23/24 and was d/c'd home with [facility]. Admitted with sternal wound infection, mediastinitis and possible sternum osteomyelitis. On Vancomycin IV Plan for OR tomorrow. Lives in [place] with significant other. [facility] closed case 5/20/24. Anticipate may need 6 weeks IV antibiotics tbd. Referral to [facility] again.

## 2024-05-29 NOTE — ASSESSMENT & PLAN NOTE
Patient with documented history of hyperlipidemia. Last lipid panel in April 2024. Takes Crestor 40 mg daily.     Lipitor 80 mg daily in place of Crestor 40 mg daily due to the latter being nonformulary.     Plan:  Continue high intensity statin.

## 2024-05-29 NOTE — ED NOTES
Pt provided with meal tray. Sitting on the side of the bed eating.      Ignacio Padilla RN  05/28/24 3502

## 2024-05-29 NOTE — ASSESSMENT & PLAN NOTE
Patient with history of asthma (no PFT's on file); treated as an outpatient with Symbicort and albuterol as needed.    Plan:  Continue twice daily Symbicort with as needed albuterol ordered

## 2024-05-29 NOTE — ASSESSMENT & PLAN NOTE
Presenting with 5 to 10 days of worsening pain, erythema, swelling and purulent drainage from sternotomy incision.  Initially noticed very small amount of purulence below the inferior margin of his incision 10 days ago, subsequently noticed worsening separate area of erythema, fluctuance and tenderness above this 5 days ago.  No associated fevers, chills, nausea/vomiting, chest pain, shortness of breath or leg swelling.    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  No discrete mediastinal collection noted.    Afebrile, not tachycardic, normotensive on presentation.  CT surgery consulted, requested admission under medical service.  Started on vancomycin in the ED.  Bcxs (5/28) NGTD.   Wound cultures (5/30) NGTD (preliminary).     Plan:  S/P local wound exploration and excisional debridement + placement of prevena vac by CTS on 5/30  Plan for Prevena Vac x 1 week until 6/5.  Once wound OR culture returns and appropriate abx selection can then be discharged.  Vancomycin with pharmacy following, will continue until wound culture growth  Follow up on wound and blood culture results

## 2024-05-29 NOTE — PROGRESS NOTES
INTERNAL MEDICINE RESIDENCY PROGRESS NOTE     Name: David S Cogan   Age & Sex: 68 y.o. male   MRN: [id number]  Unit/Bed#: [facility] 402-01   Encounter: [id number]  Team: SOD Team C     PATIENT INFORMATION     Name: David S Cogan   Age & Sex: 68 y.o. male   MRN: [id number]  Hospital Stay Days: 1    ASSESSMENT/PLAN     Principal Problem:    Mediastinitis  Active Problems:    Hyperlipidemia    Coronary artery disease involving coronary bypass graft of native heart without angina pectoris    Asthma    Prediabetes      Coronary artery disease involving coronary bypass graft of native heart without angina pectoris  Assessment & Plan  Admitted 4/17 with NSTEMI, found to have severe two-vessel CAD on cardiac catheterization including left main (85% mid LAD, 100% distal left circumflex).  Ultimately underwent CABG x 2 on 4/23 (LIMA to LAD, SVG to OM 3, LLE EVH)    S/P CABG x 2 (LIMA to LAD, SVG to OM3 with LLE endoscopic vein harvesting), performed on 4/23/24.  Required HFNC postop day 1, but subsequently weaned off and discharged 4/27/2024 with prescriptions for full dose aspirin, high intensity statin and Lopressor 25 mg every 12 hours.    Followed up with cardiology on 5/6 and CT surgery 5/13, referred to cardiac rehab.    Plan:  Continue aspirin 325 mg daily  Continue high intensity statin   Continue Lopressor 25 mg every 12 hours  Had issues with lower extremity edema during last admission, improved with 1.8 L fluid restriction and diuretics    Hyperlipidemia  Assessment & Plan  Patient with documented history of hyperlipidemia. Last lipid panel in April 2024. Takes Crestor 40 mg daily.     Lipitor 80 mg daily in place of Crestor 40 mg daily due to the latter being nonformulary.     Plan:  Continue high intensity statin.    * Mediastinitis  Assessment & Plan  Presenting with 5 to 10 days of worsening pain, erythema, swelling and purulent drainage from sternotomy incision.  Initially noticed very small amount of purulence  below the inferior margin of his incision 10 days ago, subsequently noticed worsening separate area of erythema, fluctuance and tenderness above this 5 days ago.  No associated fevers, chills, nausea/vomiting, chest pain, shortness of breath or leg swelling.    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  No discrete mediastinal collection noted.    Afebrile, not tachycardic, normotensive on presentation.  CT surgery consulted, requested admission under medical service.  Started on vancomycin in the ED.    Plan:  OR 5/30 local wound debridement and exploration  CTS following  NPO @ midnight  Vancomycin with pharmacy following  Pending blood culture results    Prediabetes  Assessment & Plan  Most recent A1c 5.9% in April, consistent with prediabetes.  Not on any medications at home.    Plan:  Cardiac diet with 1.8 L fluid restriction (consider CHO restricted diet as well)  NPO @ midnight for washout    Asthma  Assessment & Plan  Patient with history of asthma (no PFT's on file); treated as an outpatient with Symbicort and albuterol as needed.    Plan:  Continue twice daily Symbicort with as needed albuterol ordered        Disposition: Remain admitted for debridement tomorrow, NPO at midnight     SUBJECTIVE     Patient seen and examined. No acute events overnight. He felt pain at the site of sternotomy This morning, he feels well and is eating breakfast. He denies fever, chills, nausea, vomiting, chest pain, shortness of breath, light-headedness, dizziness, headache, abdominal pain, urinary complaints, constipation, and diarrhea.     OBJECTIVE     Vitals:    05/28/24 2136 05/28/24 2228 05/28/24 2330 05/29/24 0721   BP: 139/92 134/90  128/81   BP Location:       Pulse: 90 89  78   Resp: 18 18  17   Temp:  97.7 °F (36.5 °C)  98.7 °F (37.1 °C)   SpO2: 95% 97% 94% 96%      Temperature:    Temp (24hrs), Av.8 °F (36.6 °C), Min:97.1 °F (36.2 °C), Max:98.7 °F (37.1 °C)    Temperature: 98.7 °F (37.1 °C)  Intake & Output:  I/O          0701   0700  0701   0700  0701   0700    P.O.   180    IV Piggyback  500     Total Intake  500 180    Urine  600     Total Output  600     Net  -100 +180                 Weights:        There is no height or weight on file to calculate BMI.  Weight (last 2 days)       None          Physical Exam  Constitutional:       Appearance: Normal appearance.   HENT:      Head: Normocephalic and atraumatic.      Nose: Nose normal.   Eyes:      Extraocular Movements: Extraocular movements intact.      Conjunctiva/sclera: Conjunctivae normal.      Pupils: Pupils are equal, round, and reactive to light.   Cardiovascular:      Rate and Rhythm: Normal rate and regular rhythm.      Heart sounds: Normal heart sounds.   Pulmonary:      Effort: Pulmonary effort is normal.      Breath sounds: Normal breath sounds.   Abdominal:      General: Abdomen is flat. There is no distension.      Palpations: Abdomen is soft.   Musculoskeletal:         General: No swelling. Normal range of motion.      Cervical back: Normal range of motion and neck supple.      Right lower leg: No edema.      Left lower leg: No edema.   Skin:     General: Skin is warm and dry.      Coloration: Skin is not pale.      Findings: Erythema and lesion present.      Comments: Tenderness, warmth, erythema with small amount of purulent fluid on posterior aspect of sternotomy incision site. Scabs present.   Neurological:      General: No focal deficit present.      Mental Status: He is alert and oriented to person, place, and time.   Psychiatric:         Mood and Affect: Mood normal.         Behavior: Behavior normal.       LABORATORY DATA     Labs: I have personally reviewed pertinent reports.  Results from last 7 days   Lab Units 24  0518 24  1735   WBC Thousand/uL 7.69 8.40    HEMOGLOBIN g/dL 13.4 13.8   HEMATOCRIT % 40.2 42.3   PLATELETS Thousands/uL 321 316   SEGS PCT % 53 52   MONO PCT % 8 7   EOS PCT % 8* 7*      Results from last 7 days   Lab Units 05/29/24  0518 05/28/24  1735   POTASSIUM mmol/L 4.0 4.1   CHLORIDE mmol/L 108 106   CO2 mmol/L 24 27   BUN mg/dL 13 14   CREATININE mg/dL 1.10 1.12   CALCIUM mg/dL 8.5 9.1   ALK PHOS U/L  --  90   ALT U/L  --  27   AST U/L  --  22              Results from last 7 days   Lab Units 05/29/24  0518   INR  1.16   PTT seconds 32               IMAGING & DIAGNOSTIC TESTING     Radiology Results: I have personally reviewed pertinent reports.  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 Trace pericardial fluid and trace left pleural effusion. No discrete mediastinal collection. The study was marked in EPIC for immediate notification. Workstation performed: [id number]     Other Diagnostic Testing: I have personally reviewed pertinent reports.    ACTIVE MEDICATIONS     Current Facility-Administered Medications   Medication Dose Route Frequency    acetaminophen (TYLENOL) tablet 650 mg  650 mg Oral Q4H PRN    albuterol (PROVENTIL HFA,VENTOLIN HFA) inhaler 1 puff  1 puff Inhalation BID PRN    Artificial Tears ophthalmic solution 1 drop  1 drop Both Eyes BID    aspirin tablet 325 mg  325 mg Oral Daily    atorvastatin (LIPITOR) tablet 80 mg  80 mg Oral Daily With Dinner    budesonide-formoterol (SYMBICORT) 160-4.5 mcg/act inhaler 2 puff  2 puff Inhalation BID    chlorhexidine (PERIDEX) 0.12 % oral rinse 15 mL  15 mL Swish & Spit Q12H SARITA    fluticasone (FLONASE) 50 mcg/act nasal spray 2 spray  2 spray Each Nare Daily    folic acid (FOLVITE) tablet 1 mg  1 mg Oral Daily    heparin (porcine) subcutaneous injection 5,000 Units  5,000 Units Subcutaneous Q8H SARITA    melatonin tablet 6 mg  6 mg Oral HS  "   metoprolol tartrate (LOPRESSOR) tablet 25 mg  25 mg Oral Q12H [facility]    multivitamin stress formula tablet 1 tablet  1 tablet Oral Daily    naloxone (NARCAN) 0.04 mg/mL syringe 0.04 mg  0.04 mg Intravenous Q1MIN PRN    oxyCODONE (ROXICODONE) split tablet 2.5 mg  2.5 mg Oral Q4H PRN    Or    oxyCODONE (ROXICODONE) IR tablet 5 mg  5 mg Oral Q4H PRN    pantoprazole (PROTONIX) EC tablet 40 mg  40 mg Oral Early Morning    polyethylene glycol (MIRALAX) packet 17 g  17 g Oral Daily    senna (SENOKOT) tablet 8.6 mg  1 tablet Oral Daily    thiamine tablet 100 mg  100 mg Oral Daily    vancomycin (VANCOCIN) 1500 mg in sodium chloride 0.9% 250 mL IVPB  17.5 mg/kg Intravenous Q24H       VTE Pharmacologic Prophylaxis: Heparin  VTE Mechanical Prophylaxis: sequential compression device and foot pump applied    Portions of the record may have been created with voice recognition software.  Occasional wrong word or \"sound a like\" substitutions may have occurred due to the inherent limitations of voice recognition software.  Read the chart carefully and recognize, using context, where substitutions have occurred.  ==  Jackie Rizzo  [facility]  Internal Medicine Residency PGY-     "

## 2024-05-29 NOTE — OCCUPATIONAL THERAPY NOTE
Occupational Therapy Evaluation     Patient Name: David S Cogan  Today's Date: 5/29/2024  Problem List  Principal Problem:    Mediastinitis  Active Problems:    Hyperlipidemia    Asthma    Prediabetes    Coronary artery disease involving coronary bypass graft of native heart without angina pectoris    Past Medical History  Past Medical History:   Diagnosis Date    Asthma     Last Assessed:10/16/17    Fontanez's esophagus     Colonic polyp     Last Assessed:10/16/17    Eczema     GERD (gastroesophageal reflux disease)     Last Assessed:10/16/17    High cholesterol     Last Assessed:10/16/17    Hypertension     Last Assessed:10/16/17    Non-neoplastic nevus     Last Assessed:11/16/17     Past Surgical History  Past Surgical History:   Procedure Laterality Date    CARDIAC CATHETERIZATION Left 4/18/2024    Procedure: Cardiac Left Heart Cath;  Surgeon: Milagros Prather DO;  [facility];  Service: Cardiology    COLONOSCOPY      10/2013- egd/colon    LYMPHADENECTOMY      Axillary Lymph node removed-benign    AL CORONARY ARTERY BYP W/VEIN & ARTERY GRAFT 3 VEIN N/A 4/23/2024    Procedure: CORONARY ARTERY BYPASS GRAFT X2 VESSELS UTILIZING LIMA TO LAD, SVG TO OM3;  Surgeon: Balwinder Sky DO;  Location: [facility];  Service: Cardiac Surgery    TONSILLECTOMY      Last Assessed:10/16/17    VALVE REPLACEMENT      Heart Valve Replacement; Last Assessed:10/16/17             05/29/24 1001   OT Last Visit   OT Visit Date 05/29/24   Note Type   Note type Evaluation   Pain Assessment   Pain Assessment Tool 0-10   Pain Score No Pain   Restrictions/Precautions   Other Precautions Cardiac/sternal;Telemetry   Home Living   Type of Home House   Home Layout Two level;Stairs to enter with rails  (4 RYLAND)   Bathroom Shower/Tub Tub/shower unit   Bathroom Toilet Standard   Bathroom Accessibility Accessible   Home Equipment Walker  (does not use pta)   Additional Comments Pt lives in a 2  with 4 RYLAND, uses 2nd lf bed/bath  "with tub shower and standard toilet   Prior Function   Level of [place] Independent with ADLs;Independent with functional mobility;Needs assistance with IADLS   Lives With Significant other   Receives Help From Family;Friend(s)   IADLs Independent with meal prep;Independent with medication management;Independent with driving  (although has not be driving recently due to CABG surgery)   Falls in the last 6 months 0   Vocational Retired   Lifestyle   Autonomy Pta pt I with ADL, IADL and functional mobility, (+)  although not recently due to CABG surgery about a month ago   Reciprocal Relationships supportive significant other   Service to Others retired   Intrinsic Gratification enjoys traveling   General   Family/Caregiver Present Yes  (significant other)   Subjective   Subjective \"Everything has been fine until this came up\"   ADL   Where Assessed Edge of bed   Eating Assistance 6  Modified independent   Grooming Assistance 6  Modified Independent   UB Bathing Assistance 6  Modified Independent   LB Bathing Assistance 6  Modified Independent   UB Dressing Assistance 6  Modified independent   LB Dressing Assistance 5  Supervision/Setup   Toileting Assistance  6  Modified independent   Functional Assistance 6  Modified independent   Bed Mobility   Supine to Sit 6  Modified independent   Additional Comments Pt greeted in bed, left in chair with all needs within reach   Transfers   Sit to Stand 6  Modified independent   Stand to Sit 6  Modified independent   Additional Comments without AD   Functional Mobility   Functional Mobility 6  Modified independent   Additional Comments Pt performs short household distance mobility with MOD I without AD   Additional items   (no AD)   Balance   Static Sitting Good   Dynamic Sitting Good   Static Standing Fair +   Dynamic Standing Fair +   Ambulatory Fair   Activity Tolerance   Activity Tolerance Patient tolerated treatment well   Medical Staff Made Aware Co-eval with " student PT and DPT due to medical complexity   Nurse Made Aware RN cleared for therapy   RUE Assessment   RUE Assessment WFL   LUE Assessment   LUE Assessment WFL   Hand Function   Gross Motor Coordination Functional   Fine Motor Coordination Functional   Sensation   Light Touch No apparent deficits   Cognition   Overall Cognitive Status WFL   Arousal/Participation Alert;Cooperative   Attention Within functional limits   Orientation Level Oriented X4   Memory Within functional limits   Following Commands Follows all commands and directions without difficulty   Comments Pt cooperative to therapy   Assessment   Prognosis Good   Assessment Pt is a 68 y.o. male who was admitted to [facility] on 5/28/2024 with Mediastinitis, admitted for evaluation by cardiac surgery for possible debridement/washout following CABG on 4/23. Pt seen for an OT evaluation per active OT orders.  Pt  has a past medical history of Asthma, Fontanez's esophagus, Colonic polyp, Eczema, GERD (gastroesophageal reflux disease), High cholesterol, Hypertension, and Non-neoplastic nevus. Pt lives in a [facility] with 4 RYLAND, uses an upstairs bed/bath with tub shower and standard toilet. Pta, pt was independent w/ ADL/IADL and functional mobility, was (+) driving although not recently and was not using any DME at baseline. Currently, pt is Mod I for UB ADL, Mod I-Supervision for LB ADL, and completed transfers/FM w Mod I. The patient's raw score on the AM-PAC Daily Activity Inpatient Short Form is 23. A raw score of greater than or equal to 19 suggests the patient may benefit from discharge to home. Please refer to the recommendation of the Occupational Therapist for safe discharge planning. Pt is likely close to functional baseline and has adequate assist at home, indicating no further acute OT needs at this time, please re-consult if pt has a change in functional status. From OT standpoint, recommend home with increased social support, no further OT  needs upon D/C. Pt was left seated in bedside chair with significant other present and all needs within reach.   Goals   Patient Goals to feel better   Plan   OT Frequency Eval only   Discharge Recommendation   Rehab Resource Intensity Level, OT No post-acute rehabilitation needs   AM-PAC Daily Activity Inpatient   Lower Body Dressing 3   Bathing 4   Toileting 4   Upper Body Dressing 4   Grooming 4   Eating 4   Daily Activity Raw Score 23   Daily Activity Standardized Score (Calc for Raw Score >=11) 51.12   AM-PAC Applied Cognition Inpatient   Following a Speech/Presentation 4   Understanding Ordinary Conversation 4   Taking Medications 4   Remembering Where Things Are Placed or Put Away 4   Remembering List of 4-5 Errands 4   Taking Care of Complicated Tasks 3   Applied Cognition Raw Score 23   Applied Cognition Standardized Score 53.08   End of Consult   Education Provided Yes;Family or social support of family present for education by provider   Patient Position at End of Consult Bedside chair;All needs within reach   Nurse Communication Nurse aware of consult       GEN Ocasio, OTR/L

## 2024-05-29 NOTE — CONSULTS
Consultation - Cardiothoracic Surgery   David S Cogan 68 y.o. male MRN: [id number]  Unit/Bed#: [facility] 402-01 Encounter: [id number]    Physician Requesting Consult: Krystyna Hickman MD    Reason for Consult / Principal Problem: sternal wound    Consults  History of Present Illness: David S Cogan is a 68 y.o. male who underwent CABG x 2 on 4/23/2024 and discharged on POD 4.  No notable postoperative complications.  He was seen in our office for his 1 month follow up without any wound complications and discharged from the office thereafter and referred to cardiac rehabiliation outpatient therapy.  He is seen in consultation today with his girlfriend.  Last Monday there was an ingrown hair that his girlfriend removed at/near sternal incision.  The next day there was some redness at the site.  On Wednesday they drove to [place] for [employer] and had worsening redness over the course of the previous week.  Yesterday on their way home from [place] they sat down to dinner and Christopher felt a pop with a considerable amount of drainage from the incision and they drove directly to the ER for evaluation.  He has no fever, chills or weakness.  He has no other complaints while in [place] and was walking about 10k steps a day.    Past Medical History:  Past Medical History:   Diagnosis Date    Asthma     Last Assessed:10/16/17    Fontanez's esophagus     Colonic polyp     Last Assessed:10/16/17    Eczema     GERD (gastroesophageal reflux disease)     Last Assessed:10/16/17    High cholesterol     Last Assessed:10/16/17    Hypertension     Last Assessed:10/16/17    Non-neoplastic nevus     Last Assessed:11/16/17     Past Surgical History:   Past Surgical History:   Procedure Laterality Date    CARDIAC CATHETERIZATION Left 4/18/2024    Procedure: Cardiac Left Heart Cath;  Surgeon: Milagros Prather DO;  [facility];  Service: Cardiology    COLONOSCOPY      10/2013- egd/colon    LYMPHADENECTOMY      Axillary Lymph node  removed-benign    SD CORONARY ARTERY BYP W/VEIN & ARTERY GRAFT 3 VEIN N/A 2024    Procedure: CORONARY ARTERY BYPASS GRAFT X2 VESSELS UTILIZING LIMA TO LAD, SVG TO OM3;  Surgeon: Balwinder Sky DO;  [facility];  Service: Cardiac Surgery    TONSILLECTOMY      Last Assessed:10/16/17    VALVE REPLACEMENT      Heart Valve Replacement; Last Assessed:10/16/17     Family History:  Family History   Problem Relation Age of Onset    Cancer Father         groin, bone    Diabetes Paternal Grandfather      Social History:  Social History     Substance and Sexual Activity   Alcohol Use Not Currently     Social History     Substance and Sexual Activity   Drug Use No     Social History     Tobacco Use   Smoking Status Former    Current packs/day: 0.00    Average packs/day: 1 pack/day for 14.0 years (14.0 ttl pk-yrs)    Types: Cigarettes    Start date: 1974    Quit date:     Years since quittin.4   Smokeless Tobacco Former    Types: Chew    Quit date:      Marital Status:     Home Medications:   Prior to Admission medications    Medication Sig Start Date End Date Taking? Authorizing Provider   albuterol (PROVENTIL HFA,VENTOLIN HFA) 90 mcg/act inhaler Inhale 90 mcg 2 (two) times a day as needed   Yes Historical Provider, MD   aspirin 325 mg tablet Take 1 tablet (325 mg total) by mouth daily 24  Yes Andria Rivers PA-C   cycloSPORINE (RESTASIS) 0.05 % ophthalmic emulsion Apply 0.05 application to eye 2 (two) times a day   Yes Historical Provider, MD   esomeprazole (NexIUM) 40 MG capsule Take 1 capsule (40 mg total) by mouth daily in the early morning 5/1/24 10/28/24 Yes Maday Chu PA-C   folic acid (FOLVITE) 1 mg tablet Take 1 tablet (1 mg total) by mouth daily 24  Yes Andria Rivers PA-C   melatonin 3 mg Take 2 tablets (6 mg total) by mouth daily at bedtime 24  Yes Andria Rivers PA-C   metoprolol tartrate (LOPRESSOR) 25 mg tablet Take 1 tablet (25 mg total)  by mouth every 12 (twelve) hours 5/1/24 8/29/24 Yes Maday Chu PA-C   mometasone (NASONEX) 50 mcg/act nasal spray 2 sprays into each nostril daily   Yes Historical Provider, MD   Multiple Vitamins-Minerals (CENTRUM SILVER 50+MEN) TABS Take 1 tablet by mouth daily   Yes Historical Provider, MD   rosuvastatin (CRESTOR) 40 MG tablet Take 1 tablet (40 mg total) by mouth daily 4/27/24  Yes Andria Rivers PA-C   SYMBICORT 160-4.5 MCG/ACT inhaler Take 2 puffs by mouth 2 (two) times a day 4/9/18  Yes Historical Provider, MD   thiamine 100 MG tablet Take 1 tablet (100 mg total) by mouth daily 4/28/24  Yes Andria Rivers PA-C       Inpatient Medications:  Scheduled Meds:   Current Facility-Administered Medications   Medication Dose Route Frequency Provider Last Rate    acetaminophen  650 mg Oral Q4H PRN Daniel Bernard MD      albuterol  1 puff Inhalation BID PRN Emilie Soyeon Kim, MD      Artificial Tears  1 drop Both Eyes BID Emilie Soyeon Kim, MD      aspirin  325 mg Oral Daily Emilie Soyeon Kim, MD      atorvastatin  80 mg Oral Daily With Dinner Emilie Soyeon Kim, MD      budesonide-formoterol  2 puff Inhalation BID Emilie Soyeon Kim, MD      fluticasone  2 spray Each Nare Daily Emilie Soyeon Kim, MD      folic acid  1 mg Oral Daily Emilie Soyeon Kim, MD      heparin (porcine)  5,000 Units Subcutaneous Q8H SARITA Emilie Soyeon Kim, MD      HYDROmorphone  0.2 mg Intravenous Q2H PRN Daniel Bernard MD      melatonin  6 mg Oral HS Emilie Soyeon Kim, MD      metoprolol tartrate  25 mg Oral Q12H SARITA Emilie Soyeon Kim, MD      multivitamin stress formula  1 tablet Oral Daily Emilie Soyeon Kim, MD      naloxone  0.04 mg Intravenous Q1MIN PRN Daniel Bernard MD      oxyCODONE  2.5 mg Oral Q4H PRN Daniel Bernard MD      Or    oxyCODONE  5 mg Oral Q4H PRN Daniel Bernard MD      pantoprazole  40 mg Oral Early Morning Laly Soyeon Debbie, MD      polyethylene glycol  17 g Oral Daily PRN Emilie Soyeon Kim, MD       senna  1 tablet Oral Daily Emilie Soyeon Kim, MD      thiamine  100 mg Oral Daily Emilie Soyeon Kim, MD      vancomycin  17.5 mg/kg Intravenous Q24H Emilie Soyeon Kim, MD       Continuous Infusions:    PRN Meds:  acetaminophen, 650 mg, Q4H PRN  albuterol, 1 puff, BID PRN  HYDROmorphone, 0.2 mg, Q2H PRN  naloxone, 0.04 mg, Q1MIN PRN  oxyCODONE, 2.5 mg, Q4H PRN   Or  oxyCODONE, 5 mg, Q4H PRN  polyethylene glycol, 17 g, Daily PRN        Allergies:  Allergies   Allergen Reactions    Amoxicillin      Other reaction(s): Diarrhea    Amoxicillin-Pot Clavulanate      Other reaction(s): Allergy Date : 07/27/2015   GI    Penicillins      Annotation - [id number]: Diarrhea       Review of Systems:  Review of Systems   Constitutional: Negative.    HENT: Negative.     Eyes: Negative.    Respiratory: Negative.     Cardiovascular: Negative.    Gastrointestinal: Negative.    Endocrine: Negative.    Genitourinary: Negative.    Musculoskeletal: Negative.    Skin:  Positive for wound.   Allergic/Immunologic: Negative.    Neurological: Negative.    Hematological: Negative.    Psychiatric/Behavioral: Negative.         Vital Signs:     Vitals:    05/28/24 2136 05/28/24 2228 05/28/24 2330 05/29/24 0721   BP: 139/92 134/90  128/81   BP Location:       Pulse: 90 89  78   Resp: 18 18  17   Temp:  97.7 °F (36.5 °C)  98.7 °F (37.1 °C)   SpO2: 95% 97% 94% 96%     Invasive Devices       Peripheral Intravenous Line  Duration             Peripheral IV 05/28/24 Left;Proximal;Ventral (anterior) Forearm <1 day                    Physical Exam:  Physical Exam  Constitutional:       General: He is not in acute distress.     Appearance: He is normal weight. He is not ill-appearing or toxic-appearing.   HENT:      Head: Normocephalic and atraumatic.      Right Ear: External ear normal.      Left Ear: External ear normal.      Nose: Nose normal.      Mouth/Throat:      Mouth: Mucous membranes are moist.      Pharynx: Oropharynx is clear. No oropharyngeal  exudate or posterior oropharyngeal erythema.   Eyes:      General:         Right eye: No discharge.         Left eye: No discharge.      Extraocular Movements: Extraocular movements intact.      Conjunctiva/sclera: Conjunctivae normal.      Pupils: Pupils are equal, round, and reactive to light.   Cardiovascular:      Rate and Rhythm: Normal rate and regular rhythm.   Pulmonary:      Effort: Pulmonary effort is normal.      Breath sounds: Normal breath sounds.   Abdominal:      General: Abdomen is flat. Bowel sounds are normal.      Palpations: Abdomen is soft.   Musculoskeletal:      Cervical back: Normal range of motion and neck supple.      Right lower leg: No edema.      Left lower leg: No edema.   Skin:     General: Skin is warm.      Comments: Sternotomy: posterior incision has an area of erythema and tenderness about 0.5 cm around, no active drainage but has mild yellow drainage on dressing.  No sternal instability.    EVH incision site: large scab over area, slow healing.  No erythema, no drainage.  Area is clean and dry    Neurological:      General: No focal deficit present.      Mental Status: He is alert and oriented to person, place, and time.      Motor: No weakness.   Psychiatric:         Mood and Affect: Mood normal.         Behavior: Behavior normal.         Thought Content: Thought content normal.         Judgment: Judgment normal.         Lab Results:     Results from last 7 days   Lab Units 05/29/24  0518 05/28/24  1735   WBC Thousand/uL 7.69 8.40   HEMOGLOBIN g/dL 13.4 13.8   HEMATOCRIT % 40.2 42.3   PLATELETS Thousands/uL 321 316     Results from last 7 days   Lab Units 05/29/24  0518 05/28/24  1735   POTASSIUM mmol/L 4.0 4.1   CHLORIDE mmol/L 108 106   CO2 mmol/L 24 27   BUN mg/dL 13 14   CREATININE mg/dL 1.10 1.12   CALCIUM mg/dL 8.5 9.1     Results from last 7 days   Lab Units 05/29/24  0518   INR  1.16   PTT seconds 32     Lab Results   Component Value Date    HGBA1C 5.9 (H) 04/18/2024      Lab Results   Component Value Date    TROPONINI <0.02 05/17/2020       Imaging Studies:   CT chest wo:  FINDINGS:     LARGE AIRWAYS: Large airways are clear with no tracheal or endobronchial lesion.     LUNGS: No consolidation. No edema. Mild left basilar passive atelectasis.     PLEURA: Trace left pleural effusion. No pneumothorax.     HEART: Normal cardiac size and morphology. CABG. Minimal coronary artery calcification. Trace pericardial fluid.     VESSELS: Normal caliber thoracic aorta without significant atherosclerotic plaque. Normal caliber main pulmonary artery.     MEDIASTINUM AND ALESSANDRA: CABG. Anterior mediastinal fat stranding as described.     CHEST WALL AND LOWER NECK:   Post median sternotomy with intact sternotomy wires. There is mild osseous resorption of the coapted margins of the sternum with underlying sclerosis. Mild surrounding presternal and retrosternal/anterior mediastinal fat   stranding without discrete collection. Given time since surgery, findings are concerning for osteomyelitis and mediastinitis.     VISUALIZED STRUCTURES IN THE UPPER ABDOMEN: Within normal limits.     BONES: Minimal multilevel spondylosis. Sternum as described above.     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 Trace pericardial fluid and trace left pleural effusion. No discrete mediastinal collection.       Assessment:  Principal Problem:    Mediastinitis  Active Problems:    Hyperlipidemia    Asthma    Prediabetes    Coronary artery disease involving coronary bypass graft of native heart without angina pectoris    Plan:  OR tomorrow for debridement.   NPO after MN tonight.  Consent signed.    David S Cogan was comfortable with our recommendations, and their questions were answered to their satisfaction.  We will continue to evaluate the patient daily with further recommendations as work up is  completed.  Thank you for allowing us to participate in the care of this patient.     SIGNATURE: Andria Rivers PA-C  DATE: May 29, 2024  TIME: 8:06 AM    * This note was completed in part utilizing twidox direct voice recognition software.   Grammatical errors, random word insertion, spelling mistakes, and incomplete sentences may be an occasional consequence of the system secondary to software limitations, ambient noise and hardware issues. At the time of dictation, efforts were made to edit, clarify and /or correct errors. Please read the chart carefully and recognize, using context, where substitutions have occurred.  If you have any questions or concerns about the context, text or information contained within the body of this dictation, please contact myself, the provider, for further clarification.

## 2024-05-29 NOTE — PHYSICAL THERAPY NOTE
Physical Therapy Evaluation     Patient's Name: David S Cogan    Admitting Diagnosis  Postoperative infection [T81.40XA]  S/P CABG (coronary artery bypass graft) [Z95.1]  Post-operative complication [T81.9XXA]  Sternal osteomyelitis (HCC) [M86.9]    Problem List  Patient Active Problem List   Diagnosis    Acquired keratoderma    Benign hypertension    Benign prostatic hyperplasia    Vesicular palmoplantar eczema    Dyslipidemia    Eczema of both hands    Hyperlipidemia    Hypertrophic condition of skin    Subclinical hypothyroidism    Thyroid nodule    Right flank pain    Abrasion of flank    Overweight (BMI 25.0-29.9)    Essential hypertension    Alcohol use, unspecified with other alcohol-induced disorder (HCC)    NSTEMI (non-ST elevated myocardial infarction) (HCC)    Asthma    S/P CABG (coronary artery bypass graft)    Prediabetes    Mediastinitis    Coronary artery disease involving coronary bypass graft of native heart without angina pectoris       Past Medical History  Past Medical History:   Diagnosis Date    Asthma     Last Assessed:10/16/17    Fontanez's esophagus     Colonic polyp     Last Assessed:10/16/17    Eczema     GERD (gastroesophageal reflux disease)     Last Assessed:10/16/17    High cholesterol     Last Assessed:10/16/17    Hypertension     Last Assessed:10/16/17    Non-neoplastic nevus     Last Assessed:11/16/17       Past Surgical History  Past Surgical History:   Procedure Laterality Date    CARDIAC CATHETERIZATION Left 4/18/2024    Procedure: Cardiac Left Heart Cath;  Surgeon: Milagros Prather DO;  [facility];  Service: Cardiology    COLONOSCOPY      10/2013- egd/colon    LYMPHADENECTOMY      Axillary Lymph node removed-benign    MN CORONARY ARTERY BYP W/VEIN & ARTERY GRAFT 3 VEIN N/A 4/23/2024    Procedure: CORONARY ARTERY BYPASS GRAFT X2 VESSELS UTILIZING LIMA TO LAD, SVG TO OM3;  Surgeon: Balwinder Sky DO;  Location: [facility];  Service: Cardiac Surgery     TONSILLECTOMY      Last Assessed:10/16/17    VALVE REPLACEMENT      Heart Valve Replacement; Last Assessed:10/16/17          05/29/24 0959   PT Last Visit   PT Visit Date 05/29/24   Note Type   Note type Evaluation   Pain Assessment   Pain Assessment Tool 0-10   Pain Score No Pain   Restrictions/Precautions   Braces or Orthoses Other (Comment)  (Denies any braces or orthoses)   Other Precautions Cardiac/sternal;Telemetry   Home Living   Type of Home House  (4 [place] w/ BL hand railing)   Home Layout One level;Stairs to enter with rails   Home Equipment Walker   Prior Function   Level of [place] Independent with ADLs;Independent with functional mobility  (Does not ambulate with an AD)   Lives With Spouse   Receives Help From Family   Falls in the last 6 months 0   Vocational Retired   General   Additional Pertinent History Cleared for PT eval by nsg   Family/Caregiver Present Yes  (Wife present at bedside)   Cognition   Overall Cognitive Status WFL   Arousal/Participation Alert   Attention Within functional limits   Orientation Level Oriented to person;Oriented to place;Oriented to situation   Memory Within functional limits   Following Commands Follows one step commands without difficulty   Subjective   Subjective Pt alert; lying bed; agreeable to mobilize with PT   RUE Assessment   RUE Assessment WFL  (AROM)   LUE Assessment   LUE Assessment WFL  (AROM)   RLE Assessment   RLE Assessment WFL  (AROM)   Strength RLE   RLE Overall Strength   (Fair+/Good-)   LLE Assessment   LLE Assessment WFL  (AROM)   Strength LLE   LLE Overall Strength   (Fair+/Good-)   Bed Mobility   Supine to Sit 6  Modified independent   Additional items Increased time required;Verbal cues   Transfers   Sit to Stand 6  Modified independent   Additional items Verbal cues   Stand to Sit 6  Modified independent   Additional items Verbal cues   Ambulation/Elevation   Gait pattern Wide MARLA;Decreased foot clearance;Short stride;Excessively slow;Step  through pattern;Inconsistent renu   Gait Assistance 6  Modified independent   Additional items Verbal cues   Assistive Device None   Distance 160ft   Stair Management Assistance Not tested   Balance   Static Sitting Good   Dynamic Sitting Fair +   Static Standing Fair +   Dynamic Standing Fair   Ambulatory Fair   Activity Tolerance   Activity Tolerance Patient tolerated treatment well   Medical Staff Made Aware Co-eval performed with OT d/t medical complexity   Nurse Made Aware Spoke to CARRI Clark   Assessment   Prognosis Good   Problem List Decreased endurance;Decreased mobility;Obesity;Impaired balance   Assessment Pt is a 68 year old Male who came to the emergency department with worsening pain, erythema, swelling and purulent drainage from sternotomy incision on 5/28/2024. Pt was diagnosed with mediastinitis. Pt's comorbidities affecting POC include: Asthma, Hypertension, CAD involving coronary bypass graft of native heart without angina pectoris, and prediabetes. Personal factors include: R[place]. Pt's clinical presentation is currently unstable/unpredictable which is evident in ongoing telem monitoring and pending medical and surgical work-up to manage the underlying issue. Pt presents with overall decreased functional endurance and activity tolerance, min impaired balance, and min gait deviations. Pt is scheduled to go to the OR tomorrow for debridement surgery to manage current diagnosis. Plan to assess patient after surgery to determine needs and safety to return home. Will continue to follow pt in PT for progressive mobilization to address above functional deficits to return him to his prior level of function. D/C recommendations are outlined below. Will continue to follow until medically cleared for discharge.   Barriers to Discharge None   Goals   Patient Goals To feel better   STG Expiration Date 06/08/24   Short Term Goal #1 7-10 days. Pt will amb 400 ft w/ no AD and Mod(I), in order to facilitate  community amb status. Pt will be able to navigate 4 steps with BL hand rails, to be able to enter the home safety. Pt will participate in LE therex and balance activities to max progression w/ mobility skills.   PT Treatment Day 0   Plan   Treatment/Interventions Functional transfer training;LE strengthening/ROM;Elevations;Therapeutic exercise;Endurance training;Bed mobility;Gait training;Spoke to case management;OT;Family  (PT spoke to case management)   PT Frequency 2-3x/wk   Discharge Recommendation   Rehab Resource Intensity Level, PT No post-acute rehabilitation needs   Equipment Recommended Other (Comment)  (No needs at this time)   AM-PAC Basic Mobility Inpatient   Turning in Flat Bed Without Bedrails 4   Lying on Back to Sitting on Edge of Flat Bed Without Bedrails 4   Moving Bed to Chair 4   Standing Up From Chair Using Arms 4   Walk in Room 4   Climb 3-5 Stairs With Railing 4   Basic Mobility Inpatient Raw Score 24   Basic Mobility Standardized Score 57.68   [facility] Highest Level Of Mobility   JH-HLM Goal 8: Walk 250 feet or more   JH-HLM Achieved 7: Walk 25 feet or more   Modified [place] Scale   Modified [place] Scale 2   End of Consult   Patient Position at End of Consult Bedside chair;All needs within reach         Pamela Adkins

## 2024-05-29 NOTE — H&P (VIEW-ONLY)
Consultation - Cardiothoracic Surgery   David S Cogan 68 y.o. male MRN: [id number]  Unit/Bed#: [facility] 402-01 Encounter: [id number]    Physician Requesting Consult: Krystyna Hickman MD    Reason for Consult / Principal Problem: sternal wound    Consults  History of Present Illness: David S Cogan is a 68 y.o. male who underwent CABG x 2 on 4/23/2024 and discharged on POD 4.  No notable postoperative complications.  He was seen in our office for his 1 month follow up without any wound complications and discharged from the office thereafter and referred to cardiac rehabiliation outpatient therapy.  He is seen in consultation today with his girlfriend.  Last Monday there was an ingrown hair that his girlfriend removed at/near sternal incision.  The next day there was some redness at the site.  On Wednesday they drove to [place] for [employer] and had worsening redness over the course of the previous week.  Yesterday on their way home from [place] they sat down to dinner and Christopher felt a pop with a considerable amount of drainage from the incision and they drove directly to the ER for evaluation.  He has no fever, chills or weakness.  He has no other complaints while in [place] and was walking about 10k steps a day.    Past Medical History:  Past Medical History:   Diagnosis Date    Asthma     Last Assessed:10/16/17    Fontanez's esophagus     Colonic polyp     Last Assessed:10/16/17    Eczema     GERD (gastroesophageal reflux disease)     Last Assessed:10/16/17    High cholesterol     Last Assessed:10/16/17    Hypertension     Last Assessed:10/16/17    Non-neoplastic nevus     Last Assessed:11/16/17     Past Surgical History:   Past Surgical History:   Procedure Laterality Date    CARDIAC CATHETERIZATION Left 4/18/2024    Procedure: Cardiac Left Heart Cath;  Surgeon: Milagros Prather DO;  [facility];  Service: Cardiology    COLONOSCOPY      10/2013- egd/colon    LYMPHADENECTOMY      Axillary Lymph node  removed-benign    ID CORONARY ARTERY BYP W/VEIN & ARTERY GRAFT 3 VEIN N/A 2024    Procedure: CORONARY ARTERY BYPASS GRAFT X2 VESSELS UTILIZING LIMA TO LAD, SVG TO OM3;  Surgeon: Balwinder Sky DO;  [facility];  Service: Cardiac Surgery    TONSILLECTOMY      Last Assessed:10/16/17    VALVE REPLACEMENT      Heart Valve Replacement; Last Assessed:10/16/17     Family History:  Family History   Problem Relation Age of Onset    Cancer Father         groin, bone    Diabetes Paternal Grandfather      Social History:  Social History     Substance and Sexual Activity   Alcohol Use Not Currently     Social History     Substance and Sexual Activity   Drug Use No     Social History     Tobacco Use   Smoking Status Former    Current packs/day: 0.00    Average packs/day: 1 pack/day for 14.0 years (14.0 ttl pk-yrs)    Types: Cigarettes    Start date: 1974    Quit date:     Years since quittin.4   Smokeless Tobacco Former    Types: Chew    Quit date:      Marital Status:     Home Medications:   Prior to Admission medications    Medication Sig Start Date End Date Taking? Authorizing Provider   albuterol (PROVENTIL HFA,VENTOLIN HFA) 90 mcg/act inhaler Inhale 90 mcg 2 (two) times a day as needed   Yes Historical Provider, MD   aspirin 325 mg tablet Take 1 tablet (325 mg total) by mouth daily 24  Yes Andria Rivers PA-C   cycloSPORINE (RESTASIS) 0.05 % ophthalmic emulsion Apply 0.05 application to eye 2 (two) times a day   Yes Historical Provider, MD   esomeprazole (NexIUM) 40 MG capsule Take 1 capsule (40 mg total) by mouth daily in the early morning 5/1/24 10/28/24 Yes Maday Chu PA-C   folic acid (FOLVITE) 1 mg tablet Take 1 tablet (1 mg total) by mouth daily 24  Yes Andria Rivers PA-C   melatonin 3 mg Take 2 tablets (6 mg total) by mouth daily at bedtime 24  Yes Andria Rivers PA-C   metoprolol tartrate (LOPRESSOR) 25 mg tablet Take 1 tablet (25 mg total)  by mouth every 12 (twelve) hours 5/1/24 8/29/24 Yes Maday Chu PA-C   mometasone (NASONEX) 50 mcg/act nasal spray 2 sprays into each nostril daily   Yes Historical Provider, MD   Multiple Vitamins-Minerals (CENTRUM SILVER 50+MEN) TABS Take 1 tablet by mouth daily   Yes Historical Provider, MD   rosuvastatin (CRESTOR) 40 MG tablet Take 1 tablet (40 mg total) by mouth daily 4/27/24  Yes Andria Rivers PA-C   SYMBICORT 160-4.5 MCG/ACT inhaler Take 2 puffs by mouth 2 (two) times a day 4/9/18  Yes Historical Provider, MD   thiamine 100 MG tablet Take 1 tablet (100 mg total) by mouth daily 4/28/24  Yes Andria Rivers PA-C       Inpatient Medications:  Scheduled Meds:   Current Facility-Administered Medications   Medication Dose Route Frequency Provider Last Rate    acetaminophen  650 mg Oral Q4H PRN Daniel Bernard MD      albuterol  1 puff Inhalation BID PRN Emilie Soyeon Kim, MD      Artificial Tears  1 drop Both Eyes BID Emilie Soyeon Kim, MD      aspirin  325 mg Oral Daily Emilie Soyeon Kim, MD      atorvastatin  80 mg Oral Daily With Dinner Emilie Soyeon Kim, MD      budesonide-formoterol  2 puff Inhalation BID Emilie Soyeon Kim, MD      fluticasone  2 spray Each Nare Daily Emilie Soyeon Kim, MD      folic acid  1 mg Oral Daily Emilie Soyeon Kim, MD      heparin (porcine)  5,000 Units Subcutaneous Q8H SARITA Emilie Soyeon Kim, MD      HYDROmorphone  0.2 mg Intravenous Q2H PRN Daniel Bernard MD      melatonin  6 mg Oral HS Emilie Soyeon Kim, MD      metoprolol tartrate  25 mg Oral Q12H SARITA Emilie Soyeon Kim, MD      multivitamin stress formula  1 tablet Oral Daily Emilie Soyeon Kim, MD      naloxone  0.04 mg Intravenous Q1MIN PRN Daniel Bernard MD      oxyCODONE  2.5 mg Oral Q4H PRN Daniel Bernard MD      Or    oxyCODONE  5 mg Oral Q4H PRN Daniel Bernard MD      pantoprazole  40 mg Oral Early Morning Laly Soyeon Debbie, MD      polyethylene glycol  17 g Oral Daily PRN Emilie Soyeon Kim, MD       senna  1 tablet Oral Daily Emilie Soyeon Kim, MD      thiamine  100 mg Oral Daily Emilie Soyeon Kim, MD      vancomycin  17.5 mg/kg Intravenous Q24H Emilie Soyeon Kim, MD       Continuous Infusions:    PRN Meds:  acetaminophen, 650 mg, Q4H PRN  albuterol, 1 puff, BID PRN  HYDROmorphone, 0.2 mg, Q2H PRN  naloxone, 0.04 mg, Q1MIN PRN  oxyCODONE, 2.5 mg, Q4H PRN   Or  oxyCODONE, 5 mg, Q4H PRN  polyethylene glycol, 17 g, Daily PRN        Allergies:  Allergies   Allergen Reactions    Amoxicillin      Other reaction(s): Diarrhea    Amoxicillin-Pot Clavulanate      Other reaction(s): Allergy Date : 07/27/2015   GI    Penicillins      Annotation - [id number]: Diarrhea       Review of Systems:  Review of Systems   Constitutional: Negative.    HENT: Negative.     Eyes: Negative.    Respiratory: Negative.     Cardiovascular: Negative.    Gastrointestinal: Negative.    Endocrine: Negative.    Genitourinary: Negative.    Musculoskeletal: Negative.    Skin:  Positive for wound.   Allergic/Immunologic: Negative.    Neurological: Negative.    Hematological: Negative.    Psychiatric/Behavioral: Negative.         Vital Signs:     Vitals:    05/28/24 2136 05/28/24 2228 05/28/24 2330 05/29/24 0721   BP: 139/92 134/90  128/81   BP Location:       Pulse: 90 89  78   Resp: 18 18  17   Temp:  97.7 °F (36.5 °C)  98.7 °F (37.1 °C)   SpO2: 95% 97% 94% 96%     Invasive Devices       Peripheral Intravenous Line  Duration             Peripheral IV 05/28/24 Left;Proximal;Ventral (anterior) Forearm <1 day                    Physical Exam:  Physical Exam  Constitutional:       General: He is not in acute distress.     Appearance: He is normal weight. He is not ill-appearing or toxic-appearing.   HENT:      Head: Normocephalic and atraumatic.      Right Ear: External ear normal.      Left Ear: External ear normal.      Nose: Nose normal.      Mouth/Throat:      Mouth: Mucous membranes are moist.      Pharynx: Oropharynx is clear. No oropharyngeal  exudate or posterior oropharyngeal erythema.   Eyes:      General:         Right eye: No discharge.         Left eye: No discharge.      Extraocular Movements: Extraocular movements intact.      Conjunctiva/sclera: Conjunctivae normal.      Pupils: Pupils are equal, round, and reactive to light.   Cardiovascular:      Rate and Rhythm: Normal rate and regular rhythm.   Pulmonary:      Effort: Pulmonary effort is normal.      Breath sounds: Normal breath sounds.   Abdominal:      General: Abdomen is flat. Bowel sounds are normal.      Palpations: Abdomen is soft.   Musculoskeletal:      Cervical back: Normal range of motion and neck supple.      Right lower leg: No edema.      Left lower leg: No edema.   Skin:     General: Skin is warm.      Comments: Sternotomy: posterior incision has an area of erythema and tenderness about 0.5 cm around, no active drainage but has mild yellow drainage on dressing.  No sternal instability.    EVH incision site: large scab over area, slow healing.  No erythema, no drainage.  Area is clean and dry    Neurological:      General: No focal deficit present.      Mental Status: He is alert and oriented to person, place, and time.      Motor: No weakness.   Psychiatric:         Mood and Affect: Mood normal.         Behavior: Behavior normal.         Thought Content: Thought content normal.         Judgment: Judgment normal.         Lab Results:     Results from last 7 days   Lab Units 05/29/24  0518 05/28/24  1735   WBC Thousand/uL 7.69 8.40   HEMOGLOBIN g/dL 13.4 13.8   HEMATOCRIT % 40.2 42.3   PLATELETS Thousands/uL 321 316     Results from last 7 days   Lab Units 05/29/24  0518 05/28/24  1735   POTASSIUM mmol/L 4.0 4.1   CHLORIDE mmol/L 108 106   CO2 mmol/L 24 27   BUN mg/dL 13 14   CREATININE mg/dL 1.10 1.12   CALCIUM mg/dL 8.5 9.1     Results from last 7 days   Lab Units 05/29/24  0518   INR  1.16   PTT seconds 32     Lab Results   Component Value Date    HGBA1C 5.9 (H) 04/18/2024      Lab Results   Component Value Date    TROPONINI <0.02 05/17/2020       Imaging Studies:   CT chest wo:  FINDINGS:     LARGE AIRWAYS: Large airways are clear with no tracheal or endobronchial lesion.     LUNGS: No consolidation. No edema. Mild left basilar passive atelectasis.     PLEURA: Trace left pleural effusion. No pneumothorax.     HEART: Normal cardiac size and morphology. CABG. Minimal coronary artery calcification. Trace pericardial fluid.     VESSELS: Normal caliber thoracic aorta without significant atherosclerotic plaque. Normal caliber main pulmonary artery.     MEDIASTINUM AND ALESSANDRA: CABG. Anterior mediastinal fat stranding as described.     CHEST WALL AND LOWER NECK:   Post median sternotomy with intact sternotomy wires. There is mild osseous resorption of the coapted margins of the sternum with underlying sclerosis. Mild surrounding presternal and retrosternal/anterior mediastinal fat   stranding without discrete collection. Given time since surgery, findings are concerning for osteomyelitis and mediastinitis.     VISUALIZED STRUCTURES IN THE UPPER ABDOMEN: Within normal limits.     BONES: Minimal multilevel spondylosis. Sternum as described above.     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 Trace pericardial fluid and trace left pleural effusion. No discrete mediastinal collection.       Assessment:  Principal Problem:    Mediastinitis  Active Problems:    Hyperlipidemia    Asthma    Prediabetes    Coronary artery disease involving coronary bypass graft of native heart without angina pectoris    Plan:  OR tomorrow for debridement.   NPO after MN tonight.  Consent signed.    David S Cogan was comfortable with our recommendations, and their questions were answered to their satisfaction.  We will continue to evaluate the patient daily with further recommendations as work up is  completed.  Thank you for allowing us to participate in the care of this patient.     SIGNATURE: Andria Rivers PA-C  DATE: May 29, 2024  TIME: 8:06 AM    * This note was completed in part utilizing [employer] direct voice recognition software.   Grammatical errors, random word insertion, spelling mistakes, and incomplete sentences may be an occasional consequence of the system secondary to software limitations, ambient noise and hardware issues. At the time of dictation, efforts were made to edit, clarify and /or correct errors. Please read the chart carefully and recognize, using context, where substitutions have occurred.  If you have any questions or concerns about the context, text or information contained within the body of this dictation, please contact myself, the provider, for further clarification.

## 2024-05-29 NOTE — ASSESSMENT & PLAN NOTE
Most recent A1c 5.9% in April, consistent with prediabetes.  Not on any medications at home.    Plan:  Cardiac diet with 1.8 L fluid restriction (consider CHO restricted diet as well)  Continue to monitor blood glucose

## 2024-05-29 NOTE — PROGRESS NOTES
David S Cogan is a 68 y.o. male who is currently ordered Vancomycin IV with management by the Pharmacy Consult service.  Relevant clinical data and objective / subjective history reviewed.  Vancomycin Assessment:  Indication and Goal AUC/Trough: Bone/joint infection (goal -600, trough >10); Soft tissue (goal -600, trough >10)  Clinical Status: stable  Micro:   pending  Renal Function:  SCr: 1.12 mg/dL  CrCl: 63.1 mL/min  Renal replacement: Not on dialysis  Days of Therapy: 1  Current Dose: 2000 mg IV once  Vancomycin Plan:  New Dosin mg IV q 24h  Estimated AUC: 499 mcg*hr/mL  Estimated Trough: 12.2 mcg/mL  Next Level: 24 random at 06:00  Renal Function Monitoring: Daily BMP and UOP  Pharmacy will continue to follow closely for s/sx of nephrotoxicity, infusion reactions and appropriateness of therapy.  BMP and CBC will be ordered per protocol. We will continue to follow the patient’s culture results and clinical progress daily.    Milagros Hernandez, Pharmacist

## 2024-05-29 NOTE — PROGRESS NOTES
Pt arrived to unit from ED. Vitals stable. Admission database completed with pt at bedside. Pt requesting Restasis. Dr. Lewis with SOD made aware. Report given to oncoming RN.

## 2024-05-29 NOTE — PROGRESS NOTES
David S Cogan is a 68 y.o. male who is currently ordered Vancomycin IV with management by the Pharmacy Consult service.  Relevant clinical data and objective / subjective history reviewed.  Vancomycin Assessment:  Indication and Goal AUC/Trough: Bone/joint infection (goal -600, trough >10); Soft tissue (goal -600, trough >10)  Clinical Status: stable  Micro:   Blood cultures 2/2: pending  Renal Function:  SCr: 1.1 mg/dL  CrCl: 65 mL/min  Renal replacement: Not on dialysis  Days of Therapy: 2  Current Dose: 2000 mg IV once  Vancomycin Plan:  New Dosin mg IV q 24h  Estimated AUC: 491 mcg*hr/mL  Estimated Trough: 11.9 mcg/mL  Next Level:  AM draw  Renal Function Monitoring: Daily BMP and UOP  Pharmacy will continue to follow closely for s/sx of nephrotoxicity, infusion reactions and appropriateness of therapy.  BMP and CBC will be ordered per protocol. We will continue to follow the patient’s culture results and clinical progress daily.    Portia Randolph, Pharmacist

## 2024-05-29 NOTE — PLAN OF CARE
Problem: PHYSICAL THERAPY ADULT  Goal: Performs mobility at highest level of function for planned discharge setting.  See evaluation for individualized goals.  Description: Treatment/Interventions: Functional transfer training, LE strengthening/ROM, Elevations, Therapeutic exercise, Endurance training, Bed mobility, Gait training, Spoke to case management, OT, Family (PT spoke to case management)  Equipment Recommended: Other (Comment) (No needs at this time)       See flowsheet documentation for full assessment, interventions and recommendations.  5/29/2024 1459 by Pamela Adkisn  Note: Prognosis: Good  Problem List: Decreased endurance, Decreased mobility, Obesity, Impaired balance  Assessment: Pt is a 68 year old Male who came to the emergency department with worsening pain, erythema, swelling and purulent drainage from sternotomy incision on 5/28/2024. Pt was diagnosed with mediastinitis. Pt's comorbidities affecting POC include: Asthma, Hypertension, CAD involving coronary bypass graft of native heart without angina pectoris, and prediabetes. Personal factors include: RYLAND. Pt's clinical presentation is currently unstable/unpredictable which is evident in ongoing telem monitoring and pending medical and surgical work-up to manage the underlying issue. Pt presents with overall decreased functional endurance and activity tolerance, min impaired balance, and min gait deviations. Pt is scheduled to go to the OR tomorrow for debridement surgery to manage current diagnosis. Plan to assess patient after surgery to determine needs and safety to return home. Will continue to follow pt in PT for progressive mobilization to address above functional deficits to return him to his prior level of function. D/C recommendations are outlined below. Will continue to follow until medically cleared for discharge.  Barriers to Discharge: None     Rehab Resource Intensity Level, PT: No post-acute rehabilitation needs    See  flowsheet documentation for full assessment.     5/29/2024 1459 by Pamela Adkins  Note: Prognosis: Good  Problem List: Decreased endurance, Decreased mobility, Obesity, Impaired balance  Assessment: Pt is a 68 year old Male who came to the emergency department with worsening pain, erythema, swelling and purulent drainage from sternotomy incision on 5/28/2024. Pt was diagnosed with mediastinitis. Pt's comorbidities affecting POC include: Asthma, Hypertension, CAD involving coronary bypass graft of native heart without angina pectoris, and prediabetes. Personal factors include: RY[place]. Pt's clinical presentation is currently unstable/unpredictable which is evident in ongoing telem monitoring and pending medical and surgical work-up to manage the underlying issue. Pt presents with overall decreased functional endurance and activity tolerance, min impaired balance, and min gait deviations. Pt is scheduled to go to the OR tomorrow for debridement surgery to manage current diagnosis. Plan to assess patient after surgery to determine needs and safety to return home. Will continue to follow pt in PT for progressive mobilization to address above functional deficits to return him to his prior level of function. D/C recommendations are outlined below. Will continue to follow until medically cleared for discharge.  Barriers to Discharge: None     Rehab Resource Intensity Level, PT: No post-acute rehabilitation needs    See flowsheet documentation for full assessment.

## 2024-05-29 NOTE — ASSESSMENT & PLAN NOTE
Admitted 4/17 with NSTEMI, found to have severe two-vessel CAD on cardiac catheterization including left main (85% mid LAD, 100% distal left circumflex).  Ultimately underwent CABG x 2 on 4/23 (LIMA to LAD, SVG to OM 3, LLE EVH)    S/P CABG x 2 (LIMA to LAD, SVG to OM3 with LLE endoscopic vein harvesting), performed on 4/23/24.  Required HFNC postop day 1, but subsequently weaned off and discharged 4/27/2024 with prescriptions for full dose aspirin, high intensity statin and Lopressor 25 mg every 12 hours.    Followed up with cardiology on 5/6 and CT surgery 5/13, referred to cardiac rehab.    Plan:  Continue aspirin 325 mg daily  Continue high intensity statin   Continue Lopressor 25 mg every 12 hours  Had issues with lower extremity edema during last admission, improved with 1.8 L fluid restriction and diuretics

## 2024-05-30 ENCOUNTER — ANESTHESIA EVENT (INPATIENT)
Dept: PERIOP | Facility: HOSPITAL | Age: 69
DRG: 862 | End: 2024-05-30
Payer: MEDICARE

## 2024-05-30 ENCOUNTER — APPOINTMENT (INPATIENT)
Dept: NON INVASIVE DIAGNOSTICS | Facility: HOSPITAL | Age: 69
DRG: 862 | End: 2024-05-30
Attending: THORACIC SURGERY (CARDIOTHORACIC VASCULAR SURGERY)
Payer: MEDICARE

## 2024-05-30 ENCOUNTER — ANESTHESIA (INPATIENT)
Dept: PERIOP | Facility: HOSPITAL | Age: 69
DRG: 862 | End: 2024-05-30
Payer: MEDICARE

## 2024-05-30 LAB
ANION GAP SERPL CALCULATED.3IONS-SCNC: 10 MMOL/L (ref 4–13)
BUN SERPL-MCNC: 13 MG/DL (ref 5–25)
CALCIUM SERPL-MCNC: 8.7 MG/DL (ref 8.4–10.2)
CHLORIDE SERPL-SCNC: 107 MMOL/L (ref 96–108)
CO2 SERPL-SCNC: 25 MMOL/L (ref 21–32)
CREAT SERPL-MCNC: 1.04 MG/DL (ref 0.6–1.3)
ERYTHROCYTE [DISTWIDTH] IN BLOOD BY AUTOMATED COUNT: 13.2 % (ref 11.6–15.1)
GFR SERPL CREATININE-BSD FRML MDRD: 73 ML/MIN/1.73SQ M
GLUCOSE SERPL-MCNC: 93 MG/DL (ref 65–140)
HCT VFR BLD AUTO: 40.6 % (ref 36.5–49.3)
HGB BLD-MCNC: 13.4 G/DL (ref 12–17)
MCH RBC QN AUTO: 30 PG (ref 26.8–34.3)
MCHC RBC AUTO-ENTMCNC: 33 G/DL (ref 31.4–37.4)
MCV RBC AUTO: 91 FL (ref 82–98)
PLATELET # BLD AUTO: 325 THOUSANDS/UL (ref 149–390)
PMV BLD AUTO: 10.1 FL (ref 8.9–12.7)
POTASSIUM SERPL-SCNC: 3.7 MMOL/L (ref 3.5–5.3)
RBC # BLD AUTO: 4.46 MILLION/UL (ref 3.88–5.62)
SODIUM SERPL-SCNC: 142 MMOL/L (ref 135–147)
WBC # BLD AUTO: 7.13 THOUSAND/UL (ref 4.31–10.16)

## 2024-05-30 PROCEDURE — 99232 SBSQ HOSP IP/OBS MODERATE 35: CPT | Performed by: INTERNAL MEDICINE

## 2024-05-30 PROCEDURE — 2W14X6Z COMPRESSION OF CHEST WALL USING PRESSURE DRESSING: ICD-10-PCS | Performed by: THORACIC SURGERY (CARDIOTHORACIC VASCULAR SURGERY)

## 2024-05-30 PROCEDURE — 11042 DBRDMT SUBQ TIS 1ST 20SQCM/<: CPT | Performed by: PHYSICIAN ASSISTANT

## 2024-05-30 PROCEDURE — 87102 FUNGUS ISOLATION CULTURE: CPT | Performed by: THORACIC SURGERY (CARDIOTHORACIC VASCULAR SURGERY)

## 2024-05-30 PROCEDURE — 87186 SC STD MICRODIL/AGAR DIL: CPT | Performed by: THORACIC SURGERY (CARDIOTHORACIC VASCULAR SURGERY)

## 2024-05-30 PROCEDURE — 80048 BASIC METABOLIC PNL TOTAL CA: CPT

## 2024-05-30 PROCEDURE — 87070 CULTURE OTHR SPECIMN AEROBIC: CPT | Performed by: THORACIC SURGERY (CARDIOTHORACIC VASCULAR SURGERY)

## 2024-05-30 PROCEDURE — 97605 NEG PRS WND THER DME<=50SQCM: CPT | Performed by: THORACIC SURGERY (CARDIOTHORACIC VASCULAR SURGERY)

## 2024-05-30 PROCEDURE — 85027 COMPLETE CBC AUTOMATED: CPT

## 2024-05-30 PROCEDURE — 87176 TISSUE HOMOGENIZATION CULTR: CPT | Performed by: THORACIC SURGERY (CARDIOTHORACIC VASCULAR SURGERY)

## 2024-05-30 PROCEDURE — 87075 CULTR BACTERIA EXCEPT BLOOD: CPT | Performed by: THORACIC SURGERY (CARDIOTHORACIC VASCULAR SURGERY)

## 2024-05-30 PROCEDURE — 87205 SMEAR GRAM STAIN: CPT | Performed by: THORACIC SURGERY (CARDIOTHORACIC VASCULAR SURGERY)

## 2024-05-30 PROCEDURE — 87206 SMEAR FLUORESCENT/ACID STAI: CPT | Performed by: THORACIC SURGERY (CARDIOTHORACIC VASCULAR SURGERY)

## 2024-05-30 PROCEDURE — 11042 DBRDMT SUBQ TIS 1ST 20SQCM/<: CPT | Performed by: THORACIC SURGERY (CARDIOTHORACIC VASCULAR SURGERY)

## 2024-05-30 PROCEDURE — 0JB63ZZ EXCISION OF CHEST SUBCUTANEOUS TISSUE AND FASCIA, PERCUTANEOUS APPROACH: ICD-10-PCS | Performed by: THORACIC SURGERY (CARDIOTHORACIC VASCULAR SURGERY)

## 2024-05-30 PROCEDURE — 87116 MYCOBACTERIA CULTURE: CPT | Performed by: THORACIC SURGERY (CARDIOTHORACIC VASCULAR SURGERY)

## 2024-05-30 PROCEDURE — 97605 NEG PRS WND THER DME<=50SQCM: CPT | Performed by: PHYSICIAN ASSISTANT

## 2024-05-30 PROCEDURE — 87075 CULTR BACTERIA EXCEPT BLOOD: CPT | Performed by: INTERNAL MEDICINE

## 2024-05-30 PROCEDURE — 87077 CULTURE AEROBIC IDENTIFY: CPT | Performed by: THORACIC SURGERY (CARDIOTHORACIC VASCULAR SURGERY)

## 2024-05-30 PROCEDURE — 97530 THERAPEUTIC ACTIVITIES: CPT

## 2024-05-30 RX ORDER — FENTANYL CITRATE 50 UG/ML
INJECTION, SOLUTION INTRAMUSCULAR; INTRAVENOUS AS NEEDED
Status: DISCONTINUED | OUTPATIENT
Start: 2024-05-30 | End: 2024-05-30

## 2024-05-30 RX ORDER — VANCOMYCIN HYDROCHLORIDE 1 G/200ML
INJECTION, SOLUTION INTRAVENOUS AS NEEDED
Status: DISCONTINUED | OUTPATIENT
Start: 2024-05-30 | End: 2024-05-30

## 2024-05-30 RX ORDER — HYDROMORPHONE HCL IN WATER/PF 6 MG/30 ML
0.2 PATIENT CONTROLLED ANALGESIA SYRINGE INTRAVENOUS
Status: DISCONTINUED | OUTPATIENT
Start: 2024-05-30 | End: 2024-05-30 | Stop reason: HOSPADM

## 2024-05-30 RX ORDER — VANCOMYCIN HYDROCHLORIDE 1 G/200ML
1000 INJECTION, SOLUTION INTRAVENOUS ONCE
Status: DISCONTINUED | OUTPATIENT
Start: 2024-05-30 | End: 2024-05-31

## 2024-05-30 RX ORDER — HYDROMORPHONE HCL/PF 1 MG/ML
SYRINGE (ML) INJECTION AS NEEDED
Status: DISCONTINUED | OUTPATIENT
Start: 2024-05-30 | End: 2024-05-30

## 2024-05-30 RX ORDER — LIDOCAINE HYDROCHLORIDE 10 MG/ML
INJECTION, SOLUTION EPIDURAL; INFILTRATION; INTRACAUDAL; PERINEURAL AS NEEDED
Status: DISCONTINUED | OUTPATIENT
Start: 2024-05-30 | End: 2024-05-30

## 2024-05-30 RX ORDER — MAGNESIUM HYDROXIDE 1200 MG/15ML
LIQUID ORAL AS NEEDED
Status: DISCONTINUED | OUTPATIENT
Start: 2024-05-30 | End: 2024-05-30 | Stop reason: HOSPADM

## 2024-05-30 RX ORDER — ROCURONIUM BROMIDE 10 MG/ML
INJECTION, SOLUTION INTRAVENOUS AS NEEDED
Status: DISCONTINUED | OUTPATIENT
Start: 2024-05-30 | End: 2024-05-30

## 2024-05-30 RX ORDER — ONDANSETRON 2 MG/ML
4 INJECTION INTRAMUSCULAR; INTRAVENOUS ONCE AS NEEDED
Status: DISCONTINUED | OUTPATIENT
Start: 2024-05-30 | End: 2024-05-30 | Stop reason: HOSPADM

## 2024-05-30 RX ORDER — CIPROFLOXACIN 2 MG/ML
400 INJECTION, SOLUTION INTRAVENOUS ONCE
Status: COMPLETED | OUTPATIENT
Start: 2024-05-30 | End: 2024-05-30

## 2024-05-30 RX ORDER — POTASSIUM CHLORIDE 20 MEQ/1
40 TABLET, EXTENDED RELEASE ORAL ONCE
Status: COMPLETED | OUTPATIENT
Start: 2024-05-30 | End: 2024-05-30

## 2024-05-30 RX ORDER — MIDAZOLAM HYDROCHLORIDE 2 MG/2ML
INJECTION, SOLUTION INTRAMUSCULAR; INTRAVENOUS AS NEEDED
Status: DISCONTINUED | OUTPATIENT
Start: 2024-05-30 | End: 2024-05-30

## 2024-05-30 RX ORDER — SODIUM CHLORIDE, SODIUM LACTATE, POTASSIUM CHLORIDE, CALCIUM CHLORIDE 600; 310; 30; 20 MG/100ML; MG/100ML; MG/100ML; MG/100ML
INJECTION, SOLUTION INTRAVENOUS CONTINUOUS PRN
Status: DISCONTINUED | OUTPATIENT
Start: 2024-05-30 | End: 2024-05-30

## 2024-05-30 RX ORDER — FENTANYL CITRATE/PF 50 MCG/ML
25 SYRINGE (ML) INJECTION
Status: DISCONTINUED | OUTPATIENT
Start: 2024-05-30 | End: 2024-05-30 | Stop reason: HOSPADM

## 2024-05-30 RX ORDER — ONDANSETRON 2 MG/ML
INJECTION INTRAMUSCULAR; INTRAVENOUS AS NEEDED
Status: DISCONTINUED | OUTPATIENT
Start: 2024-05-30 | End: 2024-05-30

## 2024-05-30 RX ORDER — PROPOFOL 10 MG/ML
INJECTION, EMULSION INTRAVENOUS AS NEEDED
Status: DISCONTINUED | OUTPATIENT
Start: 2024-05-30 | End: 2024-05-30

## 2024-05-30 RX ADMIN — BUDESONIDE AND FORMOTEROL FUMARATE DIHYDRATE 2 PUFF: 160; 4.5 AEROSOL RESPIRATORY (INHALATION) at 09:06

## 2024-05-30 RX ADMIN — PROPOFOL 100 MG: 10 INJECTION, EMULSION INTRAVENOUS at 13:32

## 2024-05-30 RX ADMIN — ONDANSETRON 4 MG: 2 INJECTION INTRAMUSCULAR; INTRAVENOUS at 13:57

## 2024-05-30 RX ADMIN — FENTANYL CITRATE 50 MCG: 50 INJECTION INTRAMUSCULAR; INTRAVENOUS at 13:49

## 2024-05-30 RX ADMIN — SUGAMMADEX 200 MG: 100 INJECTION, SOLUTION INTRAVENOUS at 14:07

## 2024-05-30 RX ADMIN — PHENYLEPHRINE HYDROCHLORIDE 200 MCG: 10 INJECTION INTRAVENOUS at 13:36

## 2024-05-30 RX ADMIN — METOPROLOL TARTRATE 25 MG: 25 TABLET, FILM COATED ORAL at 09:07

## 2024-05-30 RX ADMIN — DEXTRAN 70, GLYCERIN, HYPROMELLOSE 1 DROP: 1; 2; 3 SOLUTION/ DROPS OPHTHALMIC at 09:08

## 2024-05-30 RX ADMIN — LIDOCAINE HYDROCHLORIDE 50 MG: 10 INJECTION, SOLUTION EPIDURAL; INFILTRATION; INTRACAUDAL; PERINEURAL at 13:32

## 2024-05-30 RX ADMIN — PANTOPRAZOLE SODIUM 40 MG: 40 TABLET, DELAYED RELEASE ORAL at 05:14

## 2024-05-30 RX ADMIN — MIDAZOLAM 2 MG: 1 INJECTION INTRAMUSCULAR; INTRAVENOUS at 13:25

## 2024-05-30 RX ADMIN — CYCLOSPORINE 1 DROP: 0.5 EMULSION OPHTHALMIC at 21:34

## 2024-05-30 RX ADMIN — POLYETHYLENE GLYCOL 3350 17 G: 17 POWDER, FOR SOLUTION ORAL at 16:03

## 2024-05-30 RX ADMIN — FLUTICASONE PROPIONATE 2 SPRAY: 50 SPRAY, METERED NASAL at 09:09

## 2024-05-30 RX ADMIN — ATORVASTATIN CALCIUM 80 MG: 80 TABLET, FILM COATED ORAL at 16:03

## 2024-05-30 RX ADMIN — HYDROMORPHONE HYDROCHLORIDE 0.5 MG: 1 INJECTION, SOLUTION INTRAMUSCULAR; INTRAVENOUS; SUBCUTANEOUS at 13:54

## 2024-05-30 RX ADMIN — VANCOMYCIN HYDROCHLORIDE 1000 MG: 1 INJECTION, SOLUTION INTRAVENOUS at 13:46

## 2024-05-30 RX ADMIN — FENTANYL CITRATE 50 MCG: 50 INJECTION INTRAMUSCULAR; INTRAVENOUS at 13:32

## 2024-05-30 RX ADMIN — CYCLOSPORINE 1 DROP: 0.5 EMULSION OPHTHALMIC at 09:06

## 2024-05-30 RX ADMIN — POTASSIUM CHLORIDE 40 MEQ: 1500 TABLET, EXTENDED RELEASE ORAL at 09:07

## 2024-05-30 RX ADMIN — CIPROFLOXACIN: 400 INJECTION, SOLUTION INTRAVENOUS at 13:40

## 2024-05-30 RX ADMIN — DEXTRAN 70, GLYCERIN, HYPROMELLOSE 1 DROP: 1; 2; 3 SOLUTION/ DROPS OPHTHALMIC at 18:05

## 2024-05-30 RX ADMIN — SENNOSIDES 8.6 MG: 8.6 TABLET, FILM COATED ORAL at 16:02

## 2024-05-30 RX ADMIN — ASPIRIN 325 MG ORAL TABLET 325 MG: 325 PILL ORAL at 16:03

## 2024-05-30 RX ADMIN — FOLIC ACID 1 MG: 1 TABLET ORAL at 09:06

## 2024-05-30 RX ADMIN — ROCURONIUM BROMIDE 50 MG: 50 INJECTION INTRAVENOUS at 13:32

## 2024-05-30 RX ADMIN — B-COMPLEX W/ C & FOLIC ACID TAB 1 TABLET: TAB at 16:03

## 2024-05-30 RX ADMIN — METOPROLOL TARTRATE 25 MG: 25 TABLET, FILM COATED ORAL at 21:34

## 2024-05-30 RX ADMIN — HEPARIN SODIUM 5000 UNITS: 5000 INJECTION INTRAVENOUS; SUBCUTANEOUS at 21:34

## 2024-05-30 RX ADMIN — SODIUM CHLORIDE, SODIUM LACTATE, POTASSIUM CHLORIDE, AND CALCIUM CHLORIDE: .6; .31; .03; .02 INJECTION, SOLUTION INTRAVENOUS at 13:25

## 2024-05-30 RX ADMIN — HEPARIN SODIUM 5000 UNITS: 5000 INJECTION INTRAVENOUS; SUBCUTANEOUS at 05:14

## 2024-05-30 RX ADMIN — Medication 6 MG: at 21:35

## 2024-05-30 RX ADMIN — OXYCODONE HYDROCHLORIDE 5 MG: 5 TABLET ORAL at 11:24

## 2024-05-30 RX ADMIN — THIAMINE HCL TAB 100 MG 100 MG: 100 TAB at 09:07

## 2024-05-30 RX ADMIN — BUDESONIDE AND FORMOTEROL FUMARATE DIHYDRATE 2 PUFF: 160; 4.5 AEROSOL RESPIRATORY (INHALATION) at 18:05

## 2024-05-30 RX ADMIN — CHLORHEXIDINE GLUCONATE 15 ML: 1.2 SOLUTION ORAL at 09:07

## 2024-05-30 NOTE — INTERVAL H&P NOTE
H&P reviewed. After examining the patient I find no changes in the patients condition since the H&P had been written.    Vitals:    05/30/24 0722   BP: 137/88   Pulse: 73   Resp:    Temp: 98 °F (36.7 °C)   SpO2: 94%

## 2024-05-30 NOTE — CASE MANAGEMENT
Case Management Discharge Planning Note    Patient name David S Cogan  Location [facility] 402/[facility] 402-01 MRN [id number]  : 1955 Date 2024       Current Admission Date: 2024  Current Admission Diagnosis:Mediastinitis   Patient Active Problem List    Diagnosis Date Noted Date Diagnosed    Mediastinitis 2024     Coronary artery disease involving coronary bypass graft of native heart without angina pectoris 2024     S/P CABG (coronary artery bypass graft) 2024     Prediabetes 2024     NSTEMI (non-ST elevated myocardial infarction) ([facility]) 2024     Asthma 2024     Alcohol use, unspecified with other alcohol-induced disorder (HCC) 2021     Essential hypertension 2020     Right flank pain 2019     Abrasion of flank 2019     Overweight (BMI 25.0-29.9) 2019     Acquired keratoderma 2017     Benign prostatic hyperplasia 2017     Dyslipidemia 2017     Hyperlipidemia 2017     Hypertrophic condition of skin 2017     Subclinical hypothyroidism 2017     Thyroid nodule 2017     Benign hypertension 10/16/2017     Vesicular palmoplantar eczema 10/16/2017     Eczema of both hands 10/16/2017       LOS (days): 2  Geometric Mean LOS (GMLOS) (days):   Days to GMLOS:     OBJECTIVE:  Risk of Unplanned Readmission Score: 12.24         Current admission status: Inpatient   Preferred Pharmacy:   [place] [id number]  Phone: [phone] Fax: [phone]    Primary Care Provider: Maday Chu PA-C    Primary Insurance: MEDICARE  Secondary Insurance: BLUE CROSS    DISCHARGE DETAILS:                                Requested Home Health Care         Home Health Agency Name:: [facility]         Other Referral/Resources/Interventions Provided:  Referral Comments: [facility] accepted. S/w pt & his S.O. in room & they would  both be trained on iv antibiotic administaration if will need long term antibiotics upon dc. AIDIN message sent to update [facility]. Updated dci.

## 2024-05-30 NOTE — PROGRESS NOTES
INTERNAL MEDICINE RESIDENCY PROGRESS NOTE     Name: David S Cogan   Age & Sex: 68 y.o. male   MRN: [id number]  Unit/Bed#: [facility] 402-01   Encounter: [id number]  Team: SOD Team C     PATIENT INFORMATION     Name: David S Cogan   Age & Sex: 68 y.o. male   MRN: [id number]  Hospital Stay Days: 2    ASSESSMENT/PLAN     Principal Problem:    Mediastinitis  Active Problems:    Hyperlipidemia    Asthma    Prediabetes    Coronary artery disease involving coronary bypass graft of native heart without angina pectoris      * Mediastinitis  Assessment & Plan  Presenting with 5 to 10 days of worsening pain, erythema, swelling and purulent drainage from sternotomy incision.  Initially noticed very small amount of purulence below the inferior margin of his incision 10 days ago, subsequently noticed worsening separate area of erythema, fluctuance and tenderness above this 5 days ago.  No associated fevers, chills, nausea/vomiting, chest pain, shortness of breath or leg swelling.    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  No discrete mediastinal collection noted.    Afebrile, not tachycardic, normotensive on presentation.  CT surgery consulted, requested admission under medical service.  Started on vancomycin in the ED.  Bcxs (5/28) NGTD    Plan:  OR 5/30 for local wound debridement and exploration by CTS  Vancomycin with pharmacy following  Follow up on blood culture results    Coronary artery disease involving coronary bypass graft of native heart without angina pectoris  Assessment & Plan  Admitted 4/17 with NSTEMI, found to have severe two-vessel CAD on cardiac catheterization including left main (85% mid LAD, 100% distal left circumflex).  Ultimately underwent CABG x 2 on 4/23 (LIMA to LAD, SVG to OM 3, LLE EVH)    S/P CABG x 2 (LIMA to LAD, SVG to OM3 with LLE endoscopic vein  rebecca), performed on 24.  Required HFNC postop day 1, but subsequently weaned off and discharged 2024 with prescriptions for full dose aspirin, high intensity statin and Lopressor 25 mg every 12 hours.    Followed up with cardiology on  and CT surgery , referred to cardiac rehab.    Plan:  Continue aspirin 325 mg daily  Continue high intensity statin   Continue Lopressor 25 mg every 12 hours  Had issues with lower extremity edema during last admission, improved with 1.8 L fluid restriction and diuretics    Prediabetes  Assessment & Plan  Most recent A1c 5.9% in April, consistent with prediabetes.  Not on any medications at home.    Plan:  Cardiac diet with 1.8 L fluid restriction (consider CHO restricted diet as well)  Continue to monitor blood glucose    Asthma  Assessment & Plan  Patient with history of asthma (no PFT's on file); treated as an outpatient with Symbicort and albuterol as needed.    Plan:  Continue twice daily Symbicort with as needed albuterol ordered    Hyperlipidemia  Assessment & Plan  Patient with documented history of hyperlipidemia. Last lipid panel in 2024. Takes Crestor 40 mg daily.     Lipitor 80 mg daily in place of Crestor 40 mg daily due to the latter being nonformulary.     Plan:  Continue high intensity statin.        Disposition: continue inpatient management     SUBJECTIVE     Patient seen and examined. No acute events overnight. Plan for local wound debridement and exploration in the OR today. Patient with no complaints this AM. Updated significant other at bedside.    OBJECTIVE     Vitals:    24 2120 24 2300 24 0721 24 0722   BP: 149/92 134/72 137/88 137/88   BP Location:  Right arm     Pulse:  80  73   Resp:  16     Temp:  98 °F (36.7 °C) 98 °F (36.7 °C) 98 °F (36.7 °C)   SpO2:  98%  94%      Temperature:   Temp (24hrs), Av °F (36.7 °C), Min:97.8 °F (36.6 °C), Max:98 °F (36.7 °C)    Temperature: 98 °F (36.7 °C)  Intake &  Output:  I/O         05/28 0701 05/29 0700 05/29 0701  05/30 0700 05/30 0701  05/31 0700    P.O.  602     IV Piggyback 500      Total Intake 500 602     Urine 600 2125     Total Output 600 2125     Net -100 -1523            Unmeasured Urine Occurrence  2 x           Weights:        There is no height or weight on file to calculate BMI.  Weight (last 2 days)       None          Physical Exam  Vitals and nursing note reviewed.   Constitutional:       General: He is not in acute distress.     Appearance: He is well-developed.   HENT:      Head: Normocephalic and atraumatic.   Eyes:      Conjunctiva/sclera: Conjunctivae normal.   Cardiovascular:      Rate and Rhythm: Normal rate and regular rhythm.      Heart sounds: No murmur heard.  Pulmonary:      Effort: Pulmonary effort is normal. No respiratory distress.      Breath sounds: Normal breath sounds.   Abdominal:      Palpations: Abdomen is soft.      Tenderness: There is no abdominal tenderness.   Musculoskeletal:         General: No swelling.      Cervical back: Neck supple.      Right lower leg: No edema.      Left lower leg: No edema.   Skin:     General: Skin is warm and dry.      Capillary Refill: Capillary refill takes less than 2 seconds.      Findings: Wound (Central, purulent wound over sternum covered by bandage) present.   Neurological:      Mental Status: He is alert.   Psychiatric:         Mood and Affect: Mood normal.       LABORATORY DATA     Labs: I have personally reviewed pertinent reports.  Results from last 7 days   Lab Units 05/30/24  0507 05/29/24 0518 05/28/24  1735   WBC Thousand/uL 7.13 7.69 8.40   HEMOGLOBIN g/dL 13.4 13.4 13.8   HEMATOCRIT % 40.6 40.2 42.3   PLATELETS Thousands/uL 325 321 316   SEGS PCT %  --  53 52   MONO PCT %  --  8 7   EOS PCT %  --  8* 7*      Results from last 7 days   Lab Units 05/30/24  0507 05/29/24  0518 05/28/24  1735   POTASSIUM mmol/L 3.7 4.0 4.1   CHLORIDE mmol/L 107 108 106   CO2 mmol/L 25 24 27   BUN mg/dL  13 13 14   CREATININE mg/dL 1.04 1.10 1.12   CALCIUM mg/dL 8.7 8.5 9.1   ALK PHOS U/L  --   --  90   ALT U/L  --   --  27   AST U/L  --   --  22              Results from last 7 days   Lab Units 05/29/24  0518   INR  1.16   PTT seconds 32               IMAGING & DIAGNOSTIC TESTING     Radiology Results: I have personally reviewed pertinent reports.  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 Trace pericardial fluid and trace left pleural effusion. No discrete mediastinal collection. The study was marked in EPIC for immediate notification. Workstation performed: [id number]     Other Diagnostic Testing: I have personally reviewed pertinent reports.    ACTIVE MEDICATIONS     Current Facility-Administered Medications   Medication Dose Route Frequency    acetaminophen (TYLENOL) tablet 650 mg  650 mg Oral Q4H PRN    albuterol (PROVENTIL HFA,VENTOLIN HFA) inhaler 1 puff  1 puff Inhalation BID PRN    Artificial Tears ophthalmic solution 1 drop  1 drop Both Eyes BID    aspirin tablet 325 mg  325 mg Oral Daily    atorvastatin (LIPITOR) tablet 80 mg  80 mg Oral Daily With Dinner    budesonide-formoterol (SYMBICORT) 160-4.5 mcg/act inhaler 2 puff  2 puff Inhalation BID    chlorhexidine (PERIDEX) 0.12 % oral rinse 15 mL  15 mL Swish & Spit Q12H SARITA    cycloSPORINE (RESTASIS) 0.05 % ophthalmic emulsion 1 drop  1 drop Ophthalmic BID    fluticasone (FLONASE) 50 mcg/act nasal spray 2 spray  2 spray Each Nare Daily    folic acid (FOLVITE) tablet 1 mg  1 mg Oral Daily    heparin (porcine) subcutaneous injection 5,000 Units  5,000 Units Subcutaneous Q8H [facility]    melatonin tablet 6 mg  6 mg Oral HS    metoprolol tartrate (LOPRESSOR) tablet 25 mg  25 mg Oral Q12H [facility]    multivitamin stress formula tablet 1 tablet  1 tablet Oral Daily    naloxone (NARCAN) 0.04 mg/mL syringe 0.04 mg  " 0.04 mg Intravenous Q1MIN PRN    oxyCODONE (ROXICODONE) split tablet 2.5 mg  2.5 mg Oral Q4H PRN    Or    oxyCODONE (ROXICODONE) IR tablet 5 mg  5 mg Oral Q4H PRN    pantoprazole (PROTONIX) EC tablet 40 mg  40 mg Oral Early Morning    polyethylene glycol (MIRALAX) packet 17 g  17 g Oral Daily    potassium chloride (Klor-Con M20) CR tablet 40 mEq  40 mEq Oral Once    senna (SENOKOT) tablet 8.6 mg  1 tablet Oral Daily    thiamine tablet 100 mg  100 mg Oral Daily    vancomycin (VANCOCIN) 1500 mg in sodium chloride 0.9% 250 mL IVPB  17.5 mg/kg Intravenous Q24H       VTE Pharmacologic Prophylaxis: Heparin  VTE Mechanical Prophylaxis: sequential compression device    Portions of the record may have been created with voice recognition software.  Occasional wrong word or \"sound a like\" substitutions may have occurred due to the inherent limitations of voice recognition software.  Read the chart carefully and recognize, using context, where substitutions have occurred.  ==  Radha Shahid MD  [facility]  Internal Medicine Residency PGY-1      "

## 2024-05-30 NOTE — PLAN OF CARE
Problem: PHYSICAL THERAPY ADULT  Goal: Performs mobility at highest level of function for planned discharge setting.  See evaluation for individualized goals.  Description: Treatment/Interventions: Functional transfer training, LE strengthening/ROM, Elevations, Therapeutic exercise, Endurance training, Bed mobility, Gait training, Spoke to case management, OT, Family (PT spoke to case management)  Equipment Recommended: Other (Comment) (No needs at this time)       See flowsheet documentation for full assessment, interventions and recommendations.  Outcome: Progressing  Note: Prognosis: Good  Problem List: Decreased endurance, Impaired balance  Assessment: Pt is improving in functional endurance ambulating further distances w/o AD and rest periods provided as needed; pt is scheduled for local wound debridement and exploration in the OR today --> will re-assess postop; will cont to follow until then.  Barriers to Discharge: None     Rehab Resource Intensity Level, PT: No post-acute rehabilitation needs    See flowsheet documentation for full assessment.

## 2024-05-30 NOTE — OP NOTE
OPERATIVE REPORT  PATIENT NAME: David S Cogan    :  1955  MRN: [id number]  Pt Location:  OR ROOM 10    SURGERY DATE: 2024    SURGEON: Balwinder Sky DO     ASSISTANT: Andria Rivers PA-C    PREOPERATIVE DIAGNOSIS:  Superficial infection of prior sternotomy    POSTOPERATIVE DIAGNOSIS: Superficial infection of prior sternotomy    PROCEDURE: Local wound exploration and excisional debridement, placement of prevena vac    ANESTHESIA: General endotracheal anesthesia with transesophageal echocardiogram guidance, Dr. Barrington Lopez     ESTIMATED BLOOD LOSS: 5 mL    TRANSFUSIONS: None     SPECIMENS: None.     FINDINGS:    OPERATIVE TECHNIQUE The patient was taken to the operating room and placed supine on the operating table. Following the satisfactory induction of general anesthesia and with monitoring lines already in place, the patient was prepped and draped in the usual sterile fashion. A time-out procedure was performed.     There was a furuncle or infected blackhead involving the skin adjacent to the inferior portion of the incision.  The incision was opened and the soft tissues were dissected down to the sternum.  There was no purulence.  Cultures were sent. There was no tracking along the sternotomy.  A 6cm elliptical incision was performed, excising the infected skin and subcutaneous tissues. A small piece of the infected tissue was sent for culture.  The skin and subcutaneous tissues were closed with multiple layers of absorbable sutures and a prevena vac was place.    As the attending surgeon, I was present and scrubbed for all critical portions of this procedure. Sponge, needle, and instrument counts were reported as correct by the nursing staff.  There is no cardiac residency program at this facility and for complex procedures such as this, it is vital to have a physician assistant experienced in cardiac surgery.  The assistance of Andria Rivers PA-C was necessary excisional  debridement of the wound and for proper closure of the soft tissues.      SIGNATURE: Balwinder Sky DO  DATE: May 30, 2024  TIME: 2:13 PM

## 2024-05-30 NOTE — PROGRESS NOTES
David S Cogan is a 68 y.o. male who is currently ordered Vancomycin IV with management by the Pharmacy Consult service.  Relevant clinical data and objective / subjective history reviewed.  Vancomycin Assessment:  Indication and Goal AUC/Trough: Bone/joint infection (goal -600, trough >10); Soft tissue (goal -600, trough >10)  Clinical Status: stable  Micro:   Blood cultures 2/2: No growth at 24 hours  Renal Function:  SCr: 1.04 mg/dL  CrCl: 68.8 mL/min  Renal replacement: Not on dialysis  Days of Therapy: 3  Current Dose: 1500 mg IV q24h  Vancomycin Plan:  New Dosin mg IV q 24h  Estimated AUC: 472 mcg*hr/mL  Estimated Trough: 11.1 mcg/mL  Next Level:  AM draw  Renal Function Monitoring: Daily BMP and UOP  Pharmacy will continue to follow closely for s/sx of nephrotoxicity, infusion reactions and appropriateness of therapy.  BMP and CBC will be ordered per protocol. We will continue to follow the patient’s culture results and clinical progress daily.    Portia Randolph, Pharmacist

## 2024-05-30 NOTE — ANESTHESIA PREPROCEDURE EVALUATION
Procedure:  LOCAL WOUND DEBRIDEMENT STERNAL (Chest)    Relevant Problems   CARDIO   (+) Benign hypertension   (+) Coronary artery disease involving coronary bypass graft of native heart without angina pectoris   (+) Essential hypertension   (+) Hyperlipidemia   (+) NSTEMI (non-ST elevated myocardial infarction) (HCC)      ENDO   (+) Subclinical hypothyroidism      /RENAL   (+) Benign prostatic hyperplasia      PULMONARY   (+) Asthma        Physical Exam    Airway    Mallampati score: II  TM Distance: >3 FB  Neck ROM: full     Dental       Cardiovascular      Pulmonary      Other Findings        Anesthesia Plan  ASA Score- 3     Anesthesia Type- general with ASA Monitors.         Additional Monitors:     Airway Plan: ETT.           Plan Factors-Exercise tolerance (METS): >4 METS.    Chart reviewed.        Patient is not a current smoker.  Patient did not smoke on day of surgery.    Obstructive sleep apnea risk education given perioperatively.        Induction- intravenous.    Postoperative Plan- Plan for postoperative opioid use. Planned trial extubation    Perioperative Resuscitation Plan - Level 1 - Full Code.       Informed Consent- Anesthetic plan and risks discussed with patient.  I personally reviewed this patient with the CRNA. Discussed and agreed on the Anesthesia Plan with the CRNA..      Anesthesia plan and consent discussed with Christopher who expressed understanding and agreement. Risks/benefits and alternatives discussed with patient including possible PONV, sore throat, damage to teeth/lips/gums and possibility of rare anesthetic and surgical emergencies.

## 2024-05-30 NOTE — QUICK NOTE
RN reached out to me regarding vancomycin dosing    Patient is scheduled for 1500 mg Q24 hours; due for next dose now    But he received 1 gram dose today at 1 pm for surgical prophylaxis     Appropriate to hold tonight's dose and recheck level tomorrow and dose according to pharmacy    Trey Navarro MD  PGY-2, Internal Medicine  [facility]

## 2024-05-30 NOTE — PHYSICAL THERAPY NOTE
PHYSICAL THERAPY NOTE          Patient Name: David S Cogan  Today's Date: 5/30/2024 05/30/24 1100   PT Last Visit   PT Visit Date 05/30/24   Note Type   Note Type Treatment   Pain Assessment   Pain Assessment Tool FLACC   Pain Location/Orientation Orientation: Mid;Location: Chest   Pain Onset/Description Onset: Ongoing;Frequency: Intermittent;Descriptor: Aching;Descriptor: Discomfort   Effect of Pain on Daily Activities no increased pain during the session   Patient's Stated Pain Goal No pain   Hospital Pain Intervention(s) Repositioned;Ambulation/increased activity;Emotional support   Pain Rating: FLACC (Rest) - Face 0   Pain Rating: FLACC (Rest) - Legs 0   Pain Rating: FLACC (Rest) - Activity 0   Pain Rating: FLACC (Rest) - Cry 1   Pain Rating: FLACC (Rest) - Consolability 0   Score: FLACC (Rest) 1   Pain Rating: FLACC (Activity) - Face 0   Pain Rating: FLACC (Activity) - Legs 0   Pain Rating: FLACC (Activity) - Activity 0   Pain Rating: FLACC (Activity) - Cry 0   Pain Rating: FLACC (Activity) - Consolability 0   Score: FLACC (Activity) 0   Restrictions/Precautions   Other Precautions Cardiac/sternal;Telemetry   General   Chart Reviewed Yes   Additional Pertinent History cleared for Tx session by nsg   Response to Previous Treatment Patient with no complaints from previous session.   Family/Caregiver Present No   Cognition   Overall Cognitive Status WFL   Arousal/Participation Cooperative   Attention Within functional limits   Orientation Level Oriented to person;Oriented to place;Oriented to situation   Memory Decreased recall of precautions   Following Commands Follows all commands and directions without difficulty   Subjective   Subjective Pt is resting in bed; agreeable to mobilize   Bed Mobility   Supine to Sit 6  Modified independent   Transfers   Sit to Stand 6  Modified independent   Stand to Sit 6  Modified independent    Ambulation/Elevation   Gait pattern Short stride   Gait Assistance 6  Modified independent   Assistive Device None   Distance 280 ft + 300 ft + 50 ft w/ extended seated rest periods in between   Balance   Static Sitting Good   Static Standing Fair +   Ambulatory Fair   Activity Tolerance   Activity Tolerance Patient tolerated treatment well   Nurse Made Aware spoke to CARRI Wesley   Exercises   Knee AROM Long Arc Quad Sitting;15 reps;AROM;Bilateral  (2 sets)   Ankle Pumps Sitting;15 reps;AROM;Bilateral  (2 sets)   Marching Sitting;10 reps;AROM;Bilateral  (2 sets)   Assessment   Prognosis Good   Problem List Decreased endurance;Impaired balance   Assessment Pt is improving in functional endurance ambulating further distances w/o AD and rest periods provided as needed; pt is scheduled for local wound debridement and exploration in the OR today --> will re-assess postop; will cont to follow until then.   Goals   Patient Goals to go home   STG Expiration Date 06/08/24   PT Treatment Day 1   Plan   Treatment/Interventions Elevations;Endurance training;Spoke to nursing;Spoke to case management   Progress Improving as expected   PT Frequency 2-3x/wk   Discharge Recommendation   Rehab Resource Intensity Level, PT No post-acute rehabilitation needs   AM-PAC Basic Mobility Inpatient   Turning in Flat Bed Without Bedrails 4   Lying on Back to Sitting on Edge of Flat Bed Without Bedrails 4   Moving Bed to Chair 4   Standing Up From Chair Using Arms 4   Walk in Room 4   Climb 3-5 Stairs With Railing 4   Basic Mobility Inpatient Raw Score 24   Basic Mobility Standardized Score 57.68   [facility] Highest Level Of Mobility   -HLM Goal 8: Walk 250 feet or more   JH-HLM Achieved 8: Walk 250 feet ot more   Education   Education Provided Mobility training;Home exercise program   Patient Demonstrates verbal understanding   End of Consult   Patient Position at End of Consult Bedside chair;All needs within reach       Papo  Pipo

## 2024-05-31 PROBLEM — N17.9 AKI (ACUTE KIDNEY INJURY) (HCC): Status: ACTIVE | Noted: 2024-05-31

## 2024-05-31 LAB
ANION GAP SERPL CALCULATED.3IONS-SCNC: 9 MMOL/L (ref 4–13)
BUN SERPL-MCNC: 20 MG/DL (ref 5–25)
CALCIUM SERPL-MCNC: 8.6 MG/DL (ref 8.4–10.2)
CHLORIDE SERPL-SCNC: 106 MMOL/L (ref 96–108)
CO2 SERPL-SCNC: 24 MMOL/L (ref 21–32)
CREAT SERPL-MCNC: 1.8 MG/DL (ref 0.6–1.3)
ERYTHROCYTE [DISTWIDTH] IN BLOOD BY AUTOMATED COUNT: 13.2 % (ref 11.6–15.1)
GFR SERPL CREATININE-BSD FRML MDRD: 37 ML/MIN/1.73SQ M
GLUCOSE SERPL-MCNC: 95 MG/DL (ref 65–140)
HCT VFR BLD AUTO: 41.1 % (ref 36.5–49.3)
HGB BLD-MCNC: 13.3 G/DL (ref 12–17)
MCH RBC QN AUTO: 29.7 PG (ref 26.8–34.3)
MCHC RBC AUTO-ENTMCNC: 32.4 G/DL (ref 31.4–37.4)
MCV RBC AUTO: 92 FL (ref 82–98)
PLATELET # BLD AUTO: 321 THOUSANDS/UL (ref 149–390)
PMV BLD AUTO: 9.8 FL (ref 8.9–12.7)
POTASSIUM SERPL-SCNC: 4.1 MMOL/L (ref 3.5–5.3)
RBC # BLD AUTO: 4.48 MILLION/UL (ref 3.88–5.62)
RHODAMINE-AURAMINE STN SPEC: NORMAL
SODIUM SERPL-SCNC: 139 MMOL/L (ref 135–147)
VANCOMYCIN SERPL-MCNC: 12.8 UG/ML (ref 10–20)
WBC # BLD AUTO: 8.69 THOUSAND/UL (ref 4.31–10.16)

## 2024-05-31 PROCEDURE — 80048 BASIC METABOLIC PNL TOTAL CA: CPT | Performed by: PHYSICIAN ASSISTANT

## 2024-05-31 PROCEDURE — 85027 COMPLETE CBC AUTOMATED: CPT | Performed by: PHYSICIAN ASSISTANT

## 2024-05-31 PROCEDURE — 99024 POSTOP FOLLOW-UP VISIT: CPT | Performed by: PHYSICIAN ASSISTANT

## 2024-05-31 PROCEDURE — 99232 SBSQ HOSP IP/OBS MODERATE 35: CPT | Performed by: INTERNAL MEDICINE

## 2024-05-31 PROCEDURE — 80202 ASSAY OF VANCOMYCIN: CPT | Performed by: PHYSICIAN ASSISTANT

## 2024-05-31 RX ORDER — VANCOMYCIN HYDROCHLORIDE 1 G/200ML
12.5 INJECTION, SOLUTION INTRAVENOUS ONCE
Status: COMPLETED | OUTPATIENT
Start: 2024-05-31 | End: 2024-05-31

## 2024-05-31 RX ORDER — VANCOMYCIN HYDROCHLORIDE 1 G/200ML
12.5 INJECTION, SOLUTION INTRAVENOUS DAILY PRN
Status: DISCONTINUED | OUTPATIENT
Start: 2024-05-31 | End: 2024-06-02

## 2024-05-31 RX ORDER — SODIUM CHLORIDE, SODIUM GLUCONATE, SODIUM ACETATE, POTASSIUM CHLORIDE, MAGNESIUM CHLORIDE, SODIUM PHOSPHATE, DIBASIC, AND POTASSIUM PHOSPHATE .53; .5; .37; .037; .03; .012; .00082 G/100ML; G/100ML; G/100ML; G/100ML; G/100ML; G/100ML; G/100ML
500 INJECTION, SOLUTION INTRAVENOUS ONCE
Status: COMPLETED | OUTPATIENT
Start: 2024-05-31 | End: 2024-05-31

## 2024-05-31 RX ORDER — VANCOMYCIN HYDROCHLORIDE 1 G/200ML
12.5 INJECTION, SOLUTION INTRAVENOUS EVERY 24 HOURS
Status: DISCONTINUED | OUTPATIENT
Start: 2024-05-31 | End: 2024-05-31

## 2024-05-31 RX ADMIN — SODIUM CHLORIDE, SODIUM GLUCONATE, SODIUM ACETATE, POTASSIUM CHLORIDE, MAGNESIUM CHLORIDE, SODIUM PHOSPHATE, DIBASIC, AND POTASSIUM PHOSPHATE 500 ML: .53; .5; .37; .037; .03; .012; .00082 INJECTION, SOLUTION INTRAVENOUS at 09:16

## 2024-05-31 RX ADMIN — SENNOSIDES 8.6 MG: 8.6 TABLET, FILM COATED ORAL at 09:16

## 2024-05-31 RX ADMIN — HEPARIN SODIUM 5000 UNITS: 5000 INJECTION INTRAVENOUS; SUBCUTANEOUS at 14:09

## 2024-05-31 RX ADMIN — VANCOMYCIN HYDROCHLORIDE 1000 MG: 1 INJECTION, SOLUTION INTRAVENOUS at 12:09

## 2024-05-31 RX ADMIN — THIAMINE HCL TAB 100 MG 100 MG: 100 TAB at 09:16

## 2024-05-31 RX ADMIN — CYCLOSPORINE 1 DROP: 0.5 EMULSION OPHTHALMIC at 21:26

## 2024-05-31 RX ADMIN — FOLIC ACID 1 MG: 1 TABLET ORAL at 09:16

## 2024-05-31 RX ADMIN — HEPARIN SODIUM 5000 UNITS: 5000 INJECTION INTRAVENOUS; SUBCUTANEOUS at 21:26

## 2024-05-31 RX ADMIN — METOPROLOL TARTRATE 25 MG: 25 TABLET, FILM COATED ORAL at 09:16

## 2024-05-31 RX ADMIN — PANTOPRAZOLE SODIUM 40 MG: 40 TABLET, DELAYED RELEASE ORAL at 05:46

## 2024-05-31 RX ADMIN — HEPARIN SODIUM 5000 UNITS: 5000 INJECTION INTRAVENOUS; SUBCUTANEOUS at 05:46

## 2024-05-31 RX ADMIN — ATORVASTATIN CALCIUM 80 MG: 80 TABLET, FILM COATED ORAL at 17:28

## 2024-05-31 RX ADMIN — BUDESONIDE AND FORMOTEROL FUMARATE DIHYDRATE 2 PUFF: 160; 4.5 AEROSOL RESPIRATORY (INHALATION) at 17:30

## 2024-05-31 RX ADMIN — ASPIRIN 325 MG ORAL TABLET 325 MG: 325 PILL ORAL at 09:15

## 2024-05-31 RX ADMIN — BUDESONIDE AND FORMOTEROL FUMARATE DIHYDRATE 2 PUFF: 160; 4.5 AEROSOL RESPIRATORY (INHALATION) at 09:34

## 2024-05-31 RX ADMIN — FLUTICASONE PROPIONATE 2 SPRAY: 50 SPRAY, METERED NASAL at 09:34

## 2024-05-31 RX ADMIN — METOPROLOL TARTRATE 25 MG: 25 TABLET, FILM COATED ORAL at 21:26

## 2024-05-31 RX ADMIN — B-COMPLEX W/ C & FOLIC ACID TAB 1 TABLET: TAB at 09:16

## 2024-05-31 RX ADMIN — CYCLOSPORINE 1 DROP: 0.5 EMULSION OPHTHALMIC at 09:34

## 2024-05-31 RX ADMIN — POLYETHYLENE GLYCOL 3350 17 G: 17 POWDER, FOR SOLUTION ORAL at 09:15

## 2024-05-31 RX ADMIN — Medication 6 MG: at 21:26

## 2024-05-31 NOTE — PROGRESS NOTES
David S Cogan is a 68 y.o. male who is currently ordered Vancomycin IV with management by the Pharmacy Consult service.  Relevant clinical data and objective / subjective history reviewed.  Vancomycin Assessment:  Indication and Goal AUC/Trough: Bone/joint infection (goal -600, trough >10); Soft tissue (goal -600, trough >10)  Clinical Status: stable  Micro:   Blood cultures from  no growth at 48 hours  Anaerobic and wound cultures sent from OR yesterday: pending  Renal Function:  SCr: 1.8 mg/dL  CrCl: 79.7 mL/min  Renal replacement: Not on dialysis  Days of Therapy: 4  Current Dose: 1500 mg q24  Vancomycin Plan:  New Dosin mg PRN level <15  Next Level: 6/ AM draw  Renal Function Monitoring: Daily BMP and UOP  Pharmacy will continue to follow closely for s/sx of nephrotoxicity, infusion reactions and appropriateness of therapy.  BMP and CBC will be ordered per protocol. We will continue to follow the patient’s culture results and clinical progress daily.    Portia Randolph, Pharmacist

## 2024-05-31 NOTE — PROGRESS NOTES
Progress Note - Cardiothoracic Surgery   David S Cogan 68 y.o. male MRN: [id number]  Unit/Bed#: [facility] 402-01 Encounter: [id number]    24 Hour Events: No events overnight.  No complaints this morning.     Medications:   Scheduled Meds:  Current Facility-Administered Medications   Medication Dose Route Frequency Provider Last Rate    acetaminophen  650 mg Oral Q4H PRN Andria Rivers PA-C      albuterol  1 puff Inhalation BID PRN Andria Rivers PA-C      Artificial Tears  1 drop Both Eyes BID Andria Rivers PA-C      aspirin  325 mg Oral Daily Andria Rivers PA-C      atorvastatin  80 mg Oral Daily With Dinner Andria Rivers PA-C      budesonide-formoterol  2 puff Inhalation BID Andria Rivers PA-C      cycloSPORINE  1 drop Ophthalmic BID Andria Rivers PA-C      fluticasone  2 spray Each Nare Daily Andria Rivers PA-C      folic acid  1 mg Oral Daily Andria Rivers PA-C      heparin (porcine)  5,000 Units Subcutaneous Q8H SARITA Andria Rivers PA-C      melatonin  6 mg Oral HS Andria Rivers PA-C      metoprolol tartrate  25 mg Oral Q12H SARITA Andria Rivers PA-C      multivitamin stress formula  1 tablet Oral Daily Andria Rivers PA-C      naloxone  0.04 mg Intravenous Q1MIN PRN Andria Rivers PA-C      oxyCODONE  2.5 mg Oral Q4H PRN Andria Rivers PA-C      Or    oxyCODONE  5 mg Oral Q4H PRN Andria Rivers PA-C      pantoprazole  40 mg Oral Early Morning Andria Rivers PA-C      polyethylene glycol  17 g Oral Daily Andria Rivers PA-C      senna  1 tablet Oral Daily Andria Rivers PA-C      thiamine  100 mg Oral Daily Andria Rivers PA-C      vancomycin  1,000 mg Intravenous Once Andria Rivers PA-C      vancomycin  12.5 mg/kg Intravenous Once Krystyna Hickman MD      vancomycin  12.5 mg/kg Intravenous Daily PRN Krystyna Hickman MD       Continuous Infusions:     Results:   Results from last 7 days   Lab Units  05/31/24  0452 05/30/24  0507 05/29/24  0518   WBC Thousand/uL 8.69 7.13 7.69   HEMOGLOBIN g/dL 13.3 13.4 13.4   HEMATOCRIT % 41.1 40.6 40.2   PLATELETS Thousands/uL 321 325 321     Results from last 7 days   Lab Units 05/31/24  0452 05/30/24  0507 05/29/24  0518   POTASSIUM mmol/L 4.1 3.7 4.0   CHLORIDE mmol/L 106 107 108   CO2 mmol/L 24 25 24   BUN mg/dL 20 13 13   CREATININE mg/dL 1.80* 1.04 1.10   CALCIUM mg/dL 8.6 8.7 8.5     Results from last 7 days   Lab Units 05/29/24  0518   INR  1.16   PTT seconds 32       Vitals:   Vitals:    05/30/24 2134 05/30/24 2258 05/31/24 0238 05/31/24 0737   BP: 119/78 119/78 100/68 98/67   Pulse: 91 89 86 101   Resp:  16 16    Temp:  97.8 °F (36.6 °C) 98.5 °F (36.9 °C) 98.1 °F (36.7 °C)   SpO2:  95% 95% 99%       Physical Exam:    General: No acute distress, Alert, and Normal appearance  HEENT/NECK:  Normocephalic. Atraumatic.  No jugular venous distention.    Cardiac: Regular rate and rhythm Sternotomy: vac prevena in place c/d/I no drainage in the collection system   Pulmonary:  Breath sounds clear bilaterally  Abdomen:  Non-tender and Non-distended  Extremities: Extremities warm/dry  Neuro: Alert and oriented X 3  Skin: Warm/Dry, without rashes or lesions.      Assessment: Superficial infection of prior sternotomy     Plan:  Prevena Vac x 1 week until 6/5 then remove and shower daily   No lifting more than 10 pounds  Once wound OR culture returns and appropriate abx selection can then be discharged.    SIGNATURE: Andria Rivers PA-C  DATE: May 31, 2024  TIME: 7:51 AM    * This note was completed in part utilizing [employer] direct voice recognition software.   Grammatical errors, random word insertion, spelling mistakes, and incomplete sentences may be an occasional consequence of the system secondary to software limitations, ambient noise and hardware issues. At the time of dictation, efforts were made to edit, clarify and /or correct errors. Please read the chart  carefully and recognize, using context, where substitutions have occurred.  If you have any questions or concerns about the context, text or information contained within the body of this dictation, please contact myself, the provider, for further clarification.

## 2024-05-31 NOTE — PROGRESS NOTES
INTERNAL MEDICINE RESIDENCY PROGRESS NOTE     Name: David S Cogan   Age & Sex: 68 y.o. male   MRN: [id number]  Unit/Bed#: [facility] 402-01   Encounter: [id number]  Team: SOD Team C     PATIENT INFORMATION     Name: David S Cogan   Age & Sex: 68 y.o. male   MRN: [id number]  Hospital Stay Days: 3    ASSESSMENT/PLAN     Principal Problem:    Mediastinitis  Active Problems:    Hyperlipidemia    Asthma    Prediabetes    Coronary artery disease involving coronary bypass graft of native heart without angina pectoris    KAN (acute kidney injury) ([facility])      * Mediastinitis  Assessment & Plan  Presenting with 5 to 10 days of worsening pain, erythema, swelling and purulent drainage from sternotomy incision.  Initially noticed very small amount of purulence below the inferior margin of his incision 10 days ago, subsequently noticed worsening separate area of erythema, fluctuance and tenderness above this 5 days ago.  No associated fevers, chills, nausea/vomiting, chest pain, shortness of breath or leg swelling.    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  No discrete mediastinal collection noted.    Afebrile, not tachycardic, normotensive on presentation.  CT surgery consulted, requested admission under medical service.  Started on vancomycin in the ED.  Bcxs (5/28) NGTD. Wound cultures (5/30) NGTD (preliminary).    Plan:  S/P local wound exploration and excisional debridement + placement of prevena vac by CTS on 5/30  Plan for Prevena Vac x 1 week until 6/5.  Once wound OR culture returns and appropriate abx selection can then be discharged.  Vancomycin with pharmacy following  Follow up on wound and blood culture results    KAN (acute kidney injury) ([facility])  Assessment & Plan  Cr increased from 1.0 to 1.8 post op.    Plan:  Ordered isolyte 500 cc bolus over 5 hours  Urinary retention  protocol added  Encourage oral hydration  Monitor Cr  Avoid nephrotoxic agents    Coronary artery disease involving coronary bypass graft of native heart without angina pectoris  Assessment & Plan  Admitted 4/17 with NSTEMI, found to have severe two-vessel CAD on cardiac catheterization including left main (85% mid LAD, 100% distal left circumflex).  Ultimately underwent CABG x 2 on 4/23 (LIMA to LAD, SVG to OM 3, LLE EVH)    S/P CABG x 2 (LIMA to LAD, SVG to OM3 with LLE endoscopic vein harvesting), performed on 4/23/24.  Required HFNC postop day 1, but subsequently weaned off and discharged 4/27/2024 with prescriptions for full dose aspirin, high intensity statin and Lopressor 25 mg every 12 hours.    Followed up with cardiology on 5/6 and CT surgery 5/13, referred to cardiac rehab.    Plan:  Continue aspirin 325 mg daily  Continue high intensity statin   Continue Lopressor 25 mg every 12 hours  Had issues with lower extremity edema during last admission, improved with 1.8 L fluid restriction and diuretics    Prediabetes  Assessment & Plan  Most recent A1c 5.9% in April, consistent with prediabetes.  Not on any medications at home.    Plan:  Cardiac diet with 1.8 L fluid restriction (consider CHO restricted diet as well)  Continue to monitor blood glucose    Asthma  Assessment & Plan  Patient with history of asthma (no PFT's on file); treated as an outpatient with Symbicort and albuterol as needed.    Plan:  Continue twice daily Symbicort with as needed albuterol ordered    Hyperlipidemia  Assessment & Plan  Patient with documented history of hyperlipidemia. Last lipid panel in April 2024. Takes Crestor 40 mg daily.     Lipitor 80 mg daily in place of Crestor 40 mg daily due to the latter being nonformulary.     Plan:  Continue high intensity statin.        Disposition: anticipating discharge     SUBJECTIVE     Patient seen and examined. No acute events overnight. S/P local wound exploration and excisional  debridement + placement of prevena vac by CTS on . C/o of mild chest wall discomfort. No fevers, chills, chest pain, SOB, N/V, or any other complaints.    OBJECTIVE     Vitals:    24 2134 24 2258 24 0238 24 0737   BP: 119/78 119/78 100/68 98/67   Pulse: 91 89 86 101   Resp:  16 16    Temp:  97.8 °F (36.6 °C) 98.5 °F (36.9 °C) 98.1 °F (36.7 °C)   SpO2:  95% 95% 99%      Temperature:   Temp (24hrs), Av.9 °F (36.6 °C), Min:97.4 °F (36.3 °C), Max:98.5 °F (36.9 °C)    Temperature: 98.1 °F (36.7 °C)  Intake & Output:  I/O          0701   0700  0701   0700  0701   0700    P.O. 602 600     I.V.  300     IV Piggyback  400     Total Intake 602 1300     Urine 2125 1725     Drains  0     Total Output 2125 1725     Net -1523 -425            Unmeasured Urine Occurrence 2 x            Weights:        There is no height or weight on file to calculate BMI.  Weight (last 2 days)       None          Physical Exam  Vitals and nursing note reviewed.   Constitutional:       General: He is not in acute distress.     Appearance: He is well-developed.   HENT:      Head: Normocephalic and atraumatic.   Eyes:      Conjunctiva/sclera: Conjunctivae normal.   Cardiovascular:      Rate and Rhythm: Normal rate and regular rhythm.      Heart sounds: No murmur heard.  Pulmonary:      Effort: Pulmonary effort is normal. No respiratory distress.      Breath sounds: Normal breath sounds.   Abdominal:      Palpations: Abdomen is soft.      Tenderness: There is no abdominal tenderness.   Musculoskeletal:         General: No swelling.      Cervical back: Neck supple.   Skin:     General: Skin is warm and dry.      Capillary Refill: Capillary refill takes less than 2 seconds.      Comments: Well-healed sternotomy scar with overlying wound vac C/D/I, no drainage in collection   Neurological:      General: No focal deficit present.      Mental Status: He is alert and oriented to person, place, and  time.   Psychiatric:         Mood and Affect: Mood normal.       LABORATORY DATA     Labs: I have personally reviewed pertinent reports.  Results from last 7 days   Lab Units 05/31/24 0452 05/30/24  0507 05/29/24  0518 05/28/24  1735   WBC Thousand/uL 8.69 7.13 7.69 8.40   HEMOGLOBIN g/dL 13.3 13.4 13.4 13.8   HEMATOCRIT % 41.1 40.6 40.2 42.3   PLATELETS Thousands/uL 321 325 321 316   SEGS PCT %  --   --  53 52   MONO PCT %  --   --  8 7   EOS PCT %  --   --  8* 7*      Results from last 7 days   Lab Units 05/31/24  0452 05/30/24  0507 05/29/24  0518 05/28/24  1735   POTASSIUM mmol/L 4.1 3.7 4.0 4.1   CHLORIDE mmol/L 106 107 108 106   CO2 mmol/L 24 25 24 27   BUN mg/dL 20 13 13 14   CREATININE mg/dL 1.80* 1.04 1.10 1.12   CALCIUM mg/dL 8.6 8.7 8.5 9.1   ALK PHOS U/L  --   --   --  90   ALT U/L  --   --   --  27   AST U/L  --   --   --  22              Results from last 7 days   Lab Units 05/29/24  0518   INR  1.16   PTT seconds 32               IMAGING & DIAGNOSTIC TESTING     Radiology Results: I have personally reviewed pertinent reports.  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 Trace pericardial fluid and trace left pleural effusion. No discrete mediastinal collection. The study was marked in EPIC for immediate notification. Workstation performed: [id number]     Other Diagnostic Testing: I have personally reviewed pertinent reports.    ACTIVE MEDICATIONS     Current Facility-Administered Medications   Medication Dose Route Frequency    acetaminophen (TYLENOL) tablet 650 mg  650 mg Oral Q4H PRN    albuterol (PROVENTIL HFA,VENTOLIN HFA) inhaler 1 puff  1 puff Inhalation BID PRN    Artificial Tears ophthalmic solution 1 drop  1 drop Both Eyes BID    aspirin tablet 325 mg  325 mg Oral Daily    atorvastatin (LIPITOR) tablet 80 mg  80 mg Oral Daily With  "Dinner    budesonide-formoterol (SYMBICORT) 160-4.5 mcg/act inhaler 2 puff  2 puff Inhalation BID    cycloSPORINE (RESTASIS) 0.05 % ophthalmic emulsion 1 drop  1 drop Ophthalmic BID    fluticasone (FLONASE) 50 mcg/act nasal spray 2 spray  2 spray Each Nare Daily    folic acid (FOLVITE) tablet 1 mg  1 mg Oral Daily    heparin (porcine) subcutaneous injection 5,000 Units  5,000 Units Subcutaneous Q8H SARITA    melatonin tablet 6 mg  6 mg Oral HS    metoprolol tartrate (LOPRESSOR) tablet 25 mg  25 mg Oral Q12H SARITA    multi-electrolyte (ISOLYTE-S PH 7.4) bolus 500 mL  500 mL Intravenous Once    multivitamin stress formula tablet 1 tablet  1 tablet Oral Daily    naloxone (NARCAN) 0.04 mg/mL syringe 0.04 mg  0.04 mg Intravenous Q1MIN PRN    oxyCODONE (ROXICODONE) split tablet 2.5 mg  2.5 mg Oral Q4H PRN    Or    oxyCODONE (ROXICODONE) IR tablet 5 mg  5 mg Oral Q4H PRN    pantoprazole (PROTONIX) EC tablet 40 mg  40 mg Oral Early Morning    polyethylene glycol (MIRALAX) packet 17 g  17 g Oral Daily    senna (SENOKOT) tablet 8.6 mg  1 tablet Oral Daily    thiamine tablet 100 mg  100 mg Oral Daily    vancomycin (VANCOCIN) IVPB (premix in dextrose) 1,000 mg 200 mL  12.5 mg/kg Intravenous Once    vancomycin (VANCOCIN) IVPB (premix in dextrose) 1,000 mg 200 mL  12.5 mg/kg Intravenous Daily PRN       VTE Pharmacologic Prophylaxis: Heparin  VTE Mechanical Prophylaxis: sequential compression device    Portions of the record may have been created with voice recognition software.  Occasional wrong word or \"sound a like\" substitutions may have occurred due to the inherent limitations of voice recognition software.  Read the chart carefully and recognize, using context, where substitutions have occurred.  ==  Radha Shahid MD  [facility]  Internal Medicine Residency PGY-1      "

## 2024-05-31 NOTE — PROGRESS NOTES
Progress Note - Cardiothoracic Surgery   David S Cogan 68 y.o. male MRN: [id number]  Unit/Bed#: [facility] 402-01 Encounter: [id number]    Superficial infection of prior sternotomy . S/P Local wound exploration and excisional debridement, placement of prevena vac ; POD # 2      24 Hour Events: No events. Denies incisonal pain.     Medications:   Scheduled Meds:  Current Facility-Administered Medications   Medication Dose Route Frequency Provider Last Rate    acetaminophen  650 mg Oral Q4H PRN Andria Rivers PA-C      albuterol  1 puff Inhalation BID PRN Andria Rivers PA-C      Artificial Tears  1 drop Both Eyes BID Andria Rivers PA-C      aspirin  325 mg Oral Daily Andria Rivers PA-C      atorvastatin  80 mg Oral Daily With Dinner Andria Rivers PA-C      budesonide-formoterol  2 puff Inhalation BID Andria Rivers PA-C      cycloSPORINE  1 drop Ophthalmic BID Andria Rivers PA-C      fluticasone  2 spray Each Nare Daily Andria Rivers PA-C      folic acid  1 mg Oral Daily Andria Rivers PA-C      heparin (porcine)  5,000 Units Subcutaneous Q8H SARITA Andria Rivers PA-C      melatonin  6 mg Oral HS Andria Rivers PA-C      metoprolol tartrate  25 mg Oral Q12H SARITA Andria Rivers PA-C      multivitamin stress formula  1 tablet Oral Daily Andria Rivers PA-C      naloxone  0.04 mg Intravenous Q1MIN PRN Andria Rivers PA-C      oxyCODONE  2.5 mg Oral Q4H PRN Andria Rivers PA-C      Or    oxyCODONE  5 mg Oral Q4H PRN Andria Rivers PA-C      pantoprazole  40 mg Oral Early Morning Andria Rivers PA-C      polyethylene glycol  17 g Oral Daily Andria Rivers PA-C      senna  1 tablet Oral Daily Andria Rivers PA-C      thiamine  100 mg Oral Daily Andria Rivers PA-C      vancomycin  12.5 mg/kg Intravenous Daily PRN Krystyna Hickman MD      vancomycin  1,000 mg Intravenous Once Kirk Alves MD       Continuous Infusions:   PRN  Meds:.  acetaminophen    albuterol    naloxone    oxyCODONE **OR** oxyCODONE    vancomycin    Vitals:   Vitals:    05/31/24 2126 05/31/24 2127 05/31/24 2251 06/01/24 0652   BP: 130/78 130/78 129/78 144/90   Pulse: 70 70 71 70   Resp:    18   Temp:   98.3 °F (36.8 °C) 98.2 °F (36.8 °C)   SpO2:  95% 95% 95%       Telemetry: NSR; Heart Rate: 70    Respiratory:   SpO2: SpO2: 95 %, SpO2 Activity: SpO2 Activity: At Rest; Room Air    Intake/Output:     Intake/Output Summary (Last 24 hours) at 6/1/2024 0818  Last data filed at 6/1/2024 0810  Gross per 24 hour   Intake 1583 ml   Output 1550 ml   Net 33 ml          Weights:   Weight (last 2 days)       None              Results:   Results from last 7 days   Lab Units 06/01/24  0451 05/31/24  0452 05/30/24  0507   WBC Thousand/uL 7.69 8.69 7.13   HEMOGLOBIN g/dL 13.4 13.3 13.4   HEMATOCRIT % 41.1 41.1 40.6   PLATELETS Thousands/uL 330 321 325     Results from last 7 days   Lab Units 06/01/24  0451 05/31/24  0452 05/30/24  0507   SODIUM mmol/L 141 139 142   POTASSIUM mmol/L 4.2 4.1 3.7   CHLORIDE mmol/L 108 106 107   CO2 mmol/L 26 24 25   BUN mg/dL 22 20 13   CREATININE mg/dL 1.73* 1.80* 1.04   CALCIUM mg/dL 8.9 8.6 8.7     Results from last 7 days   Lab Units 05/29/24  0518   INR  1.16   PTT seconds 32         Studies:  No new studies    Invasive Lines/Tubes:  Invasive Devices       Peripheral Intravenous Line  Duration             Peripheral IV 05/30/24 Proximal;Right;Ventral (anterior) Forearm 1 day                    Physical Exam:    General: No acute distress, Alert, and Normal appearance  HEENT/NECK:  Normocephalic. Atraumatic.  No jugular venous distention.    Incisions: Sternum is stable.  Incision dressed with Velvet.  No erythema or drainage  Extremities: Extremities warm/dry  Neuro: Alert and oriented X 3  Skin: Warm/Dry, without rashes or lesions.    Assessment:  Principal Problem:    Mediastinitis  Active Problems:    Hyperlipidemia    Asthma    Prediabetes     Coronary artery disease involving coronary bypass graft of native heart without angina pectoris    KAN (acute kidney injury) ([facility])       Superficial infection of prior sternotomy . S/P Local wound exploration and excisional debridement, placement of prevena vac ; POD # 2    Plan:    OR gram stain: No Polys or Bacteria seen     OR anaerobic cultures negative    OR wound Cultures negative, to this point    Continue IV Vancomycin, pending final OR cultures          SIGNATURE: Christopher Tirado PA-C  DATE: June 1, 2024  TIME: 8:18 AM

## 2024-05-31 NOTE — ASSESSMENT & PLAN NOTE
Cr increased from 1.0 to 1.8 post op.  Creatinine now stable at 1.6    Plan:  Ordered isolyte 500 cc bolus over 5 hours  Urinary retention protocol added  Encourage oral hydration  Monitor Cr  Avoid nephrotoxic agents

## 2024-06-01 LAB
ANION GAP SERPL CALCULATED.3IONS-SCNC: 7 MMOL/L (ref 4–13)
BACTERIA SPEC ANAEROBE CULT: NORMAL
BACTERIA SPEC ANAEROBE CULT: NORMAL
BUN SERPL-MCNC: 22 MG/DL (ref 5–25)
CALCIUM SERPL-MCNC: 8.9 MG/DL (ref 8.4–10.2)
CHLORIDE SERPL-SCNC: 108 MMOL/L (ref 96–108)
CO2 SERPL-SCNC: 26 MMOL/L (ref 21–32)
CREAT SERPL-MCNC: 1.73 MG/DL (ref 0.6–1.3)
ERYTHROCYTE [DISTWIDTH] IN BLOOD BY AUTOMATED COUNT: 13.2 % (ref 11.6–15.1)
GFR SERPL CREATININE-BSD FRML MDRD: 39 ML/MIN/1.73SQ M
GLUCOSE SERPL-MCNC: 94 MG/DL (ref 65–140)
HCT VFR BLD AUTO: 41.1 % (ref 36.5–49.3)
HGB BLD-MCNC: 13.4 G/DL (ref 12–17)
MCH RBC QN AUTO: 29.9 PG (ref 26.8–34.3)
MCHC RBC AUTO-ENTMCNC: 32.6 G/DL (ref 31.4–37.4)
MCV RBC AUTO: 92 FL (ref 82–98)
PLATELET # BLD AUTO: 330 THOUSANDS/UL (ref 149–390)
PMV BLD AUTO: 10 FL (ref 8.9–12.7)
POTASSIUM SERPL-SCNC: 4.2 MMOL/L (ref 3.5–5.3)
RBC # BLD AUTO: 4.48 MILLION/UL (ref 3.88–5.62)
SODIUM SERPL-SCNC: 141 MMOL/L (ref 135–147)
VANCOMYCIN SERPL-MCNC: 12.7 UG/ML (ref 10–20)
WBC # BLD AUTO: 7.69 THOUSAND/UL (ref 4.31–10.16)

## 2024-06-01 PROCEDURE — 99024 POSTOP FOLLOW-UP VISIT: CPT | Performed by: PHYSICIAN ASSISTANT

## 2024-06-01 PROCEDURE — 80048 BASIC METABOLIC PNL TOTAL CA: CPT

## 2024-06-01 PROCEDURE — 99232 SBSQ HOSP IP/OBS MODERATE 35: CPT | Performed by: INTERNAL MEDICINE

## 2024-06-01 PROCEDURE — 80202 ASSAY OF VANCOMYCIN: CPT | Performed by: INTERNAL MEDICINE

## 2024-06-01 PROCEDURE — 85027 COMPLETE CBC AUTOMATED: CPT

## 2024-06-01 RX ORDER — VANCOMYCIN HYDROCHLORIDE 1 G/200ML
1000 INJECTION, SOLUTION INTRAVENOUS ONCE
Status: COMPLETED | OUTPATIENT
Start: 2024-06-01 | End: 2024-06-01

## 2024-06-01 RX ADMIN — CYCLOSPORINE 1 DROP: 0.5 EMULSION OPHTHALMIC at 20:45

## 2024-06-01 RX ADMIN — HEPARIN SODIUM 5000 UNITS: 5000 INJECTION INTRAVENOUS; SUBCUTANEOUS at 13:23

## 2024-06-01 RX ADMIN — FLUTICASONE PROPIONATE 2 SPRAY: 50 SPRAY, METERED NASAL at 09:27

## 2024-06-01 RX ADMIN — POLYETHYLENE GLYCOL 3350 17 G: 17 POWDER, FOR SOLUTION ORAL at 09:28

## 2024-06-01 RX ADMIN — SENNOSIDES 8.6 MG: 8.6 TABLET, FILM COATED ORAL at 09:28

## 2024-06-01 RX ADMIN — VANCOMYCIN HYDROCHLORIDE 1000 MG: 1 INJECTION, SOLUTION INTRAVENOUS at 13:16

## 2024-06-01 RX ADMIN — FOLIC ACID 1 MG: 1 TABLET ORAL at 09:28

## 2024-06-01 RX ADMIN — PANTOPRAZOLE SODIUM 40 MG: 40 TABLET, DELAYED RELEASE ORAL at 05:00

## 2024-06-01 RX ADMIN — DEXTRAN 70, GLYCERIN, HYPROMELLOSE 1 DROP: 1; 2; 3 SOLUTION/ DROPS OPHTHALMIC at 09:31

## 2024-06-01 RX ADMIN — B-COMPLEX W/ C & FOLIC ACID TAB 1 TABLET: TAB at 09:28

## 2024-06-01 RX ADMIN — THIAMINE HCL TAB 100 MG 100 MG: 100 TAB at 09:28

## 2024-06-01 RX ADMIN — HEPARIN SODIUM 5000 UNITS: 5000 INJECTION INTRAVENOUS; SUBCUTANEOUS at 21:01

## 2024-06-01 RX ADMIN — METOPROLOL TARTRATE 25 MG: 25 TABLET, FILM COATED ORAL at 20:46

## 2024-06-01 RX ADMIN — BUDESONIDE AND FORMOTEROL FUMARATE DIHYDRATE 2 PUFF: 160; 4.5 AEROSOL RESPIRATORY (INHALATION) at 09:27

## 2024-06-01 RX ADMIN — CYCLOSPORINE 1 DROP: 0.5 EMULSION OPHTHALMIC at 09:28

## 2024-06-01 RX ADMIN — ASPIRIN 325 MG ORAL TABLET 325 MG: 325 PILL ORAL at 09:28

## 2024-06-01 RX ADMIN — ATORVASTATIN CALCIUM 80 MG: 80 TABLET, FILM COATED ORAL at 16:55

## 2024-06-01 RX ADMIN — BUDESONIDE AND FORMOTEROL FUMARATE DIHYDRATE 2 PUFF: 160; 4.5 AEROSOL RESPIRATORY (INHALATION) at 16:56

## 2024-06-01 RX ADMIN — HEPARIN SODIUM 5000 UNITS: 5000 INJECTION INTRAVENOUS; SUBCUTANEOUS at 05:00

## 2024-06-01 RX ADMIN — Medication 6 MG: at 21:01

## 2024-06-01 RX ADMIN — METOPROLOL TARTRATE 25 MG: 25 TABLET, FILM COATED ORAL at 09:28

## 2024-06-01 NOTE — PROGRESS NOTES
David S Cogan is a 68 y.o. male who is currently ordered Vancomycin IV with management by the Pharmacy Consult service.  Relevant clinical data and objective / subjective history reviewed.  Vancomycin Assessment:  Indication and Goal AUC/Trough: Bone/joint infection (goal -600, trough >10); Soft tissue (goal -600, trough >10)  Clinical Status: stable  Micro:     Renal Function:  SCr: 1.73 mg/dL (was 1.8 mg/dL)  CrCl: 41.3 mL/min (was 79.7 mL/min)  Renal replacement: Not on dialysis  Days of Therapy: 2  Current Dose: vancomycin 1 gm if level < 15  Vancomycin Plan: random level drawn today 6/1 at 0451 was 12.7 so dose needed today. Cultures so far were negative with re-incubation ordered. Poss DC vanco if levels come back negative again.  New Dosing:  no change, keep at 1000 mg PRN level <15  Next Level: 6/2 AM draw  Renal Function Monitoring: Daily BMP and UOP  Pharmacy will continue to follow closely for s/sx of nephrotoxicity, infusion reactions and appropriateness of therapy.  BMP and CBC will be ordered per protocol. We will continue to follow the patient’s culture results and clinical progress daily.    Gareth Mendoza, R.Ph., Pharmacist

## 2024-06-01 NOTE — PROGRESS NOTES
INTERNAL MEDICINE RESIDENCY PROGRESS NOTE     Name: David S Cogan   Age & Sex: 68 y.o. male   MRN: [id number]  Unit/Bed#: [facility] 402-01   Encounter: [id number]  Team: SOD Team C     PATIENT INFORMATION     Name: David S Cogan   Age & Sex: 68 y.o. male   MRN: [id number]  Hospital Stay Days: 4    ASSESSMENT/PLAN     Principal Problem:    Mediastinitis  Active Problems:    Hyperlipidemia    Asthma    Prediabetes    Coronary artery disease involving coronary bypass graft of native heart without angina pectoris    KAN (acute kidney injury) ([facility])      * Mediastinitis  Assessment & Plan  Presenting with 5 to 10 days of worsening pain, erythema, swelling and purulent drainage from sternotomy incision.  Initially noticed very small amount of purulence below the inferior margin of his incision 10 days ago, subsequently noticed worsening separate area of erythema, fluctuance and tenderness above this 5 days ago.  No associated fevers, chills, nausea/vomiting, chest pain, shortness of breath or leg swelling.    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  No discrete mediastinal collection noted.    Afebrile, not tachycardic, normotensive on presentation.  CT surgery consulted, requested admission under medical service.  Started on vancomycin in the ED.  Bcxs (5/28) NGTD. Wound cultures (5/30) NGTD (preliminary).    Plan:  S/P local wound exploration and excisional debridement + placement of prevena vac by CTS on 5/30  Plan for Prevena Vac x 1 week until 6/5.  Once wound OR culture returns and appropriate abx selection can then be discharged.  Vancomycin with pharmacy following  Follow up on wound and blood culture results    KAN (acute kidney injury) ([facility])  Assessment & Plan  Cr increased from 1.0 to 1.8 post op.    Plan:  Ordered isolyte 500 cc bolus over 5 hours  Urinary retention  protocol added  Encourage oral hydration  Monitor Cr  Avoid nephrotoxic agents    Coronary artery disease involving coronary bypass graft of native heart without angina pectoris  Assessment & Plan  Admitted 4/17 with NSTEMI, found to have severe two-vessel CAD on cardiac catheterization including left main (85% mid LAD, 100% distal left circumflex).  Ultimately underwent CABG x 2 on 4/23 (LIMA to LAD, SVG to OM 3, LLE EVH)    S/P CABG x 2 (LIMA to LAD, SVG to OM3 with LLE endoscopic vein harvesting), performed on 4/23/24.  Required HFNC postop day 1, but subsequently weaned off and discharged 4/27/2024 with prescriptions for full dose aspirin, high intensity statin and Lopressor 25 mg every 12 hours.    Followed up with cardiology on 5/6 and CT surgery 5/13, referred to cardiac rehab.    Plan:  Continue aspirin 325 mg daily  Continue high intensity statin   Continue Lopressor 25 mg every 12 hours  Had issues with lower extremity edema during last admission, improved with 1.8 L fluid restriction and diuretics    Prediabetes  Assessment & Plan  Most recent A1c 5.9% in April, consistent with prediabetes.  Not on any medications at home.    Plan:  Cardiac diet with 1.8 L fluid restriction (consider CHO restricted diet as well)  Continue to monitor blood glucose    Asthma  Assessment & Plan  Patient with history of asthma (no PFT's on file); treated as an outpatient with Symbicort and albuterol as needed.    Plan:  Continue twice daily Symbicort with as needed albuterol ordered    Hyperlipidemia  Assessment & Plan  Patient with documented history of hyperlipidemia. Last lipid panel in April 2024. Takes Crestor 40 mg daily.     Lipitor 80 mg daily in place of Crestor 40 mg daily due to the latter being nonformulary.     Plan:  Continue high intensity statin.        Disposition: possible discharge today     SUBJECTIVE     Patient seen and examined. No acute events overnight. Patient with no complaints this AM. Eager to be  discharged home.    OBJECTIVE     Vitals:    24 2126 24 2127 24 2251 24 0652   BP: 130/78 130/78 129/78 144/90   Pulse: 70 70 71 70   Resp:    18   Temp:   98.3 °F (36.8 °C) 98.2 °F (36.8 °C)   SpO2:  95% 95% 95%      Temperature:   Temp (24hrs), Av.9 °F (36.6 °C), Min:97.2 °F (36.2 °C), Max:98.3 °F (36.8 °C)    Temperature: 98.2 °F (36.8 °C)  Intake & Output:  I/O          0701   0700  0701   0700  0701   0700    P.O. 600 1465     I.V. 300      IV Piggyback 400      Total Intake 1300 1465     Urine 1725 1550     Drains 0 0     Total Output 1725 1550     Net -425 -85            Unmeasured Urine Occurrence  2 x           Weights:        There is no height or weight on file to calculate BMI.  Weight (last 2 days)       None          Physical Exam  Vitals and nursing note reviewed.   Constitutional:       General: He is not in acute distress.     Appearance: He is well-developed.   HENT:      Head: Normocephalic and atraumatic.   Eyes:      Conjunctiva/sclera: Conjunctivae normal.   Cardiovascular:      Rate and Rhythm: Normal rate and regular rhythm.      Heart sounds: No murmur heard.  Pulmonary:      Effort: Pulmonary effort is normal. No respiratory distress.      Breath sounds: Normal breath sounds.   Abdominal:      Palpations: Abdomen is soft.      Tenderness: There is no abdominal tenderness.   Musculoskeletal:         General: No swelling or tenderness.      Cervical back: Neck supple.      Right lower leg: No edema.      Left lower leg: No edema.   Skin:     General: Skin is warm and dry.      Capillary Refill: Capillary refill takes less than 2 seconds.      Comments: Well-healed sternotomy scar with overlying wound vac C/D/I no drainage in collection   Neurological:      General: No focal deficit present.      Mental Status: He is alert and oriented to person, place, and time.   Psychiatric:         Mood and Affect: Mood normal.       LABORATORY DATA      Labs: I have personally reviewed pertinent reports.  Results from last 7 days   Lab Units 06/01/24 0451 05/31/24 0452 05/30/24 0507 05/29/24 0518 05/28/24  1735   WBC Thousand/uL 7.69 8.69 7.13 7.69 8.40   HEMOGLOBIN g/dL 13.4 13.3 13.4 13.4 13.8   HEMATOCRIT % 41.1 41.1 40.6 40.2 42.3   PLATELETS Thousands/uL 330 321 325 321 316   SEGS PCT %  --   --   --  53 52   MONO PCT %  --   --   --  8 7   EOS PCT %  --   --   --  8* 7*      Results from last 7 days   Lab Units 06/01/24 0451 05/31/24 0452 05/30/24 0507 05/29/24 0518 05/28/24  1735   POTASSIUM mmol/L 4.2 4.1 3.7   < > 4.1   CHLORIDE mmol/L 108 106 107   < > 106   CO2 mmol/L 26 24 25   < > 27   BUN mg/dL 22 20 13   < > 14   CREATININE mg/dL 1.73* 1.80* 1.04   < > 1.12   CALCIUM mg/dL 8.9 8.6 8.7   < > 9.1   ALK PHOS U/L  --   --   --   --  90   ALT U/L  --   --   --   --  27   AST U/L  --   --   --   --  22    < > = values in this interval not displayed.              Results from last 7 days   Lab Units 05/29/24  0518   INR  1.16   PTT seconds 32               IMAGING & DIAGNOSTIC TESTING     Radiology Results: I have personally reviewed pertinent reports.  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 Trace pericardial fluid and trace left pleural effusion. No discrete mediastinal collection. The study was marked in EPIC for immediate notification. Workstation performed: [id number]     Other Diagnostic Testing: I have personally reviewed pertinent reports.    ACTIVE MEDICATIONS     Current Facility-Administered Medications   Medication Dose Route Frequency    acetaminophen (TYLENOL) tablet 650 mg  650 mg Oral Q4H PRN    albuterol (PROVENTIL HFA,VENTOLIN HFA) inhaler 1 puff  1 puff Inhalation BID PRN    Artificial Tears ophthalmic solution 1 drop  1 drop Both Eyes BID    aspirin tablet 325 mg   "325 mg Oral Daily    atorvastatin (LIPITOR) tablet 80 mg  80 mg Oral Daily With Dinner    budesonide-formoterol (SYMBICORT) 160-4.5 mcg/act inhaler 2 puff  2 puff Inhalation BID    cycloSPORINE (RESTASIS) 0.05 % ophthalmic emulsion 1 drop  1 drop Ophthalmic BID    fluticasone (FLONASE) 50 mcg/act nasal spray 2 spray  2 spray Each Nare Daily    folic acid (FOLVITE) tablet 1 mg  1 mg Oral Daily    heparin (porcine) subcutaneous injection 5,000 Units  5,000 Units Subcutaneous Q8H SARITA    melatonin tablet 6 mg  6 mg Oral HS    metoprolol tartrate (LOPRESSOR) tablet 25 mg  25 mg Oral Q12H SARITA    multivitamin stress formula tablet 1 tablet  1 tablet Oral Daily    naloxone (NARCAN) 0.04 mg/mL syringe 0.04 mg  0.04 mg Intravenous Q1MIN PRN    oxyCODONE (ROXICODONE) split tablet 2.5 mg  2.5 mg Oral Q4H PRN    Or    oxyCODONE (ROXICODONE) IR tablet 5 mg  5 mg Oral Q4H PRN    pantoprazole (PROTONIX) EC tablet 40 mg  40 mg Oral Early Morning    polyethylene glycol (MIRALAX) packet 17 g  17 g Oral Daily    senna (SENOKOT) tablet 8.6 mg  1 tablet Oral Daily    thiamine tablet 100 mg  100 mg Oral Daily    vancomycin (VANCOCIN) IVPB (premix in dextrose) 1,000 mg 200 mL  12.5 mg/kg Intravenous Daily PRN    vancomycin (VANCOCIN) IVPB (premix in dextrose) 1,000 mg 200 mL  1,000 mg Intravenous Once       VTE Pharmacologic Prophylaxis: Heparin  VTE Mechanical Prophylaxis: sequential compression device    Portions of the record may have been created with voice recognition software.  Occasional wrong word or \"sound a like\" substitutions may have occurred due to the inherent limitations of voice recognition software.  Read the chart carefully and recognize, using context, where substitutions have occurred.  ==  Radha Shahid MD  [facility]  Internal Medicine Residency PGY-1      "

## 2024-06-02 VITALS
HEART RATE: 77 BPM | DIASTOLIC BLOOD PRESSURE: 95 MMHG | OXYGEN SATURATION: 94 % | TEMPERATURE: 97.7 F | WEIGHT: 174.6 LBS | SYSTOLIC BLOOD PRESSURE: 143 MMHG | BODY MASS INDEX: 26.46 KG/M2 | HEIGHT: 68 IN | RESPIRATION RATE: 18 BRPM

## 2024-06-02 LAB
ANION GAP SERPL CALCULATED.3IONS-SCNC: 10 MMOL/L (ref 4–13)
BACTERIA BLD CULT: NORMAL
BACTERIA BLD CULT: NORMAL
BACTERIA SPEC ANAEROBE CULT: NORMAL
BACTERIA TISS AEROBE CULT: ABNORMAL
BACTERIA WND AEROBE CULT: ABNORMAL
BACTERIA WND AEROBE CULT: ABNORMAL
BUN SERPL-MCNC: 20 MG/DL (ref 5–25)
CALCIUM SERPL-MCNC: 9.2 MG/DL (ref 8.4–10.2)
CHLORIDE SERPL-SCNC: 106 MMOL/L (ref 96–108)
CO2 SERPL-SCNC: 27 MMOL/L (ref 21–32)
CREAT SERPL-MCNC: 1.66 MG/DL (ref 0.6–1.3)
ERYTHROCYTE [DISTWIDTH] IN BLOOD BY AUTOMATED COUNT: 13.4 % (ref 11.6–15.1)
GFR SERPL CREATININE-BSD FRML MDRD: 41 ML/MIN/1.73SQ M
GLUCOSE SERPL-MCNC: 97 MG/DL (ref 65–140)
GRAM STN SPEC: ABNORMAL
HCT VFR BLD AUTO: 42.9 % (ref 36.5–49.3)
HGB BLD-MCNC: 13.9 G/DL (ref 12–17)
MCH RBC QN AUTO: 29.6 PG (ref 26.8–34.3)
MCHC RBC AUTO-ENTMCNC: 32.4 G/DL (ref 31.4–37.4)
MCV RBC AUTO: 92 FL (ref 82–98)
PLATELET # BLD AUTO: 355 THOUSANDS/UL (ref 149–390)
PMV BLD AUTO: 10.3 FL (ref 8.9–12.7)
POTASSIUM SERPL-SCNC: 4.2 MMOL/L (ref 3.5–5.3)
RBC # BLD AUTO: 4.69 MILLION/UL (ref 3.88–5.62)
SODIUM SERPL-SCNC: 143 MMOL/L (ref 135–147)
VANCOMYCIN SERPL-MCNC: 13.4 UG/ML (ref 10–20)
WBC # BLD AUTO: 8.21 THOUSAND/UL (ref 4.31–10.16)

## 2024-06-02 PROCEDURE — 99024 POSTOP FOLLOW-UP VISIT: CPT | Performed by: PHYSICIAN ASSISTANT

## 2024-06-02 PROCEDURE — 80202 ASSAY OF VANCOMYCIN: CPT | Performed by: EMERGENCY MEDICINE

## 2024-06-02 PROCEDURE — 99232 SBSQ HOSP IP/OBS MODERATE 35: CPT | Performed by: INTERNAL MEDICINE

## 2024-06-02 PROCEDURE — 80048 BASIC METABOLIC PNL TOTAL CA: CPT | Performed by: EMERGENCY MEDICINE

## 2024-06-02 PROCEDURE — 85027 COMPLETE CBC AUTOMATED: CPT

## 2024-06-02 RX ORDER — CEPHALEXIN 500 MG/1
500 CAPSULE ORAL EVERY 6 HOURS SCHEDULED
Status: DISCONTINUED | OUTPATIENT
Start: 2024-06-02 | End: 2024-06-03 | Stop reason: HOSPADM

## 2024-06-02 RX ORDER — VANCOMYCIN HYDROCHLORIDE 1 G/200ML
1000 INJECTION, SOLUTION INTRAVENOUS ONCE
Status: DISCONTINUED | OUTPATIENT
Start: 2024-06-02 | End: 2024-06-02

## 2024-06-02 RX ADMIN — ASPIRIN 325 MG ORAL TABLET 325 MG: 325 PILL ORAL at 08:52

## 2024-06-02 RX ADMIN — HEPARIN SODIUM 5000 UNITS: 5000 INJECTION INTRAVENOUS; SUBCUTANEOUS at 05:01

## 2024-06-02 RX ADMIN — FOLIC ACID 1 MG: 1 TABLET ORAL at 08:52

## 2024-06-02 RX ADMIN — CYCLOSPORINE 1 DROP: 0.5 EMULSION OPHTHALMIC at 08:48

## 2024-06-02 RX ADMIN — B-COMPLEX W/ C & FOLIC ACID TAB 1 TABLET: TAB at 08:52

## 2024-06-02 RX ADMIN — METOPROLOL TARTRATE 25 MG: 25 TABLET, FILM COATED ORAL at 08:52

## 2024-06-02 RX ADMIN — THIAMINE HCL TAB 100 MG 100 MG: 100 TAB at 08:52

## 2024-06-02 RX ADMIN — Medication 6 MG: at 21:33

## 2024-06-02 RX ADMIN — BUDESONIDE AND FORMOTEROL FUMARATE DIHYDRATE 2 PUFF: 160; 4.5 AEROSOL RESPIRATORY (INHALATION) at 17:27

## 2024-06-02 RX ADMIN — CEPHALEXIN 500 MG: 500 CAPSULE ORAL at 23:54

## 2024-06-02 RX ADMIN — METOPROLOL TARTRATE 25 MG: 25 TABLET, FILM COATED ORAL at 21:33

## 2024-06-02 RX ADMIN — CEPHALEXIN 500 MG: 500 CAPSULE ORAL at 13:19

## 2024-06-02 RX ADMIN — CEPHALEXIN 500 MG: 500 CAPSULE ORAL at 17:27

## 2024-06-02 RX ADMIN — PANTOPRAZOLE SODIUM 40 MG: 40 TABLET, DELAYED RELEASE ORAL at 05:03

## 2024-06-02 RX ADMIN — BUDESONIDE AND FORMOTEROL FUMARATE DIHYDRATE 2 PUFF: 160; 4.5 AEROSOL RESPIRATORY (INHALATION) at 08:48

## 2024-06-02 RX ADMIN — SENNOSIDES 8.6 MG: 8.6 TABLET, FILM COATED ORAL at 08:52

## 2024-06-02 RX ADMIN — ATORVASTATIN CALCIUM 80 MG: 80 TABLET, FILM COATED ORAL at 16:42

## 2024-06-02 RX ADMIN — POLYETHYLENE GLYCOL 3350 17 G: 17 POWDER, FOR SOLUTION ORAL at 08:52

## 2024-06-02 RX ADMIN — HEPARIN SODIUM 5000 UNITS: 5000 INJECTION INTRAVENOUS; SUBCUTANEOUS at 13:19

## 2024-06-02 RX ADMIN — FLUTICASONE PROPIONATE 2 SPRAY: 50 SPRAY, METERED NASAL at 08:48

## 2024-06-02 RX ADMIN — HEPARIN SODIUM 5000 UNITS: 5000 INJECTION INTRAVENOUS; SUBCUTANEOUS at 21:33

## 2024-06-02 RX ADMIN — CYCLOSPORINE 1 DROP: 0.5 EMULSION OPHTHALMIC at 21:34

## 2024-06-02 NOTE — PROGRESS NOTES
David S Cogan is a 68 y.o. male who is currently ordered Vancomycin IV with management by the Pharmacy Consult service.  Relevant clinical data and objective / subjective history reviewed.  Vancomycin Assessment:  Indication and Goal AUC/Trough: Bone/joint infection (goal -600, trough >10); Soft tissue (goal -600, trough >10)  Clinical Status: stable  Micro:     Renal Function:  SCr: 1.73 mg/dL (was 1.8 mg/dL)  CrCl: 41.3 mL/min (was 79.7 mL/min)  Renal replacement: Not on dialysis  Days of Therapy: 3  Current Dose: vancomycin 1 gm if level < 15  Vancomycin Plan: random level drawn today 6/2 at 0446 was 13.4 so dose of 1 gm needed today. Cultures so far were negative with re-incubation of wound culture ordered. Poss DC vanco if wound culture levels come back negative. CT said to continue for now.  New Dosing:  no change, keep at 1000 mg PRN level <15  Next Level: 6/3 AM draw  Renal Function Monitoring: Daily BMP and UOP  Pharmacy will continue to follow closely for s/sx of nephrotoxicity, infusion reactions and appropriateness of therapy.  BMP and CBC will be ordered per protocol. We will continue to follow the patient’s culture results and clinical progress daily.    Gareth Mendoza, R.Ph., Pharmacist

## 2024-06-02 NOTE — PROGRESS NOTES
Progress Note - Cardiothoracic Surgery   David S Cogan 68 y.o. male MRN: [id number]  Unit/Bed#: [facility] 402-01 Encounter: [id number]    Superficial infection of prior sternotomy . S/P Local wound exploration and excisional debridement, placement of prevena vac ; POD # 3      24 Hour Events: No events. Denies incisonal pain. Afebrile.     Medications:   Scheduled Meds:  Current Facility-Administered Medications   Medication Dose Route Frequency Provider Last Rate    acetaminophen  650 mg Oral Q4H PRN Andria Rivers PA-C      albuterol  1 puff Inhalation BID PRN Andria Rivers PA-C      Artificial Tears  1 drop Both Eyes BID Andria Rivers PA-C      aspirin  325 mg Oral Daily Andria Rivers PA-C      atorvastatin  80 mg Oral Daily With Dinner Andria Rivers PA-C      budesonide-formoterol  2 puff Inhalation BID Andria Rivers PA-C      cycloSPORINE  1 drop Ophthalmic BID Andria Rivers PA-C      fluticasone  2 spray Each Nare Daily Andria Rivers PA-C      folic acid  1 mg Oral Daily Andria Rivers PA-C      heparin (porcine)  5,000 Units Subcutaneous Q8H SARITA Andria Rivers PA-C      melatonin  6 mg Oral HS Andria Rivers PA-C      metoprolol tartrate  25 mg Oral Q12H [facility] Andria Rivers PA-C      multivitamin stress formula  1 tablet Oral Daily Andria Rivers PA-C      naloxone  0.04 mg Intravenous Q1MIN PRN Andria Rivers PA-C      oxyCODONE  2.5 mg Oral Q4H PRN Andria Rivers PA-C      Or    oxyCODONE  5 mg Oral Q4H PRN Andria Rivers PA-C      pantoprazole  40 mg Oral Early Morning Andria Rivers PA-C      polyethylene glycol  17 g Oral Daily Andria Rivers PA-C      senna  1 tablet Oral Daily Andria Rivers PA-C      thiamine  100 mg Oral Daily Andria Rivers PA-C      vancomycin  12.5 mg/kg Intravenous Daily PRN Krystyna Hickman MD       Continuous Infusions:   PRN Meds:.  acetaminophen    albuterol    naloxone     oxyCODONE **OR** oxyCODONE    vancomycin    Vitals:   Vitals:    06/01/24 2157 06/02/24 0000 06/02/24 0400 06/02/24 0726   BP: 115/78   126/84   Pulse: 72   86   Resp: 16   18   Temp: 97.7 °F (36.5 °C)   98.1 °F (36.7 °C)   SpO2: 95% 96% 95% 97%   Weight:       Height:           Telemetry: NSR; Heart Rate: 70    Respiratory:   SpO2: SpO2: 97 %, SpO2 Activity: SpO2 Activity: At Rest; Room Air    Intake/Output:     Intake/Output Summary (Last 24 hours) at 6/2/2024 0807  Last data filed at 6/2/2024 0729  Gross per 24 hour   Intake 1076 ml   Output 3400 ml   Net -2324 ml          Weights:   Weight (last 2 days)       Date/Time Weight    06/01/24 2124 79.2 (174.6)              Results:   Results from last 7 days   Lab Units 06/02/24  0446 06/01/24  0451 05/31/24  0452   WBC Thousand/uL 8.21 7.69 8.69   HEMOGLOBIN g/dL 13.9 13.4 13.3   HEMATOCRIT % 42.9 41.1 41.1   PLATELETS Thousands/uL 355 330 321     Results from last 7 days   Lab Units 06/02/24  0446 06/01/24  0451 05/31/24  0452   SODIUM mmol/L 143 141 139   POTASSIUM mmol/L 4.2 4.2 4.1   CHLORIDE mmol/L 106 108 106   CO2 mmol/L 27 26 24   BUN mg/dL 20 22 20   CREATININE mg/dL 1.66* 1.73* 1.80*   CALCIUM mg/dL 9.2 8.9 8.6     Results from last 7 days   Lab Units 05/29/24  0518   INR  1.16   PTT seconds 32         Studies:  No new studies    Invasive Lines/Tubes:  Invasive Devices       Peripheral Intravenous Line  Duration             Peripheral IV 05/30/24 Proximal;Right;Ventral (anterior) Forearm 2 days                  Physical Exam:    General: No acute distress and Alert  HEENT/NECK:  Normocephalic. Atraumatic.  No jugular venous distention.    Incisions: Sternum is stable.  Incision dressed with Velvet.  No erythema or drainage  Extremities: Extremities warm/dry  Neuro: Alert and oriented X 3  Skin: Warm/Dry, without rashes or lesions.    Assessment:  Principal Problem:    Mediastinitis  Active Problems:    Hyperlipidemia    Asthma    Prediabetes    Coronary  artery disease involving coronary bypass graft of native heart without angina pectoris    KAN (acute kidney injury) ([facility])       Superficial infection of prior sternotomy . S/P Local wound exploration and excisional debridement, placement of prevena vac ; POD # 3    Plan:    Velvet in place. No surrounding erythema    Afebrile. WBC 8    KAN, with Creatinine 1.66, from peak of 1.8   F/U daily BMP, per Primary team    OR gram stain: No Polys or Bacteria seen     OR anaerobic cultures negative    OR wound Cultures: Culture too young- will reincubate    Continue IV Vancomycin, pending final OR cultures    Home pending OR culture speciation and ABX therapy    SIGNATURE: Christopher Tirado PA-C  DATE: June 2, 2024  TIME: 8:07 AM

## 2024-06-02 NOTE — CONSULTS
Vancomycin IV Pharmacy-to-Dose Consultation    David S Cogan is a 68 y.o. male who was receiving Vancomycin IV with management by the Pharmacy Consult service for treatment of Bone/joint infection (goal -600, trough >10), Soft tissue (goal -600, trough >10)  .       The patient's Vancomycin therapy has been completed / discontinued. Thank you for allowing us to take part in this patient's care. Pharmacy will sign-off now; please call or re-consult if there are any questions.        Gareth Mendoza. R.Ph., Pharmacist, Extension [phone]

## 2024-06-02 NOTE — PROGRESS NOTES
INTERNAL MEDICINE RESIDENCY PROGRESS NOTE     Name: David S Cogan   Age & Sex: 68 y.o. male   MRN: [id number]  Unit/Bed#: [facility] 402-01   Encounter: [id number]  Team: SOD Team C     PATIENT INFORMATION     Name: David S Cogan   Age & Sex: 68 y.o. male   MRN: [id number]  Hospital Stay Days: 5    ASSESSMENT/PLAN     Principal Problem:    Mediastinitis  Active Problems:    Coronary artery disease involving coronary bypass graft of native heart without angina pectoris    Hyperlipidemia    Asthma    Prediabetes    KAN (acute kidney injury) ([facility])      * Mediastinitis  Assessment & Plan  Presenting with 5 to 10 days of worsening pain, erythema, swelling and purulent drainage from sternotomy incision.  Initially noticed very small amount of purulence below the inferior margin of his incision 10 days ago, subsequently noticed worsening separate area of erythema, fluctuance and tenderness above this 5 days ago.  No associated fevers, chills, nausea/vomiting, chest pain, shortness of breath or leg swelling.    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  No discrete mediastinal collection noted.    Afebrile, not tachycardic, normotensive on presentation.  CT surgery consulted, requested admission under medical service.  Started on vancomycin in the ED.  Bcxs (5/28) NGTD.   Wound cultures (5/30) NGTD (preliminary).     Plan:  S/P local wound exploration and excisional debridement + placement of prevena vac by CTS on 5/30  Plan for Prevena Vac x 1 week until 6/5.  Once wound OR culture returns and appropriate abx selection can then be discharged.  Vancomycin with pharmacy following, will continue until wound culture growth  Follow up on wound and blood culture results    Coronary artery disease involving coronary bypass graft of native heart without angina pectoris  Assessment &  Plan  Admitted 4/17 with NSTEMI, found to have severe two-vessel CAD on cardiac catheterization including left main (85% mid LAD, 100% distal left circumflex).  Ultimately underwent CABG x 2 on 4/23 (LIMA to LAD, SVG to OM 3, LLE EVH)    S/P CABG x 2 (LIMA to LAD, SVG to OM3 with LLE endoscopic vein harvesting), performed on 4/23/24.  Required HFNC postop day 1, but subsequently weaned off and discharged 4/27/2024 with prescriptions for full dose aspirin, high intensity statin and Lopressor 25 mg every 12 hours.    Followed up with cardiology on 5/6 and CT surgery 5/13, referred to cardiac rehab.    Plan:  Continue aspirin 325 mg daily  Continue high intensity statin   Continue Lopressor 25 mg every 12 hours  Had issues with lower extremity edema during last admission, improved with 1.8 L fluid restriction and diuretics    KAN (acute kidney injury) (HCC)  Assessment & Plan  Cr increased from 1.0 to 1.8 post op.  Creatinine now stable at 1.6    Plan:  Ordered isolyte 500 cc bolus over 5 hours  Urinary retention protocol added  Encourage oral hydration  Monitor Cr  Avoid nephrotoxic agents    Prediabetes  Assessment & Plan  Most recent A1c 5.9% in April, consistent with prediabetes.  Not on any medications at home.    Plan:  Cardiac diet with 1.8 L fluid restriction (consider CHO restricted diet as well)  Continue to monitor blood glucose    Asthma  Assessment & Plan  Patient with history of asthma (no PFT's on file); treated as an outpatient with Symbicort and albuterol as needed.    Plan:  Continue twice daily Symbicort with as needed albuterol ordered    Hyperlipidemia  Assessment & Plan  Patient with documented history of hyperlipidemia. Last lipid panel in April 2024. Takes Crestor 40 mg daily.     Lipitor 80 mg daily in place of Crestor 40 mg daily due to the latter being nonformulary.     Plan:  Continue high intensity statin.        Disposition: Pending cultures     SUBJECTIVE     Patient seen and examined. No  acute events overnight.  No output recorded on wound VAC.  Per CTS, awaiting wound cultures but continue vancomycin.  Patient is asymptomatic and tolerating diet.  Denies chest pain or shortness of breath.    OBJECTIVE     Vitals:    24 2157 24 0000 24 0400 24 0726   BP: 115/78   126/84   Pulse: 72   86   Resp: 16   18   Temp: 97.7 °F (36.5 °C)   98.1 °F (36.7 °C)   SpO2: 95% 96% 95% 97%   Weight:       Height:          Temperature:   Temp (24hrs), Av.9 °F (36.6 °C), Min:97.7 °F (36.5 °C), Max:98.1 °F (36.7 °C)    Temperature: 98.1 °F (36.7 °C)  Intake & Output:  I/O          0701   0700  0701   0700  0701   0700    P.O. 1465 598 758    I.V. (mL/kg)       IV Piggyback  200     Total Intake(mL/kg) 1465 798 (10.1) 758 (9.6)    Urine (mL/kg/hr) 1550 3400 (1.8)     Drains 0 0     Total Output 1550 3400     Net -85 -2602 +758           Unmeasured Urine Occurrence 2 x            Weights:   IBW (Ideal Body Weight): 68.4 kg    Body mass index is 26.55 kg/m².  Weight (last 2 days)       Date/Time Weight    24 2124 79.2 (174.6)          Physical Exam  Vitals reviewed.   Constitutional:       General: He is not in acute distress.  HENT:      Head: Normocephalic and atraumatic.      Mouth/Throat:      Pharynx: Oropharynx is clear.   Eyes:      Conjunctiva/sclera: Conjunctivae normal.   Cardiovascular:      Rate and Rhythm: Normal rate and regular rhythm.      Pulses: Normal pulses.      Heart sounds: Normal heart sounds.   Pulmonary:      Effort: Pulmonary effort is normal.      Breath sounds: Normal breath sounds.      Comments: Incision/packing clean   Abdominal:      Palpations: Abdomen is soft.      Tenderness: There is no abdominal tenderness.   Musculoskeletal:         General: Normal range of motion.      Right lower leg: No edema.      Left lower leg: No edema.   Neurological:      Mental Status: He is alert and oriented to person, place, and time.        LABORATORY DATA     Labs: I have personally reviewed pertinent reports.  Results from last 7 days   Lab Units 06/02/24 0446 06/01/24 0451 05/31/24 0452 05/30/24  0507 05/29/24 0518 05/28/24  1735   WBC Thousand/uL 8.21 7.69 8.69   < > 7.69 8.40   HEMOGLOBIN g/dL 13.9 13.4 13.3   < > 13.4 13.8   HEMATOCRIT % 42.9 41.1 41.1   < > 40.2 42.3   PLATELETS Thousands/uL 355 330 321   < > 321 316   SEGS PCT %  --   --   --   --  53 52   MONO PCT %  --   --   --   --  8 7   EOS PCT %  --   --   --   --  8* 7*    < > = values in this interval not displayed.      Results from last 7 days   Lab Units 06/02/24 0446 06/01/24 0451 05/31/24 0452 05/29/24 0518 05/28/24  1735   POTASSIUM mmol/L 4.2 4.2 4.1   < > 4.1   CHLORIDE mmol/L 106 108 106   < > 106   CO2 mmol/L 27 26 24   < > 27   BUN mg/dL 20 22 20   < > 14   CREATININE mg/dL 1.66* 1.73* 1.80*   < > 1.12   CALCIUM mg/dL 9.2 8.9 8.6   < > 9.1   ALK PHOS U/L  --   --   --   --  90   ALT U/L  --   --   --   --  27   AST U/L  --   --   --   --  22    < > = values in this interval not displayed.              Results from last 7 days   Lab Units 05/29/24 0518   INR  1.16   PTT seconds 32               IMAGING & DIAGNOSTIC TESTING     Radiology Results: I have personally reviewed pertinent reports.  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 Trace pericardial fluid and trace left pleural effusion. No discrete mediastinal collection. The study was marked in EPIC for immediate notification. Workstation performed: [id number]     Other Diagnostic Testing: I have personally reviewed pertinent reports.    ACTIVE MEDICATIONS     Current Facility-Administered Medications   Medication Dose Route Frequency    acetaminophen (TYLENOL) tablet 650 mg  650 mg Oral Q4H PRN    albuterol (PROVENTIL HFA,VENTOLIN HFA) inhaler 1  "puff  1 puff Inhalation BID PRN    Artificial Tears ophthalmic solution 1 drop  1 drop Both Eyes BID    aspirin tablet 325 mg  325 mg Oral Daily    atorvastatin (LIPITOR) tablet 80 mg  80 mg Oral Daily With Dinner    budesonide-formoterol (SYMBICORT) 160-4.5 mcg/act inhaler 2 puff  2 puff Inhalation BID    cycloSPORINE (RESTASIS) 0.05 % ophthalmic emulsion 1 drop  1 drop Ophthalmic BID    fluticasone (FLONASE) 50 mcg/act nasal spray 2 spray  2 spray Each Nare Daily    folic acid (FOLVITE) tablet 1 mg  1 mg Oral Daily    heparin (porcine) subcutaneous injection 5,000 Units  5,000 Units Subcutaneous Q8H SARITA    melatonin tablet 6 mg  6 mg Oral HS    metoprolol tartrate (LOPRESSOR) tablet 25 mg  25 mg Oral Q12H SARITA    multivitamin stress formula tablet 1 tablet  1 tablet Oral Daily    naloxone (NARCAN) 0.04 mg/mL syringe 0.04 mg  0.04 mg Intravenous Q1MIN PRN    oxyCODONE (ROXICODONE) split tablet 2.5 mg  2.5 mg Oral Q4H PRN    Or    oxyCODONE (ROXICODONE) IR tablet 5 mg  5 mg Oral Q4H PRN    pantoprazole (PROTONIX) EC tablet 40 mg  40 mg Oral Early Morning    polyethylene glycol (MIRALAX) packet 17 g  17 g Oral Daily    senna (SENOKOT) tablet 8.6 mg  1 tablet Oral Daily    thiamine tablet 100 mg  100 mg Oral Daily    vancomycin (VANCOCIN) IVPB (premix in dextrose) 1,000 mg 200 mL  12.5 mg/kg Intravenous Daily PRN       VTE Pharmacologic Prophylaxis: Heparin  VTE Mechanical Prophylaxis: sequential compression device    Portions of the record may have been created with voice recognition software.  Occasional wrong word or \"sound a like\" substitutions may have occurred due to the inherent limitations of voice recognition software.  Read the chart carefully and recognize, using context, where substitutions have occurred.  ==  Beckie Amanda MD  [facility]  Internal Medicine Residency PGY-2      "

## 2024-06-03 VITALS
DIASTOLIC BLOOD PRESSURE: 94 MMHG | WEIGHT: 174.6 LBS | OXYGEN SATURATION: 96 % | RESPIRATION RATE: 17 BRPM | TEMPERATURE: 97.2 F | BODY MASS INDEX: 26.46 KG/M2 | SYSTOLIC BLOOD PRESSURE: 144 MMHG | HEART RATE: 74 BPM | HEIGHT: 68 IN

## 2024-06-03 LAB
ANION GAP SERPL CALCULATED.3IONS-SCNC: 9 MMOL/L (ref 4–13)
BACTERIA BLD CULT: NORMAL
BACTERIA BLD CULT: NORMAL
BASOPHILS # BLD AUTO: 0.1 THOUSANDS/ÂΜL (ref 0–0.1)
BASOPHILS NFR BLD AUTO: 1 % (ref 0–1)
BUN SERPL-MCNC: 20 MG/DL (ref 5–25)
CALCIUM SERPL-MCNC: 9.2 MG/DL (ref 8.4–10.2)
CHLORIDE SERPL-SCNC: 105 MMOL/L (ref 96–108)
CO2 SERPL-SCNC: 26 MMOL/L (ref 21–32)
CREAT SERPL-MCNC: 1.41 MG/DL (ref 0.6–1.3)
EOSINOPHIL # BLD AUTO: 0.42 THOUSAND/ÂΜL (ref 0–0.61)
EOSINOPHIL NFR BLD AUTO: 5 % (ref 0–6)
ERYTHROCYTE [DISTWIDTH] IN BLOOD BY AUTOMATED COUNT: 13.2 % (ref 11.6–15.1)
FUNGUS SPEC CULT: NORMAL
GFR SERPL CREATININE-BSD FRML MDRD: 50 ML/MIN/1.73SQ M
GLUCOSE SERPL-MCNC: 95 MG/DL (ref 65–140)
HCT VFR BLD AUTO: 45 % (ref 36.5–49.3)
HGB BLD-MCNC: 14.6 G/DL (ref 12–17)
IMM GRANULOCYTES # BLD AUTO: 0.04 THOUSAND/UL (ref 0–0.2)
IMM GRANULOCYTES NFR BLD AUTO: 1 % (ref 0–2)
LYMPHOCYTES # BLD AUTO: 2.35 THOUSANDS/ÂΜL (ref 0.6–4.47)
LYMPHOCYTES NFR BLD AUTO: 27 % (ref 14–44)
MCH RBC QN AUTO: 29.9 PG (ref 26.8–34.3)
MCHC RBC AUTO-ENTMCNC: 32.4 G/DL (ref 31.4–37.4)
MCV RBC AUTO: 92 FL (ref 82–98)
MONOCYTES # BLD AUTO: 0.73 THOUSAND/ÂΜL (ref 0.17–1.22)
MONOCYTES NFR BLD AUTO: 8 % (ref 4–12)
NEUTROPHILS # BLD AUTO: 5.12 THOUSANDS/ÂΜL (ref 1.85–7.62)
NEUTS SEG NFR BLD AUTO: 58 % (ref 43–75)
NRBC BLD AUTO-RTO: 0 /100 WBCS
PLATELET # BLD AUTO: 344 THOUSANDS/UL (ref 149–390)
PMV BLD AUTO: 10.2 FL (ref 8.9–12.7)
POTASSIUM SERPL-SCNC: 4.1 MMOL/L (ref 3.5–5.3)
RBC # BLD AUTO: 4.89 MILLION/UL (ref 3.88–5.62)
SODIUM SERPL-SCNC: 140 MMOL/L (ref 135–147)
WBC # BLD AUTO: 8.76 THOUSAND/UL (ref 4.31–10.16)

## 2024-06-03 PROCEDURE — 99238 HOSP IP/OBS DSCHRG MGMT 30/<: CPT | Performed by: INTERNAL MEDICINE

## 2024-06-03 PROCEDURE — 80048 BASIC METABOLIC PNL TOTAL CA: CPT | Performed by: STUDENT IN AN ORGANIZED HEALTH CARE EDUCATION/TRAINING PROGRAM

## 2024-06-03 PROCEDURE — 85025 COMPLETE CBC W/AUTO DIFF WBC: CPT | Performed by: STUDENT IN AN ORGANIZED HEALTH CARE EDUCATION/TRAINING PROGRAM

## 2024-06-03 PROCEDURE — 97530 THERAPEUTIC ACTIVITIES: CPT

## 2024-06-03 PROCEDURE — 97116 GAIT TRAINING THERAPY: CPT

## 2024-06-03 PROCEDURE — 99024 POSTOP FOLLOW-UP VISIT: CPT | Performed by: PHYSICIAN ASSISTANT

## 2024-06-03 RX ORDER — CEPHALEXIN 500 MG/1
500 CAPSULE ORAL EVERY 6 HOURS SCHEDULED
Qty: 56 CAPSULE | Refills: 0 | Status: SHIPPED | OUTPATIENT
Start: 2024-06-03 | End: 2024-06-17

## 2024-06-03 RX ORDER — CEPHALEXIN 500 MG/1
500 CAPSULE ORAL EVERY 6 HOURS SCHEDULED
Qty: 24 CAPSULE | Refills: 0 | Status: SHIPPED | OUTPATIENT
Start: 2024-06-03 | End: 2024-06-03

## 2024-06-03 RX ADMIN — POLYETHYLENE GLYCOL 3350 17 G: 17 POWDER, FOR SOLUTION ORAL at 08:39

## 2024-06-03 RX ADMIN — PANTOPRAZOLE SODIUM 40 MG: 40 TABLET, DELAYED RELEASE ORAL at 06:15

## 2024-06-03 RX ADMIN — CYCLOSPORINE 1 DROP: 0.5 EMULSION OPHTHALMIC at 08:48

## 2024-06-03 RX ADMIN — THIAMINE HCL TAB 100 MG 100 MG: 100 TAB at 08:47

## 2024-06-03 RX ADMIN — ASPIRIN 325 MG ORAL TABLET 325 MG: 325 PILL ORAL at 08:47

## 2024-06-03 RX ADMIN — SENNOSIDES 8.6 MG: 8.6 TABLET, FILM COATED ORAL at 08:47

## 2024-06-03 RX ADMIN — B-COMPLEX W/ C & FOLIC ACID TAB 1 TABLET: TAB at 08:47

## 2024-06-03 RX ADMIN — HEPARIN SODIUM 5000 UNITS: 5000 INJECTION INTRAVENOUS; SUBCUTANEOUS at 06:15

## 2024-06-03 RX ADMIN — BUDESONIDE AND FORMOTEROL FUMARATE DIHYDRATE 2 PUFF: 160; 4.5 AEROSOL RESPIRATORY (INHALATION) at 08:47

## 2024-06-03 RX ADMIN — FLUTICASONE PROPIONATE 2 SPRAY: 50 SPRAY, METERED NASAL at 08:47

## 2024-06-03 RX ADMIN — CEPHALEXIN 500 MG: 500 CAPSULE ORAL at 06:16

## 2024-06-03 RX ADMIN — CEPHALEXIN 500 MG: 500 CAPSULE ORAL at 12:58

## 2024-06-03 RX ADMIN — FOLIC ACID 1 MG: 1 TABLET ORAL at 08:47

## 2024-06-03 RX ADMIN — METOPROLOL TARTRATE 25 MG: 25 TABLET, FILM COATED ORAL at 08:47

## 2024-06-03 NOTE — DISCHARGE INSTR - AVS FIRST PAGE
You had an infection of your sternotomy wound.   Follow instructions from your CT surgery team for wound vac removal  Take you antibiotics as prescribed. It is important that you complete your antibiotics.   Return to the ED if your symptoms worsen

## 2024-06-03 NOTE — CASE MANAGEMENT
Case Management Discharge Planning Note    Patient name David S Cogan  Location [facility] 402/[facility] 402-01 MRN [id number]  : 1955 Date 6/3/2024       Current Admission Date: 2024  Current Admission Diagnosis:Mediastinitis   Patient Active Problem List    Diagnosis Date Noted Date Diagnosed    KAN (acute kidney injury) (formerly [facility]) 2024     Mediastinitis 2024     Coronary artery disease involving coronary bypass graft of native heart without angina pectoris 2024     S/P CABG (coronary artery bypass graft) 2024     Prediabetes 2024     NSTEMI (non-ST elevated myocardial infarction) (formerly [facility]) 2024     Asthma 2024     Alcohol use, unspecified with other alcohol-induced disorder (formerly [facility]) 2021     Essential hypertension 2020     Right flank pain 2019     Abrasion of flank 2019     Overweight (BMI 25.0-29.9) 2019     Acquired keratoderma 2017     Benign prostatic hyperplasia 2017     Dyslipidemia 2017     Hyperlipidemia 2017     Hypertrophic condition of skin 2017     Subclinical hypothyroidism 2017     Thyroid nodule 2017     Benign hypertension 10/16/2017     Vesicular palmoplantar eczema 10/16/2017     Eczema of both hands 10/16/2017       LOS (days): 6  Geometric Mean LOS (GMLOS) (days): 4.4  Days to GMLOS:-1.3     OBJECTIVE:  Risk of Unplanned Readmission Score: 13.8         Current admission status: Inpatient   Preferred Pharmacy:   [facility] [address] [address]  Phone: [phone] Fax: [phone]    Primary Care Provider: Maday Chu PA-C    Primary Insurance: MEDICARE  Secondary Insurance: BLUE CROSS    DISCHARGE DETAILS:                Were Treatment Team discharge recommendations reviewed with patient/caregiver?: Yes  Did patient/caregiver verbalize understanding of patient care needs?: Yes  Were patient/caregiver  advised of the risks associated with not following Treatment Team discharge recommendations?: Yes         Requested Home Health Care         Is the patient interested in HHC at discharge?: Yes  Home Health Discipline requested:: Nursing  Home Health Services Needed:: Post-Op Care and Assessment, Diabetes Management  Homebound Criteria Met:: Requires the Assistance of Another Person for Safe Ambulation or to Leave the Home  Supporting Clincal Findings:: Limited Endurance, Fatigues Easliy in Short Distances

## 2024-06-03 NOTE — PHYSICAL THERAPY NOTE
PHYSICAL THERAPY NOTE          Patient Name: David S Cogan  Today's Date: 6/3/2024       06/03/24 1058   PT Last Visit   PT Visit Date 06/03/24   Note Type   Note Type Treatment   Pain Assessment   Pain Assessment Tool 0-10   Pain Score No Pain   Restrictions/Precautions   Weight Bearing Precautions Per Order No   Other Precautions Cardiac/sternal;Telemetry  (Chest vac)   General   Chart Reviewed Yes   Additional Pertinent History Cleared for treatment session by nsg   Response to Previous Treatment Patient with no complaints from previous session.   Family/Caregiver Present Yes  (SO present at bedside)   Cognition   Overall Cognitive Status WFL   Arousal/Participation Cooperative   Attention Within functional limits   Orientation Level Oriented to person;Oriented to place;Oriented to situation   Memory Within functional limits   Following Commands Follows one step commands without difficulty   Subjective   Subjective Pt alert; seated in chair; agreeable to mobilize with PT   Transfers   Sit to Stand 6  Modified independent   Additional items Verbal cues   Stand to Sit 6  Modified independent   Additional items Verbal cues   Ambulation/Elevation   Gait pattern Wide MARLA;Short stride;Step through pattern   Gait Assistance 6  Modified independent   Additional items Verbal cues   Assistive Device None   Distance 130ft + 490ft. w/ stair negotiation in between   Stair Management Assistance 6  Modified independent   Additional items Verbal cues   Stair Management Technique One rail R;Reciprocal   Number of Stairs 7   Balance   Static Sitting Good   Dynamic Sitting Good   Static Standing Fair +   Dynamic Standing Fair   Ambulatory Fair   Activity Tolerance   Activity Tolerance Patient tolerated treatment well   Nurse Made Aware Spoke with CARRI Ulloa   Exercises   Knee AROM Long Arc Quad Sitting;20 reps;AROM;Bilateral  (2 sets)   Ankle Pumps  Sitting;20 reps;AROM;Bilateral  (2 sets)   Marching Sitting;10 reps;AROM;Bilateral  (2 sets)   Assessment   Assessment Pt continuing to ambulate longer distances and demonstrating improved endurance. Pt able to navigate steps and continue with ambulation without needing a seated rest break. Continuing to have minor gait deviations, but overall pt appears to be near his premorbid level. Pt will be able to return home from PT/mobility standpoint once medically cleared for D/C. Will sign off.   Goals   Patient Goals To go home   PT Treatment Day 2   Plan   Progress Discontinue PT   PT Frequency Other (Comment)  (DC PT)   Discharge Recommendation   Rehab Resource Intensity Level, PT No post-acute rehabilitation needs   Equipment Recommended Other (Comment)  (No needs at this time)   AM-PAC Basic Mobility Inpatient   Turning in Flat Bed Without Bedrails 4   Lying on Back to Sitting on Edge of Flat Bed Without Bedrails 4   Moving Bed to Chair 4   Standing Up From Chair Using Arms 4   Walk in Room 4   Climb 3-5 Stairs With Railing 4   Basic Mobility Inpatient Raw Score 24   Basic Mobility Standardized Score 57.68   [facility] Highest Level Of Mobility   -HLM Goal 8: Walk 250 feet or more   -HLM Achieved 8: Walk 250 feet ot more   Education   Education Provided Mobility training;Home exercise program   Patient Demonstrates acceptance/verbal understanding   End of Consult   Patient Position at End of Consult Bedside chair;All needs within reach     Pamela Adkins

## 2024-06-03 NOTE — PLAN OF CARE
Problem: PHYSICAL THERAPY ADULT  Goal: Performs mobility at highest level of function for planned discharge setting.  See evaluation for individualized goals.  Description: Treatment/Interventions: Elevations, Endurance training, Spoke to nursing, Spoke to case management  Equipment Recommended: Other (Comment) (No needs at this time)       See flowsheet documentation for full assessment, interventions and recommendations.  6/3/2024 1639 by Pamela Adkins  Outcome: Adequate for Discharge  Note: Prognosis: Good  Problem List: Decreased endurance, Impaired balance  Assessment: Pt continuing to ambulate longer distances and demonstrating improved endurance. Pt able to navigate steps and continue with ambulation without needing a seated rest break. Continuing to have minor gait deviations, but overall pt appears to be near his premorbid level. Pt will be able to return home from PT/mobility standpoint once medically cleared for D/C. Will sign off.  Barriers to Discharge: None     Rehab Resource Intensity Level, PT: No post-acute rehabilitation needs    See flowsheet documentation for full assessment.

## 2024-06-03 NOTE — PLAN OF CARE
Problem: INFECTION - ADULT  Goal: Absence or prevention of progression during hospitalization  Description: INTERVENTIONS:  - Assess and monitor for signs and symptoms of infection  - Monitor lab/diagnostic results  - Monitor all insertion sites, i.e. indwelling lines, tubes, and drains  - Monitor endotracheal if appropriate and nasal secretions for changes in amount and color  - Baldwin appropriate cooling/warming therapies per order  - Administer medications as ordered  - Instruct and encourage patient and family to use good hand hygiene technique  - Identify and instruct in appropriate isolation precautions for identified infection/condition  Outcome: Progressing

## 2024-06-03 NOTE — Clinical Note
Pt continuing to ambulate longer distances and demonstrating improved endurance. Pt able to navigate steps and continue with ambulation without needing a seated rest break. Continuing to have minor gait deviations, but overall pt appears to be near his premorbid level. Pt will be able to return home safely once medically cleared for D/C. Will sign off.

## 2024-06-03 NOTE — PROGRESS NOTES
Progress Note - Cardiothoracic Surgery   David S Cogan 68 y.o. male MRN: [id number]  Unit/Bed#: [facility] 402-01 Encounter: [id number]    Superficial infection of prior sternotomy . S/P Local wound exploration and excisional debridement, placement of prevena vac ; POD # 4      24 Hour Events: No events. Denies incisonal pain. Afebrile. Doing well otherwise. Wound culture positive for staph aureus    Medications:   Scheduled Meds:  Current Facility-Administered Medications   Medication Dose Route Frequency Provider Last Rate    acetaminophen  650 mg Oral Q4H PRN Andria Rivers PA-C      albuterol  1 puff Inhalation BID PRN Andria Rivers PA-C      Artificial Tears  1 drop Both Eyes BID Andria Rivers PA-C      aspirin  325 mg Oral Daily Andria Rivers PA-C      atorvastatin  80 mg Oral Daily With Dinner Andria Rivers PA-C      budesonide-formoterol  2 puff Inhalation BID Andria Rivers PA-C      cephalexin  500 mg Oral Q6H [facility] Beckie Amanda MD      cycloSPORINE  1 drop Ophthalmic BID Andria Rivers PA-C      fluticasone  2 spray Each Nare Daily Andria Rivers PA-C      folic acid  1 mg Oral Daily Andria Rivers PA-C      heparin (porcine)  5,000 Units Subcutaneous Q8H [facility] Andria Rivers PA-C      melatonin  6 mg Oral HS Andria Rivers PA-C      metoprolol tartrate  25 mg Oral Q12H [facility] Andria Rivers PA-C      multivitamin stress formula  1 tablet Oral Daily Andria Rivers PA-C      naloxone  0.04 mg Intravenous Q1MIN PRN Andria Rivers PA-C      oxyCODONE  2.5 mg Oral Q4H PRN Andria Rivers PA-C      Or    oxyCODONE  5 mg Oral Q4H PRN Andria Rivers PA-C      pantoprazole  40 mg Oral Early Morning KAREN Jeffries-ZULLY      polyethylene glycol  17 g Oral Daily Andria Rivers PA-C      senna  1 tablet Oral Daily Andria Rivers PA-C      thiamine  100 mg Oral Daily Andria Cat Lugiano, PA-C       Continuous Infusions:   PRN Meds:.   acetaminophen    albuterol    naloxone    oxyCODONE **OR** oxyCODONE    Vitals:   Vitals:    06/02/24 2133 06/02/24 2135 06/02/24 2152 06/03/24 0709   BP: 147/97 147/97 143/95 127/92   BP Location:    Left arm   Pulse: 77 77 77 69   Resp:    19   Temp:   97.7 °F (36.5 °C) 97.6 °F (36.4 °C)   TempSrc:    Oral   SpO2:  95% 94% 94%   Weight:       Height:           Telemetry: NSR; Heart Rate: 70    Respiratory:   SpO2: SpO2: 94 %, SpO2 Activity: SpO2 Activity: At Rest; Room Air    Intake/Output:     Intake/Output Summary (Last 24 hours) at 6/3/2024 0833  Last data filed at 6/3/2024 0801  Gross per 24 hour   Intake 1860 ml   Output 3300 ml   Net -1440 ml          Weights:   Weight (last 2 days)       Date/Time Weight    06/01/24 2124 79.2 (174.6)              Results:   Results from last 7 days   Lab Units 06/03/24  0613 06/02/24  0446 06/01/24  0451   WBC Thousand/uL 8.76 8.21 7.69   HEMOGLOBIN g/dL 14.6 13.9 13.4   HEMATOCRIT % 45.0 42.9 41.1   PLATELETS Thousands/uL 344 355 330     Results from last 7 days   Lab Units 06/03/24  0613 06/02/24  0446 06/01/24  0451   SODIUM mmol/L 140 143 141   POTASSIUM mmol/L 4.1 4.2 4.2   CHLORIDE mmol/L 105 106 108   CO2 mmol/L 26 27 26   BUN mg/dL 20 20 22   CREATININE mg/dL 1.41* 1.66* 1.73*   CALCIUM mg/dL 9.2 9.2 8.9     Results from last 7 days   Lab Units 05/29/24  0518   INR  1.16   PTT seconds 32         Studies:  No new studies    Invasive Lines/Tubes:  Invasive Devices       Peripheral Intravenous Line  Duration             Peripheral IV 05/30/24 Proximal;Right;Ventral (anterior) Forearm 3 days                  Physical Exam:    General: No acute distress, Alert, and Normal appearance  HEENT/NECK:  Normocephalic. Atraumatic.     Cardiac: Regular rate and rhythm  Pulmonary:  Breath sounds clear bilaterally  Incisions: Sternum is stable.  Incision dressed with prevena vac.  No erythema   Neuro: Alert and oriented X 3, Sensation is grossly intact, and No focal deficits          Assessment:  Principal Problem:    Mediastinitis  Active Problems:    Hyperlipidemia    Asthma    Prediabetes    Coronary artery disease involving coronary bypass graft of native heart without angina pectoris    KAN (acute kidney injury) (HCC)       Superficial infection of prior sternotomy . S/P Local wound exploration and excisional debridement, placement of prevena vac ; POD # 4    Plan:    Velvet in place. No surrounding erythema    Afebrile. WBC 8    KAN, with Creatinine 1.41, from peak of 1.8   F/U daily BMP, per Primary team    OR gram stain: No Polys or Bacteria seen     OR anaerobic cultures negative    OR wound Cultures: grew Staph Aureus   Vanco changed to Keflex    Maintain prevena Vac until 6/5, cont Abx, likely home once prevena vac off  F/u with Dr Sky 6/17, CT chest ordered for prior to that visit    SIGNATURE: Marychuy Barajas PA-C  DATE: Nathalia 3, 2024  TIME: 8:33 AM

## 2024-06-03 NOTE — DISCHARGE SUMMARY
INTERNAL MEDICINE RESIDENCY DISCHARGE SUMMARY     David S Cogan   68 y.o. male  MRN: [id number]  Room/Bed: [facility] 402/[facility] 402-01     [facility] BE [facility] 4 MED SURG/SD   Encounter: [id number]    Principal Problem:    Mediastinitis  Active Problems:    Hyperlipidemia    Asthma    Prediabetes    Coronary artery disease involving coronary bypass graft of native heart without angina pectoris    KAN (acute kidney injury) ([facility])      KAN (acute kidney injury) ([facility])  Assessment & Plan  Cr increased from 1.0 to 1.8 post op.  Creatinine now stable at 1.6    Plan:  Ordered isolyte 500 cc bolus over 5 hours  Urinary retention protocol added  Encourage oral hydration  F/u BMP as outpatient 6/7/24    Coronary artery disease involving coronary bypass graft of native heart without angina pectoris  Assessment & Plan  Admitted 4/17 with NSTEMI, found to have severe two-vessel CAD on cardiac catheterization including left main (85% mid LAD, 100% distal left circumflex).  Ultimately underwent CABG x 2 on 4/23 (LIMA to LAD, SVG to OM 3, LLE EVH)    S/P CABG x 2 (LIMA to LAD, SVG to OM3 with LLE endoscopic vein harvesting), performed on 4/23/24.  Required HFNC postop day 1, but subsequently weaned off and discharged 4/27/2024 with prescriptions for full dose aspirin, high intensity statin and Lopressor 25 mg every 12 hours.    Followed up with cardiology on 5/6 and CT surgery 5/13, referred to cardiac rehab.    Plan:  Continue aspirin 325 mg daily  Continue high intensity statin   Continue Lopressor 25 mg every 12 hours  Had issues with lower extremity edema during last admission, improved with 1.8 L fluid restriction and diuretics    Prediabetes  Assessment & Plan  Most recent A1c 5.9% in April, consistent with prediabetes.  Not on any medications at home.    Plan:  Cardiac diet with 1.8 L fluid restriction (consider CHO restricted diet as well)  Continue to monitor blood  glucose    Asthma  Assessment & Plan  Patient with history of asthma (no PFT's on file); treated as an outpatient with Symbicort and albuterol as needed.    Plan:  Continue twice daily Symbicort with as needed albuterol ordered    Hyperlipidemia  Assessment & Plan  Patient with documented history of hyperlipidemia. Last lipid panel in April 2024. Takes Crestor 40 mg daily.     Lipitor 80 mg daily in place of Crestor 40 mg daily due to the latter being nonformulary.     Plan:  Continue high intensity statin.    * Mediastinitis  Assessment & Plan  Presenting with 5 to 10 days of worsening pain, erythema, swelling and purulent drainage from sternotomy incision.  Initially noticed very small amount of purulence below the inferior margin of his incision 10 days ago, subsequently noticed worsening separate area of erythema, fluctuance and tenderness above this 5 days ago.  No associated fevers, chills, nausea/vomiting, chest pain, shortness of breath or leg swelling.    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  No discrete mediastinal collection noted.    Afebrile, not tachycardic, normotensive on presentation.  CT surgery consulted, requested admission under medical service.  Started on vancomycin in the ED.  Bcxs (5/28) NGTD.   Wound cultures (5/30) NGTD (preliminary).     Plan:  S/P local wound exploration and excisional debridement + placement of prevena vac by CTS on 5/30  Plan for Prevena Vac x 1 week until 6/5.  Continue Keflex until 6/17/24  Home health will remove prevna vac      Subjective  Patient seen and examined.  No acute events overnight.  He denies fever, chills, shortness of breath, chest pain, nausea, vomiting, constipation, diarrhea.      Physical Exam  Vitals reviewed.   Constitutional:       Appearance: Normal appearance.   HENT:      Head: Normocephalic and  atraumatic.      Nose: Nose normal.      Mouth/Throat:      Mouth: Mucous membranes are moist.      Pharynx: Oropharynx is clear.   Eyes:      Extraocular Movements: Extraocular movements intact.      Conjunctiva/sclera: Conjunctivae normal.      Pupils: Pupils are equal, round, and reactive to light.   Cardiovascular:      Rate and Rhythm: Normal rate and regular rhythm.      Pulses: Normal pulses.      Heart sounds: Normal heart sounds.   Pulmonary:      Effort: Pulmonary effort is normal.      Breath sounds: Normal breath sounds.   Abdominal:      General: Abdomen is flat. Bowel sounds are normal.      Palpations: Abdomen is soft.   Musculoskeletal:      Right lower leg: No edema.      Left lower leg: No edema.   Skin:     General: Skin is warm and dry.      Capillary Refill: Capillary refill takes less than 2 seconds.      Comments: Sternotomy wound clean and dry   Neurological:      General: No focal deficit present.      Mental Status: He is alert and oriented to person, place, and time. Mental status is at baseline.          DETAILS OF HOSPITAL COURSE     68-year-old male history of CAD status post CABG x 2 on 4/24, HTN/HLD who presented to [facility] ED 5/29 with pain, erythema and swelling with purulent drainage from his sternotomy incision for the past 5 to 10 days.  In the ED Case was discussed with CT surgery who recommended CT chest without contrast to evaluate further.  Imaging showed findings concerning for sternal osteomyelitis and mediastinitis.  Lab work included 2 negative troponins.  Wound cultures were drawn.  Patient was started on vancomycin and admitted to [facility] for further management of his postop infection.    Patient was taken to the OR for wound debridement.  Wound VAC was placed.  After results of wound culture, antibiotics were narrowed.  Patient was transitioned to Keflex.  Patient was subsequently discharged to follow-up with his primary care provider and with CT surgery  outpatient.        DISCHARGE INFORMATION     PCP at Discharge: Nancy Chu PA-C    Admitting Provider: Krystyna Hickman MD  Admission Date: 5/28/2024    Discharge Provider: Krystyna Hickman MD  Discharge Date: 6/3/2024    Discharge Disposition: Home with Home Health Care  Discharge Condition: good  Discharge with Lines:  Wound VAC in place     Discharge Diet: regular diet  Activity Restrictions: none  Test Results Pending at Discharge: None    Discharge Diagnoses:  Principal Problem:    Mediastinitis  Active Problems:    Hyperlipidemia    Asthma    Prediabetes    Coronary artery disease involving coronary bypass graft of native heart without angina pectoris    KAN (acute kidney injury) ([facility])  Resolved Problems:    * No resolved hospital problems. *      Consulting Providers:      Diagnostic & Therapeutic Procedures Performed:  CT chest without contrast    Result Date: 5/28/2024  Impression: Sternotomy with mild osseous resorption and sclerosis with surrounding fat stranding concerning for sternal osteomyelitis and mediastinitis. Trace pericardial fluid and trace left pleural effusion. No discrete mediastinal collection. The study was marked in EPIC for immediate notification. Workstation performed: [id number]       Code Status: Level 1 - Full Code  Advance Directive & Living Will: <no information>  Power of :    POLST:      Medications:  Current Discharge Medication List        Current Discharge Medication List        START taking these medications    Details   cephalexin (KEFLEX) 500 mg capsule Take 1 capsule (500 mg total) by mouth every 6 (six) hours for 14 days  Qty: 56 capsule, Refills: 0    Associated Diagnoses: Postoperative infection           Current Discharge Medication List        CONTINUE these medications which have NOT CHANGED    Details   albuterol (PROVENTIL HFA,VENTOLIN HFA) 90 mcg/act inhaler Inhale 90 mcg 2 (two) times a day as needed      aspirin 325 mg tablet Take 1 tablet (325 mg  total) by mouth daily  Qty: 30 tablet, Refills: 2    Associated Diagnoses: S/P CABG (coronary artery bypass graft); Dyslipidemia      cycloSPORINE (RESTASIS) 0.05 % ophthalmic emulsion Apply 0.05 application to eye 2 (two) times a day      esomeprazole (NexIUM) 40 MG capsule Take 1 capsule (40 mg total) by mouth daily in the early morning  Qty: 30 capsule, Refills: 1    Associated Diagnoses: Gastroesophageal reflux disease with esophagitis without hemorrhage; Gastroesophageal reflux disease without esophagitis      folic acid (FOLVITE) 1 mg tablet Take 1 tablet (1 mg total) by mouth daily  Qty: 30 tablet, Refills: 1    Associated Diagnoses: S/P CABG (coronary artery bypass graft); Dyslipidemia      melatonin 3 mg Take 2 tablets (6 mg total) by mouth daily at bedtime  Qty: 30 tablet, Refills: 1    Associated Diagnoses: S/P CABG (coronary artery bypass graft); Dyslipidemia      metoprolol tartrate (LOPRESSOR) 25 mg tablet Take 1 tablet (25 mg total) by mouth every 12 (twelve) hours  Qty: 60 tablet, Refills: 1    Associated Diagnoses: S/P CABG (coronary artery bypass graft); Dyslipidemia      mometasone (NASONEX) 50 mcg/act nasal spray 2 sprays into each nostril daily      Multiple Vitamins-Minerals (CENTRUM SILVER 50+MEN) TABS Take 1 tablet by mouth daily      rosuvastatin (CRESTOR) 40 MG tablet Take 1 tablet (40 mg total) by mouth daily  Qty: 30 tablet, Refills: 2    Associated Diagnoses: Dyslipidemia      SYMBICORT 160-4.5 MCG/ACT inhaler Take 2 puffs by mouth 2 (two) times a day      thiamine 100 MG tablet Take 1 tablet (100 mg total) by mouth daily  Qty: 30 tablet, Refills: 1    Associated Diagnoses: S/P CABG (coronary artery bypass graft); Dyslipidemia             Allergies:  Allergies   Allergen Reactions    Amoxicillin      Other reaction(s): Diarrhea    Amoxicillin-Pot Clavulanate      Other reaction(s): Allergy Date : 07/27/2015   GI    Penicillins      Annotation - [id number]: Diarrhea         Discharge  "Statement:   I spent 30 minutes minutes discharging the patient. This time was spent on the day of discharge. I had direct contact with the patient on the day of discharge. Additional documentation is required if more than 30 minutes were spent on discharge.    Portions of the record may have been created with voice recognition software.  Occasional wrong word or \"sound a like\" substitutions may have occurred due to the inherent limitations of voice recognition software.  Read the chart carefully and recognize, using context, where substitutions have occurred.    ==  Sean Rivers MD  [facility]  Internal Medicine Resident PGY-1     "

## 2024-06-04 ENCOUNTER — TRANSITIONAL CARE MANAGEMENT (OUTPATIENT)
Age: 69
End: 2024-06-04

## 2024-06-04 ENCOUNTER — TELEPHONE (OUTPATIENT)
Age: 69
End: 2024-06-04

## 2024-06-04 LAB
MYCOBACTERIUM SPEC CULT: NORMAL
RHODAMINE-AURAMINE STN SPEC: NORMAL

## 2024-06-04 NOTE — TELEPHONE ENCOUNTER
LMOM for pt to call me at [facility] office    Please schedule TCM on or before 6/17    Please send me TEAMS message to see if I am available to take the call.

## 2024-06-05 ENCOUNTER — TELEPHONE (OUTPATIENT)
Dept: ADMINISTRATIVE | Facility: OTHER | Age: 69
End: 2024-06-05

## 2024-06-05 NOTE — TELEPHONE ENCOUNTER
06/05/24 10:47 AM    Patient contacted to bring Advance Directive, POLST, or Living Will document to next scheduled pcp visit.[facility] Department was unable to leave a message; no answer/ line busy. VM would not accept message    Thank you.  Maximiliano Sanchez  [facility]

## 2024-06-07 ENCOUNTER — OFFICE VISIT (OUTPATIENT)
Dept: INTERNAL MEDICINE CLINIC | Age: 69
End: 2024-06-07
Payer: MEDICARE

## 2024-06-07 ENCOUNTER — APPOINTMENT (OUTPATIENT)
Dept: LAB | Facility: HOSPITAL | Age: 69
End: 2024-06-07
Payer: MEDICARE

## 2024-06-07 VITALS
DIASTOLIC BLOOD PRESSURE: 82 MMHG | TEMPERATURE: 97.6 F | SYSTOLIC BLOOD PRESSURE: 128 MMHG | BODY MASS INDEX: 27.1 KG/M2 | OXYGEN SATURATION: 96 % | WEIGHT: 178.2 LBS | HEART RATE: 84 BPM

## 2024-06-07 DIAGNOSIS — T81.40XA POSTOPERATIVE INFECTION: ICD-10-CM

## 2024-06-07 DIAGNOSIS — I25.810 CORONARY ARTERY DISEASE INVOLVING CORONARY BYPASS GRAFT OF NATIVE HEART WITHOUT ANGINA PECTORIS: ICD-10-CM

## 2024-06-07 DIAGNOSIS — Z95.1 S/P CABG (CORONARY ARTERY BYPASS GRAFT): ICD-10-CM

## 2024-06-07 DIAGNOSIS — N17.9 ACUTE KIDNEY INJURY (HCC): ICD-10-CM

## 2024-06-07 DIAGNOSIS — Z09 HOSPITAL DISCHARGE FOLLOW-UP: Primary | ICD-10-CM

## 2024-06-07 DIAGNOSIS — E78.5 DYSLIPIDEMIA: ICD-10-CM

## 2024-06-07 DIAGNOSIS — R73.03 PREDIABETES: ICD-10-CM

## 2024-06-07 DIAGNOSIS — J98.51 MEDIASTINITIS: ICD-10-CM

## 2024-06-07 DIAGNOSIS — I10 ESSENTIAL HYPERTENSION: ICD-10-CM

## 2024-06-07 LAB
ANION GAP SERPL CALCULATED.3IONS-SCNC: 7 MMOL/L (ref 4–13)
BUN SERPL-MCNC: 24 MG/DL (ref 5–25)
CALCIUM SERPL-MCNC: 9.2 MG/DL (ref 8.4–10.2)
CHLORIDE SERPL-SCNC: 108 MMOL/L (ref 96–108)
CO2 SERPL-SCNC: 24 MMOL/L (ref 21–32)
CREAT SERPL-MCNC: 1.6 MG/DL (ref 0.6–1.3)
GFR SERPL CREATININE-BSD FRML MDRD: 43 ML/MIN/1.73SQ M
GLUCOSE P FAST SERPL-MCNC: 100 MG/DL (ref 65–99)
POTASSIUM SERPL-SCNC: 4.1 MMOL/L (ref 3.5–5.3)
SODIUM SERPL-SCNC: 139 MMOL/L (ref 135–147)

## 2024-06-07 PROCEDURE — 36415 COLL VENOUS BLD VENIPUNCTURE: CPT

## 2024-06-07 PROCEDURE — 99496 TRANSJ CARE MGMT HIGH F2F 7D: CPT | Performed by: INTERNAL MEDICINE

## 2024-06-07 PROCEDURE — 80048 BASIC METABOLIC PNL TOTAL CA: CPT

## 2024-06-07 RX ORDER — FOLIC ACID 1 MG/1
1 TABLET ORAL DAILY
Qty: 30 TABLET | Refills: 1 | Status: SHIPPED | OUTPATIENT
Start: 2024-06-07

## 2024-06-07 NOTE — PATIENT INSTRUCTIONS
PLEASE GET YOUR LABS DONE IN ONE WEEK     DRINK AT LEAST 2 L OF WATER DAILY     AVOID NSAIDS LIKE IBUPROFEN, MOTRIN

## 2024-06-10 ENCOUNTER — TELEPHONE (OUTPATIENT)
Dept: CARDIAC SURGERY | Facility: CLINIC | Age: 69
End: 2024-06-10

## 2024-06-10 ENCOUNTER — TELEPHONE (OUTPATIENT)
Dept: PULMONOLOGY | Facility: CLINIC | Age: 69
End: 2024-06-10

## 2024-06-10 LAB — FUNGUS SPEC CULT: NORMAL

## 2024-06-10 NOTE — TELEPHONE ENCOUNTER
POST OP CALL    5/30/24: superficial infection likely folliculitis- chest - exploration, debridement  6/3/24: discharge home  6/10/24: called and spoke to patient today. He is doing well. No fever or chills. Incision healing. Tolerating antibiotics.   Offers no complaints.  Has cardiac rehab resuming tomorrow- advised to postpone until we see him for follow-up next week.   Questions answered.  Appts reviewed

## 2024-06-11 LAB
MYCOBACTERIUM SPEC CULT: NORMAL
RHODAMINE-AURAMINE STN SPEC: NORMAL

## 2024-06-12 ENCOUNTER — HOSPITAL ENCOUNTER (OUTPATIENT)
Dept: RADIOLOGY | Facility: HOSPITAL | Age: 69
Discharge: HOME | End: 2024-06-12
Payer: MEDICARE

## 2024-06-12 DIAGNOSIS — S21.101A STERNAL WOUND INFECTION: ICD-10-CM

## 2024-06-12 DIAGNOSIS — Z95.1 S/P CABG (CORONARY ARTERY BYPASS GRAFT): ICD-10-CM

## 2024-06-12 DIAGNOSIS — L08.9 STERNAL WOUND INFECTION: ICD-10-CM

## 2024-06-12 PROCEDURE — 71250 CT THORAX DX C-: CPT

## 2024-06-16 DIAGNOSIS — E78.5 DYSLIPIDEMIA: ICD-10-CM

## 2024-06-16 DIAGNOSIS — Z95.1 S/P CABG (CORONARY ARTERY BYPASS GRAFT): ICD-10-CM

## 2024-06-17 ENCOUNTER — OFFICE VISIT (OUTPATIENT)
Dept: CARDIAC SURGERY | Facility: CLINIC | Age: 69
End: 2024-06-17

## 2024-06-17 VITALS
DIASTOLIC BLOOD PRESSURE: 104 MMHG | TEMPERATURE: 96.9 F | BODY MASS INDEX: 26.73 KG/M2 | WEIGHT: 176.4 LBS | HEART RATE: 65 BPM | OXYGEN SATURATION: 97 % | SYSTOLIC BLOOD PRESSURE: 134 MMHG | HEIGHT: 68 IN

## 2024-06-17 DIAGNOSIS — M86.9 STERNAL OSTEOMYELITIS (HCC): ICD-10-CM

## 2024-06-17 DIAGNOSIS — Z95.1 S/P CABG (CORONARY ARTERY BYPASS GRAFT): Primary | ICD-10-CM

## 2024-06-17 DIAGNOSIS — S21.101A STERNAL WOUND INFECTION: ICD-10-CM

## 2024-06-17 DIAGNOSIS — L08.9 STERNAL WOUND INFECTION: ICD-10-CM

## 2024-06-17 LAB — FUNGUS SPEC CULT: NORMAL

## 2024-06-17 PROCEDURE — 99024 POSTOP FOLLOW-UP VISIT: CPT | Performed by: THORACIC SURGERY (CARDIOTHORACIC VASCULAR SURGERY)

## 2024-06-17 NOTE — PROGRESS NOTES
"Procedure: S/P local wound exploration and excisional debridement, placement of Prevena vac, performed on 5/30/24  S/p CABG x2 on 4/23/24     History: David S Cogan is a 68 y.o. year old male who presents to our office today for routine post-surgical follow up care. He was seen and evaluated in our office on 5/13 for his postop visit and was recovering well at that time. On 5/28, he presented to the ED due to an open area on his incision. He was seen in consultation on 5/29, and on 5/30 was taken to the OR for superficial wound exploration/debridement. A Prevena vac was placed. OR cultures grew staph aureus, so he was given antibiotics - Keflex - for 14 days. He was discharged home on 6/3 after surgery. He has followed up with his PCP. He has been afebrile, taking antibiotics as prescribed. No pain, fever, drainage from incision site. He had a CT last week, read pending. He is accompanied today by his girlfriend.     Vital Signs:   Vitals:    06/17/24 1101 06/17/24 1102   BP: 133/96 (!) 134/104   BP Location: Left arm Right arm   Patient Position: Sitting Sitting   Cuff Size: Standard Standard   Pulse: 65    Temp: (!) 96.9 °F (36.1 °C)    TempSrc: Tympanic    SpO2: 97%    Weight: 80 kg (176 lb 6.4 oz)    Height: 5' 8\" (1.727 m)        Home Medications:   Prior to Admission medications    Medication Sig Start Date End Date Taking? Authorizing Provider   albuterol (PROVENTIL HFA,VENTOLIN HFA) 90 mcg/act inhaler Inhale 90 mcg 2 (two) times a day as needed    Historical Provider, MD   aspirin 325 mg tablet Take 1 tablet (325 mg total) by mouth daily 4/28/24   Andria Rivers PA-C   cephalexin (KEFLEX) 500 mg capsule Take 1 capsule (500 mg total) by mouth every 6 (six) hours for 14 days 6/3/24 6/17/24  Sean Rivers MD   cycloSPORINE (RESTASIS) 0.05 % ophthalmic emulsion Apply 0.05 application to eye 2 (two) times a day    Historical Provider, MD   esomeprazole (NexIUM) 40 MG capsule Take 1 capsule (40 mg total) by " "mouth daily in the early morning 5/1/24 10/28/24  Maday Chu PA-C   folic acid (FOLVITE) 1 mg tablet Take 1 tablet (1 mg total) by mouth daily 6/7/24   Vicky Gaming DO   melatonin 3 mg Take 2 tablets (6 mg total) by mouth daily at bedtime 4/27/24   Andria Rivers PA-C   metoprolol tartrate (LOPRESSOR) 25 mg tablet TAKE 1 TABLET BY MOUTH EVERY 12 HOURS 6/16/24   Maday Chu PA-C   mometasone (NASONEX) 50 mcg/act nasal spray 2 sprays into each nostril daily    Historical Provider, MD   Multiple Vitamins-Minerals (CENTRUM SILVER 50+MEN) TABS Take 1 tablet by mouth daily    Historical Provider, MD   rosuvastatin (CRESTOR) 40 MG tablet Take 1 tablet (40 mg total) by mouth daily 4/27/24   Andria Rivers PA-C   SYMBICORT 160-4.5 MCG/ACT inhaler Take 2 puffs by mouth 2 (two) times a day 4/9/18   Historical Provider, MD   thiamine 100 MG tablet Take 1 tablet (100 mg total) by mouth daily 4/28/24   Andria Rivers PA-C       Physical Exam:    HEENT/NECK:  Normocephalic. Atraumatic.  No jugular venous distention.    Cardiac: Regular rate and rhythm  Pulmonary:  Breath sounds clear bilaterally  Abdomen:  Non-tender, Non-distended, and Normal bowel sounds  Incisions: Sternum is stable.  Incision is clean, dry, and intact.   Extremities: Extremities warm/dry  Neuro: Alert and oriented X 3.  Sensation is grossly intact.  No focal deficits.  Skin: Warm/Dry, without rashes or lesions.    Lab Results:               Invalid input(s): \"LABGLOM\"      Lab Results   Component Value Date    HGBA1C 5.9 (H) 04/18/2024     Lab Results   Component Value Date    TROPONINI <0.02 05/17/2020       Imaging Studies:   CT Chest without contrast - 6/12/24  Radiology read pending.  Reviewed by myself and Dr. Sky.     I have personally reviewed pertinent films in PACS    Assessment: NSTEMI and CAD, s/p CABG complicated by sternal site infection. S/P superficial sternal wound exploration/debridement and Prevena vac " placement on 5/30/24.     Plan:     David S Cogan continues to recover well following his most recent surgery. He has two more doses of his antibiotic left. He starts cardiac rehab on 7/2/24.     He was instructed to complete his antibiotic. He inquired about going back to swimming laps, and was told he would need to wait another two months to ensure full healing of his infection site. He has a lifting restriction of 25 lbs for an additional 2 months. He is cleared to drive.     David S Cogan has already been evaluated by their primary care physician cardiologist for ongoing medical care. At this point we will not schedule David S Cogan  for routine followup care with our office. Future medical management will be directed by their outpatient cardiologist.. I have advised them to call with any new concerns that may arise. David S Cogan was comfortable with our recommendations and their questions were answered to their satisfaction.    Christopher was instructed to call our office if there is any concern for drainage, fevers, chills or unstable sternum.     The patient recently had a screening colonoscopy in 2020.  Therefore GI referral is not indicated at this time.     Samara Denny PA-C  06/17/24    * This note was completed in part utilizing [employer] direct voice recognition software.   Grammatical errors, random word insertion, spelling mistakes, and incomplete sentences may be an occasional consequence of the system secondary to software limitations, ambient noise and hardware issues. At the time of dictation, efforts were made to edit, clarify and /or correct errors. Please read the chart carefully and recognize, using context, where substitutions have occurred.  If you have any questions or concerns about the context, text or information contained within the body of this dictation, please contact myself, the provider, for further clarification.

## 2024-06-18 ENCOUNTER — TELEPHONE (OUTPATIENT)
Age: 69
End: 2024-06-18

## 2024-06-18 LAB
MYCOBACTERIUM SPEC CULT: NORMAL
RHODAMINE-AURAMINE STN SPEC: NORMAL

## 2024-06-18 NOTE — TELEPHONE ENCOUNTER
Received a call from Samia with [facility] office stating pt is there for cleaning. Pt is post CABG 4/23/2024 and asking if there is a time limit between surgery and a cleaning?    C/b# [phone]

## 2024-06-20 ENCOUNTER — OFFICE VISIT (OUTPATIENT)
Dept: GASTROENTEROLOGY | Facility: CLINIC | Age: 69
End: 2024-06-20
Payer: MEDICARE

## 2024-06-20 VITALS
HEIGHT: 68 IN | SYSTOLIC BLOOD PRESSURE: 114 MMHG | BODY MASS INDEX: 26.76 KG/M2 | HEART RATE: 84 BPM | WEIGHT: 176.6 LBS | DIASTOLIC BLOOD PRESSURE: 79 MMHG

## 2024-06-20 DIAGNOSIS — Z86.010 PERSONAL HISTORY OF COLONIC POLYPS: ICD-10-CM

## 2024-06-20 DIAGNOSIS — K29.00 ACUTE SUPERFICIAL GASTRITIS WITHOUT HEMORRHAGE: ICD-10-CM

## 2024-06-20 DIAGNOSIS — E78.5 DYSLIPIDEMIA: ICD-10-CM

## 2024-06-20 DIAGNOSIS — K21.00 GASTROESOPHAGEAL REFLUX DISEASE WITH ESOPHAGITIS WITHOUT HEMORRHAGE: ICD-10-CM

## 2024-06-20 PROCEDURE — 99204 OFFICE O/P NEW MOD 45 MIN: CPT | Performed by: INTERNAL MEDICINE

## 2024-06-20 RX ORDER — POLYETHYLENE GLYCOL 3350, SODIUM SULFATE ANHYDROUS, SODIUM BICARBONATE, SODIUM CHLORIDE, POTASSIUM CHLORIDE 236; 22.74; 6.74; 5.86; 2.97 G/4L; G/4L; G/4L; G/4L; G/4L
4000 POWDER, FOR SOLUTION ORAL ONCE
Qty: 4000 ML | Refills: 0 | Status: SHIPPED | OUTPATIENT
Start: 2024-06-20 | End: 2024-06-20

## 2024-06-20 RX ORDER — ROSUVASTATIN CALCIUM 40 MG/1
40 TABLET, COATED ORAL DAILY
Qty: 90 TABLET | Refills: 1 | Status: SHIPPED | OUTPATIENT
Start: 2024-06-20

## 2024-06-20 NOTE — PATIENT INSTRUCTIONS
Scheduled date of EGD/colonoscopy (as of today):11/06/24  Physician performing EGD/colonoscopy:Dr. Cortes  Location of EGD/colonoscopy:[facility]  Desired bowel prep reviewed with patient:Meir  Instructions reviewed with patient by:lexii  Clearances:   n/a

## 2024-06-20 NOTE — PROGRESS NOTES
Progress Note - Cardiology Office  [facility]    David S Cogan 68 y.o. male MRN: [id number]  : 1955  Encounter: [id number]      ASSESSMENT:  CAD  S/p NSTEMI 2024  S/p CABG X2(LIMA to LAD, SVG to OM 3) on 2024  Patient had superficial sternal wound infection likely secondary to comedone or furuncle.  There was no deep infection on exploration despite suggestive findings on CT.  Patient had follow-up appointment with CT surgery on 2024    Cardiac catheterization 2024:    Mid LAD lesion is 85% stenosed in a distally small caliber vessel    OM3 100% stenosed with collaterals from the LAD    RCA with mild diffuse CAD    LVEDP is mildly elevated without gradient on LV-AO pullback     TTE, :  EF 55%, mild LVH, G1 DD, mild MR     Benign essential hypertension  BP today is 120/80 with heart rate of 90/min     Dyslipidemia  10/30/2023: , , HDL 69, normal AST and ALT  On Crestor 5 mg intermittently and oral turmeric.  2024: , TG 80, HDL 48, normal AST and ALT  Direct LDL on 04/15/2024 was 134  Currently on Crestor 40 mg  Will add Zetia 10 mg     GERD  History of acute superficial gastritis without hemorrhage  Asthma  History of alcohol abuse    RECOMMENDATIONS:  Continue aspirin, metoprolol 25 mg twice daily, Crestor 40 mg daily  Zetia 10 mg daily  Avoid alcohol consumption  20-30 minutes daily cardiovascular exercise as tolerated  Cholesterol and low carbohydrate diet    Please call [phone] if any questions.    HPI :     David S Cogan is a 68 y.o. year old male who came for follow up.  He is generally feeling well and denies any symptoms.  Postop he had superficial sternal wound infection and underwent debridement.  He had appointment with CT surgery on 2024.  His last LDL was elevated with the direct being at 134.  Therefore I am going to add 10 mg of Zetia to his current dose of 40 mg of Crestor.  I have strongly advised  him on low-cholesterol and low carbohydrate diet and also on low-salt diet.    REVIEW OF SYSTEMS:  Denies any new or acute cardiac symptoms.  Denies chest pain, dyspnea, palpitations or syncope      Historical Information   Past Medical History:   Diagnosis Date    Asthma     Last Assessed:10/16/17    Fontanez's esophagus     Colonic polyp     Last Assessed:10/16/17    Eczema     GERD (gastroesophageal reflux disease)     Last Assessed:10/16/17    High cholesterol     Last Assessed:10/16/17    Hypertension     Last Assessed:10/16/17    Non-neoplastic nevus     Last Assessed:17     Past Surgical History:   Procedure Laterality Date    CARDIAC CATHETERIZATION Left 2024    Procedure: Cardiac Left Heart Cath;  Surgeon: Milagros Prather DO;  Location: [facility];  Service: Cardiology    COLONOSCOPY      10/2013- egd/colon    LYMPHADENECTOMY      Axillary Lymph node removed-benign    [place] CORONARY ARTERY BYP W/VEIN & ARTERY GRAFT 3 VEIN N/A 2024    Procedure: CORONARY ARTERY BYPASS GRAFT X2 VESSELS UTILIZING LIMA TO LAD, SVG TO OM3;  Surgeon: Balwinder Sky DO;  Location: [facility];  Service: Cardiac Surgery    [place] STERNAL DEBRIDEMENT N/A 2024    Procedure: LOCAL WOUND DEBRIDEMENT STERNAL;  Surgeon: Balwinder Sky DO;  Location: [facility];  Service: Cardiac Surgery    TONSILLECTOMY      Last Assessed:10/16/17    UPPER GASTROINTESTINAL ENDOSCOPY      VALVE REPLACEMENT      Heart Valve Replacement; Last Assessed:10/16/17     Social History     Substance and Sexual Activity   Alcohol Use Not Currently    Comment: Not since 04/15/24 with heart surgery     Social History     Substance and Sexual Activity   Drug Use No     Social History     Tobacco Use   Smoking Status Former    Current packs/day: 0.00    Average packs/day: 1 pack/day for 14.0 years (14.0 ttl pk-yrs)    Types: Cigarettes    Start date: 1974    Quit date:     Years since quittin.4   Smokeless Tobacco Former     Types: Chew    Quit date: 1985     Family History:   Family History   Problem Relation Age of Onset    Cancer Father         groin, bone    Diabetes Paternal Grandfather        Meds/Allergies     Allergies   Allergen Reactions    Amoxicillin      Other reaction(s): Diarrhea    Amoxicillin-Pot Clavulanate      Other reaction(s): Allergy Date : 07/27/2015   GI    Penicillins      Annotation - [id number]: Diarrhea       Current Outpatient Medications:     albuterol (PROVENTIL HFA,VENTOLIN HFA) 90 mcg/act inhaler, Inhale 90 mcg 2 (two) times a day as needed for shortness of breath or wheezing, Disp: , Rfl:     aspirin 325 mg tablet, Take 1 tablet (325 mg total) by mouth daily, Disp: 30 tablet, Rfl: 2    cycloSPORINE (RESTASIS) 0.05 % ophthalmic emulsion, Apply 0.05 application to eye 2 (two) times a day, Disp: , Rfl:     ezetimibe (ZETIA) 10 mg tablet, Take 1 tablet (10 mg total) by mouth daily, Disp: 90 tablet, Rfl: 2    folic acid (FOLVITE) 1 mg tablet, Take 1 tablet (1 mg total) by mouth daily, Disp: 30 tablet, Rfl: 1    metoprolol tartrate (LOPRESSOR) 25 mg tablet, Take 1 tablet (25 mg total) by mouth every 12 (twelve) hours, Disp: 180 tablet, Rfl: 2    mometasone (NASONEX) 50 mcg/act nasal spray, 2 sprays into each nostril daily, Disp: , Rfl:     Multiple Vitamins-Minerals (CENTRUM SILVER 50+MEN) TABS, Take 1 tablet by mouth daily, Disp: , Rfl:     rosuvastatin (CRESTOR) 40 MG tablet, Take 1 tablet (40 mg total) by mouth daily, Disp: 90 tablet, Rfl: 1    SYMBICORT 160-4.5 MCG/ACT inhaler, Take 2 puffs by mouth 2 (two) times a day, Disp: , Rfl:     thiamine 100 MG tablet, Take 1 tablet (100 mg total) by mouth daily, Disp: 30 tablet, Rfl: 1    esomeprazole (NexIUM) 40 MG capsule, Take 1 capsule (40 mg total) by mouth daily in the early morning (Patient not taking: Reported on 6/17/2024), Disp: 30 capsule, Rfl: 1    melatonin 3 mg, Take 2 tablets (6 mg total) by mouth daily at bedtime (Patient not taking: Reported on  "6/17/2024), Disp: 30 tablet, Rfl: 1    polyethylene glycol (Golytely) 4000 mL solution, Take 4,000 mL by mouth once for 1 dose Take 4000 mL by mouth once for 1 dose. Use as directed, Disp: 4000 mL, Rfl: 0    Vitals: Blood pressure 120/82, pulse 90, height 5' 8\" (1.727 m), weight 79.4 kg (175 lb), SpO2 97%.    Body mass index is 26.61 kg/m².  Vitals:    06/21/24 1453   Weight: 79.4 kg (175 lb)     BP Readings from Last 3 Encounters:   06/21/24 120/82   06/20/24 114/79   06/17/24 (!) 134/104       Physical Exam:  Physical Exam    Neurologic:  Alert & oriented x 3, no new focal deficits, Not in any acute distress,  Constitutional:  Well developed, well nourished, non-toxic appearance   Eyes:  Pupil equal and reacting to light, conjunctiva normal,   HENT:  Atraumatic, oropharynx moist, Neck- normal range of motion, no tenderness,  Neck supple, No JVP, No LNP   Respiratory:  Bilateral air entry, mostly clear to auscultation  Cardiovascular: S1-S2 regular with a I/VI systolic murmur   GI:  Soft, nondistended, normal bowel sounds, nontender, no hepatosplenomegaly appreciated.  Musculoskeletal:  No tenderness, no deformities.   Skin:  Well hydrated, no rash   Lymphatic:  No lymphadenopathy noted   Extremities:  No edema and distal pulses are present        Diagnostic Studies Review Cardio:      Cardiac testing:     Results for orders placed during the hospital encounter of 04/17/24    Echo complete w/ contrast if indicated    Interpretation Summary    Left Ventricle: Left ventricular cavity size is normal. Wall thickness is mildly increased. There is mild concentric hypertrophy. The left ventricular ejection fraction is 55%. Systolic function is normal. Wall motion is normal. Diastolic function is mildly abnormal, consistent with grade I (abnormal) relaxation.    Right Ventricle: Right ventricular cavity size is normal. Systolic function is normal.    Mitral Valve: There is mild regurgitation.      Imaging:  Chest X-Ray: " "  XR chest 2 views    Result Date: 4/17/2024  Impression No acute cardiopulmonary disease. Workstation performed: [id number]       CT-scan of the chest:     No CTA results available for this patient.  Lab Review   Lab Results   Component Value Date    WBC 8.3 06/20/2024    HGB 14.8 06/20/2024    HCT 44.9 06/20/2024    MCV 89 06/20/2024    RDW 12.7 06/20/2024     06/20/2024     BMP:  Lab Results   Component Value Date    SODIUM 137 06/20/2024    K 4.8 06/20/2024     06/20/2024    CO2 22 06/20/2024    BUN 22 06/20/2024    CREATININE 1.49 (H) 06/20/2024    GLUC 94 06/20/2024    GLUF 100 (H) 06/07/2024    CALCIUM 9.2 06/07/2024    EGFR 51 (L) 06/20/2024    MG 2.2 04/26/2024     LFT:  Lab Results   Component Value Date    AST 26 06/20/2024    ALT 26 06/20/2024    ALKPHOS 90 05/28/2024    TP 6.9 06/20/2024    ALB 4.5 06/20/2024      No components found for: \"TSH3\"  No results found for: \"[id number]\"  Lab Results   Component Value Date    HGBA1C 5.9 (H) 04/18/2024     Lipid Profile:   Lab Results   Component Value Date    CHOLESTEROL 182 04/18/2024    HDL 48 04/18/2024    LDLCALC 118 (H) 04/18/2024    TRIG 80 04/18/2024     Lab Results   Component Value Date    CHOLESTEROL 182 04/18/2024    CHOLESTEROL 298 (H) 10/30/2023     Lab Results   Component Value Date    TROPONINI <0.02 05/17/2020     No results found for: \"NTBNP\"   Recent Results (from the past 672 hour(s))   ECG 12 lead    Collection Time: 05/28/24  5:05 PM   Result Value Ref Range    Ventricular Rate 95 BPM    Atrial Rate 95 BPM    TN Interval 176 ms    QRSD Interval 74 ms    QT Interval 368 ms    QTC Interval 462 ms    P Axis 71 degrees    QRS Axis 109 degrees    T Wave Axis 119 degrees   CBC and differential    Collection Time: 05/28/24  5:35 PM   Result Value Ref Range    WBC 8.40 4.31 - 10.16 Thousand/uL    RBC 4.59 3.88 - 5.62 Million/uL    Hemoglobin 13.8 12.0 - 17.0 g/dL    Hematocrit 42.3 36.5 - 49.3 %    MCV 92 82 - 98 fL    MCH 30.1 26.8 " "- 34.3 pg    MCHC 32.6 31.4 - 37.4 g/dL    RDW 13.3 11.6 - 15.1 %    MPV 9.8 8.9 - 12.7 fL    Platelets 316 149 - 390 Thousands/uL    nRBC 0 /100 WBCs    Segmented % 52 43 - 75 %    Immature Grans % 0 0 - 2 %    Lymphocytes % 33 14 - 44 %    Monocytes % 7 4 - 12 %    Eosinophils Relative 7 (H) 0 - 6 %    Basophils Relative 1 0 - 1 %    Absolute Neutrophils 4.32 1.85 - 7.62 Thousands/µL    Absolute Immature Grans 0.02 0.00 - 0.20 Thousand/uL    Absolute Lymphocytes 2.79 0.60 - 4.47 Thousands/µL    Absolute Monocytes 0.59 0.17 - 1.22 Thousand/µL    Eosinophils Absolute 0.59 0.00 - 0.61 Thousand/µL    Basophils Absolute 0.09 0.00 - 0.10 Thousands/µL   Comprehensive metabolic panel    Collection Time: 05/28/24  5:35 PM   Result Value Ref Range    Sodium 141 135 - 147 mmol/L    Potassium 4.1 3.5 - 5.3 mmol/L    Chloride 106 96 - 108 mmol/L    CO2 27 21 - 32 mmol/L    ANION GAP 8 4 - 13 mmol/L    BUN 14 5 - 25 mg/dL    Creatinine 1.12 0.60 - 1.30 mg/dL    Glucose 88 65 - 140 mg/dL    Calcium 9.1 8.4 - 10.2 mg/dL    AST 22 13 - 39 U/L    ALT 27 7 - 52 U/L    Alkaline Phosphatase 90 34 - 104 U/L    Total Protein 7.0 6.4 - 8.4 g/dL    Albumin 4.1 3.5 - 5.0 g/dL    Total Bilirubin 0.28 0.20 - 1.00 mg/dL    eGFR 67 ml/min/1.73sq m   HS Troponin 0hr (reflex protocol)    Collection Time: 05/28/24  5:35 PM   Result Value Ref Range    hs TnI 0hr 10 \"Refer to ACS Flowchart\"- see link ng/L   HS Troponin I 2hr    Collection Time: 05/28/24  7:35 PM   Result Value Ref Range    hs TnI 2hr 10 \"Refer to ACS Flowchart\"- see link ng/L    Delta 2hr hsTnI 0 <20 ng/L   Blood culture    Collection Time: 05/28/24 11:04 PM    Specimen: Arm, Right; Blood   Result Value Ref Range    Blood Culture No Growth After 5 Days.    Blood culture    Collection Time: 05/28/24 11:04 PM    Specimen: Arm, Left; Blood   Result Value Ref Range    Blood Culture No Growth After 5 Days.    Basic metabolic panel    Collection Time: 05/29/24  5:18 AM   Result Value Ref " Range    Sodium 143 135 - 147 mmol/L    Potassium 4.0 3.5 - 5.3 mmol/L    Chloride 108 96 - 108 mmol/L    CO2 24 21 - 32 mmol/L    ANION GAP 11 4 - 13 mmol/L    BUN 13 5 - 25 mg/dL    Creatinine 1.10 0.60 - 1.30 mg/dL    Glucose 94 65 - 140 mg/dL    Calcium 8.5 8.4 - 10.2 mg/dL    eGFR 68 ml/min/1.73sq m   CBC and differential    Collection Time: 05/29/24  5:18 AM   Result Value Ref Range    WBC 7.69 4.31 - 10.16 Thousand/uL    RBC 4.42 3.88 - 5.62 Million/uL    Hemoglobin 13.4 12.0 - 17.0 g/dL    Hematocrit 40.2 36.5 - 49.3 %    MCV 91 82 - 98 fL    MCH 30.3 26.8 - 34.3 pg    MCHC 33.3 31.4 - 37.4 g/dL    RDW 13.2 11.6 - 15.1 %    MPV 10.2 8.9 - 12.7 fL    Platelets 321 149 - 390 Thousands/uL    nRBC 0 /100 WBCs    Segmented % 53 43 - 75 %    Immature Grans % 1 0 - 2 %    Lymphocytes % 29 14 - 44 %    Monocytes % 8 4 - 12 %    Eosinophils Relative 8 (H) 0 - 6 %    Basophils Relative 1 0 - 1 %    Absolute Neutrophils 4.12 1.85 - 7.62 Thousands/µL    Absolute Immature Grans 0.04 0.00 - 0.20 Thousand/uL    Absolute Lymphocytes 2.24 0.60 - 4.47 Thousands/µL    Absolute Monocytes 0.61 0.17 - 1.22 Thousand/µL    Eosinophils Absolute 0.58 0.00 - 0.61 Thousand/µL    Basophils Absolute 0.10 0.00 - 0.10 Thousands/µL   Protime-INR    Collection Time: 05/29/24  5:18 AM   Result Value Ref Range    Protime 14.7 (H) 11.6 - 14.5 seconds    INR 1.16 0.84 - 1.19   APTT    Collection Time: 05/29/24  5:18 AM   Result Value Ref Range    PTT 32 23 - 37 seconds   Type and screen    Collection Time: 05/29/24 10:27 AM   Result Value Ref Range    ABO Grouping O     Rh Factor Negative     Antibody Screen Negative     Specimen Expiration Date [id number]    CBC    Collection Time: 05/30/24  5:07 AM   Result Value Ref Range    WBC 7.13 4.31 - 10.16 Thousand/uL    RBC 4.46 3.88 - 5.62 Million/uL    Hemoglobin 13.4 12.0 - 17.0 g/dL    Hematocrit 40.6 36.5 - 49.3 %    MCV 91 82 - 98 fL    MCH 30.0 26.8 - 34.3 pg    MCHC 33.0 31.4 - 37.4 g/dL    RDW  13.2 11.6 - 15.1 %    Platelets 325 149 - 390 Thousands/uL    MPV 10.1 8.9 - 12.7 fL   Basic metabolic panel    Collection Time: 05/30/24  5:07 AM   Result Value Ref Range    Sodium 142 135 - 147 mmol/L    Potassium 3.7 3.5 - 5.3 mmol/L    Chloride 107 96 - 108 mmol/L    CO2 25 21 - 32 mmol/L    ANION GAP 10 4 - 13 mmol/L    BUN 13 5 - 25 mg/dL    Creatinine 1.04 0.60 - 1.30 mg/dL    Glucose 93 65 - 140 mg/dL    Calcium 8.7 8.4 - 10.2 mg/dL    eGFR 73 ml/min/1.73sq m   Wound culture and Gram stain    Collection Time: 05/30/24  1:49 PM    Specimen: Wound   Result Value Ref Range    Wound Culture 3 colonies Staphylococcus aureus (A)     Gram Stain Result No Polys or Bacteria seen        Susceptibility    Staphylococcus aureus - FAROOQ     ZID Performed Yes       Cefazolin ($) <=8.00 Susceptible ug/ml     Clindamycin ($) <=0.25 Susceptible ug/ml     Erythromycin ($$$$) <=0.25 Susceptible ug/ml     Gentamicin ($$) <=4 Susceptible ug/ml     Oxacillin <=0.25 Susceptible ug/ml     Penicillin <=0.030 Resistant ug/ml     Tetracycline <=4 Susceptible ug/ml     Trimethoprim + Sulfamethoxazole ($$$) <=0.5/9.5 Susceptible ug/ml     Vancomycin ($) 1.00 Susceptible ug/ml   Anaerobic culture and Gram stain    Collection Time: 05/30/24  1:50 PM    Specimen: Wound   Result Value Ref Range    Anaerobic Culture No anaerobes isolated    Wound culture and Gram stain    Collection Time: 05/30/24  1:50 PM    Specimen: Wound   Result Value Ref Range    Wound Culture 3 colonies Staphylococcus aureus (A)     Gram Stain Result No Polys or Bacteria seen        Susceptibility    Staphylococcus aureus - FAROOQ     ZID Performed Yes       Cefazolin ($) <=8.00 Susceptible ug/ml     Clindamycin ($) <=0.25 Susceptible ug/ml     Erythromycin ($$$$) <=0.25 Susceptible ug/ml     Gentamicin ($$) <=4 Susceptible ug/ml     Oxacillin <=0.25 Susceptible ug/ml     Penicillin <=0.030 Resistant ug/ml     Tetracycline <=4 Susceptible ug/ml     Trimethoprim +  Sulfamethoxazole ($$$) <=0.5/9.5 Susceptible ug/ml     Vancomycin ($) 1.00 Susceptible ug/ml   Anaerobic culture and Gram stain    Collection Time: 05/30/24  1:50 PM   Result Value Ref Range    Anaerobic Culture No anaerobes isolated    Anaerobic culture and Gram stain    Collection Time: 05/30/24  1:56 PM    Specimen: Soft Tissue, Other   Result Value Ref Range    Anaerobic Culture No anaerobes isolated    Fungal culture    Collection Time: 05/30/24  1:56 PM    Specimen: Soft Tissue, Other   Result Value Ref Range    Fungus (Mycology) Culture No Fungus Isolated at 2 Weeks    Culture, tissue and Gram stain    Collection Time: 05/30/24  1:56 PM    Specimen: Soft Tissue, Other   Result Value Ref Range    Tissue Culture Few Colonies of Staphylococcus aureus (A)     Gram Stain Result No Polys or Bacteria seen        Susceptibility    Staphylococcus aureus - FAROOQ     ZID Performed Yes       Cefazolin ($) <=8.00 Susceptible ug/ml     Clindamycin ($) <=0.25 Susceptible ug/ml     Erythromycin ($$$$) <=0.25 Susceptible ug/ml     Gentamicin ($$) <=4 Susceptible ug/ml     Oxacillin <=0.25 Susceptible ug/ml     Penicillin <=0.030 Resistant ug/ml     Tetracycline <=4 Susceptible ug/ml     Trimethoprim + Sulfamethoxazole ($$$) <=0.5/9.5 Susceptible ug/ml     Vancomycin ($) 1.00 Susceptible ug/ml   AFB Culture with Stain    Collection Time: 05/30/24  1:56 PM    Specimen: Soft Tissue, Other   Result Value Ref Range    AFB Culture No AFB Isolated at 2 Weeks     AFB Stain No acid fast bacilli seen    Vancomycin, random    Collection Time: 05/31/24  4:52 AM   Result Value Ref Range    Vancomycin Rm 12.8 10.0 - 20.0 ug/mL   CBC    Collection Time: 05/31/24  4:52 AM   Result Value Ref Range    WBC 8.69 4.31 - 10.16 Thousand/uL    RBC 4.48 3.88 - 5.62 Million/uL    Hemoglobin 13.3 12.0 - 17.0 g/dL    Hematocrit 41.1 36.5 - 49.3 %    MCV 92 82 - 98 fL    MCH 29.7 26.8 - 34.3 pg    MCHC 32.4 31.4 - 37.4 g/dL    RDW 13.2 11.6 - 15.1 %     Platelets 321 149 - 390 Thousands/uL    MPV 9.8 8.9 - 12.7 fL   Basic metabolic panel    Collection Time: 05/31/24  4:52 AM   Result Value Ref Range    Sodium 139 135 - 147 mmol/L    Potassium 4.1 3.5 - 5.3 mmol/L    Chloride 106 96 - 108 mmol/L    CO2 24 21 - 32 mmol/L    ANION GAP 9 4 - 13 mmol/L    BUN 20 5 - 25 mg/dL    Creatinine 1.80 (H) 0.60 - 1.30 mg/dL    Glucose 95 65 - 140 mg/dL    Calcium 8.6 8.4 - 10.2 mg/dL    eGFR 37 ml/min/1.73sq m   Basic metabolic panel    Collection Time: 06/01/24  4:51 AM   Result Value Ref Range    Sodium 141 135 - 147 mmol/L    Potassium 4.2 3.5 - 5.3 mmol/L    Chloride 108 96 - 108 mmol/L    CO2 26 21 - 32 mmol/L    ANION GAP 7 4 - 13 mmol/L    BUN 22 5 - 25 mg/dL    Creatinine 1.73 (H) 0.60 - 1.30 mg/dL    Glucose 94 65 - 140 mg/dL    Calcium 8.9 8.4 - 10.2 mg/dL    eGFR 39 ml/min/1.73sq m   CBC    Collection Time: 06/01/24  4:51 AM   Result Value Ref Range    WBC 7.69 4.31 - 10.16 Thousand/uL    RBC 4.48 3.88 - 5.62 Million/uL    Hemoglobin 13.4 12.0 - 17.0 g/dL    Hematocrit 41.1 36.5 - 49.3 %    MCV 92 82 - 98 fL    MCH 29.9 26.8 - 34.3 pg    MCHC 32.6 31.4 - 37.4 g/dL    RDW 13.2 11.6 - 15.1 %    Platelets 330 149 - 390 Thousands/uL    MPV 10.0 8.9 - 12.7 fL   Vancomycin, random    Collection Time: 06/01/24  4:51 AM   Result Value Ref Range    Vancomycin Rm 12.7 10.0 - 20.0 ug/mL   Vancomycin, random    Collection Time: 06/02/24  4:46 AM   Result Value Ref Range    Vancomycin Rm 13.4 10.0 - 20.0 ug/mL   Basic metabolic panel    Collection Time: 06/02/24  4:46 AM   Result Value Ref Range    Sodium 143 135 - 147 mmol/L    Potassium 4.2 3.5 - 5.3 mmol/L    Chloride 106 96 - 108 mmol/L    CO2 27 21 - 32 mmol/L    ANION GAP 10 4 - 13 mmol/L    BUN 20 5 - 25 mg/dL    Creatinine 1.66 (H) 0.60 - 1.30 mg/dL    Glucose 97 65 - 140 mg/dL    Calcium 9.2 8.4 - 10.2 mg/dL    eGFR 41 ml/min/1.73sq m   CBC    Collection Time: 06/02/24  4:46 AM   Result Value Ref Range    WBC 8.21 4.31 -  10.16 Thousand/uL    RBC 4.69 3.88 - 5.62 Million/uL    Hemoglobin 13.9 12.0 - 17.0 g/dL    Hematocrit 42.9 36.5 - 49.3 %    MCV 92 82 - 98 fL    MCH 29.6 26.8 - 34.3 pg    MCHC 32.4 31.4 - 37.4 g/dL    RDW 13.4 11.6 - 15.1 %    Platelets 355 149 - 390 Thousands/uL    MPV 10.3 8.9 - 12.7 fL   Basic metabolic panel    Collection Time: 06/03/24  6:13 AM   Result Value Ref Range    Sodium 140 135 - 147 mmol/L    Potassium 4.1 3.5 - 5.3 mmol/L    Chloride 105 96 - 108 mmol/L    CO2 26 21 - 32 mmol/L    ANION GAP 9 4 - 13 mmol/L    BUN 20 5 - 25 mg/dL    Creatinine 1.41 (H) 0.60 - 1.30 mg/dL    Glucose 95 65 - 140 mg/dL    Calcium 9.2 8.4 - 10.2 mg/dL    eGFR 50 ml/min/1.73sq m   CBC and differential    Collection Time: 06/03/24  6:13 AM   Result Value Ref Range    WBC 8.76 4.31 - 10.16 Thousand/uL    RBC 4.89 3.88 - 5.62 Million/uL    Hemoglobin 14.6 12.0 - 17.0 g/dL    Hematocrit 45.0 36.5 - 49.3 %    MCV 92 82 - 98 fL    MCH 29.9 26.8 - 34.3 pg    MCHC 32.4 31.4 - 37.4 g/dL    RDW 13.2 11.6 - 15.1 %    MPV 10.2 8.9 - 12.7 fL    Platelets 344 149 - 390 Thousands/uL    nRBC 0 /100 WBCs    Segmented % 58 43 - 75 %    Immature Grans % 1 0 - 2 %    Lymphocytes % 27 14 - 44 %    Monocytes % 8 4 - 12 %    Eosinophils Relative 5 0 - 6 %    Basophils Relative 1 0 - 1 %    Absolute Neutrophils 5.12 1.85 - 7.62 Thousands/µL    Absolute Immature Grans 0.04 0.00 - 0.20 Thousand/uL    Absolute Lymphocytes 2.35 0.60 - 4.47 Thousands/µL    Absolute Monocytes 0.73 0.17 - 1.22 Thousand/µL    Eosinophils Absolute 0.42 0.00 - 0.61 Thousand/µL    Basophils Absolute 0.10 0.00 - 0.10 Thousands/µL   Basic metabolic panel    Collection Time: 06/07/24 10:13 AM   Result Value Ref Range    Sodium 139 135 - 147 mmol/L    Potassium 4.1 3.5 - 5.3 mmol/L    Chloride 108 96 - 108 mmol/L    CO2 24 21 - 32 mmol/L    ANION GAP 7 4 - 13 mmol/L    BUN 24 5 - 25 mg/dL    Creatinine 1.60 (H) 0.60 - 1.30 mg/dL    Glucose, Fasting 100 (H) 65 - 99 mg/dL     "Calcium 9.2 8.4 - 10.2 mg/dL    eGFR 43 ml/min/1.73sq m   Comprehensive metabolic panel    Collection Time: 06/20/24  9:28 AM   Result Value Ref Range    Glucose, Random 94 70 - 99 mg/dL    BUN 22 8 - 27 mg/dL    Creatinine 1.49 (H) 0.76 - 1.27 mg/dL    eGFR 51 (L) >59 mL/min/1.73    SL AMB BUN/CREATININE RATIO 15 10 - 24    Sodium 137 134 - 144 mmol/L    Potassium 4.8 3.5 - 5.2 mmol/L    Chloride 100 96 - 106 mmol/L    CO2 22 20 - 29 mmol/L    CALCIUM 10.0 8.6 - 10.2 mg/dL    Protein, Total 6.9 6.0 - 8.5 g/dL    Albumin 4.5 3.9 - 4.9 g/dL    Globulin, Total 2.4 1.5 - 4.5 g/dL    TOTAL BILIRUBIN 0.3 0.0 - 1.2 mg/dL    Alk Phos Isoenzymes 92 44 - 121 IU/L    AST 26 0 - 40 IU/L    ALT 26 0 - 44 IU/L   CBC and differential    Collection Time: 06/20/24  9:28 AM   Result Value Ref Range    White Blood Cell Count 8.3 3.4 - 10.8 x10E3/uL    Red Blood Cell Count 5.04 4.14 - 5.80 x10E6/uL    Hemoglobin 14.8 13.0 - 17.7 g/dL    HCT 44.9 37.5 - 51.0 %    MCV 89 79 - 97 fL    MCH 29.4 26.6 - 33.0 pg    MCHC 33.0 31.5 - 35.7 g/dL    RDW 12.7 11.6 - 15.4 %    Platelet Count 356 150 - 450 x10E3/uL    Neutrophils 48 Not Estab. %    Lymphocytes 38 Not Estab. %    Monocytes 9 Not Estab. %    Eosinophils 4 Not Estab. %    Basophils PCT 1 Not Estab. %    Neutrophils (Absolute) 4.0 1.4 - 7.0 x10E3/uL    Lymphocytes (Absolute) 3.1 0.7 - 3.1 x10E3/uL    Monocytes (Absolute) 0.7 0.1 - 0.9 x10E3/uL    Eosinophils (Absolute) 0.4 0.0 - 0.4 x10E3/uL    Basophils ABS 0.1 0.0 - 0.2 x10E3/uL    Immature Granulocytes 0 Not Estab. %    Immature Granulocytes (Absolute) 0.0 0.0 - 0.1 x10E3/uL             Dr. Javier Willis MD, [facility]      \"This note has been constructed using a voice recognition system.Therefore there may be syntax, spelling, and/or grammatical errors. Please call if you have any questions. \"  "

## 2024-06-20 NOTE — TELEPHONE ENCOUNTER
Patient was prescribed this in the hospital when he had a coronary bypass done. He follows up with Dr. Javier Willis    Reason for call:   [x] Refill   [] Prior Auth  [] Other:     Office:   [] PCP/Provider -   [x] Specialty/Provider - cardiology    Medication: rosuvastatin (CRESTOR) 40 MG tablet     Dose/Frequency: Take 1 tablet (40 mg total) by mouth daily     Quantity: 30 tablet     Pharmacy: [facility] - [place], [place] - [address] [place] [address] [phone]     Does the patient have enough for 3 days?   [x] Yes   [] No - Send as HP to POD

## 2024-06-20 NOTE — PROGRESS NOTES
[facility] - Outpatient Consultation  David S Cogan 68 y.o. male MRN: [id number]  Encounter: [id number]      PCP: Maday Chu PA-C  Referring: Maday Chu PA-C  [address]  [place] [facility]  [place] [address]      ASSESSMENT AND PLAN:      1. Gastroesophageal reflux disease with esophagitis without hemorrhage  2. Acute superficial gastritis without hemorrhage  Chronic symptoms > 12 months,  Patient discontinued PPI due to concern over kidney/KAN, reassured regarding safety profile  He is unclear if he needs to continue the PPI due to previous diagnosis of gastritis  Will plan for EGD to evaluate for erosive GI disease, including esophagitis, gastritis or other given his longterm need for aspirin and r/o H pylori  - Ambulatory Referral to Gastroenterology  - EGD; Future    3. Personal history of colonic polyps  Last colonoscopy 2020, 4 colon polyps- recall 3 years  - Ambulatory Referral to Gastroenterology  - Colonoscopy; Future  - polyethylene glycol (Golytely) 4000 mL solution; Take 4,000 mL by mouth once for 1 dose Take 4000 mL by mouth once for 1 dose. Use as directed  Dispense: 4000 mL; Refill: 0      ______________________________________________________________________    CC:  Chief Complaint   Patient presents with    Follow-up       HPI:      Patient is a 68-year-old male referred for history of colon polyps, GERD/gastritis.  He has alcohol use disorder, asthma, HTN, BPH, CABG recently performed 4/23/2024 on aspirin and subsequent infection of sternotomy on 5/30/2024, HLD, HTN, bilateral hand eczema, subclinical hypothyroidism, BMI of 26.  Patient previously established with Dr. Sevilla, Dr. Roldan.  Recently undergone CABG 4/23/2024 which was complicated superficial sternal wound infection and he was on antibiotics. He remains on aspirin and not alternative antiplatelet or anticoagulation at this time.  During his hospitalization he reports being discontinued from  his Nexium due to KAN.  He is concerned he may have had gastritis, but denies any GI symptoms at this time. He is having regular bowel movements. He discontinued alcohol use 2 months ago (was drinking beer). Denies rectal bleeding or other Gi symptoms.     EGD 2018  Colonoscopy 2020-4 colon polyps, pathology not available for my review      REVIEW OF SYSTEMS:    CONSTITUTIONAL: Denies any fever, chills, rigors, and weight loss.  HEENT: No earache or tinnitus. Denies hearing loss or visual disturbances.  CARDIOVASCULAR: No chest pain or palpitations.   RESPIRATORY: Denies any cough, hemoptysis, shortness of breath or dyspnea on exertion.  GASTROINTESTINAL: As noted in the History of Present Illness.   GENITOURINARY: No problems with urination. Denies any hematuria or dysuria.  NEUROLOGIC: No dizziness or vertigo, denies headaches.   MUSCULOSKELETAL: Denies any muscle or joint pain.   SKIN: Denies skin rashes or itching.   ENDOCRINE: Denies excessive thirst. Denies intolerance to heat or cold.  PSYCHOSOCIAL: Denies depression or anxiety. Denies any recent memory loss.       Historical Information   Past Medical History:   Diagnosis Date    Asthma     Last Assessed:10/16/17    Fontanez's esophagus     Colonic polyp     Last Assessed:10/16/17    Eczema     GERD (gastroesophageal reflux disease)     Last Assessed:10/16/17    High cholesterol     Last Assessed:10/16/17    Hypertension     Last Assessed:10/16/17    Non-neoplastic nevus     Last Assessed:11/16/17     Past Surgical History:   Procedure Laterality Date    CARDIAC CATHETERIZATION Left 04/18/2024    Procedure: Cardiac Left Heart Cath;  Surgeon: Milagros Prather DO;  [facility];  Service: Cardiology    COLONOSCOPY      10/2013- egd/colon    LYMPHADENECTOMY      Axillary Lymph node removed-benign    [place] CORONARY ARTERY BYP W/VEIN & ARTERY GRAFT 3 VEIN N/A 04/23/2024    Procedure: CORONARY ARTERY BYPASS GRAFT X2 VESSELS UTILIZING LIMA TO LAD,  "SVG TO OM3;  Surgeon: Balwinder Sky DO;  Location: [facility];  Service: Cardiac Surgery    TN STERNAL DEBRIDEMENT N/A 2024    Procedure: LOCAL WOUND DEBRIDEMENT STERNAL;  Surgeon: Balwinder Sky DO;  Location[facility];  Service: Cardiac Surgery    TONSILLECTOMY      Last Assessed:10/16/17    UPPER GASTROINTESTINAL ENDOSCOPY      VALVE REPLACEMENT      Heart Valve Replacement; Last Assessed:10/16/17     Social History   Social History     Substance and Sexual Activity   Alcohol Use Not Currently    Comment: Not since 04/15/24 with heart surgery     Social History     Substance and Sexual Activity   Drug Use No     Social History     Tobacco Use   Smoking Status Former    Current packs/day: 0.00    Average packs/day: 1 pack/day for 14.0 years (14.0 ttl pk-yrs)    Types: Cigarettes    Start date: 1974    Quit date:     Years since quittin.4   Smokeless Tobacco Former    Types: Chew    Quit date:      Family History   Problem Relation Age of Onset    Cancer Father         groin, bone    Diabetes Paternal Grandfather        Meds/Allergies       Current Outpatient Medications:     albuterol (PROVENTIL HFA,VENTOLIN HFA) 90 mcg/act inhaler    aspirin 325 mg tablet    cycloSPORINE (RESTASIS) 0.05 % ophthalmic emulsion    folic acid (FOLVITE) 1 mg tablet    metoprolol tartrate (LOPRESSOR) 25 mg tablet    mometasone (NASONEX) 50 mcg/act nasal spray    Multiple Vitamins-Minerals (CENTRUM SILVER 50+MEN) TABS    rosuvastatin (CRESTOR) 40 MG tablet    SYMBICORT 160-4.5 MCG/ACT inhaler    thiamine 100 MG tablet    esomeprazole (NexIUM) 40 MG capsule    melatonin 3 mg    Allergies   Allergen Reactions    Amoxicillin      Other reaction(s): Diarrhea    Amoxicillin-Pot Clavulanate      Other reaction(s): Allergy Date : 2015   GI    Penicillins      Annotation - [id number]: Diarrhea           Objective     Blood pressure 114/79, pulse 84, height 5' 8\" (1.727 m), weight 80.1 kg (176 lb 9.6 oz). Body " "mass index is 26.85 kg/m².     PHYSICAL EXAM:      General Appearance:   Alert, cooperative, no distress   HEENT:   Normocephalic, atraumatic, anicteric.     Neck:  Supple, symmetrical, trachea midline   Lungs:   Clear to auscultation bilaterally; no rales, rhonchi or wheezing; respirations unlabored    Heart::   Regular rate and rhythm; no murmur, rub, or gallop.   Abdomen:   Soft, non-tender, non-distended; normal bowel sounds; no masses, no organomegaly    Genitalia:   Deferred    Rectal:   Deferred    Extremities:  No cyanosis, clubbing or edema    Pulses:  2+ and symmetric    Skin:  No jaundice, rashes, or lesions    Lymph nodes:  No palpable cervical lymphadenopathy        Lab Results:     Lab Results   Component Value Date    WBC 8.76 06/03/2024    HGB 14.6 06/03/2024    HCT 45.0 06/03/2024    MCV 92 06/03/2024     06/03/2024       Lab Results   Component Value Date    K 4.1 06/07/2024     06/07/2024    CO2 24 06/07/2024    BUN 24 06/07/2024    CREATININE 1.60 (H) 06/07/2024    GLUCOSE 151 (H) 04/23/2024    GLUF 100 (H) 06/07/2024    CALCIUM 9.2 06/07/2024    AST 22 05/28/2024    ALT 27 05/28/2024    ALKPHOS 90 05/28/2024    EGFR 43 06/07/2024       Lab Results   Component Value Date    INR 1.16 05/29/2024    INR 1.37 (H) 04/23/2024    INR 1.06 04/17/2024    PROTIME 14.7 (H) 05/29/2024    PROTIME 16.7 (H) 04/23/2024    PROTIME 13.7 04/17/2024       I personally reviewed relevant labs    Radiology Results:   None relevant    Portions of the record may have been created with voice recognition software. Occasional wrong word or \"sound a like\" substitutions may have occurred due to the inherent limitations of voice recognition software. Read the chart carefully and recognize, using context, where substitutions have occurred.        "

## 2024-06-21 ENCOUNTER — OFFICE VISIT (OUTPATIENT)
Dept: CARDIOLOGY CLINIC | Facility: CLINIC | Age: 69
End: 2024-06-21
Payer: MEDICARE

## 2024-06-21 VITALS
BODY MASS INDEX: 26.52 KG/M2 | OXYGEN SATURATION: 97 % | WEIGHT: 175 LBS | SYSTOLIC BLOOD PRESSURE: 120 MMHG | DIASTOLIC BLOOD PRESSURE: 82 MMHG | HEART RATE: 90 BPM | HEIGHT: 68 IN

## 2024-06-21 DIAGNOSIS — E78.5 DYSLIPIDEMIA: ICD-10-CM

## 2024-06-21 DIAGNOSIS — Z95.1 S/P CABG (CORONARY ARTERY BYPASS GRAFT): ICD-10-CM

## 2024-06-21 DIAGNOSIS — E78.00 PURE HYPERCHOLESTEROLEMIA: Primary | ICD-10-CM

## 2024-06-21 LAB
ALBUMIN SERPL-MCNC: 4.5 G/DL (ref 3.9–4.9)
ALP SERPL-CCNC: 92 IU/L (ref 44–121)
ALT SERPL-CCNC: 26 IU/L (ref 0–44)
AST SERPL-CCNC: 26 IU/L (ref 0–40)
BASOPHILS # BLD AUTO: 0.1 X10E3/UL (ref 0–0.2)
BASOPHILS NFR BLD AUTO: 1 %
BILIRUB SERPL-MCNC: 0.3 MG/DL (ref 0–1.2)
BUN SERPL-MCNC: 22 MG/DL (ref 8–27)
BUN/CREAT SERPL: 15 (ref 10–24)
CALCIUM SERPL-MCNC: 10 MG/DL (ref 8.6–10.2)
CHLORIDE SERPL-SCNC: 100 MMOL/L (ref 96–106)
CO2 SERPL-SCNC: 22 MMOL/L (ref 20–29)
CREAT SERPL-MCNC: 1.49 MG/DL (ref 0.76–1.27)
EGFR: 51 ML/MIN/1.73
EOSINOPHIL # BLD AUTO: 0.4 X10E3/UL (ref 0–0.4)
EOSINOPHIL NFR BLD AUTO: 4 %
ERYTHROCYTE [DISTWIDTH] IN BLOOD BY AUTOMATED COUNT: 12.7 % (ref 11.6–15.4)
GLOBULIN SER-MCNC: 2.4 G/DL (ref 1.5–4.5)
GLUCOSE SERPL-MCNC: 94 MG/DL (ref 70–99)
HCT VFR BLD AUTO: 44.9 % (ref 37.5–51)
HGB BLD-MCNC: 14.8 G/DL (ref 13–17.7)
IMM GRANULOCYTES # BLD: 0 X10E3/UL (ref 0–0.1)
IMM GRANULOCYTES NFR BLD: 0 %
LYMPHOCYTES # BLD AUTO: 3.1 X10E3/UL (ref 0.7–3.1)
LYMPHOCYTES NFR BLD AUTO: 38 %
MCH RBC QN AUTO: 29.4 PG (ref 26.6–33)
MCHC RBC AUTO-ENTMCNC: 33 G/DL (ref 31.5–35.7)
MCV RBC AUTO: 89 FL (ref 79–97)
MONOCYTES # BLD AUTO: 0.7 X10E3/UL (ref 0.1–0.9)
MONOCYTES NFR BLD AUTO: 9 %
NEUTROPHILS # BLD AUTO: 4 X10E3/UL (ref 1.4–7)
NEUTROPHILS NFR BLD AUTO: 48 %
PLATELET # BLD AUTO: 356 X10E3/UL (ref 150–450)
POTASSIUM SERPL-SCNC: 4.8 MMOL/L (ref 3.5–5.2)
PROT SERPL-MCNC: 6.9 G/DL (ref 6–8.5)
RBC # BLD AUTO: 5.04 X10E6/UL (ref 4.14–5.8)
SODIUM SERPL-SCNC: 137 MMOL/L (ref 134–144)
WBC # BLD AUTO: 8.3 X10E3/UL (ref 3.4–10.8)

## 2024-06-21 PROCEDURE — 99214 OFFICE O/P EST MOD 30 MIN: CPT | Performed by: INTERNAL MEDICINE

## 2024-06-21 RX ORDER — EZETIMIBE 10 MG/1
10 TABLET ORAL DAILY
Qty: 90 TABLET | Refills: 2 | Status: SHIPPED | OUTPATIENT
Start: 2024-06-21

## 2024-06-24 LAB — FUNGUS SPEC CULT: NORMAL

## 2024-06-25 LAB
MYCOBACTERIUM SPEC CULT: NORMAL
RHODAMINE-AURAMINE STN SPEC: NORMAL

## 2024-07-01 ENCOUNTER — TELEPHONE (OUTPATIENT)
Dept: CARDIAC REHAB | Facility: CLINIC | Age: 69
End: 2024-07-01

## 2024-07-01 LAB — FUNGUS SPEC CULT: NORMAL

## 2024-07-02 ENCOUNTER — CLINICAL SUPPORT (OUTPATIENT)
Dept: CARDIAC REHAB | Facility: CLINIC | Age: 69
End: 2024-07-02

## 2024-07-02 DIAGNOSIS — Z95.1 S/P CABG (CORONARY ARTERY BYPASS GRAFT): ICD-10-CM

## 2024-07-02 DIAGNOSIS — E78.5 DYSLIPIDEMIA: ICD-10-CM

## 2024-07-02 LAB
MYCOBACTERIUM SPEC CULT: NORMAL
RHODAMINE-AURAMINE STN SPEC: NORMAL

## 2024-07-02 NOTE — PROGRESS NOTES
Did not complete cardiac rehabilitation evaluation due to patient relocating to [place] next week. Provided him with contact information for cardiac rehab center in [place].

## 2024-07-09 LAB
MYCOBACTERIUM SPEC CULT: NORMAL
RHODAMINE-AURAMINE STN SPEC: NORMAL

## 2024-07-10 DIAGNOSIS — E78.5 DYSLIPIDEMIA: ICD-10-CM

## 2024-07-10 DIAGNOSIS — I25.2 CORONARY ARTERY DISEASE WITH HISTORY OF MYOCARDIAL INFARCTION WITHOUT HISTORY OF CABG: Primary | ICD-10-CM

## 2024-07-10 DIAGNOSIS — I25.10 CORONARY ARTERY DISEASE WITH HISTORY OF MYOCARDIAL INFARCTION WITHOUT HISTORY OF CABG: Primary | ICD-10-CM

## 2024-07-10 RX ORDER — ROSUVASTATIN CALCIUM 40 MG/1
40 TABLET, COATED ORAL DAILY
Qty: 30 TABLET | Refills: 0 | Status: SHIPPED | OUTPATIENT
Start: 2024-07-10

## 2024-07-10 NOTE — TELEPHONE ENCOUNTER
Reason for call: patient leaving on vacation Friday and will run out while away, he needs a script sent to local pharmacy to last him until he is back and the mail order can ship  [x] Refill   [] Prior Auth  [] Other:     Office:   [] PCP/Provider -   [x] Specialty/Provider - cardio    Medication:       Does the patient have enough for 3 days?   [] Yes   [x] No - Send as HP to POD

## 2024-07-16 LAB
MYCOBACTERIUM SPEC CULT: NORMAL
RHODAMINE-AURAMINE STN SPEC: NORMAL

## 2024-07-26 DIAGNOSIS — E78.5 DYSLIPIDEMIA: ICD-10-CM

## 2024-07-26 DIAGNOSIS — Z95.1 S/P CABG (CORONARY ARTERY BYPASS GRAFT): ICD-10-CM

## 2024-07-26 RX ORDER — FOLIC ACID 1 MG/1
1000 TABLET ORAL DAILY
Qty: 100 TABLET | Refills: 1 | Status: SHIPPED | OUTPATIENT
Start: 2024-07-26

## 2024-08-21 ENCOUNTER — TELEPHONE (OUTPATIENT)
Dept: ADMINISTRATIVE | Facility: OTHER | Age: 69
End: 2024-08-21

## 2024-08-21 ENCOUNTER — RA CDI HCC (OUTPATIENT)
Dept: OTHER | Facility: HOSPITAL | Age: 69
End: 2024-08-21

## 2024-08-21 NOTE — TELEPHONE ENCOUNTER
08/21/24 10:30 AM    Patient contacted to bring Advance Directive, POLST, or Living Will document to next scheduled pcp visit.[facility] Department left message.    Thank you.  Sushma Frank MA  [facility]

## 2024-08-27 ENCOUNTER — OFFICE VISIT (OUTPATIENT)
Dept: INTERNAL MEDICINE CLINIC | Age: 69
End: 2024-08-27
Payer: MEDICARE

## 2024-08-27 VITALS
TEMPERATURE: 97.3 F | HEIGHT: 68 IN | OXYGEN SATURATION: 97 % | BODY MASS INDEX: 27.74 KG/M2 | WEIGHT: 183 LBS | HEART RATE: 73 BPM | SYSTOLIC BLOOD PRESSURE: 140 MMHG | DIASTOLIC BLOOD PRESSURE: 68 MMHG

## 2024-08-27 DIAGNOSIS — Z95.1 S/P CABG (CORONARY ARTERY BYPASS GRAFT): ICD-10-CM

## 2024-08-27 DIAGNOSIS — I10 ESSENTIAL HYPERTENSION: ICD-10-CM

## 2024-08-27 DIAGNOSIS — K21.00 GASTROESOPHAGEAL REFLUX DISEASE WITH ESOPHAGITIS WITHOUT HEMORRHAGE: ICD-10-CM

## 2024-08-27 DIAGNOSIS — L02.92 FURUNCLE: Primary | ICD-10-CM

## 2024-08-27 DIAGNOSIS — I25.810 CORONARY ARTERY DISEASE INVOLVING CORONARY BYPASS GRAFT OF NATIVE HEART WITHOUT ANGINA PECTORIS: ICD-10-CM

## 2024-08-27 DIAGNOSIS — E78.5 DYSLIPIDEMIA: ICD-10-CM

## 2024-08-27 PROCEDURE — 99214 OFFICE O/P EST MOD 30 MIN: CPT | Performed by: PHYSICIAN ASSISTANT

## 2024-08-27 RX ORDER — CEPHALEXIN 500 MG/1
500 CAPSULE ORAL 3 TIMES DAILY
Qty: 30 CAPSULE | Refills: 0 | Status: SHIPPED | OUTPATIENT
Start: 2024-08-27 | End: 2024-09-06

## 2024-08-27 NOTE — PROGRESS NOTES
Assessment/Plan:         Diagnoses and all orders for this visit:    Furuncle  Comments:  unable to express for culture  continue warm compresses  +keflex x 10 days, if not improving will change abx  Orders:  -     cephalexin (KEFLEX) 500 mg capsule; Take 1 capsule (500 mg total) by mouth 3 (three) times a day for 10 days    Dyslipidemia  -     CBC and differential; Future  -     Comprehensive metabolic panel; Future  -     Lipid panel; Future  -     TSH, 3rd generation; Future  -     CBC and differential  -     Comprehensive metabolic panel  -     Lipid panel  -     TSH, 3rd generation    S/P CABG (coronary artery bypass graft)  -     CBC and differential; Future  -     Comprehensive metabolic panel; Future  -     Lipid panel; Future  -     TSH, 3rd generation; Future  -     CBC and differential  -     Comprehensive metabolic panel  -     Lipid panel  -     TSH, 3rd generation    Essential hypertension  -     CBC and differential; Future  -     Comprehensive metabolic panel; Future  -     Lipid panel; Future  -     TSH, 3rd generation; Future  -     CBC and differential  -     Comprehensive metabolic panel  -     Lipid panel  -     TSH, 3rd generation    Coronary artery disease involving coronary bypass graft of native heart without angina pectoris  -     CBC and differential; Future  -     Comprehensive metabolic panel; Future  -     Lipid panel; Future  -     TSH, 3rd generation; Future  -     CBC and differential  -     Comprehensive metabolic panel  -     Lipid panel  -     TSH, 3rd generation    Gastroesophageal reflux disease with esophagitis without hemorrhage  -     CBC and differential; Future  -     Comprehensive metabolic panel; Future  -     Lipid panel; Future  -     TSH, 3rd generation; Future  -     CBC and differential  -     Comprehensive metabolic panel  -     Lipid panel  -     TSH, 3rd generation          Subjective:      Patient ID: David S Cogan is a 69 y.o. male.    68 y/o male with hx of  CAD s/p CABG, htn, hld, asthma presents for f/u  Pt recently moved to [place] and is up visiting his mother this week  He has not yet set up a PCP / cardiologist in [place]     Pt plans to travel back and forth for a few months  He had planned to pursue cardiac rehab in [place] but after he called he was told it was past 30 days and he couldn't do so   We will reach out to local cardiac rehab to find out if they can make any suggestions   Pt reports he has swam a few times and felt great with this but has not resumed normal exercise   Wants to resume exercise in near future - prefers swimming    C/o boil on R chest wall, inferior to nipple, states started like pimple and used heat, broke open and drained and is now scabbed over but is slightly tender and red  Pt has not had any fever, night sweats  No drainage any longer   Had superficial incisional infx in May but this area is not near his incision which is all healed            The following portions of the patient's history were reviewed and updated as appropriate: allergies, current medications, past family history, past medical history, past social history, past surgical history, and problem list.    Review of Systems   Constitutional:  Negative for activity change, appetite change, chills, diaphoresis, fatigue and fever.   HENT:  Negative for congestion and sore throat.    Eyes:  Negative for pain and redness.   Respiratory:  Negative for cough and shortness of breath.    Cardiovascular:  Negative for chest pain and leg swelling.   Gastrointestinal:  Negative for abdominal pain, constipation, diarrhea and nausea.   Genitourinary:  Negative for dysuria.   Musculoskeletal:  Negative for arthralgias and back pain.   Skin:  Positive for wound (R chest wall). Negative for rash.   Neurological:  Negative for dizziness, light-headedness and headaches.   Psychiatric/Behavioral:  Negative for sleep disturbance. The patient is not nervous/anxious.          Past  Medical History:   Diagnosis Date    Asthma     Last Assessed:10/16/17    Fontanez's esophagus     Colonic polyp     Last Assessed:10/16/17    Eczema     GERD (gastroesophageal reflux disease)     Last Assessed:10/16/17    High cholesterol     Last Assessed:10/16/17    Hypertension     Last Assessed:10/16/17    Non-neoplastic nevus     Last Assessed:11/16/17         Current Outpatient Medications:     cephalexin (KEFLEX) 500 mg capsule, Take 1 capsule (500 mg total) by mouth 3 (three) times a day for 10 days, Disp: 30 capsule, Rfl: 0    ezetimibe (ZETIA) 10 mg tablet, Take 1 tablet (10 mg total) by mouth daily, Disp: 90 tablet, Rfl: 2    folic acid (FOLVITE) 1 mg tablet, TAKE 1 TABLET BY MOUTH DAILY, Disp: 100 tablet, Rfl: 1    metoprolol tartrate (LOPRESSOR) 25 mg tablet, Take 1 tablet (25 mg total) by mouth every 12 (twelve) hours, Disp: 180 tablet, Rfl: 2    metoprolol tartrate (LOPRESSOR) 25 mg tablet, Take 1 tablet (25 mg total) by mouth every 12 (twelve) hours, Disp: 60 tablet, Rfl: 0    mometasone (NASONEX) 50 mcg/act nasal spray, 2 sprays into each nostril daily, Disp: , Rfl:     Multiple Vitamins-Minerals (CENTRUM SILVER 50+MEN) TABS, Take 1 tablet by mouth daily, Disp: , Rfl:     polyethylene glycol (Golytely) 4000 mL solution, Take 4,000 mL by mouth once for 1 dose Take 4000 mL by mouth once for 1 dose. Use as directed, Disp: 4000 mL, Rfl: 0    rosuvastatin (CRESTOR) 40 MG tablet, Take 1 tablet (40 mg total) by mouth daily, Disp: 90 tablet, Rfl: 1    thiamine 100 MG tablet, Take 1 tablet (100 mg total) by mouth daily, Disp: 30 tablet, Rfl: 1    Allergies   Allergen Reactions    Amoxicillin      Other reaction(s): Diarrhea    Amoxicillin-Pot Clavulanate      Other reaction(s): Allergy Date : 07/27/2015   GI    Penicillins      Annotation - [id number]: Diarrhea       Social History   Past Surgical History:   Procedure Laterality Date    CARDIAC CATHETERIZATION Left 04/18/2024    Procedure: Cardiac Left Heart  "Cath;  Surgeon: Milagros Prather DO;  [facility];  Service: Cardiology    COLONOSCOPY      10/2013- egd/colon    LYMPHADENECTOMY      Axillary Lymph node removed-benign    [place] CORONARY ARTERY BYP W/VEIN & ARTERY GRAFT 3 VEIN N/A 04/23/2024    Procedure: CORONARY ARTERY BYPASS GRAFT X2 VESSELS UTILIZING LIMA TO LAD, SVG TO OM3;  Surgeon: Balwinder Sky DO;  [facility];  Service: Cardiac Surgery    VA STERNAL DEBRIDEMENT N/A 05/30/2024    Procedure: LOCAL WOUND DEBRIDEMENT STERNAL;  Surgeon: Balwinder Sky DO;  [facility];  Service: Cardiac Surgery    TONSILLECTOMY      Last Assessed:10/16/17    UPPER GASTROINTESTINAL ENDOSCOPY      VALVE REPLACEMENT      Heart Valve Replacement; Last Assessed:10/16/17     Family History   Problem Relation Age of Onset    Cancer Father         groin, bone    Diabetes Paternal Grandfather        Objective:  /68 (BP Location: Right arm, Patient Position: Sitting, Cuff Size: Standard)   Pulse 73   Temp (!) 97.3 °F (36.3 °C) (Temporal)   Ht 5' 8\" (1.727 m)   Wt 83 kg (183 lb)   SpO2 97%   BMI 27.83 kg/m²        Physical Exam  Vitals reviewed.   Constitutional:       General: He is not in acute distress.  HENT:      Head: Normocephalic and atraumatic.      Nose: Nose normal. No congestion.      Mouth/Throat:      Mouth: Mucous membranes are moist.   Eyes:      General:         Right eye: No discharge.      Extraocular Movements: Extraocular movements intact.      Conjunctiva/sclera: Conjunctivae normal.      Pupils: Pupils are equal, round, and reactive to light.   Cardiovascular:      Rate and Rhythm: Normal rate and regular rhythm.   Pulmonary:      Effort: Pulmonary effort is normal. No respiratory distress.      Breath sounds: Normal breath sounds. No wheezing or rales.   Musculoskeletal:      Cervical back: Normal range of motion.      Right lower leg: No edema.      Left lower leg: No edema.   Skin:     Findings: Erythema (r chest " wall - furuncle - eschar present - no draiange, mild erythema around site) present.   Neurological:      General: No focal deficit present.      Mental Status: He is alert and oriented to person, place, and time.

## 2024-09-12 ENCOUNTER — PATIENT MESSAGE (OUTPATIENT)
Dept: INTERNAL MEDICINE CLINIC | Age: 69
End: 2024-09-12

## 2024-09-27 NOTE — PATIENT COMMUNICATION
Left detailed message on machine  Read aMday's message  Asked patient to call back to confirm he got the message and if he has questions.

## 2024-10-11 ENCOUNTER — TELEPHONE (OUTPATIENT)
Age: 69
End: 2024-10-11

## 2024-10-11 NOTE — TELEPHONE ENCOUNTER
Pt moved to [place] and wanted to schedule with a cardiologist. They requested record to be fax to [phone]

## 2024-12-24 DIAGNOSIS — E78.5 DYSLIPIDEMIA: ICD-10-CM

## 2024-12-24 RX ORDER — ROSUVASTATIN CALCIUM 40 MG/1
40 TABLET, COATED ORAL DAILY
Qty: 90 TABLET | Refills: 3 | Status: SHIPPED | OUTPATIENT
Start: 2024-12-24

## 2025-01-16 DIAGNOSIS — Z95.1 S/P CABG (CORONARY ARTERY BYPASS GRAFT): ICD-10-CM

## 2025-01-16 DIAGNOSIS — E78.5 DYSLIPIDEMIA: ICD-10-CM

## 2025-01-16 RX ORDER — FOLIC ACID 1 MG/1
1000 TABLET ORAL DAILY
Qty: 90 TABLET | Refills: 3 | OUTPATIENT
Start: 2025-01-16

## 2025-02-25 DIAGNOSIS — E78.5 DYSLIPIDEMIA: ICD-10-CM

## 2025-02-25 DIAGNOSIS — Z95.1 S/P CABG (CORONARY ARTERY BYPASS GRAFT): ICD-10-CM

## 2025-02-26 RX ORDER — FOLIC ACID 1 MG/1
1000 TABLET ORAL DAILY
Qty: 90 TABLET | Refills: 1 | Status: SHIPPED | OUTPATIENT
Start: 2025-02-26

## 2025-02-26 RX ORDER — METOPROLOL TARTRATE 25 MG/1
25 TABLET, FILM COATED ORAL 2 TIMES DAILY
Qty: 180 TABLET | Refills: 0 | Status: SHIPPED | OUTPATIENT
Start: 2025-02-26

## (undated) DEVICE — ANTIBACTERIAL UNDYED BRAIDED (POLYGLACTIN 910), SYNTHETIC ABSORBABLE SUTURE: Brand: COATED VICRYL

## (undated) DEVICE — JP CHANNEL DRAIN, 24FR HUBLESS: Brand: CARDINAL HEALTH

## (undated) DEVICE — BLADE BEAVER MINI SZ 69

## (undated) DEVICE — RECIP.STERNUM SAW BLADE 34/7.5/0.7MM: Brand: AESCULAP

## (undated) DEVICE — SUT PROLENE 4-0 BB 36 IN 8581H

## (undated) DEVICE — RADIFOCUS OPTITORQUE ANGIOGRAPHIC CATHETER: Brand: OPTITORQUE

## (undated) DEVICE — SUT PROLENE 6-0 C-1/C-1 30 IN 8307H

## (undated) DEVICE — LIGHT HANDLE COVER SLEEVE DISP BLUE STELLAR

## (undated) DEVICE — GLIDESHEATH BASIC HYDROPHILIC COATED INTRODUCER SHEATH: Brand: GLIDESHEATH

## (undated) DEVICE — 32 FR RIGHT ANGLE – SOFT PVC CATHETER: Brand: PVC THORACIC CATHETERS

## (undated) DEVICE — FILTER SMOKE EVAC VIROSAFE

## (undated) DEVICE — 3000CC GUARDIAN II: Brand: GUARDIAN

## (undated) DEVICE — TRAY FOLEY 16FR SURESTEP TEMP SENS URIMETER STAT LOK

## (undated) DEVICE — INTENDED FOR TISSUE SEPARATION, AND OTHER PROCEDURES THAT REQUIRE A SHARP SURGICAL BLADE TO PUNCTURE OR CUT.: Brand: BARD-PARKER ® CARBON RIB-BACK BLADES

## (undated) DEVICE — PUMP TUBING FUSION PACK

## (undated) DEVICE — SYRINGE 50ML LL

## (undated) DEVICE — GLOVE SRG BIOGEL ECLIPSE 8

## (undated) DEVICE — BONE WAX WHITE: Brand: BONE WAX WHITE

## (undated) DEVICE — BETHLEHEM MAJOR GENERAL PACK: Brand: CARDINAL HEALTH

## (undated) DEVICE — ELECTRODE BLADE E-Z CLEAN 4IN -0014A

## (undated) DEVICE — ACE WRAP 6 IN UNSTERILE

## (undated) DEVICE — PLUMEPEN PRO 10FT

## (undated) DEVICE — PLEDGET CARDIO PTFE 9.5 X 4.8 SOFT LF (6EA/PK)

## (undated) DEVICE — SILVER-COATED ANTIBACTERIAL BARRIER DRESSING: Brand: ACTICOAT SURGIC 10X12CM 5PK US

## (undated) DEVICE — GUIDEWIRE WHOLEY HI TORQUE INTERM MOD J .035 145CM

## (undated) DEVICE — DRESSING ALLEVYN LIFE HEEL 25 X 25.2CM

## (undated) DEVICE — ALCON OPHTHALMIC KNIFE 15 °: Brand: ALCON

## (undated) DEVICE — SILVER-COATED ANTIBACTERIAL BARRIER DRESSING: Brand: ACTICOAT SURGIC 10X25CM 5PK US

## (undated) DEVICE — SUT PDS PLUS 1 CTB 36 IN PDPB359T

## (undated) DEVICE — EVERGRIP INSERT SET 86MM: Brand: FOGARTY EVERGRIP

## (undated) DEVICE — SUT PROLENE 4-0 RB-1/RB-1 36 IN 8557H

## (undated) DEVICE — PACK CUSTOM PERFUSION PLEG PK

## (undated) DEVICE — INTENDED FOR TISSUE SEPARATION, AND OTHER PROCEDURES THAT REQUIRE A SHARP SURGICAL BLADE TO PUNCTURE OR CUT.: Brand: BARD-PARKER SAFETY BLADES SIZE 15, STERILE

## (undated) DEVICE — OASIS DRAIN, SINGLE, INLINE & ATS COMPATIBLE: Brand: OASIS

## (undated) DEVICE — THERMOFLECT BLANKET, L, 25EA                               TS THERMOFLECT BLANKET, 48" X 84", SILVER, 5/BG, 5 BG/CS NW: Brand: THERMOFLECT

## (undated) DEVICE — SUT SILK 0 CT-1 30 IN 424H

## (undated) DEVICE — PACK CABG PBDS

## (undated) DEVICE — TUBING INSUFFLATION SET ISO CONNECTOR

## (undated) DEVICE — STERNAL WIRE

## (undated) DEVICE — GAUZE SPONGES,16 PLY: Brand: CURITY

## (undated) DEVICE — 2000CC GUARDIAN II: Brand: GUARDIAN

## (undated) DEVICE — DRESSING ACTICOAT AG 4 X 5 IN

## (undated) DEVICE — RED RUBBER URETHRAL CATHETER: Brand: DOVER

## (undated) DEVICE — BLANKET HYPOTHERMIA ADULT GAYMAR

## (undated) DEVICE — SUT PROLENE 5-0 C-1/C-1 36 IN 8321H

## (undated) DEVICE — SUT SILK 3-0 SH CR/8 18 IN C013D

## (undated) DEVICE — MEDI-VAC YANK SUCT HNDL W/TPRD BULBOUS TIP: Brand: CARDINAL HEALTH

## (undated) DEVICE — SUT MONOCRYL PLUS 3-0 PS-2 27 IN MCP427H

## (undated) DEVICE — AIRLIFE™ TRI-FLO™ SUCTION CATHETER: Brand: AIRLIFE™

## (undated) DEVICE — GLOVE INDICATOR PI UNDERGLOVE SZ 8 BLUE

## (undated) DEVICE — VASOVIEW HEMOPRO 2: Brand: VASOVIEW HEMOPRO 2

## (undated) DEVICE — PENCIL ELECTROSURG E-Z CLEAN -0035H

## (undated) DEVICE — CATH GUIDE LAUNCHER 6FR EBU 3.5

## (undated) DEVICE — DRAPE SHEET THREE QUARTER

## (undated) DEVICE — AORTIC PUNCH 5.2 MM DISP

## (undated) DEVICE — SUT ETHIBOND 2-0 SH-1/SH-1 30 IN X763H

## (undated) DEVICE — SILVER-COATED ANTIBACTERIAL BARRIER DRESSING: Brand: ACTICOAT SURGIC 10X35CM 5PK US

## (undated) DEVICE — SUT MONOCRYL PLUS 4-0 PS-2 18 IN MCP496G

## (undated) DEVICE — 40601 PROLONGED POSITIONING SYSTEM: Brand: 40601 PROLONGED POSITIONING SYSTEM

## (undated) DEVICE — CANISTER PREVENA PLUS 150ML

## (undated) DEVICE — SUT SILK 2 60 IN SA8H

## (undated) DEVICE — TR BAND RADIAL ARTERY COMPRESSION DEVICE: Brand: TR BAND

## (undated) DEVICE — 32 FR STRAIGHT – SOFT PVC CATHETER: Brand: PVC THORACIC CATHETERS

## (undated) DEVICE — SUT PROLENE 7-0 BV175-8/BV175-8 24 IN EPM8747

## (undated) DEVICE — ADHESIVE SKIN HIGH VISCOSITY EXOFIN 1ML

## (undated) DEVICE — LIGACLIP MCA MULTIPLE CLIP APPLIERS, 20 SMALL CLIPS: Brand: LIGACLIP

## (undated) DEVICE — PACK CUSTOM PERFUSION ANH

## (undated) DEVICE — CHLORAPREP HI-LITE 26ML ORANGE